# Patient Record
Sex: MALE | Race: WHITE | Employment: OTHER | URBAN - METROPOLITAN AREA
[De-identification: names, ages, dates, MRNs, and addresses within clinical notes are randomized per-mention and may not be internally consistent; named-entity substitution may affect disease eponyms.]

---

## 2020-01-05 ENCOUNTER — HOSPITAL ENCOUNTER (EMERGENCY)
Dept: GENERAL RADIOLOGY | Age: 70
Discharge: HOME | End: 2020-01-05
Attending: PHYSICIAN ASSISTANT
Payer: MEDICARE

## 2020-01-05 ENCOUNTER — APPOINTMENT (OUTPATIENT)
Dept: CT IMAGING | Age: 70
End: 2020-01-05
Attending: PHYSICIAN ASSISTANT
Payer: MEDICARE

## 2020-01-05 ENCOUNTER — HOSPITAL ENCOUNTER (EMERGENCY)
Age: 70
Discharge: HOME | End: 2020-01-05
Attending: EMERGENCY MEDICINE
Payer: MEDICARE

## 2020-01-05 ENCOUNTER — APPOINTMENT (OUTPATIENT)
Dept: GENERAL RADIOLOGY | Age: 70
End: 2020-01-05
Attending: PHYSICIAN ASSISTANT
Payer: MEDICARE

## 2020-01-05 VITALS
OXYGEN SATURATION: 98 % | HEART RATE: 68 BPM | SYSTOLIC BLOOD PRESSURE: 135 MMHG | RESPIRATION RATE: 18 BRPM | TEMPERATURE: 98 F | HEIGHT: 70 IN | BODY MASS INDEX: 37.94 KG/M2 | DIASTOLIC BLOOD PRESSURE: 73 MMHG | WEIGHT: 265 LBS

## 2020-01-05 DIAGNOSIS — S80.211A ABRASION OF RIGHT KNEE, INITIAL ENCOUNTER: ICD-10-CM

## 2020-01-05 DIAGNOSIS — R55 VASOVAGAL EPISODE: ICD-10-CM

## 2020-01-05 DIAGNOSIS — S80.01XA CONTUSION OF RIGHT KNEE AND LOWER LEG, INITIAL ENCOUNTER: Primary | ICD-10-CM

## 2020-01-05 DIAGNOSIS — S80.11XA CONTUSION OF RIGHT KNEE AND LOWER LEG, INITIAL ENCOUNTER: Primary | ICD-10-CM

## 2020-01-05 LAB
ALBUMIN SERPL-MCNC: 4.3 G/DL (ref 3.5–5)
ALBUMIN/GLOB SERPL: 1.3 {RATIO} (ref 1.1–2.2)
ALP SERPL-CCNC: 54 U/L (ref 45–117)
ALT SERPL-CCNC: 30 U/L (ref 12–78)
ANION GAP SERPL CALC-SCNC: 9 MMOL/L (ref 5–15)
APPEARANCE UR: CLEAR
AST SERPL-CCNC: 24 U/L (ref 15–37)
BACTERIA URNS QL MICRO: NEGATIVE /HPF
BASOPHILS # BLD: 0.1 K/UL (ref 0–0.1)
BASOPHILS NFR BLD: 0 % (ref 0–1)
BILIRUB SERPL-MCNC: 1 MG/DL (ref 0.2–1)
BILIRUB UR QL: NEGATIVE
BUN SERPL-MCNC: 27 MG/DL (ref 6–20)
BUN/CREAT SERPL: 25 (ref 12–20)
CALCIUM SERPL-MCNC: 9 MG/DL (ref 8.5–10.1)
CHLORIDE SERPL-SCNC: 103 MMOL/L (ref 97–108)
CO2 SERPL-SCNC: 24 MMOL/L (ref 21–32)
COLOR UR: NORMAL
CREAT SERPL-MCNC: 1.07 MG/DL (ref 0.7–1.3)
DIFFERENTIAL METHOD BLD: ABNORMAL
EOSINOPHIL # BLD: 0 K/UL (ref 0–0.4)
EOSINOPHIL NFR BLD: 0 % (ref 0–7)
EPITH CASTS URNS QL MICRO: NORMAL /LPF
ERYTHROCYTE [DISTWIDTH] IN BLOOD BY AUTOMATED COUNT: 13.9 % (ref 11.5–14.5)
GLOBULIN SER CALC-MCNC: 3.4 G/DL (ref 2–4)
GLUCOSE SERPL-MCNC: 125 MG/DL (ref 65–100)
GLUCOSE UR STRIP.AUTO-MCNC: NEGATIVE MG/DL
HCT VFR BLD AUTO: 42.8 % (ref 36.6–50.3)
HGB BLD-MCNC: 14.7 G/DL (ref 12.1–17)
HGB UR QL STRIP: NEGATIVE
HYALINE CASTS URNS QL MICRO: NORMAL /LPF (ref 0–5)
IMM GRANULOCYTES # BLD AUTO: 0.1 K/UL (ref 0–0.04)
IMM GRANULOCYTES NFR BLD AUTO: 0 % (ref 0–0.5)
KETONES UR QL STRIP.AUTO: NEGATIVE MG/DL
LEUKOCYTE ESTERASE UR QL STRIP.AUTO: NEGATIVE
LYMPHOCYTES # BLD: 1.3 K/UL (ref 0.8–3.5)
LYMPHOCYTES NFR BLD: 9 % (ref 12–49)
MCH RBC QN AUTO: 28.4 PG (ref 26–34)
MCHC RBC AUTO-ENTMCNC: 34.3 G/DL (ref 30–36.5)
MCV RBC AUTO: 82.8 FL (ref 80–99)
MONOCYTES # BLD: 0.6 K/UL (ref 0–1)
MONOCYTES NFR BLD: 4 % (ref 5–13)
NEUTS SEG # BLD: 12.9 K/UL (ref 1.8–8)
NEUTS SEG NFR BLD: 87 % (ref 32–75)
NITRITE UR QL STRIP.AUTO: NEGATIVE
NRBC # BLD: 0 K/UL (ref 0–0.01)
NRBC BLD-RTO: 0 PER 100 WBC
PH UR STRIP: 5.5 [PH] (ref 5–8)
PLATELET # BLD AUTO: 266 K/UL (ref 150–400)
PMV BLD AUTO: 9.4 FL (ref 8.9–12.9)
POTASSIUM SERPL-SCNC: 4.4 MMOL/L (ref 3.5–5.1)
PROT SERPL-MCNC: 7.7 G/DL (ref 6.4–8.2)
PROT UR STRIP-MCNC: NEGATIVE MG/DL
RBC # BLD AUTO: 5.17 M/UL (ref 4.1–5.7)
RBC #/AREA URNS HPF: NORMAL /HPF (ref 0–5)
SODIUM SERPL-SCNC: 136 MMOL/L (ref 136–145)
SP GR UR REFRACTOMETRY: 1.03 (ref 1–1.03)
TROPONIN I SERPL-MCNC: <0.05 NG/ML
UR CULT HOLD, URHOLD: NORMAL
UROBILINOGEN UR QL STRIP.AUTO: 0.2 EU/DL (ref 0.2–1)
WBC # BLD AUTO: 14.9 K/UL (ref 4.1–11.1)
WBC URNS QL MICRO: NORMAL /HPF (ref 0–4)

## 2020-01-05 PROCEDURE — 93005 ELECTROCARDIOGRAM TRACING: CPT

## 2020-01-05 PROCEDURE — 70450 CT HEAD/BRAIN W/O DYE: CPT

## 2020-01-05 PROCEDURE — 73562 X-RAY EXAM OF KNEE 3: CPT

## 2020-01-05 PROCEDURE — 99284 EMERGENCY DEPT VISIT MOD MDM: CPT

## 2020-01-05 PROCEDURE — 80053 COMPREHEN METABOLIC PANEL: CPT

## 2020-01-05 PROCEDURE — 81001 URINALYSIS AUTO W/SCOPE: CPT

## 2020-01-05 PROCEDURE — 85025 COMPLETE CBC W/AUTO DIFF WBC: CPT

## 2020-01-05 PROCEDURE — 84484 ASSAY OF TROPONIN QUANT: CPT

## 2020-01-05 PROCEDURE — 73590 X-RAY EXAM OF LOWER LEG: CPT

## 2020-01-05 PROCEDURE — 74011000250 HC RX REV CODE- 250: Performed by: PHYSICIAN ASSISTANT

## 2020-01-05 PROCEDURE — 36415 COLL VENOUS BLD VENIPUNCTURE: CPT

## 2020-01-05 RX ORDER — CEPHALEXIN 500 MG/1
500 CAPSULE ORAL 3 TIMES DAILY
Qty: 21 CAP | Refills: 0 | Status: SHIPPED | OUTPATIENT
Start: 2020-01-05 | End: 2020-01-12

## 2020-01-05 RX ORDER — BACITRACIN 500 UNIT/G
1 PACKET (EA) TOPICAL
Status: COMPLETED | OUTPATIENT
Start: 2020-01-05 | End: 2020-01-05

## 2020-01-05 RX ADMIN — BACITRACIN 1 PACKET: 500 OINTMENT TOPICAL at 17:10

## 2020-01-05 NOTE — ED PROVIDER NOTES
81 y.o. male with past medical history significant for HTN and diabetes who presents from home with chief complaint of right knee pain. Patient was at [employer] this morning and \"was coughing multiple times, lost consciousness for a second, falling to the ground hitting his head and right knee. \" Patient notes he has episodes like this before \"including coughing so much he loses consciousness for a second. \" Patient presents to [facility] ED c/o right knee pain/swelling and laceration to right side of forehead. Patient notes he took Advil with no relief. Patient endorses his Tetanus shot is UTD. Patient denies anticoagulant use. He states he has seen a cardiologist a few years ago due to syncope and had a negative stress test, this was at [facility]. He denies headache, dizziness, vomiting, neck/back pain, flank pain, CP, SOB, ABD pain, difficulty ambulating. There are no other acute medical concerns at this time. Social hx: No Tobacco use, (+) EtOH use, No illicit drug use. PCP: Kaitlynn Talley MD  He has an orthopedist.    Note written by Gibson Goldberg, Scribe, as dictated by TAHMINA De La Fuente 2:29 PM     The history is provided by the patient. No  was used.         Past Medical History:   Diagnosis Date    Arthritis     Chest pain, unspecified     Essential hypertension, benign     Hypertension     NIDDM (non-insulin dependent diabetes mellitus)     Obesity, unspecified     Other and unspecified hyperlipidemia     Sleep apnea     C PAP    Thyroid disease     Unspecified adverse effect of anesthesia     low oxygenation/fighting       Past Surgical History:   Procedure Laterality Date    ABDOMEN SURGERY PROC UNLISTED      dbl hernia repair as infant    HX ANKLE FRACTURE TX Right     Replacement    HX APPENDECTOMY  APR 2013    HX CHOLECYSTECTOMY      HX ORTHOPAEDIC      left knee     HX OTHER SURGICAL      deviated septum         Family History:   Problem Relation Age of Onset    Emphysema Mother    [place] Other Mother         enlarged heart    Heart Attack Maternal Grandmother        Social History     Socioeconomic History    Marital status:      Spouse name: Not on file    Number of children: Not on file    Years of education: Not on file    Highest education level: Not on file   Occupational History    Not on file   Social Needs    Financial resource strain: Not on file    Food insecurity:     Worry: Not on file     Inability: Not on file    Transportation needs:     Medical: Not on file     Non-medical: Not on file   Tobacco Use    Smoking status: Never Smoker    Smokeless tobacco: Never Used   Substance and Sexual Activity    Alcohol use: Yes     Comment: rarely    Drug use: No    Sexual activity: Not on file   Lifestyle    Physical activity:     Days per week: Not on file     Minutes per session: Not on file    Stress: Not on file   Relationships    Social connections:     Talks on phone: Not on file     Gets together: Not on file     Attends Jehovah's witness service: Not on file     Active member of club or organization: Not on file     Attends meetings of clubs or organizations: Not on file     Relationship status: Not on file    Intimate partner violence:     Fear of current or ex partner: Not on file     Emotionally abused: Not on file     Physically abused: Not on file     Forced sexual activity: Not on file   Other Topics Concern    Not on file   Social History Narrative    Not on file         ALLERGIES: Codeine    Review of Systems   Constitutional: Negative for activity change and fever. HENT: Negative for congestion. Respiratory: Negative for shortness of breath. Cardiovascular: Negative for chest pain. Gastrointestinal: Negative for abdominal pain. Musculoskeletal: Negative for back pain, gait problem and neck pain. Right knee swelling and pain. Skin: Positive for wound. Rash: laceration. Neurological: Positive for syncope.  Negative for headaches. All other systems reviewed and are negative. There were no vitals filed for this visit. Physical Exam  Vitals signs and nursing note reviewed. Constitutional:       General: He is not in acute distress. Appearance: He is well-developed. He is not toxic-appearing or diaphoretic. Comments: WM   HENT:      Head: Normocephalic. Comments: 2 linear areas of ecchymosis to R lateral maxilla; no man bile instability; normal bite     Right Ear: Tympanic membrane normal.      Left Ear: Tympanic membrane normal.      Mouth/Throat:      Mouth: Mucous membranes are moist.   Eyes:      General:         Right eye: No discharge. Left eye: No discharge. Conjunctiva/sclera: Conjunctivae normal.      Pupils: Pupils are equal, round, and reactive to light. Neck:      Musculoskeletal: Full passive range of motion without pain and normal range of motion. Trachea: No tracheal tenderness. Comments: No midline spinous process TTP throughout. Cardiovascular:      Rate and Rhythm: Normal rate and regular rhythm. Pulses: Normal pulses. Heart sounds: Normal heart sounds. No murmur. No friction rub. No gallop. Pulmonary:      Effort: Pulmonary effort is normal. No respiratory distress. Breath sounds: Normal breath sounds. No wheezing or rales. Chest:      Chest wall: No tenderness. Abdominal:      General: Bowel sounds are normal. There is no distension. Palpations: Abdomen is soft. Tenderness: There is no tenderness. There is no guarding or rebound. Musculoskeletal: Normal range of motion. General: No tenderness. Comments: R knee with joint effusion, large dry abrasion to prepatellar region; FROM; 2 abrasions to R anterior shin; NVI throughout. L knee- TKR scar, non-tender; FROM of all extremities. Ambulates without difficulty. Skin:     General: Skin is warm and dry.       Capillary Refill: Capillary refill takes less than 2 seconds. Findings: No abrasion, erythema or rash. Neurological:      Mental Status: He is alert and oriented to person, place, and time. Cranial Nerves: No cranial nerve deficit. Sensory: No sensory deficit. Coordination: Coordination normal.   Psychiatric:         Speech: Speech normal.         Behavior: Behavior normal.          MDM  Number of Diagnoses or Management Options  Abrasion of right knee, initial encounter:   Contusion of right knee and lower leg, initial encounter:   Vasovagal episode:   Diagnosis management comments:   Ddx: frx, contusion, abrasion       Amount and/or Complexity of Data Reviewed  Clinical lab tests: reviewed and ordered  Tests in the radiology section of CPT®: reviewed and ordered  Review and summarize past medical records: yes  Discuss the patient with other providers: yes    Patient Progress  Patient progress: stable         Procedures      I discussed patient's PMH, exam findings as well as careplan with the ER attending who agrees with care plan. Luana Ovalle PA-C    EKG interpretation:   Rhythm: sinus bradycardia; and regular . Rate (approx.): 57; Axis: normal; P wave: normal; QRS interval: normal ; ST/T wave: non-specific changes. Interpreted by Dr. Beto Palacios:  Recent Results (from the past 12 hour(s))   CBC WITH AUTOMATED DIFF    Collection Time: 01/05/20  3:35 PM   Result Value Ref Range    WBC 14.9 (H) 4.1 - 11.1 K/uL    RBC 5.17 4.10 - 5.70 M/uL    HGB 14.7 12.1 - 17.0 g/dL    HCT 42.8 36.6 - 50.3 %    MCV 82.8 80.0 - 99.0 FL    MCH 28.4 26.0 - 34.0 PG    MCHC 34.3 30.0 - 36.5 g/dL    RDW 13.9 11.5 - 14.5 %    PLATELET 939 618 - 046 K/uL    MPV 9.4 8.9 - 12.9 FL    NRBC 0.0 0  WBC    ABSOLUTE NRBC 0.00 0.00 - 0.01 K/uL    NEUTROPHILS 87 (H) 32 - 75 %    LYMPHOCYTES 9 (L) 12 - 49 %    MONOCYTES 4 (L) 5 - 13 %    EOSINOPHILS 0 0 - 7 %    BASOPHILS 0 0 - 1 %    IMMATURE GRANULOCYTES 0 0.0 - 0.5 %    ABS.  NEUTROPHILS 12.9 (H) 1.8 - 8.0 K/UL    ABS. LYMPHOCYTES 1.3 0.8 - 3.5 K/UL    ABS. MONOCYTES 0.6 0.0 - 1.0 K/UL    ABS. EOSINOPHILS 0.0 0.0 - 0.4 K/UL    ABS. BASOPHILS 0.1 0.0 - 0.1 K/UL    ABS. IMM. GRANS. 0.1 (H) 0.00 - 0.04 K/UL    DF AUTOMATED     METABOLIC PANEL, COMPREHENSIVE    Collection Time: 01/05/20  3:35 PM   Result Value Ref Range    Sodium 136 136 - 145 mmol/L    Potassium 4.4 3.5 - 5.1 mmol/L    Chloride 103 97 - 108 mmol/L    CO2 24 21 - 32 mmol/L    Anion gap 9 5 - 15 mmol/L    Glucose 125 (H) 65 - 100 mg/dL    BUN 27 (H) 6 - 20 MG/DL    Creatinine 1.07 0.70 - 1.30 MG/DL    BUN/Creatinine ratio 25 (H) 12 - 20      GFR est AA >60 >60 ml/min/1.73m2    GFR est non-AA >60 >60 ml/min/1.73m2    Calcium 9.0 8.5 - 10.1 MG/DL    Bilirubin, total 1.0 0.2 - 1.0 MG/DL    ALT (SGPT) 30 12 - 78 U/L    AST (SGOT) 24 15 - 37 U/L    Alk. phosphatase 54 45 - 117 U/L    Protein, total 7.7 6.4 - 8.2 g/dL    Albumin 4.3 3.5 - 5.0 g/dL    Globulin 3.4 2.0 - 4.0 g/dL    A-G Ratio 1.3 1.1 - 2.2     TROPONIN I    Collection Time: 01/05/20  3:35 PM   Result Value Ref Range    Troponin-I, Qt. <0.05 <0.05 ng/mL   URINE CULTURE HOLD SAMPLE    Collection Time: 01/05/20  4:51 PM   Result Value Ref Range    Urine culture hold        URINE ON HOLD IN MICROBIOLOGY DEPT FOR 3 DAYS. IF UNPRESERVED URINE IS SUBMITTED, IT CANNOT BE USED FOR ADDITIONAL TESTING AFTER 24 HRS, RECOLLECTION WILL BE REQUIRED. MEDICATIONS GIVEN:  Medications   bacitracin 500 unit/gram packet 1 Packet (1 Packet Topical Given 1/5/20 1710)         DISCHARGE NOTE:  5:14 PM  The patient's results have been reviewed with them and/or available family. Patient and/or family verbally conveyed their understanding and agreement of the patient's signs, symptoms, diagnosis, treatment and prognosis and additionally agree to follow up as recommended in the discharge instructions or to return to the Emergency Room should their condition change prior to their follow-up appointment.  The patient/family verbally agrees with the care-plan and verbally conveys that all of their questions have been answered. The discharge instructions have also been provided to the patient and/or family with some educational information regarding the patient's diagnosis as well a list of reasons why the patient would want to return to the ER prior to their follow-up appointment, should their condition change. Plan:  1. F/U with pcp, cardiology due to probably vasovagal episode  2.  Return precautions discussed and advised to return to ER if worse

## 2020-01-05 NOTE — DISCHARGE INSTRUCTIONS
Patient Education        Fainting: Care Instructions  Your Care Instructions    When you faint, or pass out, you lose consciousness for a short time. A brief drop in blood flow to the brain often causes it. When you fall or lie down, more blood flows to your brain and you regain consciousness. Emotional stress, pain, or overheating--especially if you have been standing--can make you faint. In these cases, fainting is usually not serious. But fainting can be a sign of a more serious problem. Your doctor may want you to have more tests to rule out other causes. The treatment you need depends on the reason why you fainted. The doctor has checked you carefully, but problems can develop later. If you notice any problems or new symptoms, get medical treatment right away. Follow-up care is a key part of your treatment and safety. Be sure to make and go to all appointments, and call your doctor if you are having problems. It's also a good idea to know your test results and keep a list of the medicines you take. How can you care for yourself at home? · Drink plenty of fluids to prevent dehydration. If you have kidney, heart, or liver disease and have to limit fluids, talk with your doctor before you increase your fluid intake. When should you call for help? Call 911 anytime you think you may need emergency care. For example, call if:    · You have symptoms of a heart problem. These may include:  ? Chest pain or pressure. ? Severe trouble breathing. ? A fast or irregular heartbeat. ? Lightheadedness or sudden weakness. ? Coughing up pink, foamy mucus. ? Passing out. After you call 911, the  may tell you to chew 1 adult-strength or 2 to 4 low-dose aspirin. Wait for an ambulance. Do not try to drive yourself.     · You have symptoms of a stroke. These may include:  ? Sudden numbness, tingling, weakness, or loss of movement in your face, arm, or leg, especially on only one side of your body.   ? Sudden vision changes. ? Sudden trouble speaking. ? Sudden confusion or trouble understanding simple statements. ? Sudden problems with walking or balance. ? A sudden, severe headache that is different from past headaches.     · You passed out (lost consciousness) again.    Watch closely for changes in your health, and be sure to contact your doctor if:    · You do not get better as expected. Where can you learn more? Go to http://harshil-naomi.info/. Enter A028 in the search box to learn more about \"Fainting: Care Instructions. \"  Current as of: June 26, 2019  Content Version: 12.2  © 2996-1639 [employer]. Care instructions adapted under license by [employer] (which disclaims liability or warranty for this information). If you have questions about a medical condition or this instruction, always ask your healthcare professional. [address] any warranty or liability for your use of this information. Patient Education     Contusion: Care Instructions  Your Care Instructions  Contusion is the medical term for a bruise. It is the result of a direct blow or an impact, such as a fall. Contusions are common sports injuries. Most people think of a bruise as a black-and-blue spot. This happens when small blood vessels get torn and leak blood under the skin. But bones, muscles, and organs can also get bruised. This may damage deep tissues but not cause a bruise you can see. The doctor will do a physical exam to find the location of your contusion. You may also have tests to make sure you do not have a more serious injury, such as a broken bone or nerve damage. These may include X-rays or other imaging tests like a CT scan or MRI. Deep-tissue contusions may cause pain and swelling. But if there is no serious damage, they will often get better in a few weeks with home treatment. The doctor has checked you carefully, but problems can develop later. If you notice any problems or new symptoms, get medical treatment right away. Follow-up care is a key part of your treatment and safety. Be sure to make and go to all appointments, and call your doctor if you are having problems. It's also a good idea to know your test results and keep a list of the medicines you take. How can you care for yourself at home? · Put ice or a cold pack on the sore area for 10 to 20 minutes at a time to stop swelling. Put a thin cloth between the ice pack and your skin. · Be safe with medicines. Read and follow all instructions on the label. ¨ If the doctor gave you a prescription medicine for pain, take it as prescribed. ¨ If you are not taking a prescription pain medicine, ask your doctor if you can take an over-the-counter medicine. · If you can, prop up the sore area on pillows as much as possible for the next few days. Try to keep the sore area above the level of your heart. When should you call for help? Call your doctor now or seek immediate medical care if:  · Your pain gets worse. · You have new or worse swelling. · You have tingling, weakness, or numbness in the area near the contusion. · The area near the contusion is cold or pale. Watch closely for changes in your health, and be sure to contact your doctor if:  · You do not get better as expected. Where can you learn more? Go to Tuee.be  Enter [id number] in the search box to learn more about \"Contusion: Care Instructions. \"   © 2277-1381 [employer]. Care instructions adapted under license by [employer] (which disclaims liability or warranty for this information). This care instruction is for use with your licensed healthcare professional. If you have questions about a medical condition or this instruction, always ask your healthcare professional. [address] any warranty or liability for your use of this information.   Content Version: 98.7.094523; Current as of: May 22, 2015           Patient Education        Scrapes (Abrasions): Care Instructions  Your Care Instructions  Scrapes (abrasions) are wounds where your skin has been rubbed or torn off. Most scrapes do not go deep into the skin, but some may remove several layers of skin. Scrapes usually don't bleed much, but they may ooze pinkish fluid. Scrapes on the head or face may appear worse than they are. They may bleed a lot because of the good blood supply to this area. Most scrapes heal well and may not need a bandage. They usually heal within 3 to 7 days. A large, deep scrape may take 1 to 2 weeks or longer to heal. A scab may form on some scrapes. Follow-up care is a key part of your treatment and safety. Be sure to make and go to all appointments, and call your doctor if you are having problems. It's also a good idea to know your test results and keep a list of the medicines you take. How can you care for yourself at home? · If your doctor told you how to care for your wound, follow your doctor's instructions. If you did not get instructions, follow this general advice:  ? Wash the scrape with clean water 2 times a day. Don't use hydrogen peroxide or alcohol, which can slow healing. ? You may cover the scrape with a thin layer of petroleum jelly, such as Vaseline, and a nonstick bandage. ? Apply more petroleum jelly and replace the bandage as needed. · Prop up the injured area on a pillow anytime you sit or lie down during the next 3 days. Try to keep it above the level of your heart. This will help reduce swelling. · Be safe with medicines. Take pain medicines exactly as directed. ? If the doctor gave you a prescription medicine for pain, take it as prescribed. ? If you are not taking a prescription pain medicine, ask your doctor if you can take an over-the-counter medicine. When should you call for help?   Call your doctor now or seek immediate medical care if:    · You have signs of infection, such as:  ? Increased pain, swelling, warmth, or redness around the scrape. ? Red streaks leading from the scrape. ? Pus draining from the scrape. ? A fever.     · The scrape starts to bleed, and blood soaks through the bandage. Oozing small amounts of blood is normal.    Watch closely for changes in your health, and be sure to contact your doctor if the scrape is not getting better each day. Where can you learn more? Go to http://harshil-naomi.info/. Enter A374 in the search box to learn more about \"Scrapes (Abrasions): Care Instructions. \"  Current as of: June 26, 2019  Content Version: 12.2  © 8612-5484 [employer]. Care instructions adapted under license by [employer] (which disclaims liability or warranty for this information). If you have questions about a medical condition or this instruction, always ask your healthcare professional. [place] [address] any warranty or liability for your use of this information. Patient Education        Knee Pain or Injury: Care Instructions  Your Care Instructions    Injuries are a common cause of knee problems. Sudden (acute) injuries may be caused by a direct blow to the knee. They can also be caused by abnormal twisting, bending, or falling on the knee. Pain, bruising, or swelling may be severe, and may start within minutes of the injury. Overuse is another cause of knee pain. Other causes are climbing stairs, kneeling, and other activities that use the knee. Everyday wear and tear, especially as you get older, also can cause knee pain. Rest, along with home treatment, often relieves pain and allows your knee to heal. If you have a serious knee injury, you may need tests and treatment. Follow-up care is a key part of your treatment and safety. Be sure to make and go to all appointments, and call your doctor if you are having problems.  It's also a good idea to know your test results and keep a list of the medicines you take. How can you care for yourself at home? · Be safe with medicines. Read and follow all instructions on the label. ? If the doctor gave you a prescription medicine for pain, take it as prescribed. ? If you are not taking a prescription pain medicine, ask your doctor if you can take an over-the-counter medicine. · Rest and protect your knee. Take a break from any activity that may cause pain. · Put ice or a cold pack on your knee for 10 to 20 minutes at a time. Put a thin cloth between the ice and your skin. · Prop up a sore knee on a pillow when you ice it or anytime you sit or lie down for the next 3 days. Try to keep it above the level of your heart. This will help reduce swelling. · If your knee is not swollen, you can put moist heat, a heating pad, or a warm cloth on your knee. · If your doctor recommends an elastic bandage, sleeve, or other type of support for your knee, wear it as directed. · Follow your doctor's instructions about how much weight you can put on your leg. Use a cane, crutches, or a walker as instructed. · Follow your doctor's instructions about activity during your healing process. If you can do mild exercise, slowly increase your activity. · Reach and stay at a healthy weight. Extra weight can strain the joints, especially the knees and hips, and make the pain worse. Losing even a few pounds may help. When should you call for help? Call 911 anytime you think you may need emergency care. For example, call if:    · You have symptoms of a blood clot in your lung (called a pulmonary embolism). These may include:  ? Sudden chest pain. ? Trouble breathing. ?  Coughing up blood.    Call your doctor now or seek immediate medical care if:    · You have severe or increasing pain.     · Your leg or foot turns cold or changes color.     · You cannot stand or put weight on your knee.     · Your knee looks twisted or bent out of shape.     · You cannot move your knee.     · You have signs of infection, such as:  ? Increased pain, swelling, warmth, or redness. ? Red streaks leading from the knee. ? Pus draining from a place on your knee. ? A fever.     · You have signs of a blood clot in your leg (called a deep vein thrombosis), such as:  ? Pain in your calf, back of the knee, thigh, or groin. ? Redness and swelling in your leg or groin.    Watch closely for changes in your health, and be sure to contact your doctor if:    · You have tingling, weakness, or numbness in your knee.     · You have any new symptoms, such as swelling.     · You have bruises from a knee injury that last longer than 2 weeks.     · You do not get better as expected. Where can you learn more? Go to http://harshil-naomi.info/. Enter K195 in the search box to learn more about \"Knee Pain or Injury: Care Instructions. \"  Current as of: June 26, 2019  Content Version: 12.2  © 8721-2270 [employer]. Care instructions adapted under license by [employer] (which disclaims liability or warranty for this information). If you have questions about a medical condition or this instruction, always ask your healthcare professional. [address] any warranty or liability for your use of this information.

## 2020-01-05 NOTE — ED NOTES
Kimi MARTEL at bedside to review d/c instructions and prescription(s) with patient and spouse. Opportunity for clarification given. No further questions/concerns voiced at this time. Patient is alert and remains in no acute distress.

## 2020-01-06 LAB
ATRIAL RATE: 57 BPM
CALCULATED P AXIS, ECG09: 14 DEGREES
CALCULATED R AXIS, ECG10: 44 DEGREES
CALCULATED T AXIS, ECG11: 57 DEGREES
DIAGNOSIS, 93000: NORMAL
P-R INTERVAL, ECG05: 158 MS
Q-T INTERVAL, ECG07: 438 MS
QRS DURATION, ECG06: 94 MS
QTC CALCULATION (BEZET), ECG08: 426 MS
VENTRICULAR RATE, ECG03: 57 BPM

## 2020-02-19 ENCOUNTER — OFFICE VISIT (OUTPATIENT)
Dept: CARDIOLOGY CLINIC | Age: 70
End: 2020-02-19

## 2020-02-19 VITALS
OXYGEN SATURATION: 96 % | HEART RATE: 57 BPM | SYSTOLIC BLOOD PRESSURE: 118 MMHG | DIASTOLIC BLOOD PRESSURE: 80 MMHG | BODY MASS INDEX: 39.51 KG/M2 | HEIGHT: 70 IN | WEIGHT: 276 LBS

## 2020-02-19 DIAGNOSIS — E66.9 CLASS 2 OBESITY WITH BODY MASS INDEX (BMI) OF 39.0 TO 39.9 IN ADULT, UNSPECIFIED OBESITY TYPE, UNSPECIFIED WHETHER SERIOUS COMORBIDITY PRESENT: ICD-10-CM

## 2020-02-19 DIAGNOSIS — I10 ESSENTIAL HYPERTENSION, BENIGN: Primary | ICD-10-CM

## 2020-02-19 DIAGNOSIS — R55 SYNCOPE, UNSPECIFIED SYNCOPE TYPE: ICD-10-CM

## 2020-02-19 DIAGNOSIS — G47.30 SLEEP APNEA, UNSPECIFIED TYPE: ICD-10-CM

## 2020-02-19 RX ORDER — TELMISARTAN 40 MG/1
TABLET ORAL
COMMUNITY
Start: 2019-12-30 | End: 2022-05-19

## 2020-02-19 NOTE — PROGRESS NOTES
HISTORY OF PRESENTING ILLNESS      Juan Antonio Gannon is a 78 y.o. male with diabetes, hypertension and sleep apnea who is referred from the [facility] ER for recent syncopal event after coughing. He reports similar episodes previously occurring every 1-2 years and has been seen by cardiology years prior d/t syncope. He had negative stress testing then. He has family hx of aortic stenosis and other valvular disease. He denies additional cardiac complaints or previous additional workup for this complaint. EKG during ER visit was sinus bradycardia.        ACTIVE PROBLEM LIST     Patient Active Problem List    Diagnosis Date Noted    Ankle instability 07/02/2013    Posterior tibial tendon dysfunction 07/02/2013    Contracture, Achilles tendon 07/02/2013    Chest pain, unspecified     Other and unspecified hyperlipidemia     NIDDM (non-insulin dependent diabetes mellitus)     Obesity, unspecified     Essential hypertension, benign     Sleep apnea            PAST MEDICAL HISTORY     Past Medical History:   Diagnosis Date    Arthritis     Chest pain, unspecified     Essential hypertension, benign     Hypertension     NIDDM (non-insulin dependent diabetes mellitus)     Obesity, unspecified     Other and unspecified hyperlipidemia     Sleep apnea     C PAP    Thyroid disease     Unspecified adverse effect of anesthesia     low oxygenation/fighting           PAST SURGICAL HISTORY     Past Surgical History:   Procedure Laterality Date    ABDOMEN SURGERY PROC UNLISTED      dbl hernia repair as infant    HX ANKLE FRACTURE TX Right     Replacement    HX APPENDECTOMY  APR 2013    HX CHOLECYSTECTOMY      HX ORTHOPAEDIC      left knee     HX OTHER SURGICAL      deviated septum          ALLERGIES     Allergies   Allergen Reactions    Codeine Anxiety          FAMILY HISTORY     Family History   Problem Relation Age of Onset    Emphysema Mother     Other Mother         enlarged heart    Heart Attack Maternal Grandmother     negative for cardiac disease       SOCIAL HISTORY     Social History     Socioeconomic History    Marital status:      Spouse name: Not on file    Number of children: Not on file    Years of education: Not on file    Highest education level: Not on file   Tobacco Use    Smoking status: Never Smoker    Smokeless tobacco: Never Used   Substance and Sexual Activity    Alcohol use: Yes     Comment: rarely    Drug use: No         MEDICATIONS     Current Outpatient Medications   Medication Sig    LOSARTAN-HYDROCHLOROTHIAZIDE PO Take 1 Tab by mouth daily.  INSULIN DETEMIR (LEVEMIR FLEXPEN SC) 30 Units by SubCUTAneous route nightly.  metFORMIN (GLUCOPHAGE) 500 mg tablet Take 500 mg by mouth daily (with breakfast).  cholecalciferol, vitamin D3, 2,000 unit Tab Take  by mouth daily.  multivitamin (ONE A DAY) tablet Take 1 Tab by mouth daily.  sertraline (ZOLOFT) 100 mg tablet Take 100 mg by mouth daily. 150 mg daily    levothyroxine (SYNTHROID) 200 mcg tablet Take 225 mcg by mouth Daily (before breakfast).  simvastatin (ZOCOR) 40 mg tablet Take  by mouth nightly. No current facility-administered medications for this visit. I have reviewed the nurses notes, vitals, problem list, allergy list, medical history, family, social history and medications. REVIEW OF SYMPTOMS      General: Pt denies excessive weight gain or loss. Pt is able to conduct ADL's  HEENT: Denies blurred vision, headaches, hearing loss, epistaxis and difficulty swallowing. Respiratory: Denies cough, congestion, shortness of breath, MEEHAN, wheezing or stridor.   Cardiovascular: Denies precordial pain, palpitations, edema or PND  Gastrointestinal: Denies poor appetite, indigestion, abdominal pain or blood in stool  Genitourinary: Denies hematuria, dysuria, increased urinary frequency  Musculoskeletal: Denies joint pain or swelling from muscles or joints  Neurologic: +syncope, Denies tremor, paresthesias, headache, or sensory motor disturbance  Psychiatric: Denies confusion, insomnia, depression  Integumentray: Denies rash, itching or ulcers. Hematologic: Denies easy bruising, bleeding       PHYSICAL EXAMINATION      There were no vitals filed for this visit. General: Well developed, in no acute distress. HEENT: No jaundice, oral mucosa moist, no oral ulcers  Neck: Supple, no stiffness, no lymphadenopathy, supple  Heart:  Normal S1/S2 negative S3 or S4. Regular, no murmur, gallop or rub, no jugular venous distention  Respiratory: Clear bilaterally x 4, no wheezing or rales  Abdomen:   Soft, non-tender, bowel sounds are active.   Extremities:  No edema, normal cap refill, no cyanosis. Musculoskeletal: No clubbing, no deformities  Neuro: A&Ox3, speech clear, gait stable, cooperative, no focal neurologic deficits  Skin: Skin color is normal. No rashes or lesions. Non diaphoretic, moist.  Vascular: 2+ pulses symmetric in all extremities       DIAGNOSTIC DATA      EKG: sinus bradycardia        LABORATORY DATA      Lab Results   Component Value Date/Time    WBC 14.9 (H) 01/05/2020 03:35 PM    HGB 14.7 01/05/2020 03:35 PM    HCT 42.8 01/05/2020 03:35 PM    PLATELET 202 32/23/8421 03:35 PM    MCV 82.8 01/05/2020 03:35 PM      Lab Results   Component Value Date/Time    Sodium 136 01/05/2020 03:35 PM    Potassium 4.4 01/05/2020 03:35 PM    Chloride 103 01/05/2020 03:35 PM    CO2 24 01/05/2020 03:35 PM    Anion gap 9 01/05/2020 03:35 PM    Glucose 125 (H) 01/05/2020 03:35 PM    BUN 27 (H) 01/05/2020 03:35 PM    Creatinine 1.07 01/05/2020 03:35 PM    BUN/Creatinine ratio 25 (H) 01/05/2020 03:35 PM    GFR est AA >60 01/05/2020 03:35 PM    GFR est non-AA >60 01/05/2020 03:35 PM    Calcium 9.0 01/05/2020 03:35 PM    Bilirubin, total 1.0 01/05/2020 03:35 PM    AST (SGOT) 24 01/05/2020 03:35 PM    Alk.  phosphatase 54 01/05/2020 03:35 PM    Protein, total 7.7 01/05/2020 03:35 PM    Albumin 4.3 01/05/2020 03:35 PM Globulin 3.4 01/05/2020 03:35 PM    A-G Ratio 1.3 01/05/2020 03:35 PM    ALT (SGPT) 30 01/05/2020 03:35 PM           ASSESSMENT      1. Syncope  2. Hypertension  3. Diabetes  4. Sleep apnea  5. Obesity       PLAN     Plan for echocardiogram and injection of a loop recorder. FOLLOW-UP     Post procedure      Thank you, Faisal Nix MD for allowing me to participate in the care of this extraordinarily pleasant male. Please do not hesitate to contact me for further questions/concerns. Hao San NP    Patient seen and examined by me with nurse practitioner. I personally performed all components of the history, physical, and medical decision making and agree with the assessment and plan with minor modifications as noted.        Debora Bond MD  Cardiac Electrophysiology / Cardiology    [place] [id number].  [address] [facility], [facility], [address] [place] [address] [facility]  [phone] / [phone] Fax   [phone] / [phone] Fax

## 2020-02-19 NOTE — PROGRESS NOTES
Chief Complaint   Patient presents with    Hypertension     Visit Vitals  /80 (BP 1 Location: Left arm, BP Patient Position: Sitting)   Pulse (!) 57   Ht 5' 10\" (1.778 m)   Wt 276 lb (125.2 kg)   SpO2 96%   BMI 39.60 kg/m²     Pt in office states no caridac complaints. No hospital stays. No cardiac medication refills.

## 2020-03-02 DIAGNOSIS — R55 SYNCOPE, UNSPECIFIED SYNCOPE TYPE: Primary | ICD-10-CM

## 2020-03-02 DIAGNOSIS — R55 SYNCOPE, UNSPECIFIED SYNCOPE TYPE: ICD-10-CM

## 2020-03-07 LAB
ERYTHROCYTE [DISTWIDTH] IN BLOOD BY AUTOMATED COUNT: 14.1 % (ref 11.6–15.4)
HCT VFR BLD AUTO: 41.3 % (ref 37.5–51)
HGB BLD-MCNC: 14 G/DL (ref 13–17.7)
MCH RBC QN AUTO: 29 PG (ref 26.6–33)
MCHC RBC AUTO-ENTMCNC: 33.9 G/DL (ref 31.5–35.7)
MCV RBC AUTO: 86 FL (ref 79–97)
PLATELET # BLD AUTO: 205 X10E3/UL (ref 150–450)
RBC # BLD AUTO: 4.83 X10E6/UL (ref 4.14–5.8)
WBC # BLD AUTO: 8.3 X10E3/UL (ref 3.4–10.8)

## 2020-03-12 ENCOUNTER — HOSPITAL ENCOUNTER (OUTPATIENT)
Age: 70
Setting detail: OUTPATIENT SURGERY
Discharge: HOME | End: 2020-03-12
Attending: INTERNAL MEDICINE | Admitting: INTERNAL MEDICINE
Payer: MEDICARE

## 2020-03-12 VITALS — WEIGHT: 271.19 LBS | BODY MASS INDEX: 38.82 KG/M2 | HEIGHT: 70 IN

## 2020-03-12 DIAGNOSIS — R55 SYNCOPE AND COLLAPSE: ICD-10-CM

## 2020-03-12 PROCEDURE — 33285 INSJ SUBQ CAR RHYTHM MNTR: CPT | Performed by: INTERNAL MEDICINE

## 2020-03-12 PROCEDURE — 74011250637 HC RX REV CODE- 250/637: Performed by: INTERNAL MEDICINE

## 2020-03-12 PROCEDURE — 77030041075 HC DRSG AG OPTIFRM MDII -B: Performed by: INTERNAL MEDICINE

## 2020-03-12 PROCEDURE — C1764 EVENT RECORDER, CARDIAC: HCPCS | Performed by: INTERNAL MEDICINE

## 2020-03-12 DEVICE — MON LNQ11 REVEAL LINQ USA FW2.0
Type: IMPLANTABLE DEVICE | Status: FUNCTIONAL
Brand: REVEAL LINQ™

## 2020-03-12 RX ORDER — CEPHALEXIN 250 MG/1
500 CAPSULE ORAL
Status: COMPLETED | OUTPATIENT
Start: 2020-03-12 | End: 2020-03-12

## 2020-03-12 RX ORDER — DIAZEPAM 5 MG/1
5 TABLET ORAL
Status: DISCONTINUED | OUTPATIENT
Start: 2020-03-12 | End: 2020-03-12 | Stop reason: HOSPADM

## 2020-03-12 RX ORDER — CEPHALEXIN 250 MG/1
500 CAPSULE ORAL ONCE
Status: DISCONTINUED | OUTPATIENT
Start: 2020-03-12 | End: 2020-03-12

## 2020-03-12 RX ORDER — CEPHALEXIN 500 MG/1
500 CAPSULE ORAL 3 TIMES DAILY
Qty: 15 CAP | Refills: 0 | Status: SHIPPED | OUTPATIENT
Start: 2020-03-12 | End: 2020-03-17

## 2020-03-12 RX ADMIN — DIAZEPAM 5 MG: 5 TABLET ORAL at 07:17

## 2020-03-12 RX ADMIN — CEPHALEXIN 500 MG: 250 CAPSULE ORAL at 07:25

## 2020-03-12 NOTE — Clinical Note
TRANSFER - IN REPORT:     Verbal report received from: BEDSIDE. Report consisted of patient's Situation, Background, Assessment and   Recommendations(SBAR). Opportunity for questions and clarification was provided. Assessment completed upon patient's arrival to unit and care assumed.

## 2020-03-12 NOTE — PROCEDURES
Cardiac Electrophysiology Report      PATIENT INFORMATION     Patient Name: Ryan Talbert MRN: [id number]       Study Date: 3/12/2020    YOB: 1950  Age: 78 y.o. Gender: male      Procedure:  Loop Recorder Injectiona    Referring Physician:  Fanny Romero, [address]     Duty Name   Electrophysiologist Ally Nolan MD   Monitor PARISA Witt       PATIENT HISTORY     Boston Bowman is a 78 y. o. male with diabetes, hypertension and sleep apnea who is referred from the [facility] ER for recent syncopal event after coughing. He reported similar episodes previously occurring every 1-2 years and has been seen by cardiology years prior due to syncope. He had negative stress testing then. EKG during ER visit was sinus bradycardia. He now presents for injection of a loop recorder.        PROCEDURE     The patient was brought to the Cardiac Electrophysiology laboratory preparatory area in a post-absorptive, fasting state. Informed consent was obtained. A peripheral IV was in place. Continuous electrocardiographic, blood pressure, O2 saturation and  CO2 monitoring was initiated. Pre-operative antibiotics were administered pre-operatively. Self-adhesive cardioversion patches were positioned on the chest.  Conscious sedation was effectuated according to protocol. The patient was then prepped and draped in the usual sterile fashion. A 50/50 mixture of lidocaine (1%) with epinephrine and bupivicaine (0.5%) was utilized for local anesthesia. A blunt incision was delivered along the left sternal border between the 3rd and 4th intercostal space. A loop recorder was then injected successfully using a EVOFEM loop recorder injection tool. Dermabond adhesive glue was applied to the skin. The patient remained hemodynamically stable, tolerated the procedure well and was transferred in stable condition.  There were no immediate complications encountered during the procedure. There was minimal blood loss and no specimen were removed. LEAD & GENERATOR DATA       Model # Serial #   Generator Medtronic [id number]       MEDICATION SUMMARY     Medication Route Unit Total   Keflex PO mg 500   Valium PO mg 5       RADIOLOGY SUMMARY     N/A      CONCLUSIONS     1. Successful injection of loop recorder. 2. Wound check in EP clinic in 14 days. 3. Oral antibiotics x 5 days. 4.  Follow up in EP clinic in 3 months or earlier if necessary. 5. Follow up with  TAHMINA Solano as scheduled. Thank you TAHMINA Solano for allowing me to participate in the care of this extraordinarily pleasant male.         Amador Lopez MD  Cardiac Electrophysiology / Cardiology    [address], [address] [facility], [address]  [address], [address]  [phone] / [phone] Fax [phone] / [phone] Fax

## 2020-03-12 NOTE — PROGRESS NOTES
0700    Patient arrived. ID and allergies verified verbally with patient. Pt voices understanding of procedure to be performed. Consent obtained. Pt prepped for procedure. Loop insertion     0925    [employer] performing post home monitoring education. 0935    Pt discharged via ambulation  with spouse. Personal belongings with patient upon discharge.

## 2020-03-12 NOTE — Clinical Note
TRANSFER - OUT REPORT:     Verbal report given to: BEDSIDE. Report consisted of patient's Situation, Background, Assessment and   Recommendations(SBAR). Opportunity for questions and clarification was provided. Patient transported to: [place] [address].

## 2020-03-12 NOTE — Clinical Note
TRANSFER - OUT REPORT:     Verbal report given to: BEDSIDE. Report consisted of patient's Situation, Background, Assessment and   Recommendations(SBAR). Opportunity for questions and clarification was provided. Patient transported to: [place] [address].

## 2020-03-12 NOTE — H&P
HISTORY OF PRESENTING ILLNESS      Lake Friend is a 78 y.o. male with diabetes, hypertension and sleep apnea who is referred from the [facility] ER for recent syncopal event after coughing. He reports similar episodes previously occurring every 1-2 years and has been seen by cardiology years prior d/t syncope. He had negative stress testing then. He has family hx of aortic stenosis and other valvular disease. He denies additional cardiac complaints or previous additional workup for this complaint.  EKG during ER visit was sinus bradycardia.         ACTIVE PROBLEM LIST           Patient Active Problem List     Diagnosis Date Noted    Ankle instability 07/02/2013    Posterior tibial tendon dysfunction 07/02/2013    Contracture, Achilles tendon 07/02/2013    Chest pain, unspecified      Other and unspecified hyperlipidemia      NIDDM (non-insulin dependent diabetes mellitus)      Obesity, unspecified      Essential hypertension, benign      Sleep apnea               PAST MEDICAL HISTORY           Past Medical History:   Diagnosis Date    Arthritis      Chest pain, unspecified      Essential hypertension, benign      Hypertension      NIDDM (non-insulin dependent diabetes mellitus)      Obesity, unspecified      Other and unspecified hyperlipidemia      Sleep apnea       C PAP    Thyroid disease      Unspecified adverse effect of anesthesia       low oxygenation/fighting             PAST SURGICAL HISTORY            Past Surgical History:   Procedure Laterality Date    ABDOMEN SURGERY PROC UNLISTED         dbl hernia repair as infant    HX ANKLE FRACTURE TX Right       Replacement    HX APPENDECTOMY   APR 2013    HX CHOLECYSTECTOMY        HX ORTHOPAEDIC         left knee     HX OTHER SURGICAL         deviated septum             ALLERGIES           Allergies   Allergen Reactions    Codeine Anxiety            FAMILY HISTORY            Family History   Problem Relation Age of Onset    Emphysema Mother      Other Mother           enlarged heart    Heart Attack Maternal Grandmother      negative for cardiac disease         SOCIAL HISTORY      Social History               Socioeconomic History    Marital status:        Spouse name: Not on file    Number of children: Not on file    Years of education: Not on file    Highest education level: Not on file   Tobacco Use    Smoking status: Never Smoker    Smokeless tobacco: Never Used   Substance and Sexual Activity    Alcohol use: Yes       Comment: rarely    Drug use: No               MEDICATIONS           Current Outpatient Medications   Medication Sig    LOSARTAN-HYDROCHLOROTHIAZIDE PO Take 1 Tab by mouth daily.  INSULIN DETEMIR (LEVEMIR FLEXPEN SC) 30 Units by SubCUTAneous route nightly.  metFORMIN (GLUCOPHAGE) 500 mg tablet Take 500 mg by mouth daily (with breakfast).  cholecalciferol, vitamin D3, 2,000 unit Tab Take  by mouth daily.  multivitamin (ONE A DAY) tablet Take 1 Tab by mouth daily.  sertraline (ZOLOFT) 100 mg tablet Take 100 mg by mouth daily. 150 mg daily    levothyroxine (SYNTHROID) 200 mcg tablet Take 225 mcg by mouth Daily (before breakfast).  simvastatin (ZOCOR) 40 mg tablet Take  by mouth nightly.      No current facility-administered medications for this visit.          I have reviewed the nurses notes, vitals, problem list, allergy list, medical history, family, social history and medications.         REVIEW OF SYMPTOMS      General: Pt denies excessive weight gain or loss. Pt is able to conduct ADL's  HEENT: Denies blurred vision, headaches, hearing loss, epistaxis and difficulty swallowing. Respiratory: Denies cough, congestion, shortness of breath, MEEHAN, wheezing or stridor.   Cardiovascular: Denies precordial pain, palpitations, edema or PND  Gastrointestinal: Denies poor appetite, indigestion, abdominal pain or blood in stool  Genitourinary: Denies hematuria, dysuria, increased urinary frequency  Musculoskeletal: Denies joint pain or swelling from muscles or joints  Neurologic: +syncope, Denies tremor, paresthesias, headache, or sensory motor disturbance  Psychiatric: Denies confusion, insomnia, depression  Integumentray: Denies rash, itching or ulcers. Hematologic: Denies easy bruising, bleeding         PHYSICAL EXAMINATION      There were no vitals filed for this visit. General: Well developed, in no acute distress. HEENT: No jaundice, oral mucosa moist, no oral ulcers  Neck: Supple, no stiffness, no lymphadenopathy, supple  Heart:  Normal S1/S2 negative S3 or S4. Regular, no murmur, gallop or rub, no jugular venous distention  Respiratory: Clear bilaterally x 4, no wheezing or rales  Abdomen:   Soft, non-tender, bowel sounds are active.   Extremities:  No edema, normal cap refill, no cyanosis. Musculoskeletal: No clubbing, no deformities  Neuro: A&Ox3, speech clear, gait stable, cooperative, no focal neurologic deficits  Skin: Skin color is normal. No rashes or lesions.  Non diaphoretic, moist.  Vascular: 2+ pulses symmetric in all extremities         DIAGNOSTIC DATA      EKG: sinus bradycardia          LABORATORY DATA            Lab Results   Component Value Date/Time     WBC 14.9 (H) 01/05/2020 03:35 PM     HGB 14.7 01/05/2020 03:35 PM     HCT 42.8 01/05/2020 03:35 PM     PLATELET 207 26/18/8511 03:35 PM     MCV 82.8 01/05/2020 03:35 PM            Lab Results   Component Value Date/Time     Sodium 136 01/05/2020 03:35 PM     Potassium 4.4 01/05/2020 03:35 PM     Chloride 103 01/05/2020 03:35 PM     CO2 24 01/05/2020 03:35 PM     Anion gap 9 01/05/2020 03:35 PM     Glucose 125 (H) 01/05/2020 03:35 PM     BUN 27 (H) 01/05/2020 03:35 PM     Creatinine 1.07 01/05/2020 03:35 PM     BUN/Creatinine ratio 25 (H) 01/05/2020 03:35 PM     GFR est AA >60 01/05/2020 03:35 PM     GFR est non-AA >60 01/05/2020 03:35 PM     Calcium 9.0 01/05/2020 03:35 PM     Bilirubin, total 1.0 01/05/2020 03:35 PM     AST (SGOT) 24 01/05/2020 03:35 PM     Alk. phosphatase 54 01/05/2020 03:35 PM     Protein, total 7.7 01/05/2020 03:35 PM     Albumin 4.3 01/05/2020 03:35 PM     Globulin 3.4 01/05/2020 03:35 PM     A-G Ratio 1.3 01/05/2020 03:35 PM     ALT (SGPT) 30 01/05/2020 03:35 PM             ASSESSMENT      1. Syncope  2. Hypertension  3. Diabetes  4. Sleep apnea  5.  Obesity         PLAN      Loop recorder injection     Compa Corea MD  Cardiac Electrophysiology / Cardiology     [address] [place] [address]  [address] [facility], [address] [address], [address]  [place] [address]                                         [address] [facility]  [phone] / [phone] Fax                                    [phone] / [phone] Fax

## 2020-03-12 NOTE — DISCHARGE INSTRUCTIONS
Loop Recorder  Discharge Instructions    Please make sure you have received your Temporary Loop Recorder identification card with your discharge instructions      MEDICATIONS         Take only the medications prescribed to you at discharge. ACTIVITY         Return to your normal activity, except as noted below. o Avoid tight clothes or unnecessary pressure over your incision (such as bra straps or seat belts). If it is tender or sensitive to clothing, cover the incision with a soft dressing or pad.  o Questions about driving are individualized and should be discussed with one of the EP Physicians prior to discharge. SHOWERING         Leave the bandage over your incision for 14 days after the Loop Recorder implant. You bandage will be removed in clinic in 14 days.  It is important to keep the bandaged area clean and dry. You may shower around the site until the bandage is removed in clinic. Thereafter, you may shower after the bandage is removed, washing it gently with soap and water. Do not apply any lotions, powders, or perfumes to the incision line.  Avoid submerging your incision in water (tub baths, hot tubs, or swimming) for two weeks.  Underneath the dressing. o If you have white steri-strips over your incision (underneath the gauze dressing), they will curl up at the end and fall off, usually within 10 days. Do not pull them off.  - OR -   o You may have a different type of closure for the incision. If Dermabond Adhesive was used to close your incision, you will receive a separate instruction sheet. DISCHARGE PRECAUTIONS         Record your temperature every day, at the same time, for 3 weeks after your implant. A temperature of 100.5 F, or higher, can be the first sign of infection. This should be reported to your Doctor immediately.  You can have an MRI after 6 weeks. You must be aware that any strong magnet or magnetic field can affect your Loop Recorder. In general, be careful of metal detectors, heavy machinery, and any area where arc-welding is performed. Avoid metal detectors such as the ones in security checkpoints at [facility] or [address] [place] [address]. When approaching a security checkpoint show your Loop Recorder ID Card to security personnel and ask to be hand searched.  Always tell your doctor or dentist that you have a Loop Recorder. Antibiotics may be prescribed before certain procedures.  Your temporary identification will be given to you with these instructions. Keep your device card in your wallet or on your person at all times. You should receive your device in 8 weeks. If you do not receive your permanent card, please call the office at [phone]. TAKING YOUR PULSE         Take your pulse the same time every day, preferably in the morning.  Sit down and rest for 5 minutes prior to taking your pulse.  Take your pulse for 1 full minute, use a clock or stop watch with a second hand.  To feel your pulse, use the first two fingers of one hand; place them on the thumb side of the wrist of the opposite hand. The pulse will be steady, regular and throbbing.  Call the EP Lab Doctors if your pulse is less than 40 beats per minute. SYMPTOMS THAT NEED TO BE REPORTED IMMEDIATELY         Temperature more than 100.4 F     Redness or warmth at the incision site, or pain for longer than the first 5 days after the implant.  Drainage from the incision site.  Swelling around the incision site.  Shortness of breath.  Rapid heart rate or palpitations.  Dizziness, lightheadedness, fainting.  Slow pulse below 40 beats per minute.  REMEMBER: If you feel something is an emergency or cannot be handled over the phone, call 911 or go to the closest emergency room.           FANY WILLAMS MD  Cardiac Electrophysiology / Cardiology    [address], [address] [facility], [address] [place] [address],[address]  [place], [address] [place] [address] Drive         Sandeep Bowens  [phone] / [phone] Fax       [phone] / [phone] Fax

## 2020-03-25 ENCOUNTER — OFFICE VISIT (OUTPATIENT)
Dept: CARDIOLOGY CLINIC | Age: 70
End: 2020-03-25

## 2020-03-25 ENCOUNTER — TELEPHONE (OUTPATIENT)
Dept: CARDIOLOGY CLINIC | Age: 70
End: 2020-03-25

## 2020-03-25 DIAGNOSIS — Z95.818 STATUS POST PLACEMENT OF IMPLANTABLE LOOP RECORDER: ICD-10-CM

## 2020-03-25 DIAGNOSIS — Z51.89 VISIT FOR WOUND CHECK: ICD-10-CM

## 2020-03-25 DIAGNOSIS — R55 SYNCOPE, UNSPECIFIED SYNCOPE TYPE: Primary | ICD-10-CM

## 2020-03-25 NOTE — TELEPHONE ENCOUNTER
Attempted to call patient for previously scheduled telephone follow up call. Left voicemail. ILR negative for arrhythmia thus far.     Hervey Schilder, NP

## 2020-03-25 NOTE — PROGRESS NOTES
Patient presents for wound check post-device implantation. The dressing was removed by patient and the site was inspected. The site appeared to be well-healing without ecchymosis/tenderness/erythema. Denies pain, fevers, discharge. ILR negative thus far. He denies recurrence of syncope. This visit was performed virtually via telephone with permission obtained from patient prior due to COVID 19 restrictions. Total visit time was 10 minutes. All patient and caregiver questions and concerns were addressed during the visit. Major risks, benefits, and side-effects of therapy were discussed. Plan:    Continue follow up in device clinic as planned.      Maegan Collier NP

## 2020-06-17 ENCOUNTER — VIRTUAL VISIT (OUTPATIENT)
Dept: CARDIOLOGY CLINIC | Age: 70
End: 2020-06-17

## 2020-06-17 ENCOUNTER — TELEPHONE (OUTPATIENT)
Dept: CARDIOLOGY CLINIC | Age: 70
End: 2020-06-17

## 2020-06-17 DIAGNOSIS — R55 SYNCOPE, UNSPECIFIED SYNCOPE TYPE: Primary | ICD-10-CM

## 2020-06-17 DIAGNOSIS — G47.30 SLEEP APNEA, UNSPECIFIED TYPE: ICD-10-CM

## 2020-06-17 DIAGNOSIS — I10 ESSENTIAL HYPERTENSION, BENIGN: ICD-10-CM

## 2020-06-17 RX ORDER — GLIPIZIDE 5 MG/1
10 TABLET ORAL 2 TIMES DAILY
COMMUNITY

## 2020-06-17 NOTE — PROGRESS NOTES
VIRTUAL VISIT DOCUMENTATION     Pursuant to the emergency declaration under the [facility], On license of [facility] waiver authority and the [employer] Act, this Virtual  Visit was conducted, with patient's consent, to reduce the patient's risk of exposure to COVID-19 and provide continuity of care for an established patient. Services were provided through a video synchronous discussion virtually to substitute for in-person clinic visit. HISTORY OF PRESENTING ILLNESS      Karla Mayberry is a 78 y.o. male who was seen by synchronous (real-time) audio-video technology on 6/17/2020 with diabetes, hypertension and sleep apnea who is referred from the [facility] ER for recent syncopal event after coughing. He reported similar episodes previously occurring every 1-2 years and has been seen by cardiology years prior due to syncope. He had negative stress testing then. EKG during ER visit was sinus bradycardia. He underwent injection of a loop recorder. Interrogation failed to demonstrate arrhythmias thus far. Injection site has healed well. No further episodes of syncope.          ACTIVE PROBLEM LIST     Patient Active Problem List    Diagnosis Date Noted    Ankle instability 07/02/2013    Posterior tibial tendon dysfunction 07/02/2013    Contracture, Achilles tendon 07/02/2013    Chest pain, unspecified     Other and unspecified hyperlipidemia     Type 2 diabetes mellitus ([facility] [address]     Obesity, unspecified     Essential hypertension, benign     Sleep apnea            PAST MEDICAL HISTORY     Past Medical History:   Diagnosis Date    Arthritis     Chest pain, unspecified     Essential hypertension, benign     Hypertension     NIDDM (non-insulin dependent diabetes mellitus)     Obesity, unspecified     Other and unspecified hyperlipidemia     Sleep apnea     C PAP    Thyroid disease     Unspecified adverse effect of anesthesia low oxygenation/fighting           PAST SURGICAL HISTORY     Past Surgical History:   Procedure Laterality Date    ABDOMEN SURGERY PROC UNLISTED      dbl hernia repair as infant    HX ANKLE FRACTURE TX Right     Replacement    HX APPENDECTOMY  APR 2013    HX CHOLECYSTECTOMY      HX ORTHOPAEDIC      left knee     HX OTHER SURGICAL      deviated septum    PA INSERTION SUBQ CARDIAC RHYTHM MONITOR W/PRGRMG N/A 3/12/2020    LOOP RECORDER INSERT performed by Edilberto Ceja MD at [address] LAB          ALLERGIES     Allergies   Allergen Reactions    Codeine Anxiety          FAMILY HISTORY     Family History   Problem Relation Age of Onset    Emphysema Mother     Other Mother         enlarged heart    Heart Attack Maternal Grandmother     negative for cardiac disease       SOCIAL HISTORY     Social History     Socioeconomic History    Marital status:      Spouse name: Not on file    Number of children: Not on file    Years of education: Not on file    Highest education level: Not on file   Tobacco Use    Smoking status: Never Smoker    Smokeless tobacco: Never Used   Substance and Sexual Activity    Alcohol use: Yes     Comment: rarely    Drug use: No         MEDICATIONS     Current Outpatient Medications   Medication Sig    glipiZIDE (GLUCOTROL) 5 mg tablet Take  by mouth two (2) times a day.  telmisartan (MICARDIS) 40 mg tablet     INSULIN DETEMIR (LEVEMIR FLEXPEN SC) 50 Units by SubCUTAneous route two (2) times a day.  cholecalciferol, vitamin D3, 2,000 unit Tab Take  by mouth daily.  multivitamin (ONE A DAY) tablet Take 1 Tab by mouth daily.  sertraline (ZOLOFT) 100 mg tablet Take 100 mg by mouth daily.  levothyroxine (SYNTHROID) 200 mcg tablet Take 200 mcg by mouth Daily (before breakfast).  simvastatin (ZOCOR) 40 mg tablet Take  by mouth nightly. No current facility-administered medications for this visit.         I have reviewed the nurses notes, vitals, problem list, allergy list, medical history, family, social history and medications. REVIEW OF SYMPTOMS      General: Pt denies excessive weight gain or loss. Pt is able to conduct ADL's  HEENT: Denies blurred vision, headaches, hearing loss, epistaxis and difficulty swallowing. Respiratory: Denies cough, congestion, shortness of breath, MEEHAN, wheezing or stridor. Cardiovascular: Denies precordial pain, palpitations, edema or PND  Gastrointestinal: Denies poor appetite, indigestion, abdominal pain or blood in stool  Genitourinary: Denies hematuria, dysuria, increased urinary frequency  Musculoskeletal: Denies joint pain or swelling from muscles or joints  Neurologic: Denies tremor, paresthesias, headache, or sensory motor disturbance  Psychiatric: Denies confusion, insomnia, depression  Integumentray: Denies rash, itching or ulcers. Hematologic: Denies easy bruising, bleeding       PHYSICAL EXAMINATION      Due to this being a TeleHealth evaluation, many elements of the physical examination are unable to be assessed. General: Well developed, in no acute distress, cooperative and alert  HEENT: Pupils equal/round. No marked JVD visible on video. Respiratory: No audible wheezing, no signs of respiratory distress, lips non cyanotic  Extremities:  No edema  Neuro: A&Ox3, speech clear, no facial droop, answering questions appropriately  Skin: Skin color is normal. No rashes or lesions.  Non diaphoretic on visible skin during exam       DIAGNOSTIC DATA      EKG:        LABORATORY DATA      Lab Results   Component Value Date/Time    WBC 8.3 03/06/2020 01:01 PM    HGB 14.0 03/06/2020 01:01 PM    HCT 41.3 03/06/2020 01:01 PM    PLATELET 214 10/01/0789 01:01 PM    MCV 86 03/06/2020 01:01 PM      Lab Results   Component Value Date/Time    Sodium 136 01/05/2020 03:35 PM    Potassium 4.4 01/05/2020 03:35 PM    Chloride 103 01/05/2020 03:35 PM    CO2 24 01/05/2020 03:35 PM    Anion gap 9 01/05/2020 03:35 PM    Glucose 125 (H) 01/05/2020 03:35 PM    BUN 27 (H) 01/05/2020 03:35 PM    Creatinine 1.07 01/05/2020 03:35 PM    BUN/Creatinine ratio 25 (H) 01/05/2020 03:35 PM    GFR est AA >60 01/05/2020 03:35 PM    GFR est non-AA >60 01/05/2020 03:35 PM    Calcium 9.0 01/05/2020 03:35 PM    Bilirubin, total 1.0 01/05/2020 03:35 PM    Alk. phosphatase 54 01/05/2020 03:35 PM    Protein, total 7.7 01/05/2020 03:35 PM    Albumin 4.3 01/05/2020 03:35 PM    Globulin 3.4 01/05/2020 03:35 PM    A-G Ratio 1.3 01/05/2020 03:35 PM    ALT (SGPT) 30 01/05/2020 03:35 PM           ASSESSMENT      1. Syncope  2. Hypertension  3. Diabetes  4. Sleep apnea  5. Obesity          ICD-10-CM ICD-9-CM    1. Syncope, unspecified syncope type R55 780.2    2. Sleep apnea, unspecified type G47.30 780.57    3. Essential hypertension, benign I10 401.1      Orders Placed This Encounter    glipiZIDE (GLUCOTROL) 5 mg tablet     Sig: Take  by mouth two (2) times a day. PLAN     Continue monitoring in device clinic    We discussed the expected course, resolution and complications of the diagnosis(es) in detail. Medication risks, benefits, costs, interactions, and alternatives were discussed as indicated. I advised him to contact the office if his condition worsens, changes or fails to improve as anticipated. He expressed understanding with the diagnosis(es) and plan     FOLLOW-UP     1 year      Patient was made aware and verbalized understanding that an appointment will be scheduled for them for a virtual visit and/or office visit within the above time frame. Patient understanding his/her responsibility to call and change time/date if he/she so chooses. Thank you, TAHMINA Carney for allowing me to participate in the care of this extraordinarily pleasant male. Please do not hesitate to contact me for further questions/concerns.          Adam Alba MD  Cardiac Electrophysiology / Cardiology    [address] [facility] [address], [place] [address], [address], [address], [facility]  [phone] / [phone] Fax  [phone] / [phone] Fax        Greater than 25 minutes was spent in direct video patient care, planning and chart review. This visit was conducted using [employer] telemedicine services.

## 2021-03-15 ENCOUNTER — OFFICE VISIT (OUTPATIENT)
Dept: CARDIOLOGY CLINIC | Age: 71
End: 2021-03-15
Payer: MEDICARE

## 2021-03-15 DIAGNOSIS — Z95.818 STATUS POST PLACEMENT OF IMPLANTABLE LOOP RECORDER: Primary | ICD-10-CM

## 2021-03-15 PROCEDURE — 93298 REM INTERROG DEV EVAL SCRMS: CPT | Performed by: INTERNAL MEDICINE

## 2021-03-15 NOTE — LETTER
3/25/2021 10:39 AM 
 
Mr. Naida Russell [address] [facility] [phone] Dear Patient, We have received the remote transmission for your implanted loop recorder dated 3/14/2021. You have had no episodes recorded. Your battery status is \"OK\". We will continue to monitor your device remotely as long as your home monitor remains plugged into power. Thank you for remaining connected remotely! Sincerely, Sincerely, 
 
Linn SALEHN, RN Cardiac Device Clinic Coordinator [facility] [address] 
[phone]

## 2021-03-25 NOTE — PROGRESS NOTES
Chargeable pacer remote transmission  See scanned document for details. No episodes recorded for 2/3/21 - 3/14/2021.

## 2021-04-14 ENCOUNTER — OFFICE VISIT (OUTPATIENT)
Dept: CARDIOLOGY CLINIC | Age: 71
End: 2021-04-14
Payer: MEDICARE

## 2021-04-14 DIAGNOSIS — Z95.818 STATUS POST PLACEMENT OF IMPLANTABLE LOOP RECORDER: Primary | ICD-10-CM

## 2021-04-14 PROCEDURE — 93298 REM INTERROG DEV EVAL SCRMS: CPT | Performed by: INTERNAL MEDICINE

## 2021-04-14 NOTE — LETTER
4/14/2021 1:54 PM 
 
Mr. Nabila Rodriguez [address] [facility] [phone] Dear Patient, We have received the remote transmission for your implanted loop recorder dated  4/14/2021. You have had no episodes recorded. Your battery status is \"OK\". We will continue to monitor your device remotely as long as your home monitor remains plugged into power. Thank you for remaining connected remotely! Sincerely, 
 
 
Jay SALEHN, RN Cardiac Device Clinic Coordinator [facility] of [address] [place] [address] 
[phone]

## 2021-06-15 ENCOUNTER — OFFICE VISIT (OUTPATIENT)
Dept: CARDIOLOGY CLINIC | Age: 71
End: 2021-06-15
Payer: MEDICARE

## 2021-06-15 DIAGNOSIS — Z95.818 STATUS POST PLACEMENT OF IMPLANTABLE LOOP RECORDER: Primary | ICD-10-CM

## 2021-06-15 PROCEDURE — 93291 INTERROG DEV EVAL SCRMS IP: CPT

## 2021-06-15 PROCEDURE — 93298 REM INTERROG DEV EVAL SCRMS: CPT | Performed by: INTERNAL MEDICINE

## 2021-06-15 NOTE — LETTER
6/18/2021 1:40 PM 
 
[facility]. [facility]ner Conce[facility]ion [facility] [address] [phone] Dear Mr. Wilner Concepcion, We have received the remote transmission for your implanted loop recorder dated 6/15/2021. You have had no unusual heart rate episodes recorded. Your battery status is \"OK\". We will continue to monitor your device remotely as long as your home monitor remains plugged into power. Thank you for remaining connected remotely! Sincerely, 
 
Rober PANTOJA, RN Cardiac Device Clinic Coordinator [facility] of [address]. [address] [place] [address] 
[phone] [email or web address]

## 2021-08-16 ENCOUNTER — OFFICE VISIT (OUTPATIENT)
Dept: CARDIOLOGY CLINIC | Age: 71
End: 2021-08-16
Payer: MEDICARE

## 2021-08-16 DIAGNOSIS — Z95.818 STATUS POST PLACEMENT OF IMPLANTABLE LOOP RECORDER: Primary | ICD-10-CM

## 2021-08-16 PROCEDURE — 93298 REM INTERROG DEV EVAL SCRMS: CPT | Performed by: NURSE PRACTITIONER

## 2021-08-16 NOTE — LETTER
8/16/2021 1:58 PM    Mr. Snow  [address] [address]        Dear Mr. Snow,    We have received the remote transmission for your implanted loop recorder dated 8/16/2021. You have had no unusual heart rate episodes recorded. Your battery status is \"OK\". We will continue to monitor your device remotely as long as your home monitor remains plugged into power. Thank you for remaining connected remotely! Sincerely,    Carlos PANTOJA, RN  Cardiac Device Clinic Coordinator  [facility] [address] 82.  [address] [address] [facility]  [phone]

## 2021-09-20 ENCOUNTER — OFFICE VISIT (OUTPATIENT)
Dept: CARDIOLOGY CLINIC | Age: 71
End: 2021-09-20
Payer: MEDICARE

## 2021-09-20 DIAGNOSIS — Z95.818 STATUS POST PLACEMENT OF IMPLANTABLE LOOP RECORDER: Primary | ICD-10-CM

## 2021-09-20 PROCEDURE — 93298 REM INTERROG DEV EVAL SCRMS: CPT | Performed by: NURSE PRACTITIONER

## 2021-09-20 NOTE — LETTER
9/20/2021 3:27 PM    Mr. Juan Antonio Ramirez  [address] [address]        Dear Mr. Juan Antonio Ramirez,    We have received the remote transmission for your implanted loop recorder dated 9/20/2021. You have had no unusual heart rate episodes recorded. Your battery status is \"OK\". We will continue to monitor your device remotely as long as your home monitor remains plugged into power. Thank you for remaining connected remotely! Sincerely,    Favian PANTOJA, RN  Cardiac Device Clinic Coordinator  [facility] [address] 82.  [address] [address] [facility]  [phone]

## 2021-10-25 ENCOUNTER — OFFICE VISIT (OUTPATIENT)
Dept: CARDIOLOGY CLINIC | Age: 71
End: 2021-10-25
Payer: MEDICARE

## 2021-10-25 DIAGNOSIS — Z95.818 STATUS POST PLACEMENT OF IMPLANTABLE LOOP RECORDER: Primary | ICD-10-CM

## 2021-10-25 PROCEDURE — 93298 REM INTERROG DEV EVAL SCRMS: CPT | Performed by: NURSE PRACTITIONER

## 2021-10-25 NOTE — LETTER
10/25/2021 11:29 AM    Mr. Nelson Hamilton  [address] [address]        Dear Mr. Nelson Hamilton,    We have received the remote transmission for your implanted loop recorder dated 10/25/21. You have had no unusual heart rate episodes recorded. Your battery status is \"OK\". Your next scheduled remote transmission is  11/29/2021. We will continue to monitor your device remotely as long as your home monitor remains plugged into power. Thank you for remaining connected remotely! Sincerely,    Judah SALEHN, RN  Cardiac Device Clinic Coordinator  [facility] of [address] [place] [address] 82.  [address], [address] [facility]  [phone]

## 2021-11-24 ENCOUNTER — OFFICE VISIT (OUTPATIENT)
Dept: CARDIOLOGY CLINIC | Age: 71
End: 2021-11-24

## 2021-11-24 DIAGNOSIS — Z95.818 STATUS POST PLACEMENT OF IMPLANTABLE LOOP RECORDER: Primary | ICD-10-CM

## 2021-11-29 ENCOUNTER — OFFICE VISIT (OUTPATIENT)
Dept: CARDIOLOGY CLINIC | Age: 71
End: 2021-11-29
Payer: MEDICARE

## 2021-11-29 ENCOUNTER — APPOINTMENT (OUTPATIENT)
Dept: CARDIOLOGY CLINIC | Age: 71
End: 2021-11-29

## 2021-11-29 DIAGNOSIS — Z95.818 STATUS POST PLACEMENT OF IMPLANTABLE LOOP RECORDER: Primary | ICD-10-CM

## 2021-11-29 PROCEDURE — 93298 REM INTERROG DEV EVAL SCRMS: CPT | Performed by: NURSE PRACTITIONER

## 2022-02-07 ENCOUNTER — OFFICE VISIT (OUTPATIENT)
Dept: CARDIOLOGY CLINIC | Age: 72
End: 2022-02-07
Payer: MEDICARE

## 2022-02-07 DIAGNOSIS — Z95.818 STATUS POST PLACEMENT OF IMPLANTABLE LOOP RECORDER: Primary | ICD-10-CM

## 2022-02-07 PROCEDURE — 93298 REM INTERROG DEV EVAL SCRMS: CPT | Performed by: INTERNAL MEDICINE

## 2022-02-07 NOTE — LETTER
2/9/2022 1:31 PM    Mr. Melo Orozco  [address] [address]    Dear Mr. Melo Orozco,    We have received the remote transmission for your implanted loop recorder dated 2/7/22. You have had no unusual heart rate episodes recorded. Your battery status is \"OK\". Your next scheduled remote transmission is 3/14/2022. We will continue to monitor your device remotely as long as your home monitor remains plugged into power. Thank you for remaining connected remotely! Sincerely,    Daneil Apley BSN, RN  Cardiac Device Clinic Coordinator  [facility] of [address] [place] [address] 82.  [address], [id number] [place] [address]  [phone]

## 2022-03-14 ENCOUNTER — OFFICE VISIT (OUTPATIENT)
Dept: CARDIOLOGY CLINIC | Age: 72
End: 2022-03-14
Payer: MEDICARE

## 2022-03-14 DIAGNOSIS — Z95.818 STATUS POST PLACEMENT OF IMPLANTABLE LOOP RECORDER: Primary | ICD-10-CM

## 2022-03-14 PROCEDURE — 93298 REM INTERROG DEV EVAL SCRMS: CPT | Performed by: INTERNAL MEDICINE

## 2022-04-14 ENCOUNTER — TRANSCRIBE ORDER (OUTPATIENT)
Dept: SCHEDULING | Age: 72
End: 2022-04-14

## 2022-04-14 DIAGNOSIS — R74.8 ACID PHOSPHATASE ELEVATED: ICD-10-CM

## 2022-04-14 DIAGNOSIS — R10.9 STOMACH ACHE: Primary | ICD-10-CM

## 2022-04-18 ENCOUNTER — OFFICE VISIT (OUTPATIENT)
Dept: CARDIOLOGY CLINIC | Age: 72
End: 2022-04-18
Payer: MEDICARE

## 2022-04-18 DIAGNOSIS — Z95.818 STATUS POST PLACEMENT OF IMPLANTABLE LOOP RECORDER: Primary | ICD-10-CM

## 2022-04-18 PROCEDURE — 93298 REM INTERROG DEV EVAL SCRMS: CPT | Performed by: INTERNAL MEDICINE

## 2022-04-19 ENCOUNTER — HOSPITAL ENCOUNTER (OUTPATIENT)
Dept: CT IMAGING | Age: 72
Discharge: HOME | End: 2022-04-19
Attending: PHYSICIAN ASSISTANT
Payer: MEDICARE

## 2022-04-19 DIAGNOSIS — R74.8 ACID PHOSPHATASE ELEVATED: ICD-10-CM

## 2022-04-19 DIAGNOSIS — R10.9 STOMACH ACHE: ICD-10-CM

## 2022-04-19 LAB — CREAT BLD-MCNC: 1 MG/DL (ref 0.6–1.3)

## 2022-04-19 PROCEDURE — 74011000636 HC RX REV CODE- 636: Performed by: RADIOLOGY

## 2022-04-19 PROCEDURE — 82565 ASSAY OF CREATININE: CPT

## 2022-04-19 PROCEDURE — 74177 CT ABD & PELVIS W/CONTRAST: CPT

## 2022-04-19 RX ADMIN — IOPAMIDOL 100 ML: 755 INJECTION, SOLUTION INTRAVENOUS at 14:56

## 2022-04-21 ENCOUNTER — HOSPITAL ENCOUNTER (OUTPATIENT)
Dept: CT IMAGING | Age: 72
End: 2022-04-21
Attending: PHYSICIAN ASSISTANT

## 2022-05-17 ENCOUNTER — HOSPITAL ENCOUNTER (INPATIENT)
Age: 72
LOS: 2 days | Discharge: HOME | DRG: 435 | End: 2022-05-19
Attending: EMERGENCY MEDICINE | Admitting: INTERNAL MEDICINE
Payer: MEDICARE

## 2022-05-17 ENCOUNTER — APPOINTMENT (OUTPATIENT)
Dept: CT IMAGING | Age: 72
DRG: 435 | End: 2022-05-17
Attending: EMERGENCY MEDICINE
Payer: MEDICARE

## 2022-05-17 ENCOUNTER — APPOINTMENT (OUTPATIENT)
Dept: MRI IMAGING | Age: 72
DRG: 435 | End: 2022-05-17
Attending: INTERNAL MEDICINE
Payer: MEDICARE

## 2022-05-17 ENCOUNTER — APPOINTMENT (OUTPATIENT)
Dept: ULTRASOUND IMAGING | Age: 72
DRG: 435 | End: 2022-05-17
Attending: EMERGENCY MEDICINE
Payer: MEDICARE

## 2022-05-17 DIAGNOSIS — C25.9 MALIGNANT NEOPLASM OF PANCREAS, UNSPECIFIED LOCATION OF MALIGNANCY (HCC): ICD-10-CM

## 2022-05-17 DIAGNOSIS — E11.65 TYPE 2 DIABETES MELLITUS WITH HYPERGLYCEMIA, WITH LONG-TERM CURRENT USE OF INSULIN (HCC): ICD-10-CM

## 2022-05-17 DIAGNOSIS — R16.0 LIVER MASS: ICD-10-CM

## 2022-05-17 DIAGNOSIS — C78.7 METASTASIS TO LIVER (HCC): ICD-10-CM

## 2022-05-17 DIAGNOSIS — K83.1 BILIARY OBSTRUCTION: ICD-10-CM

## 2022-05-17 DIAGNOSIS — Z79.4 TYPE 2 DIABETES MELLITUS WITH HYPERGLYCEMIA, WITH LONG-TERM CURRENT USE OF INSULIN (HCC): ICD-10-CM

## 2022-05-17 DIAGNOSIS — R17 JAUNDICE: Primary | ICD-10-CM

## 2022-05-17 DIAGNOSIS — K83.8 DILATION OF BILIARY TRACT: ICD-10-CM

## 2022-05-17 PROBLEM — R79.89 LFT ELEVATION: Status: ACTIVE | Noted: 2022-05-17

## 2022-05-17 PROBLEM — E43 SEVERE PROTEIN-CALORIE MALNUTRITION (HCC): Status: ACTIVE | Noted: 2022-05-17

## 2022-05-17 PROBLEM — D64.9 ANEMIA: Status: ACTIVE | Noted: 2022-05-17

## 2022-05-17 PROBLEM — I95.9 HYPOTENSION: Status: ACTIVE | Noted: 2022-05-17

## 2022-05-17 PROBLEM — R00.1 BRADYCARDIA: Status: ACTIVE | Noted: 2022-05-17

## 2022-05-17 PROBLEM — E80.6 HYPERBILIRUBINEMIA: Status: ACTIVE | Noted: 2022-05-17

## 2022-05-17 LAB
ALBUMIN SERPL-MCNC: 2.8 G/DL (ref 3.5–5)
ALBUMIN/GLOB SERPL: 0.8 {RATIO} (ref 1.1–2.2)
ALP SERPL-CCNC: 952 U/L (ref 45–117)
ALT SERPL-CCNC: 465 U/L (ref 12–78)
AMPHET UR QL SCN: NEGATIVE
ANION GAP SERPL CALC-SCNC: 8 MMOL/L (ref 5–15)
APPEARANCE UR: CLEAR
APTT PPP: 30.7 SEC (ref 22.1–31)
AST SERPL-CCNC: 338 U/L (ref 15–37)
BACTERIA URNS QL MICRO: NEGATIVE /HPF
BARBITURATES UR QL SCN: NEGATIVE
BASOPHILS # BLD: 0 K/UL (ref 0–0.1)
BASOPHILS NFR BLD: 0 % (ref 0–1)
BENZODIAZ UR QL: NEGATIVE
BILIRUB DIRECT SERPL-MCNC: 6 MG/DL (ref 0–0.2)
BILIRUB SERPL-MCNC: 7.2 MG/DL (ref 0.2–1)
BILIRUB UR QL CFM: POSITIVE
BUN SERPL-MCNC: 21 MG/DL (ref 6–20)
BUN/CREAT SERPL: 20 (ref 12–20)
CALCIUM SERPL-MCNC: 8.6 MG/DL (ref 8.5–10.1)
CANNABINOIDS UR QL SCN: NEGATIVE
CEA SERPL-MCNC: 5.4 NG/ML
CHLORIDE SERPL-SCNC: 101 MMOL/L (ref 97–108)
CHOLEST SERPL-MCNC: 406 MG/DL
CO2 SERPL-SCNC: 26 MMOL/L (ref 21–32)
COCAINE UR QL SCN: NEGATIVE
COLOR UR: ABNORMAL
COMMENT, HOLDF: NORMAL
COVID-19 RAPID TEST, COVR: NOT DETECTED
CREAT SERPL-MCNC: 1.07 MG/DL (ref 0.7–1.3)
DIFFERENTIAL METHOD BLD: ABNORMAL
DRUG SCRN COMMENT,DRGCM: NORMAL
EOSINOPHIL # BLD: 0.1 K/UL (ref 0–0.4)
EOSINOPHIL NFR BLD: 2 % (ref 0–7)
EPITH CASTS URNS QL MICRO: ABNORMAL /LPF
ERYTHROCYTE [DISTWIDTH] IN BLOOD BY AUTOMATED COUNT: 17.2 % (ref 11.5–14.5)
EST. AVERAGE GLUCOSE BLD GHB EST-MCNC: 189 MG/DL
ETHANOL SERPL-MCNC: <10 MG/DL
FERRITIN SERPL-MCNC: 219 NG/ML (ref 26–388)
FOLATE SERPL-MCNC: 26.7 NG/ML (ref 5–21)
GLOBULIN SER CALC-MCNC: 3.7 G/DL (ref 2–4)
GLUCOSE BLD STRIP.AUTO-MCNC: 188 MG/DL (ref 65–117)
GLUCOSE BLD STRIP.AUTO-MCNC: 225 MG/DL (ref 65–117)
GLUCOSE SERPL-MCNC: 312 MG/DL (ref 65–100)
GLUCOSE UR STRIP.AUTO-MCNC: NEGATIVE MG/DL
HAPTOGLOB SERPL-MCNC: 90 MG/DL (ref 30–200)
HAV IGM SER QL: NONREACTIVE
HBA1C MFR BLD: 8.2 % (ref 4–5.6)
HBV CORE IGM SER QL: NONREACTIVE
HBV SURFACE AG SER QL: <0.1 INDEX
HBV SURFACE AG SER QL: NEGATIVE
HCT VFR BLD AUTO: 33.6 % (ref 36.6–50.3)
HCV AB SERPL QL IA: NONREACTIVE
HDLC SERPL-MCNC: 12 MG/DL
HDLC SERPL: 33.8 {RATIO} (ref 0–5)
HGB BLD-MCNC: 11.1 G/DL (ref 12.1–17)
HGB UR QL STRIP: NEGATIVE
HYALINE CASTS URNS QL MICRO: ABNORMAL /LPF (ref 0–2)
IMM GRANULOCYTES # BLD AUTO: 0.1 K/UL (ref 0–0.04)
IMM GRANULOCYTES NFR BLD AUTO: 1 % (ref 0–0.5)
INR PPP: 1.1 (ref 0.9–1.1)
IRON SATN MFR SERPL: 32 % (ref 20–50)
IRON SERPL-MCNC: 92 UG/DL (ref 35–150)
KETONES UR QL STRIP.AUTO: NEGATIVE MG/DL
LDH SERPL L TO P-CCNC: 205 U/L (ref 85–241)
LDLC SERPL CALC-MCNC: 346.4 MG/DL (ref 0–100)
LEUKOCYTE ESTERASE UR QL STRIP.AUTO: ABNORMAL
LIPASE SERPL-CCNC: 106 U/L (ref 73–393)
LYMPHOCYTES # BLD: 0.7 K/UL (ref 0.8–3.5)
LYMPHOCYTES NFR BLD: 10 % (ref 12–49)
MCH RBC QN AUTO: 28.5 PG (ref 26–34)
MCHC RBC AUTO-ENTMCNC: 33 G/DL (ref 30–36.5)
MCV RBC AUTO: 86.4 FL (ref 80–99)
METHADONE UR QL: NEGATIVE
MONOCYTES # BLD: 0.3 K/UL (ref 0–1)
MONOCYTES NFR BLD: 5 % (ref 5–13)
NEUTS SEG # BLD: 5.7 K/UL (ref 1.8–8)
NEUTS SEG NFR BLD: 82 % (ref 32–75)
NITRITE UR QL STRIP.AUTO: NEGATIVE
NRBC # BLD: 0 K/UL (ref 0–0.01)
NRBC BLD-RTO: 0 PER 100 WBC
OPIATES UR QL: NEGATIVE
PCP UR QL: NEGATIVE
PH UR STRIP: 6 [PH] (ref 5–8)
PLATELET # BLD AUTO: 176 K/UL (ref 150–400)
PMV BLD AUTO: 11.3 FL (ref 8.9–12.9)
POTASSIUM SERPL-SCNC: 3 MMOL/L (ref 3.5–5.1)
PROT SERPL-MCNC: 6.5 G/DL (ref 6.4–8.2)
PROT UR STRIP-MCNC: NEGATIVE MG/DL
PROTHROMBIN TIME: 11.5 SEC (ref 9–11.1)
RBC # BLD AUTO: 3.89 M/UL (ref 4.1–5.7)
RBC #/AREA URNS HPF: ABNORMAL /HPF (ref 0–5)
RBC MORPH BLD: ABNORMAL
RETICS # AUTO: 0.16 M/UL (ref 0.03–0.1)
RETICS/RBC NFR AUTO: 4.2 % (ref 0.7–2.1)
SAMPLES BEING HELD,HOLD: NORMAL
SERVICE CMNT-IMP: ABNORMAL
SERVICE CMNT-IMP: ABNORMAL
SODIUM SERPL-SCNC: 135 MMOL/L (ref 136–145)
SOURCE, COVRS: NORMAL
SP1: NORMAL
SP2: NORMAL
SP3: NORMAL
THERAPEUTIC RANGE,PTTT: NORMAL SECS (ref 58–77)
TIBC SERPL-MCNC: 289 UG/DL (ref 250–450)
TRIGL SERPL-MCNC: 238 MG/DL (ref ?–150)
TSH SERPL DL<=0.05 MIU/L-ACNC: 2.18 UIU/ML (ref 0.36–3.74)
UROBILINOGEN UR QL STRIP.AUTO: 0.2 EU/DL (ref 0.2–1)
VIT B12 SERPL-MCNC: 944 PG/ML (ref 193–986)
VLDLC SERPL CALC-MCNC: 47.6 MG/DL
WBC # BLD AUTO: 6.9 K/UL (ref 4.1–11.1)
WBC URNS QL MICRO: ABNORMAL /HPF (ref 0–4)

## 2022-05-17 PROCEDURE — 82378 CARCINOEMBRYONIC ANTIGEN: CPT

## 2022-05-17 PROCEDURE — 74011250637 HC RX REV CODE- 250/637: Performed by: EMERGENCY MEDICINE

## 2022-05-17 PROCEDURE — 87635 SARS-COV-2 COVID-19 AMP PRB: CPT

## 2022-05-17 PROCEDURE — 74183 MRI ABD W/O CNTR FLWD CNTR: CPT

## 2022-05-17 PROCEDURE — 77030021566

## 2022-05-17 PROCEDURE — 82077 ASSAY SPEC XCP UR&BREATH IA: CPT

## 2022-05-17 PROCEDURE — 82607 VITAMIN B-12: CPT

## 2022-05-17 PROCEDURE — 74011250636 HC RX REV CODE- 250/636: Performed by: RADIOLOGY

## 2022-05-17 PROCEDURE — 74011250637 HC RX REV CODE- 250/637: Performed by: INTERNAL MEDICINE

## 2022-05-17 PROCEDURE — 80048 BASIC METABOLIC PNL TOTAL CA: CPT

## 2022-05-17 PROCEDURE — 74011250636 HC RX REV CODE- 250/636: Performed by: INTERNAL MEDICINE

## 2022-05-17 PROCEDURE — 85025 COMPLETE CBC W/AUTO DIFF WBC: CPT

## 2022-05-17 PROCEDURE — 82746 ASSAY OF FOLIC ACID SERUM: CPT

## 2022-05-17 PROCEDURE — 85045 AUTOMATED RETICULOCYTE COUNT: CPT

## 2022-05-17 PROCEDURE — G0378 HOSPITAL OBSERVATION PER HR: HCPCS

## 2022-05-17 PROCEDURE — 83540 ASSAY OF IRON: CPT

## 2022-05-17 PROCEDURE — 80076 HEPATIC FUNCTION PANEL: CPT

## 2022-05-17 PROCEDURE — 99223 1ST HOSP IP/OBS HIGH 75: CPT | Performed by: INTERNAL MEDICINE

## 2022-05-17 PROCEDURE — 65270000029 HC RM PRIVATE

## 2022-05-17 PROCEDURE — 87086 URINE CULTURE/COLONY COUNT: CPT

## 2022-05-17 PROCEDURE — 83036 HEMOGLOBIN GLYCOSYLATED A1C: CPT

## 2022-05-17 PROCEDURE — 83010 ASSAY OF HAPTOGLOBIN QUANT: CPT

## 2022-05-17 PROCEDURE — 36415 COLL VENOUS BLD VENIPUNCTURE: CPT

## 2022-05-17 PROCEDURE — 96374 THER/PROPH/DIAG INJ IV PUSH: CPT

## 2022-05-17 PROCEDURE — 83615 LACTATE (LD) (LDH) ENZYME: CPT

## 2022-05-17 PROCEDURE — 81001 URINALYSIS AUTO W/SCOPE: CPT

## 2022-05-17 PROCEDURE — 99285 EMERGENCY DEPT VISIT HI MDM: CPT

## 2022-05-17 PROCEDURE — 80074 ACUTE HEPATITIS PANEL: CPT

## 2022-05-17 PROCEDURE — 84443 ASSAY THYROID STIM HORMONE: CPT

## 2022-05-17 PROCEDURE — 82728 ASSAY OF FERRITIN: CPT

## 2022-05-17 PROCEDURE — 74011636637 HC RX REV CODE- 636/637: Performed by: INTERNAL MEDICINE

## 2022-05-17 PROCEDURE — 85730 THROMBOPLASTIN TIME PARTIAL: CPT

## 2022-05-17 PROCEDURE — 74170 CT ABD WO CNTRST FLWD CNTRST: CPT

## 2022-05-17 PROCEDURE — 82105 ALPHA-FETOPROTEIN SERUM: CPT

## 2022-05-17 PROCEDURE — 74011000636 HC RX REV CODE- 636: Performed by: RADIOLOGY

## 2022-05-17 PROCEDURE — A9576 INJ PROHANCE MULTIPACK: HCPCS | Performed by: RADIOLOGY

## 2022-05-17 PROCEDURE — 76705 ECHO EXAM OF ABDOMEN: CPT

## 2022-05-17 PROCEDURE — 80307 DRUG TEST PRSMV CHEM ANLYZR: CPT

## 2022-05-17 PROCEDURE — 80061 LIPID PANEL: CPT

## 2022-05-17 PROCEDURE — 82962 GLUCOSE BLOOD TEST: CPT

## 2022-05-17 PROCEDURE — 85610 PROTHROMBIN TIME: CPT

## 2022-05-17 PROCEDURE — 83690 ASSAY OF LIPASE: CPT

## 2022-05-17 PROCEDURE — 86301 IMMUNOASSAY TUMOR CA 19-9: CPT

## 2022-05-17 RX ORDER — HYDROXYZINE 25 MG/1
50 TABLET, FILM COATED ORAL
Status: COMPLETED | OUTPATIENT
Start: 2022-05-17 | End: 2022-05-17

## 2022-05-17 RX ORDER — MAGNESIUM SULFATE 100 %
4 CRYSTALS MISCELLANEOUS AS NEEDED
Status: DISCONTINUED | OUTPATIENT
Start: 2022-05-17 | End: 2022-05-19 | Stop reason: HOSPADM

## 2022-05-17 RX ORDER — POLYETHYLENE GLYCOL 3350 17 G/17G
17 POWDER, FOR SOLUTION ORAL DAILY PRN
Status: DISCONTINUED | OUTPATIENT
Start: 2022-05-17 | End: 2022-05-19 | Stop reason: HOSPADM

## 2022-05-17 RX ORDER — INSULIN LISPRO 100 [IU]/ML
INJECTION, SOLUTION INTRAVENOUS; SUBCUTANEOUS EVERY 6 HOURS
Status: DISCONTINUED | OUTPATIENT
Start: 2022-05-17 | End: 2022-05-19 | Stop reason: HOSPADM

## 2022-05-17 RX ORDER — ENOXAPARIN SODIUM 100 MG/ML
40 INJECTION SUBCUTANEOUS DAILY
Status: DISCONTINUED | OUTPATIENT
Start: 2022-05-18 | End: 2022-05-17

## 2022-05-17 RX ORDER — ACETAMINOPHEN 650 MG/1
650 SUPPOSITORY RECTAL
Status: DISCONTINUED | OUTPATIENT
Start: 2022-05-17 | End: 2022-05-19 | Stop reason: HOSPADM

## 2022-05-17 RX ORDER — HYDROXYZINE 25 MG/1
25 TABLET, FILM COATED ORAL
Status: DISCONTINUED | OUTPATIENT
Start: 2022-05-17 | End: 2022-05-19 | Stop reason: HOSPADM

## 2022-05-17 RX ORDER — ACETAMINOPHEN 325 MG/1
650 TABLET ORAL
Status: DISCONTINUED | OUTPATIENT
Start: 2022-05-17 | End: 2022-05-19 | Stop reason: HOSPADM

## 2022-05-17 RX ORDER — POTASSIUM CHLORIDE AND SODIUM CHLORIDE 900; 300 MG/100ML; MG/100ML
INJECTION, SOLUTION INTRAVENOUS CONTINUOUS
Status: DISCONTINUED | OUTPATIENT
Start: 2022-05-17 | End: 2022-05-18

## 2022-05-17 RX ORDER — POTASSIUM CHLORIDE 750 MG/1
40 TABLET, FILM COATED, EXTENDED RELEASE ORAL 2 TIMES DAILY
Status: DISCONTINUED | OUTPATIENT
Start: 2022-05-17 | End: 2022-05-18

## 2022-05-17 RX ORDER — ONDANSETRON 2 MG/ML
4 INJECTION INTRAMUSCULAR; INTRAVENOUS
Status: DISCONTINUED | OUTPATIENT
Start: 2022-05-17 | End: 2022-05-19 | Stop reason: HOSPADM

## 2022-05-17 RX ORDER — ONDANSETRON 4 MG/1
4 TABLET, ORALLY DISINTEGRATING ORAL
Status: DISCONTINUED | OUTPATIENT
Start: 2022-05-17 | End: 2022-05-19 | Stop reason: HOSPADM

## 2022-05-17 RX ORDER — ENOXAPARIN SODIUM 100 MG/ML
30 INJECTION SUBCUTANEOUS EVERY 12 HOURS
Status: DISCONTINUED | OUTPATIENT
Start: 2022-05-18 | End: 2022-05-19 | Stop reason: HOSPADM

## 2022-05-17 RX ORDER — ASCORBIC ACID 250 MG
2000 TABLET ORAL DAILY
COMMUNITY

## 2022-05-17 RX ORDER — DEXTROSE MONOHYDRATE 100 MG/ML
0-250 INJECTION, SOLUTION INTRAVENOUS AS NEEDED
Status: DISCONTINUED | OUTPATIENT
Start: 2022-05-17 | End: 2022-05-19 | Stop reason: HOSPADM

## 2022-05-17 RX ORDER — HYDROCHLOROTHIAZIDE 12.5 MG/1
12.5 TABLET ORAL DAILY
COMMUNITY
End: 2022-05-19

## 2022-05-17 RX ADMIN — HYDROXYZINE HYDROCHLORIDE 50 MG: 25 TABLET, FILM COATED ORAL at 11:18

## 2022-05-17 RX ADMIN — POTASSIUM CHLORIDE 40 MEQ: 750 TABLET, FILM COATED, EXTENDED RELEASE ORAL at 18:09

## 2022-05-17 RX ADMIN — IOPAMIDOL 100 ML: 755 INJECTION, SOLUTION INTRAVENOUS at 12:30

## 2022-05-17 RX ADMIN — POTASSIUM CHLORIDE 40 MEQ: 750 TABLET, FILM COATED, EXTENDED RELEASE ORAL at 15:29

## 2022-05-17 RX ADMIN — INSULIN LISPRO 3 UNITS: 100 INJECTION, SOLUTION INTRAVENOUS; SUBCUTANEOUS at 18:09

## 2022-05-17 RX ADMIN — GADOTERIDOL 20 ML: 279.3 INJECTION, SOLUTION INTRAVENOUS at 14:28

## 2022-05-17 RX ADMIN — INSULIN LISPRO 2 UNITS: 100 INJECTION, SOLUTION INTRAVENOUS; SUBCUTANEOUS at 15:35

## 2022-05-17 RX ADMIN — POTASSIUM CHLORIDE AND SODIUM CHLORIDE: 900; 300 INJECTION, SOLUTION INTRAVENOUS at 15:26

## 2022-05-17 NOTE — CONSULTS
[facility] [place] [facility] 88  [facility], [address] [facility] [address] [facility]  [place] Client: [phone]  F: [phone]      Reason for Visit:   Ramesh Pugh is a 81 y.o. male who is seen for evaluation of liver/pancreas mass. Hematology/Oncology Treatment History:   · CT A/P 4/19/2022: Possible mild acute pancreatitis. Prominent sigmoid diverticulosis. Enlarged prostate. Fat-containing hernias  · CT Abdomen 5/17/2022: New intra-hepatic biliary dilatation and common bile duct dilatation. Ampullary prominence and 1.5 cm x 1.5 cm soft tissue density in the pancreaticoduodenal groove. New subcentimeter hepatic hypodense lesions, worrisome for metastatic  · disease. · MRI Abdomen 5/17/2022:  Periampullary pancreatic mass with possible extension into the wall of the duodenum. Associated intrahepatic and extra hepatic biliary dilatation and mild pancreatic ductal dilatation. Small hyperenhancing lesions throughout the liver suspicious for metastatic disease. History of Present Illness:   Ramesh Pugh is a pleasant 81 y.o. male who was admitted on 5/17/22  for jaundice . A Treated for pancreatitis 2 months ago following having tan, loose BMs; stools improved  But then loose stools returned. Noticed glucose was going up so went to see endocrinologist; labs were drawn and recommended GI follow up. Seen by GI yesterday and was directed to the ED for further eval. Noticed jaundice and itching x 1 week. Weight loss of 25# < 1 month. Bowels remain loose brown /grey in color. Denies any blood. Urine dk tea colored x 1 week. Denies N/V. Appetite remains good. Denies pain. ED labs Hgb 11.1 K+ 3.0 Tbili 7.2     ED imaging  US abd moderate intrahepatic and extrahepatic biliary ductal dilation; cirrhosis;small hepatic mass.  CT ampullary prominence and soft tissue density in the pancreaticoduodenal groove; new sub-centimeter hepatic hypodense lesions; worrisome for metastatic disease;     Smoking: denies   ETOH: very rare  Colonoscopy: due in July; last one 10 yrs ago. Family Hx of cancer: Father: unsure what type    Retired : [employer]/   : no children       Past Medical History:   Diagnosis Date    Anxiety and depression     Arthritis     Cirrhosis ([facility] [address]     Dementia ([facility] [address]     DM (diabetes mellitus) ([facility] [address]     Hyperlipidemia     Hypertension    Brendan Durbin Hypothyroid     Obese     SELINA on CPAP        Past Surgical History:   Procedure Laterality Date    HX ANKLE FRACTURE TX Right     Replacement    HX APPENDECTOMY  APR 2013    HX CHOLECYSTECTOMY      HX ORTHOPAEDIC      left knee     HX OTHER SURGICAL      deviated septum    OK ABDOMEN SURGERY PROC UNLISTED      dbl hernia repair as infant    OK INSERTION SUBQ CARDIAC RHYTHM MONITOR W/PRGRMG N/A 3/12/2020    LOOP RECORDER INSERT performed by Marley Flor MD at [address] [place] [address] LAB       Social History     Socioeconomic History    Marital status:      Spouse name: Not on file    Number of children: Not on file    Years of education: Not on file    Highest education level: Not on file   Occupational History    Not on file   Tobacco Use    Smoking status: Never Smoker    Smokeless tobacco: Never Used   Substance and Sexual Activity    Alcohol use: Yes     Comment: rarely    Drug use: No    Sexual activity: Not on file   Other Topics Concern    Not on file   Social History Narrative    Not on file     Social Determinants of Health     Financial Resource Strain:     Difficulty of Paying Living Expenses: Not on file   Food Insecurity:     Worried About [address] in the Last Year: Not on file    Scott of Food in the Last Year: Not on file   Transportation Needs:     Lack of Transportation (Medical): Not on file    Lack of Transportation (Non-Medical):  Not on file   Physical Activity:     Days of Exercise per Week: Not on file    Minutes of Exercise per Session: Not on file   Stress:     Feeling of Stress : Not on file   Social Connections:     Frequency of Communication with Friends and Family: Not on file    Frequency of Social Gatherings with Friends and Family: Not on file    Attends Scientology Services: Not on file    Active Member of Clubs or Organizations: Not on file    Attends Club or Organization Meetings: Not on file    Marital Status: Not on file   Intimate Partner Violence:     Fear of Current or Ex-Partner: Not on file    Emotionally Abused: Not on file    Physically Abused: Not on file    Sexually Abused: Not on file   Housing Stability:     Unable to Pay for Housing in the Last Year: Not on file    Number of Places Lived in the Last Year: Not on file    Unstable Housing in the Last Year: Not on file       Family History   Problem Relation Age of Onset    Emphysema Mother    Irby Other Mother         enlarged heart    Heart Attack Maternal Grandmother        Current Facility-Administered Medications   Medication Dose Route Frequency    potassium chloride SR (KLOR-CON 10) tablet 40 mEq  40 mEq Oral BID    0.9% sodium chloride with KCl 40 mEq/L infusion   IntraVENous CONTINUOUS    glucose chewable tablet 16 g  4 Tablet Oral PRN    dextrose 10% infusion 0-250 mL  0-250 mL IntraVENous PRN    glucagon (GLUCAGEN) injection 1 mg  1 mg IntraMUSCular PRN    acetaminophen (TYLENOL) tablet 650 mg  650 mg Oral Q6H PRN    Or    acetaminophen (TYLENOL) suppository 650 mg  650 mg Rectal Q6H PRN    polyethylene glycol (MIRALAX) packet 17 g  17 g Oral DAILY PRN    ondansetron (ZOFRAN ODT) tablet 4 mg  4 mg Oral Q8H PRN    Or    ondansetron (ZOFRAN) injection 4 mg  4 mg IntraVENous Q6H PRN    insulin lispro (HUMALOG) injection   SubCUTAneous Q6H    [Held by provider] enoxaparin (LOVENOX) injection 30 mg  30 mg SubCUTAneous Q12H    [START ON 5/18/2022] iopamidoL (ISOVUE 300) 61 % contrast injection 100-200 mL  100-200 mL IntraCATHeter RAD ONCE    [START ON 5/18/2022] indomethacin (INDOCIN) rectal suppository 100 mg  100 mg Rectal ONCE    [START ON 5/18/2022] glucagon (GLUCAGEN) injection 1 mg  1 mg IntraVENous ONCE    hydrOXYzine HCL (ATARAX) tablet 25 mg  25 mg Oral Q6H PRN       Allergies   Allergen Reactions    Codeine Anxiety          Review of Systems: A complete review of systems was obtained, reviewed. Pertinent findings reviewed above. Physical Exam:     Visit Vitals  /66 (BP 1 Location: Left upper arm, BP Patient Position: Sitting)   Pulse 60   Temp 97.6 °F (36.4 °C)   Resp 16   Ht 5' 10\" (1.778 m)   Wt 253 lb (114.8 kg)   SpO2 95%   BMI 36.30 kg/m²     ECOG PS: 1  General: obese; no distress  Eyes: PERRLA, icteric sclerae  HENT: atraumatic, oropharynx clear  Neck: supple  Lymphatic: no cervical, supraclavicular, or inguinal adenopathy  Respiratory: CTAB, normal respiratory effort  CV: normal rate, regular rhythm, no murmurs, no peripheral edema  GI: soft, nontender, nondistended, no masses, no hepatomegaly, no splenomegaly  MS: digits without clubbing or cyanosis. Skin: no rashes, no ecchymoses, no petechia. normal temperature, turgor, and texture. Jaundice  Psych: alert, oriented, appropriate affect, normal judgment/insight    Results:     Lab Results   Component Value Date/Time    WBC 6.9 05/17/2022 10:10 AM    HGB 11.1 (L) 05/17/2022 10:10 AM    HCT 33.6 (L) 05/17/2022 10:10 AM    PLATELET 060 58/16/4186 10:10 AM    MCV 86.4 05/17/2022 10:10 AM    ABS.  NEUTROPHILS 5.7 05/17/2022 10:10 AM     Lab Results   Component Value Date/Time    Sodium 135 (L) 05/17/2022 10:10 AM    Potassium 3.0 (L) 05/17/2022 10:10 AM    Chloride 101 05/17/2022 10:10 AM    CO2 26 05/17/2022 10:10 AM    Glucose 312 (H) 05/17/2022 10:10 AM    BUN 21 (H) 05/17/2022 10:10 AM    Creatinine 1.07 05/17/2022 10:10 AM    GFR est AA >60 05/17/2022 10:10 AM    GFR est non-AA >60 05/17/2022 10:10 AM    Calcium 8.6 05/17/2022 10:10 AM    Glucose (POC) 188 (H) 05/17/2022 03:23 PM Creatinine (POC) 1.00 04/19/2022 02:47 PM     Lab Results   Component Value Date/Time    Bilirubin, total 7.2 (H) 05/17/2022 10:10 AM    ALT (SGPT) 465 (H) 05/17/2022 10:10 AM    Alk. phosphatase 952 (H) 05/17/2022 10:10 AM    Protein, total 6.5 05/17/2022 10:10 AM    Albumin 2.8 (L) 05/17/2022 10:10 AM    Globulin 3.7 05/17/2022 10:10 AM     Lab Results   Component Value Date/Time    Reticulocyte count 4.2 (H) 05/17/2022 03:04 PM     05/17/2022 03:04 PM    TSH 2.18 05/17/2022 03:04 PM    Lipase 106 05/17/2022 10:10 AM       Lab Results   Component Value Date/Time    INR 1.1 05/17/2022 10:10 AM    aPTT 30.7 05/17/2022 10:10 AM     CA 19-9:  No results for input(s): C199LT in the last 7224 hours. 5/17/22 US ABD LTD  IMPRESSION  1. Moderate intrahepatic and extrahepatic biliary ductal dilation. 2. Cirrhosis. No ascites shown. 3. Small (11 x 13 mm) hepatic mass. 4. Liver protocol CT or MRI is recommended for further evaluation. Assessment/Recommendations:     1. Pancreatic cancer with possible liver metastases  His CT and MRI are concerning for a primary pancreatic cancer with liver metastases.  is pending. I will obtain a CT Chest for additional staging. GI planning biopsy of pancreatic mass with EUS tomorrow. I reviewed his scans with IR and the liver lesions may be amenable to US guided biopsy, so we will attempt this tomorrow as well. --tumor markers pending  --EUS tomorrow  --US guided liver biopsy tomorrow    2. Hyperbilirubinemia   Admission Tbili 7.2. Imaging concerning for biliary obstruction. GI consulted, planning ERCP tomorrow. 3.  Anemia  Anemia labs pending: will follow to see if any correctable deficiency     4. Hypokalemia  receiving replacement    5. DM  management per primary team       I personally saw and evaluated the patient and performed the key components of medical decision making.   The history, physical exam, and documentation were performed by Gene Centeno Schoeneweis, NP. I reviewed and verified the above documentation and modified it as needed.     Signed By: Alicia Foster MD

## 2022-05-17 NOTE — ED NOTES
TRANSFER - OUT REPORT:    Verbal report given to [facility] RN(name) on [facility].  being transferred to 5th floor(unit) for routine progression of care       Report consisted of patients Situation, Background, Assessment and   Recommendations(SBAR). Information from the following report(s) SBAR, ED Summary, Intake/Output, MAR, Recent Results and Quality Measures was reviewed with the receiving nurse. Lines:   Peripheral IV 05/17/22 Left Antecubital (Active)        Opportunity for questions and clarification was provided.       Patient transported with:   [employer]

## 2022-05-17 NOTE — CONSULTS
[address] [place] [address]. [address] [place] [address] NP  [phone]                    GASTROENTEROLOGY CONSULTATION NOTE              NAME:  Cricket Monetjo. :   1950   MRN:   [id number]       Referring Physician:   Dr. Chace Mina Date:   2022 4:22 PM    Chief Complaint:  Jaundice       History of Present Illness:    Patient is a 81 y.o. who we have been asked to see in consultation for the above complaints. THe patient was evaluatedin our office yesterday for complaints of painless jaundice. Associated symptoms include 25 lbs unintentional weight loss. He denies use of alchol and smoking. His work up in the ER includes a CT scan showing  Periampullary pancreatic mass with possible extension into the wall of the duodenum. Associated intrahepatic and extra hepatic biliary dilatation and mild pancreatic ductal dilatation      PMH:  Past Medical History:   Diagnosis Date    Anxiety and depression     Arthritis     Cirrhosis ([facility]     Dementia ([facility]     DM (diabetes mellitus) ([facility]     Hyperlipidemia     Hypertension     Hypothyroid     Obese     SELINA on CPAP        PSH:  Past Surgical History:   Procedure Laterality Date    HX ANKLE FRACTURE TX Right     Replacement    HX APPENDECTOMY  2013    HX CHOLECYSTECTOMY      HX ORTHOPAEDIC      left knee     HX OTHER SURGICAL      deviated septum    [place] ABDOMEN SURGERY PROC UNLISTED      dbl hernia repair as infant    [place] INSERTION SUBQ CARDIAC RHYTHM MONITOR W/PRGRMG N/A 3/12/2020    LOOP RECORDER INSERT performed by Elian Aguila MD at [facility]       Allergies: Allergies   Allergen Reactions    Codeine Anxiety       Home Medications:  Prior to Admission Medications   Prescriptions Last Dose Informant Patient Reported? Taking? INSULIN DETEMIR (LEVEMIR FLEXPEN SC) 2022 at Unknown time  Yes Yes   Si Units by SubCUTAneous route two (2) times a day.    ascorbic acid, vitamin C, (Vitamin C) 250 mg tablet 5/10/2022 at Unknown time  Yes Yes   Sig: Take 2,000 mg by mouth daily. cholecalciferol, vitamin D3, 2,000 unit Tab 5/16/2022 at Unknown time  Yes Yes   Sig: Take  by mouth daily. glipiZIDE (GLUCOTROL) 5 mg tablet 5/16/2022 at Unknown time  Yes Yes   Sig: Take  by mouth two (2) times a day. hydroCHLOROthiazide (HYDRODIURIL) 12.5 mg tablet 5/10/2022 at Unknown time  Yes Yes   Sig: Take 12.5 mg by mouth daily. levothyroxine (SYNTHROID) 200 mcg tablet 5/16/2022 at Unknown time  Yes Yes   Sig: Take 200 mcg by mouth Daily (before breakfast). multivitamin (ONE A DAY) tablet 5/10/2022 at Unknown time  Yes Yes   Sig: Take 1 Tab by mouth daily. sertraline (ZOLOFT) 100 mg tablet 5/16/2022 at Unknown time  Yes Yes   Sig: Take 100 mg by mouth daily. simvastatin (ZOCOR) 40 mg tablet 5/10/2022 at Unknown time  Yes Yes   Sig: Take  by mouth nightly.    telmisartan (MICARDIS) 40 mg tablet 5/16/2022 at Unknown time  Yes Yes      Facility-Administered Medications: None       Hospital Medications:  Current Facility-Administered Medications   Medication Dose Route Frequency    potassium chloride SR (KLOR-CON 10) tablet 40 mEq  40 mEq Oral BID    0.9% sodium chloride with KCl 40 mEq/L infusion   IntraVENous CONTINUOUS    glucose chewable tablet 16 g  4 Tablet Oral PRN    dextrose 10% infusion 0-250 mL  0-250 mL IntraVENous PRN    glucagon (GLUCAGEN) injection 1 mg  1 mg IntraMUSCular PRN    acetaminophen (TYLENOL) tablet 650 mg  650 mg Oral Q6H PRN    Or    acetaminophen (TYLENOL) suppository 650 mg  650 mg Rectal Q6H PRN    polyethylene glycol (MIRALAX) packet 17 g  17 g Oral DAILY PRN    ondansetron (ZOFRAN ODT) tablet 4 mg  4 mg Oral Q8H PRN    Or    ondansetron (ZOFRAN) injection 4 mg  4 mg IntraVENous Q6H PRN    insulin lispro (HUMALOG) injection   SubCUTAneous Q6H    [START ON 5/18/2022] enoxaparin (LOVENOX) injection 30 mg  30 mg SubCUTAneous Q12H    [START ON 5/18/2022] iopamidoL (ISOVUE 300) 61 % contrast injection 100-200 mL  100-200 mL IntraCATHeter RAD ONCE    [START ON 5/18/2022] indomethacin (INDOCIN) rectal suppository 100 mg  100 mg Rectal ONCE    [START ON 5/18/2022] glucagon (GLUCAGEN) injection 1 mg  1 mg IntraVENous ONCE       Social History:  Social History     Tobacco Use    Smoking status: Never Smoker    Smokeless tobacco: Never Used   Substance Use Topics    Alcohol use: Yes     Comment: rarely       Family History:  Family History   Problem Relation Age of Onset    Emphysema Mother     Other Mother         enlarged heart    Heart Attack Maternal Grandmother        Review of Systems:  Constitutional: negative fever, negative chills, negative weight loss  Eyes:   negative visual changes  ENT:   negative sore throat, tongue or lip swelling  Respiratory:  negative cough, negative dyspnea  Cards:  negative for chest pain, palpitations, lower extremity edema  GI:   See HPI  :  negative for frequency, dysuria  Integument:  negative for rash and pruritus  Heme:  negative for easy bruising and gum/nose bleeding  Musculoskel: negative for myalgias,  back pain and muscle weakness  Neuro: negative for headaches, dizziness, vertigo  Psych:  negative for feelings of anxiety, depression     Objective:     Patient Vitals for the past 8 hrs:   BP Temp Pulse Resp SpO2 Height Weight   05/17/22 1350 -- -- -- -- -- -- 114.8 kg (253 lb)   05/17/22 0958 (!) 121/54 97.7 °F (36.5 °C) (!) 54 16 95 % 5' 10\" (1.778 m) 114.8 kg (253 lb)     No intake/output data recorded. No intake/output data recorded. EXAM:     NEURO-alert, oriented x3, affect appropriate   HEENT-Head: Normocephalic, no lesions, without obvious abnormality. LUNGS-clear to auscultation bilaterally    COR-regular rate and rhythym     ABD- soft, non-tender.  Bowel sounds normal. No masses,  no organomegaly     Skin - No rash     Data Review     Recent Labs     05/17/22  1010   WBC 6.9   HGB 11.1*   HCT 33.6*        Recent Labs 05/17/22  1010   *   K 3.0*      CO2 26   BUN 21*   CREA 1.07   *   CA 8.6     Recent Labs     05/17/22  1010   *   TP 6.5   ALB 2.8*   GLOB 3.7   LPSE 106     Recent Labs     05/17/22  1010   INR 1.1   PTP 11.5*   APTT 30.7       Patient Active Problem List   Diagnosis Code    Type 2 diabetes mellitus (HCC) E11.9    Biliary obstruction K83.1    Anxiety and depression F41.9, F32. A    Arthritis M19.90    Dementia ([facility] F03.90    Hyperlipidemia E78.5    Hypertension I10    Hypothyroid E03.9    Obese E66.9    SELINA on CPAP G47.33, Z99.89    Anemia D64.9    LFT elevation R79.89    Hyperbilirubinemia E80.6    Cirrhosis (HCC) K74.60    Liver mass R16.0    Hypotension I95.9    Bradycardia R00.1    Jaundice R17    Severe protein-calorie malnutrition (HCC) E43       Assessment and Plan:  Jaundice:  Associated with 25 lbs unintentional weight loss. Concerning for malignancy    - NPO at midnight  - Tumor serologies are pending  - EUS ERCP tomorrow  - Oncology is on board. Following      Thanks for allowing me to participate in the care of this patient.   Signed By: Roderick Cordero NP     5/17/2022  4:22 PM

## 2022-05-17 NOTE — ED TRIAGE NOTES
Patient reports he noticed approximately a week ago he was getting jaundice so he went to see his GI dr who referred him to the ED for evaluation.     Patient reports generalized itching and being jaundice but denies any other symptoms or pain-

## 2022-05-17 NOTE — PROGRESS NOTES
5/17/2022  Reason for Admission:  Biliary obstruction                   RUR Score:          N/a patient is obs status, alerted CM specialist to deliver obs letter. Plan for utilizing home health:      Patient had once in the past, nearly a decade ago following knee surgery. PCP: First and Last name:  TAHMINA Finnegan   Name of Practice:    Are you a current patient: Yes/No:  Yes   Approximate date of last visit: 2 months ago   Can you participate in a virtual visit with your PCP:                     Current Advanced Directive/Advance Care Plan: Full Code    Healthcare Decision Maker:   Click here to complete [address] including selection of the Healthcare Decision Maker Relationship (ie \"Primary\")           Wife Katey                  Transition of Care Plan:                    Patient lives with his wife in a single story home with 3 steps to enter. Prior to admission he is independent with ADLs and drives. He has a CPAP at home. He has had home health in the past but not recently--it was after knee surgery around 8 years ago. Patient's wife Ezio Luu is his emergency contact and she can be reached at [phone]. She will be taking him home on discharge. Patient sees TAHMINA Henson for primary care and last had a visit about 2 months ago. He uses [facility] for prescriptions and sometimes pays a co pay, but they are typically covered. No needs identified at this time. Will continue to follow. Transition of Care Plan   1. Continue medical management/treatment  2. Home with family assistance unless otherwise indicated  3. Family will transport at discharge  4. Follow up outpatient as indicated  5. CM will continue to follow    Care Management Interventions  PCP Verified by CM: Yes (Berenice MARTEL)  Mode of Transport at Discharge:  Other (see comment) (wife)  Transition of Care Consult (CM Consult): Discharge Planning  Discharge Durable Medical Equipment: No  Physical Therapy Consult: Yes  Occupational Therapy Consult: Yes  Speech Therapy Consult: No  Support Systems: Spouse/Significant Other  Confirm Follow Up Transport: Family  Discharge Location  Patient Expects to be Discharged to[de-identified] Home with family assistance    PER Carpio

## 2022-05-17 NOTE — PROGRESS NOTES
05/17/22    Medicare Outpatient Observation Notice (MOON)/ [place] Outpatient Observation Notice (Eduar Goyal) provided to patient/representative with verbal explanation of the notice. Time allotted for questions regarding the notice. Patient /representative provided a completed copy of the MOON/VOON notice. Copy placed on bedside chart.

## 2022-05-17 NOTE — PROGRESS NOTES
[facility] Dosing Services: 5/17/22    Consult for Enoxaparin Dosing by Dr. Paige Kussmaul  Enoxaparin Indication:  DVT prophylaxis  Previous Dose Lovenox 40 mg daily   Serum Creatinine Lab Results   Component Value Date/Time    Creatinine 1.07 05/17/2022 10:10 AM    Creatinine (POC) 1.00 04/19/2022 02:47 PM      Creatinine Clearance Estimated Creatinine Clearance: 81.5 mL/min (based on SCr of 1.07 mg/dL). Platelets Lab Results   Component Value Date/Time    PLATELET 349 21/99/0005 10:10 AM       H/H Lab Results   Component Value Date/Time    HGB 11.1 (L) 05/17/2022 10:10 AM        Adjustments:  Lovenox 30 mg every 12 hours for patient with a weight between 101 to 150 kg (114.8 kg) and scr greater than 30 ml/min (81.5 ml/min).      Continue to monitor  [facility]

## 2022-05-17 NOTE — ED PROVIDER NOTES
80 yo male presents with jaundice. 2 months ago had tan, loose BM's. Stool, x-ray, CT ordered by PCP. Told he had pancreatitis. Treated with antibiotics. BM returned to normal color. Started itching about a week ago. Went to endocrinologist.  Blood work ordered and had elevated liver enzymes. Sent to GI, saw TAHMINA Anna, sent to emergency departement. Has had 25 lb weight loss, fatigue. Stool is grayish, black x one week. .   Denies nausea, vomiting, fevers, no night sweats. Denies EtOH. FH: unsure of specifics but states there is cancer.         Jaundice          Past Medical History:   Diagnosis Date    Arthritis     Chest pain, unspecified     Essential hypertension, benign     Hypertension     NIDDM (non-insulin dependent diabetes mellitus)     Obesity, unspecified     Other and unspecified hyperlipidemia     Sleep apnea     C PAP    Thyroid disease     Unspecified adverse effect of anesthesia     low oxygenation/fighting       Past Surgical History:   Procedure Laterality Date    ABDOMEN SURGERY PROC UNLISTED      dbl hernia repair as infant    HX ANKLE FRACTURE TX Right     Replacement    HX APPENDECTOMY  APR 2013    HX CHOLECYSTECTOMY      HX ORTHOPAEDIC      left knee     HX OTHER SURGICAL      deviated septum    [place] INSERTION SUBQ CARDIAC RHYTHM MONITOR W/PRGRMG N/A 3/12/2020    LOOP RECORDER INSERT performed by Neo Cabrales MD at [facility]         Family History:   Problem Relation Age of Onset    Emphysema Mother     Other Mother         enlarged heart    Heart Attack Maternal Grandmother        Social History     Socioeconomic History    Marital status:      Spouse name: Not on file    Number of children: Not on file    Years of education: Not on file    Highest education level: Not on file   Occupational History    Not on file   Tobacco Use    Smoking status: Never Smoker    Smokeless tobacco: Never Used   Substance and Sexual Activity    Alcohol use: Yes     Comment: rarely    Drug use: No    Sexual activity: Not on file   Other Topics Concern    Not on file   Social History Narrative    Not on file     Social Determinants of Health     Financial Resource Strain:     Difficulty of Paying Living Expenses: Not on file   Food Insecurity:     Worried About Running Out of Food in the Last Year: Not on file    Scott of Food in the Last Year: Not on file   Transportation Needs:     Lack of Transportation (Medical): Not on file    Lack of Transportation (Non-Medical): Not on file   Physical Activity:     Days of Exercise per Week: Not on file    Minutes of Exercise per Session: Not on file   Stress:     Feeling of Stress : Not on file   Social Connections:     Frequency of Communication with Friends and Family: Not on file    Frequency of Social Gatherings with Friends and Family: Not on file    Attends Restoration Services: Not on file    Active Member of [address] [place] [address] or Organizations: Not on file    Attends Club or Organization Meetings: Not on file    Marital Status: Not on file   Intimate Partner Violence:     Fear of Current or Ex-Partner: Not on file    Emotionally Abused: Not on file    Physically Abused: Not on file    Sexually Abused: Not on file   Housing Stability:     Unable to Pay for Housing in the Last Year: Not on file    Number of [place] in the Last Year: Not on file    Unstable Housing in the Last Year: Not on file         ALLERGIES: Codeine    Review of Systems   Gastrointestinal: Positive for jaundice. All other systems reviewed and are negative. Vitals:    05/17/22 0958   BP: (!) 121/54   Pulse: (!) 54   Resp: 16   Temp: 97.7 °F (36.5 °C)   SpO2: 95%   Weight: 114.8 kg (253 lb)   Height: 5' 10\" (1.778 m)            Physical Exam  Vitals and nursing note reviewed. Constitutional:       General: He is not in acute distress. Appearance: He is obese. HENT:      Head: Normocephalic and atraumatic. Eyes:      General: Scleral icterus present. Conjunctiva/sclera: Conjunctivae normal.      Pupils: Pupils are equal, round, and reactive to light. Neck:      Trachea: No tracheal deviation. Cardiovascular:      Rate and Rhythm: Normal rate and regular rhythm. Pulmonary:      Effort: Pulmonary effort is normal. No respiratory distress. Breath sounds: Normal breath sounds. No stridor. Abdominal:      General: There is no distension. Palpations: Abdomen is soft. Tenderness: There is no abdominal tenderness. There is no guarding or rebound. Genitourinary:     Comments: deferred  Musculoskeletal:         General: No deformity. Cervical back: No rigidity. Skin:     General: Skin is warm and dry. Coloration: Skin is jaundiced. Neurological:      General: No focal deficit present. Mental Status: He is alert. Psychiatric:         Mood and Affect: Mood normal.         Behavior: Behavior normal.          MDM  Number of Diagnoses or Management Options  Dilation of biliary tract  Jaundice  Liver mass  Diagnosis management comments: 57-year-old male presents with painless jaundice for the past week. Here his T bili was 7.2. Conjugated bili 6.0. Ultrasound shows possible hepatic mass, dilated hepatic and biliary ducts. Suspect underlying malignancy. He was sent in by GI.  GI was consulted but did not receive a call back. Gave patient option for admission for jaundice work-up versus outpatient management given that he was sent in by his outpatient gastroenterologist.  Laura Rodrigez for inpatient work-up. Procedures        Perfect Serve Consult for Admission  1:22 PM    ED Room Number: ER15/15  Patient Name and age:  Julee Bateman. 81 y.o.  male  Working Diagnosis:   1. Jaundice    2. Dilation of biliary tract    3.  Liver mass        COVID-19 Suspicion:  no  Sepsis present:  no  Reassessment needed: N/A  Code Status:  Full Code  Readmission: no  Isolation Requirements: no  Recommended Level of Care:  med/surg  Department:[facility] ED - [phone]  Other: Has had painless jaundice. Sent in by GI. Unable to reach GI.  CT abdomen pelvis pending. Blanca Constantino.  Tamie Redding MD

## 2022-05-17 NOTE — PROGRESS NOTES
Bedside shift change report given to Yogi Badillo (oncoming nurse) by Mahesh Alexander (offgoing nurse). Report included the following information SBAR, Kardex, Intake/Output, MAR and Recent Results.

## 2022-05-17 NOTE — H&P
[facility]    Hospitalist Admission Note      NAME:  Gris Wilson. :   1950   MRN:  [id number]     PCP:  TAHMINA Briggs     Date/Time of service:  2022 1:51 PM          Subjective:     CHIEF COMPLAINT: jaundice     HISTORY OF PRESENT ILLNESS:     Mr. Christen De La Cruz is a 81 y.o.  male who presented to the Emergency Department complaining of jaundice. Worsening over weeks. New. ER finds liver mass and biliary obstruction on CT. 25 lb unintentional weight loss over last month. We will admit him for management. Past Medical History:   Diagnosis Date    Anxiety and depression     Arthritis     Cir[facility]     Dementia ([facility]     DM (diabetes mellitus) ([facility]     Hyperlipidemia     Hypertension     Hypothyroid     Obese     SELINA on CPAP         Past Surgical History:   Procedure Laterality Date    HX ANKLE FRACTURE TX Right     Replacement    HX APPENDECTOMY  2013    HX CHOLECYSTECTOMY      HX ORTHOPAEDIC      left knee     HX OTHER SURGICAL      deviated septum    [place] ABDOMEN SURGERY PROC UNLISTED      dbl hernia repair as infant    [place] INSERTION SUBQ CARDIAC RHYTHM MONITOR W/PRGRMG N/A 3/12/2020    LOOP RECORDER INSERT performed by Ilsa Pallas, MD at [facility] CATH LAB       Social History     Tobacco Use    Smoking status: Never Smoker    Smokeless tobacco: Never Used   Substance Use Topics    Alcohol use: Yes     Comment: rarely        Family History   Problem Relation Age of Onset    Emphysema Mother     Other Mother         enlarged heart    Heart Attack Maternal Grandmother       Family hx cannot be fully assessed, since the patient cannot provide information    Allergies   Allergen Reactions    Codeine Anxiety        Prior to Admission medications    Medication Sig Start Date End Date Taking? Authorizing Provider   glipiZIDE (GLUCOTROL) 5 mg tablet Take  by mouth two (2) times a day.     Provider, Historical   telmisartan (MICARDIS) 40 mg tablet  12/30/19   Provider, Historical   INSULIN DETEMIR (LEVEMIR FLEXPEN SC) 50 Units by SubCUTAneous route two (2) times a day. Other, MD Kimber   cholecalciferol, vitamin D3, 2,000 unit Tab Take  by mouth daily. Provider, Historical   multivitamin (ONE A DAY) tablet Take 1 Tab by mouth daily. Other, MD Kimber   sertraline (ZOLOFT) 100 mg tablet Take 100 mg by mouth daily. Provider, Historical   levothyroxine (SYNTHROID) 200 mcg tablet Take 200 mcg by mouth Daily (before breakfast). Provider, Historical   simvastatin (ZOCOR) 40 mg tablet Take  by mouth nightly.     Provider, Historical       Review of Systems:  (bold if positive, if negative)    Gen:  Eyes:  ENT:  CVS:  Pulm:  GI:  jaundiceGU:  MS:  Skin:  Psych:  Endo:  Hem:  Renal:  Neuro:        Objective:      VITALS:    Vital signs reviewed; most recent are:    Visit Vitals  BP (!) 121/54 (BP 1 Location: Right upper arm, BP Patient Position: Sitting)   Pulse (!) 54   Temp 97.7 °F (36.5 °C)   Resp 16   Ht 5' 10\" (1.778 m)   Wt 114.8 kg (253 lb)   SpO2 95%   BMI 36.30 kg/m²     SpO2 Readings from Last 6 Encounters:   05/17/22 95%   02/19/20 96%   01/05/20 98%   07/16/16 97%   06/04/15 94%   07/04/13 96%        No intake or output data in the 24 hours ending 05/17/22 1351     Exam:     Physical Exam:    Gen:  Well-developed, well-nourished, in no acute distress  HEENT:  Pink conjunctivae, PERRL, hearing intact to voice, moist mucous membranes  Neck:  Supple, without masses, thyroid non-tender  Resp:  No accessory muscle use, clear breath sounds without wheezes rales or rhonchi  Card:  No murmurs, normal S1, S2 without thrills, bruits or peripheral edema  Abd:  Soft, non-tender, non-distended, normoactive bowel sounds are present, no mass  Lymph:  No cervical or inguinal adenopathy  Musc:  No cyanosis or clubbing  Skin:  No rashes or ulcers, skin turgor is good  Neuro:  Cranial nerves are grossly intact, no focal motor weakness, follows commands appropriately  Psych:  Good insight, oriented to person, place and time, alert     Labs:    Recent Labs     05/17/22  1010   WBC 6.9   HGB 11.1*   HCT 33.6*        Recent Labs     05/17/22  1010   *   K 3.0*      CO2 26   *   BUN 21*   CREA 1.07   CA 8.6   ALB 2.8*   TBILI 7.2*   *     Lab Results   Component Value Date/Time    Glucose (POC) 317 (H) 07/16/2016 06:48 PM    Glucose (POC) 369 (H) 07/16/2016 05:02 PM     No results for input(s): PH, PCO2, PO2, HCO3, FIO2 in the last 72 hours. Recent Labs     05/17/22  1010   INR 1.1     All Micro Results     Procedure Component Value Units Date/Time    CULTURE, URINE [[id number]]     Order Status: Sent Specimen: Urine from Clean catch     COVID-19 RAPID TEST [[id number]]     Order Status: Sent           I have reviewed previous records       Assessment and Plan:      Biliary obstruction / LFT elevation / Hyperbilirubinemia / Jaundice / Cirrhosis / Liver mass - POA, new. Presumed cancer. MRCP. GI consult to consider stent and Bx. Oncology consult. Check tumor markers. NPO for now. Hypotension / Bradycardia / Hx Hypertension - POA, monitor with hydration. Hold telmisartan    Anemia - POA, mild, may worsen with hydration. Check serologies. Severe protein calorie malnutrition / hypoalbuminemia - 25lb (10% body weight) unintentional wt loss over last 30 days. Supplements when eating    Hypokalemia - Replete PO and in IVF and monitor. Type 2 diabetes mellitus without complications - Diabetic diet and counseling. SSI per protocol. Hold home Lantus and glipizide until eating. Check A1c. Obese / SELINA on CPAP - Advise weight loss and compliance with CPAP    Anxiety and depression / Dementia - Hx of confusion with anesthesia. Continue sertraline    Arthritis - tylenol prn. No NSAIDS    Hyperlipidemia - Check lipid panel and hold simvastatin. No hx of CAD nor CVA to demand tight control.     Hypothyroid - Check TSH and continue synthroid       Telemetry reviewed:   normal sinus rhythm    Risk of deterioration: high      Total time spent with patient: 48 Minutes I personally reviewed chart, notes, data and current medications in the medical record. I have personally examined and treated the patient at bedside during this period.                  Care Plan discussed with: Patient, Family, Care Manager, Nursing Staff, Consultant/Specialist and >50% of time spent in counseling and coordination of care    Discussed:  Care Plan and D/C Planning       ___________________________________________________    Attending Physician: Carroll Hurley MD

## 2022-05-18 ENCOUNTER — APPOINTMENT (OUTPATIENT)
Dept: GENERAL RADIOLOGY | Age: 72
DRG: 435 | End: 2022-05-18
Attending: INTERNAL MEDICINE
Payer: MEDICARE

## 2022-05-18 ENCOUNTER — ANESTHESIA (OUTPATIENT)
Dept: ENDOSCOPY | Age: 72
DRG: 435 | End: 2022-05-18
Payer: MEDICARE

## 2022-05-18 ENCOUNTER — ANESTHESIA EVENT (OUTPATIENT)
Dept: ENDOSCOPY | Age: 72
DRG: 435 | End: 2022-05-18
Payer: MEDICARE

## 2022-05-18 ENCOUNTER — APPOINTMENT (OUTPATIENT)
Dept: CT IMAGING | Age: 72
DRG: 435 | End: 2022-05-18
Attending: NURSE PRACTITIONER
Payer: MEDICARE

## 2022-05-18 LAB
AFP-TM SERPL-MCNC: 1.5 NG/ML (ref 0–8.4)
ALBUMIN SERPL-MCNC: 2.5 G/DL (ref 3.5–5)
ALBUMIN/GLOB SERPL: 0.7 {RATIO} (ref 1.1–2.2)
ALP SERPL-CCNC: 931 U/L (ref 45–117)
ALT SERPL-CCNC: 390 U/L (ref 12–78)
ANION GAP SERPL CALC-SCNC: 8 MMOL/L (ref 5–15)
AST SERPL-CCNC: 269 U/L (ref 15–37)
BACTERIA SPEC CULT: NORMAL
BILIRUB SERPL-MCNC: 8 MG/DL (ref 0.2–1)
BUN SERPL-MCNC: 16 MG/DL (ref 6–20)
BUN/CREAT SERPL: 18 (ref 12–20)
CALCIUM SERPL-MCNC: 8.4 MG/DL (ref 8.5–10.1)
CANCER AG19-9 SERPL-ACNC: 217 U/ML (ref 0–35)
CC UR VC: NORMAL
CHLORIDE SERPL-SCNC: 106 MMOL/L (ref 97–108)
CO2 SERPL-SCNC: 22 MMOL/L (ref 21–32)
CREAT SERPL-MCNC: 0.9 MG/DL (ref 0.7–1.3)
ERYTHROCYTE [DISTWIDTH] IN BLOOD BY AUTOMATED COUNT: 17.6 % (ref 11.5–14.5)
GLOBULIN SER CALC-MCNC: 3.5 G/DL (ref 2–4)
GLUCOSE BLD STRIP.AUTO-MCNC: 161 MG/DL (ref 65–117)
GLUCOSE BLD STRIP.AUTO-MCNC: 227 MG/DL (ref 65–117)
GLUCOSE BLD STRIP.AUTO-MCNC: 275 MG/DL (ref 65–117)
GLUCOSE BLD STRIP.AUTO-MCNC: 403 MG/DL (ref 65–117)
GLUCOSE SERPL-MCNC: 205 MG/DL (ref 65–100)
HCT VFR BLD AUTO: 32.3 % (ref 36.6–50.3)
HGB BLD-MCNC: 10.5 G/DL (ref 12.1–17)
MAGNESIUM SERPL-MCNC: 2.1 MG/DL (ref 1.6–2.4)
MCH RBC QN AUTO: 28.9 PG (ref 26–34)
MCHC RBC AUTO-ENTMCNC: 32.5 G/DL (ref 30–36.5)
MCV RBC AUTO: 89 FL (ref 80–99)
NRBC # BLD: 0 K/UL (ref 0–0.01)
NRBC BLD-RTO: 0 PER 100 WBC
PHOSPHATE SERPL-MCNC: 2.2 MG/DL (ref 2.6–4.7)
PLATELET # BLD AUTO: 163 K/UL (ref 150–400)
PMV BLD AUTO: 11.2 FL (ref 8.9–12.9)
POTASSIUM SERPL-SCNC: 4 MMOL/L (ref 3.5–5.1)
PROT SERPL-MCNC: 6 G/DL (ref 6.4–8.2)
RBC # BLD AUTO: 3.63 M/UL (ref 4.1–5.7)
SERVICE CMNT-IMP: ABNORMAL
SERVICE CMNT-IMP: NORMAL
SODIUM SERPL-SCNC: 136 MMOL/L (ref 136–145)
WBC # BLD AUTO: 5.8 K/UL (ref 4.1–11.1)

## 2022-05-18 PROCEDURE — 76060000033 HC ANESTHESIA 1 TO 1.5 HR: Performed by: INTERNAL MEDICINE

## 2022-05-18 PROCEDURE — 77030003474 HC NDL BIOP COOK -D: Performed by: INTERNAL MEDICINE

## 2022-05-18 PROCEDURE — 74011636637 HC RX REV CODE- 636/637: Performed by: INTERNAL MEDICINE

## 2022-05-18 PROCEDURE — 2709999900 HC NON-CHARGEABLE SUPPLY: Performed by: INTERNAL MEDICINE

## 2022-05-18 PROCEDURE — 74011000636 HC RX REV CODE- 636: Performed by: INTERNAL MEDICINE

## 2022-05-18 PROCEDURE — 74011250637 HC RX REV CODE- 250/637: Performed by: INTERNAL MEDICINE

## 2022-05-18 PROCEDURE — G0378 HOSPITAL OBSERVATION PER HR: HCPCS

## 2022-05-18 PROCEDURE — 77030041276 HC SPHNTOM BILI BSC -F: Performed by: INTERNAL MEDICINE

## 2022-05-18 PROCEDURE — 36415 COLL VENOUS BLD VENIPUNCTURE: CPT

## 2022-05-18 PROCEDURE — 77030013992 HC SNR POLYP ENDOSC BSC -B: Performed by: INTERNAL MEDICINE

## 2022-05-18 PROCEDURE — 77030021593 HC FCPS BIOP ENDOSC BSC -A: Performed by: INTERNAL MEDICINE

## 2022-05-18 PROCEDURE — 74011250636 HC RX REV CODE- 250/636: Performed by: NURSE ANESTHETIST, CERTIFIED REGISTERED

## 2022-05-18 PROCEDURE — 0F798DZ DILATION OF COMMON BILE DUCT WITH INTRALUMINAL DEVICE, VIA NATURAL OR ARTIFICIAL OPENING ENDOSCOPIC: ICD-10-PCS | Performed by: INTERNAL MEDICINE

## 2022-05-18 PROCEDURE — 71260 CT THORAX DX C+: CPT

## 2022-05-18 PROCEDURE — 84100 ASSAY OF PHOSPHORUS: CPT

## 2022-05-18 PROCEDURE — 76000 FLUOROSCOPY <1 HR PHYS/QHP: CPT

## 2022-05-18 PROCEDURE — 65270000029 HC RM PRIVATE

## 2022-05-18 PROCEDURE — 77030010104 HC SEAL PRT ENDOSC BYRN -B: Performed by: INTERNAL MEDICINE

## 2022-05-18 PROCEDURE — 74011000250 HC RX REV CODE- 250: Performed by: NURSE ANESTHETIST, CERTIFIED REGISTERED

## 2022-05-18 PROCEDURE — C1874 STENT, COATED/COV W/DEL SYS: HCPCS | Performed by: INTERNAL MEDICINE

## 2022-05-18 PROCEDURE — 77030009038 HC CATH BILI STN RTVR BSC -C: Performed by: INTERNAL MEDICINE

## 2022-05-18 PROCEDURE — 88342 IMHCHEM/IMCYTCHM 1ST ANTB: CPT

## 2022-05-18 PROCEDURE — 80053 COMPREHEN METABOLIC PANEL: CPT

## 2022-05-18 PROCEDURE — 77030011761 HC SPHNTOM BILI BSC -C: Performed by: INTERNAL MEDICINE

## 2022-05-18 PROCEDURE — 77030031656 HC FCPS ENDO GRSP DISP BSC -B: Performed by: INTERNAL MEDICINE

## 2022-05-18 PROCEDURE — 74011250636 HC RX REV CODE- 250/636: Performed by: INTERNAL MEDICINE

## 2022-05-18 PROCEDURE — 77030026438 HC STYL ET INTUB CARD -A: Performed by: ANESTHESIOLOGY

## 2022-05-18 PROCEDURE — 97161 PT EVAL LOW COMPLEX 20 MIN: CPT

## 2022-05-18 PROCEDURE — 99233 SBSQ HOSP IP/OBS HIGH 50: CPT | Performed by: INTERNAL MEDICINE

## 2022-05-18 PROCEDURE — 82962 GLUCOSE BLOOD TEST: CPT

## 2022-05-18 PROCEDURE — 77030003406 HC NDL ASPIR BIOP OCOA -C: Performed by: INTERNAL MEDICINE

## 2022-05-18 PROCEDURE — 77030010603 HC BLN DEV INFL BSC -B: Performed by: INTERNAL MEDICINE

## 2022-05-18 PROCEDURE — 83735 ASSAY OF MAGNESIUM: CPT

## 2022-05-18 PROCEDURE — 88305 TISSUE EXAM BY PATHOLOGIST: CPT

## 2022-05-18 PROCEDURE — BF131ZZ FLUOROSCOPY OF GALLBLADDER AND BILE DUCTS USING LOW OSMOLAR CONTRAST: ICD-10-PCS | Performed by: INTERNAL MEDICINE

## 2022-05-18 PROCEDURE — 88341 IMHCHEM/IMCYTCHM EA ADD ANTB: CPT

## 2022-05-18 PROCEDURE — 77030007288 HC DEV LOK BILI BSC -A: Performed by: INTERNAL MEDICINE

## 2022-05-18 PROCEDURE — 76040000008: Performed by: INTERNAL MEDICINE

## 2022-05-18 PROCEDURE — 77030016053: Performed by: INTERNAL MEDICINE

## 2022-05-18 PROCEDURE — 85027 COMPLETE CBC AUTOMATED: CPT

## 2022-05-18 PROCEDURE — C1769 GUIDE WIRE: HCPCS | Performed by: INTERNAL MEDICINE

## 2022-05-18 PROCEDURE — C1726 CATH, BAL DIL, NON-VASCULAR: HCPCS | Performed by: INTERNAL MEDICINE

## 2022-05-18 PROCEDURE — 77030008684 HC TU ET CUF COVD -B: Performed by: ANESTHESIOLOGY

## 2022-05-18 DEVICE — STENT SYSTEM RMV
Type: IMPLANTABLE DEVICE | Site: BILE DUCT | Status: FUNCTIONAL
Brand: WALLFLEX BILIARY

## 2022-05-18 RX ORDER — SUCCINYLCHOLINE CHLORIDE 20 MG/ML
INJECTION INTRAMUSCULAR; INTRAVENOUS AS NEEDED
Status: DISCONTINUED | OUTPATIENT
Start: 2022-05-18 | End: 2022-05-18 | Stop reason: HOSPADM

## 2022-05-18 RX ORDER — NEOSTIGMINE METHYLSULFATE 1 MG/ML
INJECTION, SOLUTION INTRAVENOUS AS NEEDED
Status: DISCONTINUED | OUTPATIENT
Start: 2022-05-18 | End: 2022-05-18 | Stop reason: HOSPADM

## 2022-05-18 RX ORDER — LIDOCAINE HYDROCHLORIDE 20 MG/ML
INJECTION, SOLUTION EPIDURAL; INFILTRATION; INTRACAUDAL; PERINEURAL AS NEEDED
Status: DISCONTINUED | OUTPATIENT
Start: 2022-05-18 | End: 2022-05-18 | Stop reason: HOSPADM

## 2022-05-18 RX ORDER — FENTANYL CITRATE 50 UG/ML
INJECTION, SOLUTION INTRAMUSCULAR; INTRAVENOUS AS NEEDED
Status: DISCONTINUED | OUTPATIENT
Start: 2022-05-18 | End: 2022-05-18 | Stop reason: HOSPADM

## 2022-05-18 RX ORDER — SODIUM CHLORIDE, SODIUM LACTATE, POTASSIUM CHLORIDE, CALCIUM CHLORIDE 600; 310; 30; 20 MG/100ML; MG/100ML; MG/100ML; MG/100ML
75 INJECTION, SOLUTION INTRAVENOUS CONTINUOUS
Status: DISCONTINUED | OUTPATIENT
Start: 2022-05-18 | End: 2022-05-19 | Stop reason: HOSPADM

## 2022-05-18 RX ORDER — FLUMAZENIL 0.1 MG/ML
0.2 INJECTION INTRAVENOUS
Status: DISCONTINUED | OUTPATIENT
Start: 2022-05-18 | End: 2022-05-18 | Stop reason: HOSPADM

## 2022-05-18 RX ORDER — DIPHENHYDRAMINE HYDROCHLORIDE 50 MG/ML
12.5 INJECTION, SOLUTION INTRAMUSCULAR; INTRAVENOUS
Status: DISCONTINUED | OUTPATIENT
Start: 2022-05-18 | End: 2022-05-19 | Stop reason: HOSPADM

## 2022-05-18 RX ORDER — ROCURONIUM BROMIDE 10 MG/ML
INJECTION, SOLUTION INTRAVENOUS AS NEEDED
Status: DISCONTINUED | OUTPATIENT
Start: 2022-05-18 | End: 2022-05-18 | Stop reason: HOSPADM

## 2022-05-18 RX ORDER — PHENYLEPHRINE HCL IN 0.9% NACL 0.4MG/10ML
SYRINGE (ML) INTRAVENOUS AS NEEDED
Status: DISCONTINUED | OUTPATIENT
Start: 2022-05-18 | End: 2022-05-18 | Stop reason: HOSPADM

## 2022-05-18 RX ORDER — ATROPINE SULFATE 0.1 MG/ML
0.4 INJECTION INTRAVENOUS
Status: DISCONTINUED | OUTPATIENT
Start: 2022-05-18 | End: 2022-05-18 | Stop reason: HOSPADM

## 2022-05-18 RX ORDER — GLYCOPYRROLATE 0.2 MG/ML
INJECTION INTRAMUSCULAR; INTRAVENOUS AS NEEDED
Status: DISCONTINUED | OUTPATIENT
Start: 2022-05-18 | End: 2022-05-18 | Stop reason: HOSPADM

## 2022-05-18 RX ORDER — MIDAZOLAM HYDROCHLORIDE 1 MG/ML
.25-5 INJECTION, SOLUTION INTRAMUSCULAR; INTRAVENOUS
Status: DISCONTINUED | OUTPATIENT
Start: 2022-05-18 | End: 2022-05-18 | Stop reason: HOSPADM

## 2022-05-18 RX ORDER — EPINEPHRINE 0.1 MG/ML
1 INJECTION INTRACARDIAC; INTRAVENOUS
Status: DISCONTINUED | OUTPATIENT
Start: 2022-05-18 | End: 2022-05-18 | Stop reason: HOSPADM

## 2022-05-18 RX ORDER — EPHEDRINE SULFATE/0.9% NACL/PF 50 MG/5 ML
SYRINGE (ML) INTRAVENOUS AS NEEDED
Status: DISCONTINUED | OUTPATIENT
Start: 2022-05-18 | End: 2022-05-18 | Stop reason: HOSPADM

## 2022-05-18 RX ORDER — ONDANSETRON 2 MG/ML
INJECTION INTRAMUSCULAR; INTRAVENOUS AS NEEDED
Status: DISCONTINUED | OUTPATIENT
Start: 2022-05-18 | End: 2022-05-18 | Stop reason: HOSPADM

## 2022-05-18 RX ORDER — NALOXONE HYDROCHLORIDE 0.4 MG/ML
0.4 INJECTION, SOLUTION INTRAMUSCULAR; INTRAVENOUS; SUBCUTANEOUS
Status: DISCONTINUED | OUTPATIENT
Start: 2022-05-18 | End: 2022-05-18 | Stop reason: HOSPADM

## 2022-05-18 RX ORDER — DEXTROMETHORPHAN/PSEUDOEPHED 2.5-7.5/.8
1.2 DROPS ORAL
Status: DISCONTINUED | OUTPATIENT
Start: 2022-05-18 | End: 2022-05-18 | Stop reason: HOSPADM

## 2022-05-18 RX ORDER — PROPOFOL 10 MG/ML
INJECTION, EMULSION INTRAVENOUS AS NEEDED
Status: DISCONTINUED | OUTPATIENT
Start: 2022-05-18 | End: 2022-05-18 | Stop reason: HOSPADM

## 2022-05-18 RX ORDER — SODIUM CHLORIDE 9 MG/ML
50 INJECTION, SOLUTION INTRAVENOUS CONTINUOUS
Status: DISPENSED | OUTPATIENT
Start: 2022-05-18 | End: 2022-05-18

## 2022-05-18 RX ADMIN — INSULIN LISPRO 2 UNITS: 100 INJECTION, SOLUTION INTRAVENOUS; SUBCUTANEOUS at 07:50

## 2022-05-18 RX ADMIN — HYDROXYZINE HYDROCHLORIDE 25 MG: 25 TABLET, FILM COATED ORAL at 21:16

## 2022-05-18 RX ADMIN — ROCURONIUM BROMIDE 5 MG: 10 INJECTION INTRAVENOUS at 14:01

## 2022-05-18 RX ADMIN — GLYCOPYRROLATE 0.6 MG: 0.2 INJECTION INTRAMUSCULAR; INTRAVENOUS at 14:17

## 2022-05-18 RX ADMIN — INDOMETHACIN 100 MG: 50 SUPPOSITORY RECTAL at 13:30

## 2022-05-18 RX ADMIN — PROPOFOL 50 MG: 10 INJECTION, EMULSION INTRAVENOUS at 13:30

## 2022-05-18 RX ADMIN — Medication 15 MG: at 13:42

## 2022-05-18 RX ADMIN — ONDANSETRON HYDROCHLORIDE 4 MG: 2 SOLUTION INTRAMUSCULAR; INTRAVENOUS at 14:11

## 2022-05-18 RX ADMIN — FENTANYL CITRATE 50 MCG: 50 INJECTION, SOLUTION INTRAMUSCULAR; INTRAVENOUS at 13:57

## 2022-05-18 RX ADMIN — Medication 100 MCG: at 13:42

## 2022-05-18 RX ADMIN — HYDROXYZINE HYDROCHLORIDE 25 MG: 25 TABLET, FILM COATED ORAL at 08:32

## 2022-05-18 RX ADMIN — IOPAMIDOL 100 ML: 612 INJECTION, SOLUTION INTRAVENOUS at 08:10

## 2022-05-18 RX ADMIN — PROPOFOL 50 MG: 10 INJECTION, EMULSION INTRAVENOUS at 14:20

## 2022-05-18 RX ADMIN — FENTANYL CITRATE 50 MCG: 50 INJECTION, SOLUTION INTRAMUSCULAR; INTRAVENOUS at 13:15

## 2022-05-18 RX ADMIN — Medication 3 MG: at 14:17

## 2022-05-18 RX ADMIN — IOPAMIDOL 27 ML: 612 INJECTION, SOLUTION INTRAVENOUS at 14:10

## 2022-05-18 RX ADMIN — Medication 10 MG: at 13:36

## 2022-05-18 RX ADMIN — SODIUM CHLORIDE, POTASSIUM CHLORIDE, SODIUM LACTATE AND CALCIUM CHLORIDE 75 ML/HR: 600; 310; 30; 20 INJECTION, SOLUTION INTRAVENOUS at 08:31

## 2022-05-18 RX ADMIN — Medication 10 MG: at 13:52

## 2022-05-18 RX ADMIN — INSULIN LISPRO 10 UNITS: 100 INJECTION, SOLUTION INTRAVENOUS; SUBCUTANEOUS at 17:58

## 2022-05-18 RX ADMIN — ROCURONIUM BROMIDE 20 MG: 10 INJECTION INTRAVENOUS at 13:32

## 2022-05-18 RX ADMIN — Medication 100 MCG: at 13:36

## 2022-05-18 RX ADMIN — INSULIN LISPRO 5 UNITS: 100 INJECTION, SOLUTION INTRAVENOUS; SUBCUTANEOUS at 11:54

## 2022-05-18 RX ADMIN — LIDOCAINE HYDROCHLORIDE 80 MG: 20 INJECTION, SOLUTION EPIDURAL; INFILTRATION; INTRACAUDAL; PERINEURAL at 13:17

## 2022-05-18 RX ADMIN — PROPOFOL 50 MG: 10 INJECTION, EMULSION INTRAVENOUS at 13:20

## 2022-05-18 RX ADMIN — Medication 100 MCG: at 14:04

## 2022-05-18 RX ADMIN — PROPOFOL 150 MG: 10 INJECTION, EMULSION INTRAVENOUS at 13:17

## 2022-05-18 RX ADMIN — Medication 100 MG: at 13:18

## 2022-05-18 RX ADMIN — Medication 10 MG: at 14:04

## 2022-05-18 RX ADMIN — Medication 100 MCG: at 13:31

## 2022-05-18 NOTE — PROGRESS NOTES
Comprehensive Nutrition Assessment    Type and Reason for Visit: Initial,Positive nutrition screen    Nutrition Recommendations/Plan:   1. Advance diet as tolerated - recommend regular, 4 carb choice  2. Provide Ensure Enlive BID to increase kcal/protein intake (700 kcal, 88 g Carbs, 40 g protein)     Malnutrition Assessment:  Malnutrition Status:  Severe malnutrition (05/18/22 1012)    Context:  Acute illness     Findings of the 6 clinical characteristics of malnutrition:   Energy Intake:  No significant decrease in energy intake  Weight Loss:  Greater than 5% over 1 month     Body Fat Loss:  Mild body fat loss, Triceps   Muscle Mass Loss: Moderate muscle mass loss, Clavicles (pectoralis & deltoids),Scapula (trapezius),Calf  Fluid Accumulation:  No significant fluid accumulation,     Strength:  Not performed     Nutrition Assessment:     Pt is a 81year old male admitted with Biliary obstruction [K83.1]. He  has a past medical history of Anxiety and depression, Arthritis, Cirrhosis ([facility] Dementia ([facility] DM (diabetes mellitus) ([facility] Hyperlipidemia, Hypertension, Hypothyroid, Obese, and SELINA on CPAP. BPA for 24-33# wt loss. Lack of recent weight hx - per documentation, patient has lost 18# x 2 years - not clinically significant for timeframe. Patient reports 25# wt loss in last month; admission weight of 253# is stated, RD ordered measured weight. Wife at bedside. Measured weight of 251#. Per chart review, patient was 271# in March at [address] [place] [address] Rd. Weight loss is clinically significant for timeframe (20# / 7.3% x 2 months). #. Patient denies decreased appetite during this time. Denies activity level changes. NKFA. No chewing/swallowing problems. Usually drinks 1 Premier Protein shake/day. Voiced acceptance of Ensure BID this admission. NPO at this time. Per chart review, c/f pancreatic malignancy. Corrected Ca 9.6, WNL.     Wt Readings from Last 10 Encounters:   05/18/22 114.3 kg (251 lb 14.4 oz)   03/12/20 123 kg (271 lb 3 oz)   02/26/20 125.2 kg (276 lb)   02/19/20 125.2 kg (276 lb)   01/05/20 120.2 kg (265 lb)   07/16/16 122.5 kg (270 lb)   06/04/15 127 kg (280 lb)   07/02/13 130.6 kg (288 lb)   06/28/13 120.2 kg (265 lb)   04/24/13 120.2 kg (265 lb)       Nutrition Related Findings:      Wound Type: None     Last Bowel Movement Date: 05/17/22  Stool Appearance: Hard  Edema:No data recorded    Nutr. Labs:    Lab Results   Component Value Date/Time    GFR est AA >60 05/18/2022 03:34 AM    GFR est non-AA >60 05/18/2022 03:34 AM    Creatinine (POC) 1.00 04/19/2022 02:47 PM    Creatinine 0.90 05/18/2022 03:34 AM    BUN 16 05/18/2022 03:34 AM    Sodium 136 05/18/2022 03:34 AM    Potassium 4.0 05/18/2022 03:34 AM    Chloride 106 05/18/2022 03:34 AM    CO2 22 05/18/2022 03:34 AM       Lab Results   Component Value Date/Time    Glucose 205 (H) 05/18/2022 03:34 AM    Glucose (POC) 227 (H) 05/18/2022 06:37 AM       Lab Results   Component Value Date/Time    Hemoglobin A1c 8.2 (H) 05/17/2022 03:04 PM     Magnesium   Date Value Ref Range Status   05/18/2022 2.1 1.6 - 2.4 mg/dL Final   06/04/2015 1.8 1.6 - 2.4 mg/dL Final     Lab Results   Component Value Date/Time    Calcium 8.4 (L) 05/18/2022 03:34 AM    Phosphorus 2.2 (L) 05/18/2022 03:34 AM     Nutr. Meds:  Humalog, lactated ringers, zofran PRN, miralax PRN      Current Nutrition Intake & Therapies:  Average Meal Intake: NPO  Average Supplement Intake: NPO  DIET NPO Sips of Water with Meds    Anthropometric Measures:  Height: 5' 10\" (177.8 cm)  Ideal Body Weight (IBW): 166 lbs (75 kg)  Admission Body Weight: 253 lb  Current Body Wt:  114.3 kg (251 lb 15.8 oz), 151.8 % IBW. Bed scale  Current BMI (kg/m2): 36.2  Usual Body Weight: 122.5 kg (270 lb)  % Weight Change (Calculated): -6.7  Weight Adjustment: No adjustment                 BMI Category: Obese class 2 (BMI 35.0-39. 9)    Estimated Daily Nutrient Needs:  Energy Requirements Based On: Formula  Weight Used for Energy Requirements: Current  Energy (kcal/day): 2483 (MSJ x 1.3)  Weight Used for Protein Requirements: Current  Protein (g/day): 114-137 (1.0-1.2)  Method Used for Fluid Requirements: 1 ml/kcal  Fluid (ml/day): 2483    Nutrition Diagnosis:   · Severe malnutrition related to catabolic illness,increased demand for energy/nutrients as evidenced by weight loss,poor intake prior to admission,mild loss of subcutaneous fat,mild muscle loss    Nutrition Interventions:   Food and/or Nutrient Delivery: Start oral diet,Start oral nutrition supplement  Nutrition Education/Counseling: No recommendations at this time  Coordination of Nutrition Care: Continue to monitor while inpatient,Interdisciplinary rounds       Goals:     Goals: Initiate PO diet,within 2 days       Nutrition Monitoring and Evaluation:   Behavioral-Environmental Outcomes: None identified  Food/Nutrient Intake Outcomes: Food and nutrient intake,Supplement intake,Diet advancement/tolerance  Physical Signs/Symptoms Outcomes: Biochemical data,Skin,Weight    Discharge Planning:     Too soon to determine    Marzesharone Foots, RD  Contact: Ext: [phone], or via [employer]

## 2022-05-18 NOTE — PROGRESS NOTES
Problem: Falls - Risk of  Goal: *Absence of Falls  Description: Document Paula Flores Fall Risk and appropriate interventions in the flowsheet. Outcome: Progressing Towards Goal  Note: Fall Risk Interventions:            Medication Interventions: Teach patient to arise slowly                   Problem: Diabetes Self-Management  Goal: *Disease process and treatment process  Description: Define diabetes and identify own type of diabetes; list 3 options for treating diabetes. Outcome: Progressing Towards Goal  Goal: *Monitoring blood glucose, interpreting and using results  Description: Identify recommended blood glucose targets  and personal targets.   Outcome: Progressing Towards Goal

## 2022-05-18 NOTE — PROGRESS NOTES
Endoscopy discharge instructions have been reviewed and given to patient. The patient verbalized understanding and acceptance of instructions. Dr. Leroy Rubio discussed with spouse procedure findings and next steps.

## 2022-05-18 NOTE — PROCEDURES
Casper Aldana M.D.  [phone]           2022              ERCP Operative Report    Manuel Rodriguez. :  1950  Jude Magana Medical Record Number:  [id number]      Procedure Type:   ERCPwith biliary sphincterotomy, biliary stent placement     Indications: jaundice, biliary obstruction, pancreatic mass    : Cyrena Snellen, MD    Referring Provider:    TAHMINA Woods    Exam:  Airway: clear, no airway problems anticipated  Heart: RRR, without gallops or rubs  Lungs: clear bilaterally without wheezes, crackles, or rhonchi  Abdomen: soft, nontender, nondistended, bowel sounds present  Mental Status: awake, alert and oriented to person, place and time    Sedation:  General anesthesia    Procedure Details:  After discussing in detail the procedure with all its risks and potential complications including but not limited to bleeding, perforation, increased risk of pancreatitis with potential lethal complications related to the pancreatitis, sepsis and multiorgan failure and complications related to sedation, informed consent was obtained. Ample amount of time was allowed for questions and alternatives to this procedure were provided. The patient was taken to the fluoroscopy suite and placed in the prone position. Upon sequential sedation as per above, the Olympus duodenoscope [id number]   was inserted via the mouthpiece and carefully advanced to the stomach where all fluid secretions were suctioned and then the scope advanced to the second portion of the duodenum. The quality of visualization was excellent. The duodenoscope was withdrawn into a short position. Findings:   Esophagus:normal  Stomach: normal   Duodenum/jejunum: Evidence of a large ulcerated mass seen in the second portion of the duodenum taking half the circumference in the area of the major papilla and extending distally about 3 to 4 cm     Ampulla:seen at the proximal rim of the mass.  It appeared intact and ulcerated mass appeared to be pancreatic and eroding into the duodenum  Cholangiogram: Dilated CHD and intrahepatic ducts seen, cystic duct stump seen, mid to distal CBD appeared completely obstructed. Pancreatogram: No performed. Specimen Removed:  None    Complications: None. EBL:  None. Interventions:      Pancreatic: none  Biliary: Using the Jagtome sphincterotome, the ulcerated mass area was evaluated, minor oozing occurred to touch due to friable and ulcerated tissue. After few attempts to cannulate the CBD from the ulcerated area, the major papilla was discovered after washing and easy cannulation of the CBD was then achieved. A 0.025 Fr guidewire was advanced into the intrahepatic ducts. Adequate sphincterotomy was performed using ERBE cautery. No bleeding occurred. At this point the sphincterotome was exchanged over the guidewire with a 10 mm wide, 60 mm long fully covered metal stent. The stent was advanced into the CHD and deployed into an optimal position under endoscopic and fluoroscopic guidance. Excellent bile flow could be seen coming out once stent was deployed. Biopsies were then obtained from the mass edges. At this point we decided to stop and felt no need to proceed with EUS, as the ulcerated mass appeared pancreatic invading the duodenum wall. Impression:      Biliary obstruction from ulcerated duodenal or pancreatic mass. Metal stent was deployed with excellent bile flow, and biopsies obtained. Recommendations:     Full liquid diet today  Will try and get biopsy of liver mets by radiology in am  Follow-up on final pathology results  Discuss treatment options with Dr. Oda Meckel from oncology.           Yaz Gilmore MD  5/18/2022  2:40 PM

## 2022-05-18 NOTE — ANESTHESIA PREPROCEDURE EVALUATION
Relevant Problems   RESPIRATORY SYSTEM   (+) SELINA on CPAP      NEUROLOGY   (+) Anxiety and depression   (+) Dementia (HCC)      CARDIOVASCULAR   (+) Hypertension      GASTROINTESTINAL   (+) Cirrhosis (HCC)      ENDOCRINE   (+) Arthritis   (+) Hypothyroid   (+) Obese   (+) Type 2 diabetes mellitus (HCC)      HEMATOLOGY   (+) Anemia       Anesthetic History   No history of anesthetic complications            Review of Systems / Medical History  Patient summary reviewed and pertinent labs reviewed    Pulmonary        Sleep apnea           Neuro/Psych   Within defined limits           Cardiovascular    Hypertension        Dysrhythmias         Comments: Bradycardia- naturally low HR in the low 50s   GI/Hepatic/Renal           Liver disease    Comments: Admitted with Jaundice and biliary obstruction- Liver mass; possible pancreatic cancer Endo/Other    Diabetes  Hypothyroidism  Obesity     Other Findings            Physical Exam    Airway  Mallampati: II    Neck ROM: normal range of motion   Mouth opening: Normal    Comments: beard Cardiovascular    Rhythm: regular  Rate: normal         Dental  No notable dental hx       Pulmonary  Breath sounds clear to auscultation               Abdominal  Abdominal exam normal      Comments: Jaundiced skin and scleral icterus Other Findings            Anesthetic Plan    ASA: 4  Anesthesia type: general          Induction: Intravenous  Anesthetic plan and risks discussed with: Patient

## 2022-05-18 NOTE — PROGRESS NOTES
5/18/2022  Case Management Progress Note    11:44 AM  Patient is 81year old male admitted 5/17 with biliary obstruction   Patient does not have an RUR score as he is observation status, letter given yesterday   Covid test: negative 5/17   Chart reviewed--patient discussed at [facility] rounds  Per rounds patient is having MRCP today and liver bx tomorrow. Pending all of that he may be ready for discharge tomorrow. Patient cleared physical therapy this morning and does not have any noted needs for discharge at this time--plan remains for him to return home with family assistance when medically stable to do so. Transition of Care Plan   1. Continue medical management/treatment  2. Home with family when medically ready   3. Family will transport at discharge   4. Follow up outpatient as indicated  5.  CM will continue to follow    PER Romo

## 2022-05-18 NOTE — PROGRESS NOTES
[facility] [place] at [address], [address] [facility] [address] [facility]  W: [phone]  F: [phone]      Reason for Visit:   Marbella Lopez is a 81 y.o. male who is seen for follow up of pancreatic mass, liver lesions, and biliary obstruction. Hematology/Oncology Treatment History:   · CT A/P 4/19/2022: Possible mild acute pancreatitis. Prominent sigmoid diverticulosis. Enlarged prostate. Fat-containing hernias  · CT Abdomen 5/17/2022: New intra-hepatic biliary dilatation and common bile duct dilatation. Ampullary prominence and 1.5 cm x 1.5 cm soft tissue density in the pancreaticoduodenal groove. New subcentimeter hepatic hypodense lesions, worrisome for metastatic disease. · MRI Abdomen 5/17/2022:  Periampullary pancreatic mass with possible extension into the wall of the duodenum. Associated intrahepatic and extra hepatic biliary dilatation and mild pancreatic ductal dilatation. Small hyperenhancing lesions throughout the liver suspicious for metastatic disease. Interval History:   He reports feeling ok this morning. No new issues. My visit with him was short, as he was rushing to the bathroom and transport was waiting to take him to CT. I did spend some time speaking with his wife to follow up on our conversation from yesterday. PAST HISTORY: The following sections were reviewed and updated in the EMR as appropriate: PMH, SH, FH, Medications, Allergies. Allergies   Allergen Reactions    Codeine Anxiety      Review of Systems: A complete review of systems was obtained, reviewed. Pertinent findings reviewed above.       Physical Exam:     Visit Vitals  /64 (BP 1 Location: Left upper arm)   Pulse 62   Temp 98.3 °F (36.8 °C)   Resp 20   Ht 5' 10\" (1.778 m)   Wt 253 lb (114.8 kg)   SpO2 97%   BMI 36.30 kg/m²     ECOG PS: 1  General: no distress,  icteric sclerae  Respiratory: normal respiratory effort  CV: no peripheral edema  Skin: no rashes; no ecchymoses; no petechiae; +jaundice  Psych: alert, oriented, normal mood/affect      Results:     Lab Results   Component Value Date/Time    WBC 5.8 05/18/2022 03:34 AM    HGB 10.5 (L) 05/18/2022 03:34 AM    HCT 32.3 (L) 05/18/2022 03:34 AM    PLATELET 923 58/25/4957 03:34 AM    MCV 89.0 05/18/2022 03:34 AM    ABS. NEUTROPHILS 5.7 05/17/2022 10:10 AM     Lab Results   Component Value Date/Time    Sodium 136 05/18/2022 03:34 AM    Potassium 4.0 05/18/2022 03:34 AM    Chloride 106 05/18/2022 03:34 AM    CO2 22 05/18/2022 03:34 AM    Glucose 205 (H) 05/18/2022 03:34 AM    BUN 16 05/18/2022 03:34 AM    Creatinine 0.90 05/18/2022 03:34 AM    GFR est AA >60 05/18/2022 03:34 AM    GFR est non-AA >60 05/18/2022 03:34 AM    Calcium 8.4 (L) 05/18/2022 03:34 AM    Glucose (POC) 161 (H) 05/18/2022 12:16 AM    Creatinine (POC) 1.00 04/19/2022 02:47 PM     Lab Results   Component Value Date/Time    Bilirubin, total 8.0 (H) 05/18/2022 03:34 AM    ALT (SGPT) 390 (H) 05/18/2022 03:34 AM    Alk. phosphatase 931 (H) 05/18/2022 03:34 AM    Protein, total 6.0 (L) 05/18/2022 03:34 AM    Albumin 2.5 (L) 05/18/2022 03:34 AM    Globulin 3.5 05/18/2022 03:34 AM     Lab Results   Component Value Date/Time    Reticulocyte count 4.2 (H) 05/17/2022 03:04 PM    Iron % saturation 32 05/17/2022 03:04 PM    TIBC 289 05/17/2022 03:04 PM    Ferritin 219 05/17/2022 03:04 PM    Vitamin B12 944 05/17/2022 03:04 PM    Folate 26.7 (H) 05/17/2022 03:04 PM    Haptoglobin 90 05/17/2022 03:04 PM     05/17/2022 03:04 PM    TSH 2.18 05/17/2022 03:04 PM    Lipase 106 05/17/2022 10:10 AM    Hep C virus Ab Interp. NONREACTIVE 05/17/2022 03:04 PM       Lab Results   Component Value Date/Time    INR 1.1 05/17/2022 10:10 AM    aPTT 30.7 05/17/2022 10:10 AM     CA 19-9:  No results for input(s): C199LT in the last 7224 hours. 5/17/22 US ABD LTD  IMPRESSION  1. Moderate intrahepatic and extrahepatic biliary ductal dilation. 2. Cirrhosis.  No ascites shown.  3. Small (11 x 13 mm) hepatic mass. 4. Liver protocol CT or MRI is recommended for further evaluation. Assessment/Recommendations:     1. Pancreatic cancer with possible liver metastases  His CT and MRI are concerning for a primary pancreatic cancer with liver metastases. Workup is ongoing  -- pending  --CT Chest pending  --EUS with GI today  --US guided liver biopsy with IR pending (this will need to be postponed until after her endoscopic procedure)    2. Hyperbilirubinemia   Admission Tbili 7.2, rising further to 8.0 today. Imaging concerning for biliary obstruction. Discussed with Dr. Ilene Gupta today, plan is for an attempt at ERCP. May need PTC if this is not successful. --ERCP with GI today    3. Anemia, normocytic  Mild. Labwork unrevealing. Possibly related to above process. Elevated retic suggests possible blood loss. Monitor for now. 4. Hypokalemia  Improved today    5. DM  Management per primary team.  [facility] endocrinology outpatient.         Signed By: Guillermo Councilman, MD

## 2022-05-18 NOTE — PROGRESS NOTES
Bedside shift change report given to Duke Martin (oncoming nurse) by Jami Michel (offgoing nurse). Report included the following information SBAR, Kardex, Intake/Output, MAR and Recent Results.

## 2022-05-18 NOTE — PROGRESS NOTES
Problem: Mobility Impaired (Adult and Pediatric)  Goal: *Acute Goals and Plan of Care (Insert Text)  Outcome: Progressing Towards Goal   PHYSICAL THERAPY EVALUATION/DISCHARGE  Patient: Alex Carias (71 y.o. male)  Date: 5/18/2022  Primary Diagnosis: Biliary obstruction [K83.1]  Procedure(s) (LRB):  ESOPHAGOGASTRODUODENOSCOPY (EGD) (N/A)  ENDOSCOPIC ULTRASOUND (EUS) with FNA (N/A)  ENDOSCOPIC RETROGRADE CHOLANGIOPANCREATOGRAPHY (ERCP) (N/A)     Precautions: Universal         ASSESSMENT  Based on the objective data described below, the patient presents with good general mobility. He has a history of right ankle replacement and left knee replacement. He is independent with transfers out of bed and sit <> stand and he is able to ambulate without an assistive device or assistance without loss of balance or path deviations. He is at his baseline of mobility. Functional Outcome Measure: The patient scored 28 out of 28 on the Tinetti outcome measure which is indicative of low fall risk. Other factors to consider for discharge: none     Further skilled acute physical therapy is not indicated at this time. PLAN :  Recommendation for discharge: (in order for the patient to meet his/her long term goals)  No skilled physical therapy/ follow up rehabilitation needs identified at this time. This discharge recommendation:  Has not yet been discussed the attending provider and/or case management    IF patient discharges home will need the following DME: none       SUBJECTIVE:   Patient stated I was a little unsteady after lying in the MRI yesterday.     OBJECTIVE DATA SUMMARY:   HISTORY:    Past Medical History:   Diagnosis Date    Anxiety and depression     Arthritis     Cirrhosis ([facility]     Dementia ([facility]     DM (diabetes mellitus) ([facility]     Hyperlipidemia     Hypertension     Hypothyroid     Obese     SELINA on CPAP      Past Surgical History:   Procedure Laterality Date    HX ANKLE FRACTURE TX Right Replacement    HX APPENDECTOMY  APR 2013    HX CHOLECYSTECTOMY      HX ORTHOPAEDIC      left knee     HX OTHER SURGICAL      deviated septum    NM ABDOMEN SURGERY PROC UNLISTED      dbl hernia repair as infant    NM INSERTION SUBQ CARDIAC RHYTHM MONITOR W/PRGRMG N/A 3/12/2020    LOOP RECORDER INSERT performed by Katie Velazquez MD at [address]       Prior level of function: indep without an assistive device, no recent falls  Personal factors and/or comorbidities impacting plan of care: lives with spouse in a one level home with 3 steps in, has a cane and walker available but didn't use    Home Situation  Home Environment: Private residence  # Steps to Enter: 3  One/Two Story Residence: One story  Living Alone: No  Support Systems: [employer],Spouse/Significant Other  Patient Expects to be Discharged to[de-identified] Home  Current DME Used/Available at Home: CPAP,Glucometer    EXAMINATION/PRESENTATION/DECISION MAKING:   Critical Behavior:              Hearing: Auditory  Auditory Impairment: Hard of hearing, bilateral    Range Of Motion:  AROM: Within functional limits           PROM: Within functional limits           Strength:    Strength:  Within functional limits                    Tone & Sensation:   Tone: Normal              Sensation: Intact               Coordination:  Coordination: Within functional limits  Vision:      Functional Mobility:  Bed Mobility:     Supine to Sit: Independent        Transfers:  Sit to Stand: Independent  Stand to Sit: Independent                       Balance:   Sitting: Intact  Standing: Intact  Ambulation/Gait Training:  Distance (ft): 200 Feet (ft)  Assistive Device: Gait belt  Ambulation - Level of Assistance: Independent                                           Functional Measure:  Tinetti test:    Sitting Balance: 1  Arises: 2  Attempts to Rise: 2  Immediate Standing Balance: 2  Standing Balance: 2  Nudged: 2  Eyes Closed: 1  Turn 360 Degrees - Continuous/Discontinuous: 1  Turn 360 Degrees - Steady/Unsteady: 1  Sitting Down: 2  Balance Score: 16 Balance total score  Indication of Gait: 1  R Step Length/Height: 1  L Step Length/Height: 1  R Foot Clearance: 1  L Foot Clearance: 1  Step Symmetry: 1  Step Continuity: 1  Path: 2  Trunk: 2  Walking Time: 1  Gait Score: 12 Gait total score  Total Score: 28/28 Overall total score         Tinetti Tool Score Risk of Falls  <19 = High Fall Risk  19-24 = Moderate Fall Risk  25-28 = Low Fall Risk  Tinetti ME. Performance-Oriented Assessment of Mobility Problems in Elderly Patients. [facility] 66; [id number]. (Scoring Description: PT Bulletin Feb. 10, 1993)    Older adults: Alexandro Rosado et al, 2009; n = 1000 [facility] elderly evaluated with ABC, REGINA, ADL, and IADL)  · Mean REGINA score for males aged 69-68 years = 26.21(3.40)  · Mean REGINA score for females age 69-68 years = 25.16(4.30)  · Mean REGINA score for males over 80 years = 23.29(6.02)  · Mean REGINA score for females over 80 years = 17.20(8.32)           Physical Therapy Evaluation Charge Determination   History Examination Presentation Decision-Making   LOW Complexity : Zero comorbidities / personal factors that will impact the outcome / POC LOW Complexity : 1-2 Standardized tests and measures addressing body structure, function, activity limitation and / or participation in recreation  LOW Complexity : Stable, uncomplicated  LOW Complexity : FOTO score of       Based on the above components, the patient evaluation is determined to be of the following complexity level: LOW     Pain Rating:  None stated    Activity Tolerance:   Good      After treatment patient left in no apparent distress: In bathroom    COMMUNICATION/EDUCATION:   The patients plan of care was discussed with: Occupational therapist and Registered nurse.      Fall prevention education was provided and the patient/caregiver indicated understanding., Patient/family have participated as able in goal setting and plan of care. , and Patient/family agree to work toward stated goals and plan of care.     Thank you for this referral.  Zuly Swartz, PT   Time Calculation: 14 mins

## 2022-05-18 NOTE — PERIOP NOTES
Received recovery report from Anesthesia team, see anesthesia note. ABD remains soft and non-tender post procedure. Pt has no complaints at this time and tolerated the procedure well. Endoscope was pre-cleaned at bedside immediately following procedure by Samantha Antunez. Post recovery report given to [facility].

## 2022-05-18 NOTE — PERIOP NOTES
TRANSFER - OUT REPORT:    Verbal report given to [address] on [employer].  being transferred to [phone] for routine progression of care       Report consisted of patients Situation, Background, Assessment and   Recommendations(SBAR). Information from the following report(s) SBAR was reviewed with the receiving nurse. Lines:   Peripheral IV 05/17/22 Left Antecubital (Active)   Site Assessment Clean, dry, & intact 05/18/22 1242   Phlebitis Assessment 0 05/18/22 1242   Infiltration Assessment 0 05/18/22 1242   Dressing Status Clean, dry, & intact 05/18/22 1242   Dressing Type Transparent;Tape 05/18/22 1242   Hub Color/Line Status End cap changed; Flushed;Pink 05/18/22 1242   Action Taken Open ports on tubing capped 05/18/22 1242   Alcohol Cap Used Yes 05/18/22 1242       Peripheral IV 05/17/22 Left;Posterior Hand (Active)   Site Assessment Clean, dry, & intact 05/18/22 1242   Phlebitis Assessment 0 05/18/22 1242   Infiltration Assessment 0 05/18/22 1242   Dressing Status Clean, dry, & intact 05/18/22 1242   Dressing Type Tape;Transparent 05/18/22 1242   Hub Color/Line Status Blue; Infusing;Patent 05/18/22 1242   Action Taken Open ports on tubing capped 05/18/22 1242   Alcohol Cap Used Yes 05/18/22 1242        Opportunity for questions and clarification was provided.       Patient transported with:   [employer]

## 2022-05-18 NOTE — PROGRESS NOTES
Bedside shift change report given to Abbi Nunez RN  (oncoming nurse) by David Lemus (offgoing nurse). Report included the following information SBAR, Kardex, Procedure Summary, Intake/Output, MAR and Recent Results.

## 2022-05-18 NOTE — PROGRESS NOTES
Bell Ribera.  1950  [id number]    Situation:  Verbal report received from:  Tiffany Flores RN   Procedure: Procedure(s):  ENDOSCOPIC ULTRASOUND (EUS) with FNA  ENDOSCOPIC RETROGRADE CHOLANGIOPANCREATOGRAPHY (ERCP) with fluoro  BILIARY STENT PLACEMENT  ENDOSCOPIC SPHINCTEROTOMY    Background:    Preoperative diagnosis: elevated liver enzymes  Postoperative diagnosis: Aisha-ampullary mass      :  Dr. Jose Duran   Assistant(s): Endoscopy Technician-1: Clint Myrick  Endoscopy Technician-2: Larry Lara  Endoscopy RN-1: Paulette Parekh RN    Specimens:   ID Type Source Tests Collected by Time Destination   1 : Periampullary Mass Biopsies Preservative   Ac Sol MD 5/18/2022 1408 Pathology     H. Pylori  no    Assessment:  Intra-procedure medications       Anesthesia gave intra-procedure sedation and medications, see anesthesia flow sheet yes    Intravenous fluids: NS@ KVO     Vital signs stable   yes    Abdominal assessment: round and soft   yes    Recommendation:  Discharge patient per MD order  inpatient.   Return to floor  yes  Family or Friend   spouse  Permission to share finding with family or friend yes

## 2022-05-18 NOTE — ANESTHESIA POSTPROCEDURE EVALUATION
Procedure(s):  ENDOSCOPIC RETROGRADE CHOLANGIOPANCREATOGRAPHY (ERCP) with fluoro  BILIARY STENT PLACEMENT  ENDOSCOPIC SPHINCTEROTOMY  ESOPHAGOGASTRODUODENAL (EGD) BIOPSY. general    Anesthesia Post Evaluation      Multimodal analgesia: multimodal analgesia not used between 6 hours prior to anesthesia start to PACU discharge  Patient location during evaluation: PACU  Patient participation: complete - patient participated  Level of consciousness: awake  Pain management: adequate  Airway patency: patent  Anesthetic complications: no  Cardiovascular status: acceptable, blood pressure returned to baseline and hemodynamically stable  Respiratory status: acceptable  Hydration status: acceptable  Post anesthesia nausea and vomiting:  controlled  Final Post Anesthesia Temperature Assessment:  Normothermia (36.0-37.5 degrees C)      INITIAL Post-op Vital signs:   Vitals Value Taken Time   /48 05/18/22 1539   Temp 35.9 °C (96.6 °F) 05/18/22 1445   Pulse 49 05/18/22 1540   Resp 19 05/18/22 1540   SpO2 99 % 05/18/22 1540   Vitals shown include unvalidated device data.

## 2022-05-18 NOTE — PROGRESS NOTES
Occupational therapy note:  Orders received, chart reviewed. Spoke with evaluating PT who reports patient seen and discharged from PT services as patient is independent with mobility and has been up ad beth in room and managing ADLs as needed. Patient does not require OT evaluation at this time. Will complete orders. More Simeon MS OTR/L

## 2022-05-18 NOTE — PERIOP NOTES
Neha Shaw.  1950  [id number]    Situation:    Scheduled Procedure: Procedure(s):  ESOPHAGOGASTRODUODENOSCOPY (EGD)  ENDOSCOPIC ULTRASOUND (EUS) with FNA  ENDOSCOPIC RETROGRADE CHOLANGIOPANCREATOGRAPHY (ERCP)  Verbal report received from: Araceli Cisse RN  Preoperative diagnosis: elevated liver enzymes    Background:    Procedure: Procedure(s):  ESOPHAGOGASTRODUODENOSCOPY (EGD)  ENDOSCOPIC ULTRASOUND (EUS) with FNA  ENDOSCOPIC RETROGRADE CHOLANGIOPANCREATOGRAPHY (ERCP)  Physician performing procedure; Dr. Andrea Carlos RN    NPO Status/Last PO Intake: midnight 5/17    Pregnancy Test:Not applicable If yes, result: none    Is the patient taking Blood Thinners: YES If yes, list: lovenox and last taken not taken by pt yet  Is the patient diabetic:yes       If yes, what was the last BS:  5/18/22 0637  Time taken? [address] Katiuska Smith given?  Yes, 2U humalog          Does the patient have a Pacemaker/Defibrillator in place?: no   Does the patient need antibiotics before/during/after procedure: n/a  If the patient is having a colon, How much prep was drank? n/a   What were the Colon prep results? n/a   Does the patient have SCD in place:no   Is patient on CONTACT precautions:no        If yes, what kind of CONTACT precautions: n/a    Assessment:  Are the vital signs stable prior to patient coming to ENDO?  yes  Is the patient alert/oriented and able to sign consent for the procedures:yes  How does the patient's abdomen feel prior to coming to ENDO? round and soft will assess upon patients arrival to unit   Does the patient have a patient IV in place? 20g L AC and 22g L Hand     Recommendation:  Family or Friend present yes, wife     Permission to share finding with Family or Friend yes

## 2022-05-19 ENCOUNTER — APPOINTMENT (OUTPATIENT)
Dept: ULTRASOUND IMAGING | Age: 72
DRG: 435 | End: 2022-05-19
Attending: INTERNAL MEDICINE
Payer: MEDICARE

## 2022-05-19 VITALS
WEIGHT: 251.9 LBS | HEART RATE: 60 BPM | RESPIRATION RATE: 20 BRPM | SYSTOLIC BLOOD PRESSURE: 131 MMHG | BODY MASS INDEX: 36.06 KG/M2 | DIASTOLIC BLOOD PRESSURE: 62 MMHG | TEMPERATURE: 97.3 F | HEIGHT: 70 IN | OXYGEN SATURATION: 98 %

## 2022-05-19 PROBLEM — C25.0 MALIGNANT NEOPLASM OF HEAD OF PANCREAS (HCC): Status: ACTIVE | Noted: 2022-05-19

## 2022-05-19 PROBLEM — C78.7 METASTASIS TO LIVER (HCC): Status: ACTIVE | Noted: 2022-05-19

## 2022-05-19 PROBLEM — K86.89 PANCREATIC MASS: Status: ACTIVE | Noted: 2022-05-19

## 2022-05-19 LAB
ALBUMIN SERPL-MCNC: 2.4 G/DL (ref 3.5–5)
ALBUMIN/GLOB SERPL: 0.7 {RATIO} (ref 1.1–2.2)
ALP SERPL-CCNC: 807 U/L (ref 45–117)
ALT SERPL-CCNC: 313 U/L (ref 12–78)
ANION GAP SERPL CALC-SCNC: 4 MMOL/L (ref 5–15)
AST SERPL-CCNC: 135 U/L (ref 15–37)
BASOPHILS # BLD: 0 K/UL (ref 0–0.1)
BASOPHILS NFR BLD: 0 % (ref 0–1)
BILIRUB DIRECT SERPL-MCNC: 2.1 MG/DL (ref 0–0.2)
BILIRUB SERPL-MCNC: 3.2 MG/DL (ref 0.2–1)
BUN SERPL-MCNC: 13 MG/DL (ref 6–20)
BUN/CREAT SERPL: 15 (ref 12–20)
CALCIUM SERPL-MCNC: 8.2 MG/DL (ref 8.5–10.1)
CHLORIDE SERPL-SCNC: 105 MMOL/L (ref 97–108)
CO2 SERPL-SCNC: 26 MMOL/L (ref 21–32)
CREAT SERPL-MCNC: 0.87 MG/DL (ref 0.7–1.3)
DIFFERENTIAL METHOD BLD: ABNORMAL
EOSINOPHIL # BLD: 0.2 K/UL (ref 0–0.4)
EOSINOPHIL NFR BLD: 3 % (ref 0–7)
ERYTHROCYTE [DISTWIDTH] IN BLOOD BY AUTOMATED COUNT: 17.3 % (ref 11.5–14.5)
GLOBULIN SER CALC-MCNC: 3.6 G/DL (ref 2–4)
GLUCOSE BLD STRIP.AUTO-MCNC: 264 MG/DL (ref 65–117)
GLUCOSE BLD STRIP.AUTO-MCNC: 266 MG/DL (ref 65–117)
GLUCOSE BLD STRIP.AUTO-MCNC: 324 MG/DL (ref 65–117)
GLUCOSE SERPL-MCNC: 252 MG/DL (ref 65–100)
HCT VFR BLD AUTO: 31.5 % (ref 36.6–50.3)
HGB BLD-MCNC: 10.2 G/DL (ref 12.1–17)
IMM GRANULOCYTES # BLD AUTO: 0.1 K/UL (ref 0–0.04)
IMM GRANULOCYTES NFR BLD AUTO: 1 % (ref 0–0.5)
LYMPHOCYTES # BLD: 0.9 K/UL (ref 0.8–3.5)
LYMPHOCYTES NFR BLD: 14 % (ref 12–49)
MCH RBC QN AUTO: 29 PG (ref 26–34)
MCHC RBC AUTO-ENTMCNC: 32.4 G/DL (ref 30–36.5)
MCV RBC AUTO: 89.5 FL (ref 80–99)
MONOCYTES # BLD: 0.4 K/UL (ref 0–1)
MONOCYTES NFR BLD: 6 % (ref 5–13)
NEUTS SEG # BLD: 5.1 K/UL (ref 1.8–8)
NEUTS SEG NFR BLD: 76 % (ref 32–75)
NRBC # BLD: 0 K/UL (ref 0–0.01)
NRBC BLD-RTO: 0 PER 100 WBC
PLATELET # BLD AUTO: 162 K/UL (ref 150–400)
PMV BLD AUTO: 11 FL (ref 8.9–12.9)
POTASSIUM SERPL-SCNC: 3.7 MMOL/L (ref 3.5–5.1)
PROT SERPL-MCNC: 6 G/DL (ref 6.4–8.2)
RBC # BLD AUTO: 3.52 M/UL (ref 4.1–5.7)
SERVICE CMNT-IMP: ABNORMAL
SODIUM SERPL-SCNC: 135 MMOL/L (ref 136–145)
WBC # BLD AUTO: 6.7 K/UL (ref 4.1–11.1)

## 2022-05-19 PROCEDURE — 74011000272 HC RX REV CODE- 272

## 2022-05-19 PROCEDURE — 88341 IMHCHEM/IMCYTCHM EA ADD ANTB: CPT

## 2022-05-19 PROCEDURE — 94762 N-INVAS EAR/PLS OXIMTRY CONT: CPT

## 2022-05-19 PROCEDURE — 77010033678 HC OXYGEN DAILY

## 2022-05-19 PROCEDURE — 88342 IMHCHEM/IMCYTCHM 1ST ANTB: CPT

## 2022-05-19 PROCEDURE — 36415 COLL VENOUS BLD VENIPUNCTURE: CPT

## 2022-05-19 PROCEDURE — 88307 TISSUE EXAM BY PATHOLOGIST: CPT

## 2022-05-19 PROCEDURE — 74011250636 HC RX REV CODE- 250/636: Performed by: STUDENT IN AN ORGANIZED HEALTH CARE EDUCATION/TRAINING PROGRAM

## 2022-05-19 PROCEDURE — 99153 MOD SED SAME PHYS/QHP EA: CPT

## 2022-05-19 PROCEDURE — 88360 TUMOR IMMUNOHISTOCHEM/MANUAL: CPT

## 2022-05-19 PROCEDURE — G0378 HOSPITAL OBSERVATION PER HR: HCPCS

## 2022-05-19 PROCEDURE — 74011000250 HC RX REV CODE- 250: Performed by: INTERNAL MEDICINE

## 2022-05-19 PROCEDURE — 80048 BASIC METABOLIC PNL TOTAL CA: CPT

## 2022-05-19 PROCEDURE — 74011250636 HC RX REV CODE- 250/636: Performed by: INTERNAL MEDICINE

## 2022-05-19 PROCEDURE — 74011636637 HC RX REV CODE- 636/637: Performed by: INTERNAL MEDICINE

## 2022-05-19 PROCEDURE — 99233 SBSQ HOSP IP/OBS HIGH 50: CPT | Performed by: INTERNAL MEDICINE

## 2022-05-19 PROCEDURE — 76942 ECHO GUIDE FOR BIOPSY: CPT

## 2022-05-19 PROCEDURE — 88333 PATH CONSLTJ SURG CYTO XM 1: CPT

## 2022-05-19 PROCEDURE — 65270000029 HC RM PRIVATE

## 2022-05-19 PROCEDURE — 0FB03ZX EXCISION OF LIVER, PERCUTANEOUS APPROACH, DIAGNOSTIC: ICD-10-PCS | Performed by: STUDENT IN AN ORGANIZED HEALTH CARE EDUCATION/TRAINING PROGRAM

## 2022-05-19 PROCEDURE — 99233 SBSQ HOSP IP/OBS HIGH 50: CPT | Performed by: CLINICAL NURSE SPECIALIST

## 2022-05-19 PROCEDURE — 85025 COMPLETE CBC W/AUTO DIFF WBC: CPT

## 2022-05-19 PROCEDURE — 99152 MOD SED SAME PHYS/QHP 5/>YRS: CPT

## 2022-05-19 PROCEDURE — 80076 HEPATIC FUNCTION PANEL: CPT

## 2022-05-19 PROCEDURE — 82962 GLUCOSE BLOOD TEST: CPT

## 2022-05-19 PROCEDURE — 77030027138 HC INCENT SPIROMETER -A

## 2022-05-19 RX ORDER — MIDAZOLAM HYDROCHLORIDE 1 MG/ML
.5-5 INJECTION, SOLUTION INTRAMUSCULAR; INTRAVENOUS
Status: DISCONTINUED | OUTPATIENT
Start: 2022-05-19 | End: 2022-05-19

## 2022-05-19 RX ORDER — HYDROXYZINE 25 MG/1
25 TABLET, FILM COATED ORAL
Qty: 15 TABLET | Refills: 0 | Status: SHIPPED | OUTPATIENT
Start: 2022-05-19 | End: 2022-05-29

## 2022-05-19 RX ORDER — SODIUM CHLORIDE 9 MG/ML
25 INJECTION, SOLUTION INTRAVENOUS
Status: COMPLETED | OUTPATIENT
Start: 2022-05-19 | End: 2022-05-19

## 2022-05-19 RX ORDER — FENTANYL CITRATE 50 UG/ML
25-100 INJECTION, SOLUTION INTRAMUSCULAR; INTRAVENOUS
Status: DISCONTINUED | OUTPATIENT
Start: 2022-05-19 | End: 2022-05-19

## 2022-05-19 RX ORDER — LIDOCAINE HYDROCHLORIDE 10 MG/ML
10 INJECTION, SOLUTION EPIDURAL; INFILTRATION; INTRACAUDAL; PERINEURAL
Status: COMPLETED | OUTPATIENT
Start: 2022-05-19 | End: 2022-05-19

## 2022-05-19 RX ADMIN — SODIUM CHLORIDE 25 ML/HR: 9 INJECTION, SOLUTION INTRAVENOUS at 08:30

## 2022-05-19 RX ADMIN — SODIUM CHLORIDE, POTASSIUM CHLORIDE, SODIUM LACTATE AND CALCIUM CHLORIDE 75 ML/HR: 600; 310; 30; 20 INJECTION, SOLUTION INTRAVENOUS at 01:22

## 2022-05-19 RX ADMIN — MIDAZOLAM HYDROCHLORIDE 1 MG: 1 INJECTION, SOLUTION INTRAMUSCULAR; INTRAVENOUS at 08:40

## 2022-05-19 RX ADMIN — LIDOCAINE HYDROCHLORIDE 10 ML: 10 INJECTION, SOLUTION EPIDURAL; INFILTRATION; INTRACAUDAL; PERINEURAL at 09:07

## 2022-05-19 RX ADMIN — INSULIN LISPRO 5 UNITS: 100 INJECTION, SOLUTION INTRAVENOUS; SUBCUTANEOUS at 12:23

## 2022-05-19 RX ADMIN — Medication 1 EACH: at 09:04

## 2022-05-19 RX ADMIN — INSULIN LISPRO 4 UNITS: 100 INJECTION, SOLUTION INTRAVENOUS; SUBCUTANEOUS at 01:26

## 2022-05-19 RX ADMIN — FENTANYL CITRATE 25 MCG: 50 INJECTION INTRAMUSCULAR; INTRAVENOUS at 08:43

## 2022-05-19 RX ADMIN — INSULIN LISPRO 5 UNITS: 100 INJECTION, SOLUTION INTRAVENOUS; SUBCUTANEOUS at 05:50

## 2022-05-19 NOTE — PROGRESS NOTES
INTERVENTIONAL RADIOLOGY  Preoperative History and Physical      Patient:  Aileen Peterson. :  1950  Age:  81 y.o. MRN:  [id number]  Today's Date:  2022      CC / HPI   Aileen Peterson. is a 81 y.o. male with a history of hepatic lesions who presents for biopsy. PAST MEDICAL HISTORY  Past Medical History:   Diagnosis Date    Anxiety and depression     Arthritis     [facility]     Dementia [facility]     DM (diabetes mellitus) [facility]     Hyperlipidemia     Hypertension    [place] Hypothyroid     Obese     SELINA on CPAP      PAST SURGICAL HISTORY  Past Surgical History:   Procedure Laterality Date    HX ANKLE FRACTURE TX Right     Replacement    HX APPENDECTOMY  2013    HX CHOLECYSTECTOMY      HX ORTHOPAEDIC      left knee     HX OTHER SURGICAL      deviated septum    MS ABDOMEN SURGERY PROC UNLISTED      dbl hernia repair as infant    MS INSERTION SUBQ CARDIAC RHYTHM MONITOR W/PRGRMG N/A 3/12/2020    LOOP RECORDER INSERT performed by Lakia Nur MD at [facility]     SOCIAL HISTORY  Social History     Socioeconomic History    Marital status:      Spouse name: Not on file    Number of children: Not on file    Years of education: Not on file    Highest education level: Not on file   Occupational History    Not on file   Tobacco Use    Smoking status: Never Smoker    Smokeless tobacco: Never Used   Substance and Sexual Activity    Alcohol use: Yes     Comment: rarely    Drug use: No    Sexual activity: Not on file   Other Topics Concern    Not on file   Social History Narrative    Not on file     Social Determinants of Health     Financial Resource Strain:     Difficulty of Paying Living Expenses: Not on file   Food Insecurity:     Worried About Running Out of Food in the Last Year: Not on file    Scott of Food in the Last Year: Not on file   Transportation Needs:     Lack of Transportation (Medical):  Not on file    Lack of Transportation (Non-Medical): Not on file   Physical Activity:     Days of Exercise per Week: Not on file    Minutes of Exercise per Session: Not on file   Stress:     Feeling of Stress : Not on file   Social Connections:     Frequency of Communication with Friends and Family: Not on file    Frequency of Social Gatherings with Friends and Family: Not on file    Attends Mandaeism Services: Not on file    Active Member of Clubs or Organizations: Not on file    Attends Club or Organization Meetings: Not on file    Marital Status: Not on file   Intimate Partner Violence:     Fear of Current or Ex-Partner: Not on file    Emotionally Abused: Not on file    Physically Abused: Not on file    Sexually Abused: Not on file   Housing Stability:     Unable to Pay for Housing in the Last Year: Not on file    Number of [place] in the Last Year: Not on file    Unstable Housing in the Last Year: Not on file     FAMILY HISTORY  Family History   Problem Relation Age of Onset    Emphysema Mother     Other Mother         enlarged heart    Heart Attack Maternal Grandmother      CURRENT MEDICATIONS  Current Facility-Administered Medications   Medication Dose Route Frequency Provider Last Rate Last Admin    fentaNYL citrate (PF) injection  mcg   mcg IntraVENous Rad Jignesh White MD   25 mcg at 05/19/22 0843    midazolam (VERSED) injection 0.5-5 mg  0.5-5 mg IntraVENous Rad Jignesh White MD   1 mg at 05/19/22 0840    lactated Ringers infusion  75 mL/hr IntraVENous CONTINUOUS Marie Liu MD 75 mL/hr at 05/19/22 0122 75 mL/hr at 05/19/22 0122    diphenhydrAMINE (BENADRYL) injection 12.5 mg  12.5 mg IntraVENous Q6H PRN Marie Liu MD        glucose chewable tablet 16 g  4 Tablet Oral PRN Monae Ramos MD        dextrose 10% infusion 0-250 mL  0-250 mL IntraVENous PRN Monae Ramos MD        glucagon [facility]) injection 1 mg  1 mg IntraMUSCular PRN Monae Ramos MD  acetaminophen (TYLENOL) tablet 650 mg  650 mg Oral Q6H PRN Grace Mcintosh MD        Or    acetaminophen (TYLENOL) suppository 650 mg  650 mg Rectal Q6H PRN Grace Mcintosh MD        polyethylene glycol [facility]) packet 17 g  17 g Oral DAILY PRN Grace Mcintosh MD        ondansetron (ZOFRAN ODT) tablet 4 mg  4 mg Oral Q8H PRN Grace Mcintosh MD        Or    ondansetron [facility]) injection 4 mg  4 mg IntraVENous Q6H PRN Grace Mcintosh MD        insulin lispro (HUMALOG) injection   SubCUTAneous Q6H Grace Mcintosh MD   5 Units at 05/19/22 0550    [Held by provider] enoxaparin (LOVENOX) injection 30 mg  30 mg SubCUTAneous Q12H Grace Mcintosh MD        hydrOXYzine HCL (ATARAX) tablet 25 mg  25 mg Oral Q6H PRN Grace Mcintosh MD   25 mg at 05/18/22 2116     ALLERGIES  Allergies   Allergen Reactions    Codeine Anxiety       DIAGNOSTIC STUDIES   IMAGING STUDIES  I personally reviewed the following imaging studies:    CT Results:  Results from Hospital Encounter encounter on 05/17/22    CT CHEST W CONT    Narrative  INDICATION: complete staging : new pancreatic mass with possible liver mets    COMPARISON: MRI and CT exams of 5/17/2022. TECHNIQUE:  Following the uneventful intravenous administration of 100 cc  Isovue-300, 5 mm axial images were obtained through the thorax. Coronal and  sagittal reformats were generated. CT dose reduction was achieved through use  of a standardized protocol tailored for this examination and automatic exposure  control for dose modulation. FINDINGS:    CHEST WALL: No mass or axillary lymphadenopathy. THYROID: Atrophic appearance with no nodule. MEDIASTINUM: No mass or lymphadenopathy. CAROLYN: No mass or enlarged lymphadenopathy. Calcified right hilar lymph nodes. THORACIC AORTA: No dissection or aneurysm. MAIN PULMONARY ARTERY: Normal in caliber. TRACHEA/BRONCHI: Patent. ESOPHAGUS: No wall thickening or dilatation.   HEART: The heart is normal in size without pericardial effusion. Coronary artery  calcifications are noted. PLEURA: No effusion or pneumothorax. LUNGS: Calcified granuloma right upper lobe. No nodule, mass, or airspace  disease. INCIDENTALLY IMAGED UPPER ABDOMEN: Small hepatic hypodensities at sites of  previously identified suspected hepatic metastases. Otherwise unremarkable. BONES: Chronic healed appearing sternal fracture deformity. Mild osteoarthritic  changes of the shoulders and degenerative spine changes. No acute fracture or  aggressive lesion. Impression  No CT evidence of metastatic disease to the thorax with incidental  note of coronary artery disease and sequela of granulomatous disease in the  right upper lobe and right hilum. Previously evaluated hepatic metastases. CT ABD W WO CONT    Narrative  INDICATION: Jaundice, elevated total bilirubin. Exam: CT of the abdomen is performed with 2.5 mm collimation. The study is  performed with p.o. and 100 mL of nonionic IV Isovue 370. Noncontrast, arterial  phase, portal venous phase and excretory phase images were performed. Coronal  and sagittal reformatted images were also performed. CT dose reduction was achieved through use of a standardized protocol tailored  for this examination and automatic exposure control for dose modulation. Direct comparison is made to prior CT dated April 2022. FINDINGS:    There is a 2 mm right lower lobe subpleural nodular density, unchanged. There is  minimal bibasilar linear scarring. Abdomen:    Liver/bile ducts/pancreas: There is new intra-hepatic biliary dilatation and  common bile duct dilatation, as compared to prior CT dated 4/2022. The ampulla  is prominent and there is a 1.5 cm x 1.5 cm soft tissue density within the  pancreaticoduodenal groove. While no obstructing CBD calculus is visualized,  there are several calcifications in the head of pancreas which are unchanged. The main pancreatic duct is prominent in caliber. There are multiple, subtle  subcentimeter hepatic hypodense lesions, not present on prior CT dated 4/2022. No evidence of vascular encasement. Spleen: The spleen is normal.    Adrenals: The adrenals are normal.    Gallbladder: The gallbladder is surgically absent. Kidneys: The kidneys are normal.    Bowel: No thickened or dilated loop of large or small bowel is visualized. Scattered colonic diverticulosis noted. Bones: No lytic or sclerotic osseous lesion is visualized. Miscellaneous: There is a 1.8 cm x 2 cm fat-containing periumbilical hernia. No  free intraperitoneal gas or fluid is visualized. Impression  1. New intra-hepatic biliary dilatation and common bile duct dilatation. Ampullary prominence and 1.5 cm x 1.5 cm soft tissue density in the  pancreaticoduodenal groove. 2. New subcentimeter hepatic hypodense lesions, worrisome for metastatic  disease. 3. MRI can be performed for further evaluation, as indicated. MRI Results:  Results from [place] encounter on 05/17/22    MRI ABD W WO CONT    Narrative  EXAM:  MRI ABD W WO CONT    INDICATION: Pancreatic duodenal mass    COMPARISON: CT performed 5/17/2022    TECHNIQUE: Coronal T2 and postcontrast T1; Axial T2, in/out of phase, MRCP, pre-  and dynamic postcontrast T1 MRI of the abdomen. 20 mL of ProHance. Subtractions  were performed. FINDINGS:    Evaluation is limited by motion. Liver: Multiple relatively hypoenhancing lesions scattered throughout the liver  best seen on series 703    Biliary tree: Intrahepatic and extra hepatic biliary dilatation. The ducts are  dilated to the level of the ampulla. Pancreas: There is a relatively T2 hypointense mass in the head of pancreas and  extending into the pancreatic duodenal groove. The mass demonstrates relative  hypoenhancement to the remainder the pancreas with some areas of enhancement  centrally and measures approximately 3.8 x 2.2 cm.  The pancreatic duct is mildly  dilated. Spleen: Enlarged measuring 17.6 cm    Adrenal glands: Unremarkable    Kidneys: Unremarkable    Vasculature: Unremarkable    Bowel: Unremarkable    Lymph nodes: No definite lymphadenopathy identified however there was an  enlarged lymph node adjacent to the pancreatic head on previous CT. Miscellaneous: None    Impression  1. Periampullary pancreatic mass with possible extension into the wall of the  duodenum. Associated intrahepatic and extra hepatic biliary dilatation and mild  pancreatic ductal dilatation. 2.  Small hyperenhancing lesions throughout the liver suspicious for metastatic  disease. US Results:  Results from [place] encounter on 05/17/22    US ABD LTD    Narrative  ULTRASOUND OF THE RIGHT UPPER QUADRANT    INDICATION: painless jaundice    COMPARISON: CT abdomen pelvis 4/19/2022. TECHNIQUE:  Sonography of the right upper quadrant was performed. FINDINGS:    LIVER: Cirrhotic, with surface nodularity and coarsened echotexture. Fissural  widening and surface nodularity were better seen on prior CT. No ascites is  shown. The liver is probably also mildly steatotic. Intrahepatic biliary ductal  dilation is moderate. A hypoechoic mass in the left lobe measures 11 x 13 mm. GALLBLADDER: Surgically absent  COMMON DUCT: 1.5 cm in diameter. Dilated. PANCREAS: The visualized portions of the pancreas are normal.  RIGHT KIDNEY: 11.2 cm in length. No hydronephrosis, shadowing calculus, or  contour-deforming renal mass. Impression  1. Moderate intrahepatic and extrahepatic biliary ductal dilation. 2. Cirrhosis. No ascites shown. 3. Small (11 x 13 mm) hepatic mass. 4. Liver protocol CT or MRI is recommended for further evaluation.       LABS  Lab Results   Component Value Date/Time    WBC 6.7 05/19/2022 05:40 AM    HGB 10.2 (L) 05/19/2022 05:40 AM    HCT 31.5 (L) 05/19/2022 05:40 AM    PLATELET 167 22/79/3426 05:40 AM    MCV 89.5 05/19/2022 05:40 AM     Lab Results Component Value Date/Time    Sodium 135 (L) 05/19/2022 05:40 AM    Potassium 3.7 05/19/2022 05:40 AM    Chloride 105 05/19/2022 05:40 AM    CO2 26 05/19/2022 05:40 AM    Anion gap 4 (L) 05/19/2022 05:40 AM    Glucose 252 (H) 05/19/2022 05:40 AM    BUN 13 05/19/2022 05:40 AM    Creatinine 0.87 05/19/2022 05:40 AM    BUN/Creatinine ratio 15 05/19/2022 05:40 AM    GFR est AA >60 05/19/2022 05:40 AM    GFR est non-AA >60 05/19/2022 05:40 AM    Calcium 8.2 (L) 05/19/2022 05:40 AM     Lab Results   Component Value Date/Time    INR 1.1 05/17/2022 10:10 AM    Prothrombin time 11.5 (H) 05/17/2022 10:10 AM       PHYSICAL EXAM   BP (!) 111/53 (BP 1 Location: Right upper arm, BP Patient Position: Reclining)   Pulse (!) 46   Temp 97.8 °F (36.6 °C)   Resp 20   Ht 5' 10\" (1.778 m)   Wt 114.3 kg (251 lb 14.4 oz)   SpO2 97%   BMI 36.14 kg/m²   General:  NAD  Heart:  RRR  Lungs:  NWOB  Neurological:  AAOX3      PLAN   Procedure to be performed:  US guided hepatic lesion biopsy  Plan for sedation:  moderate  Post procedure plan:  observation per protocol  Informed consent:  risks, benefits, and alternatives reviewed with the patient / family who agree to proceed      Mundo Daily NP  [facility]

## 2022-05-19 NOTE — PROGRESS NOTES
A Spiritual Care Partner Volunteer visited patient in [place] [address] on 5/19/2022  Documented by:  Chaplain Draper MDiv, MS, [facility]

## 2022-05-19 NOTE — PROGRESS NOTES
Arrives via stretcher with Angel Watt transporting to [address] [facility] [facility] radiology department for Ultrasound guided percutaneous liver biopsy with conscious sedation by Chaim Carreno NP/Adilson Amos MD. NPO status confirmed. Assessment and pre-procedural assessment completed. Allergies and medications reviewed. Consent reviewed for correctness. Procedure, sedation plan and medication education provided. Patient voices good understanding of all.    08:25 Assessment completed by RN to include vitals - see flow sheet. S1, S2 noted, cardiac monitor shows sinus drea rate 46 bpm. Ascultation reveals lungs clear bilaterally. Mallampati noted as a 2. Patient is allergic to codeine. 22g IV in left hand flushed and is patent. IV in [address] is leaking around catheter insertion site when flushed. MD called to bedside for consent. 08:30 AM Chaim Carreno NP bedside and consent in progress. 08:40 AM Sedation and procedural timeouts performed to include medication allergies. Sedation and procedure started. 09:05 AM Sedation end, Procedure end. Tolerated sedation well, Versed 1 MG and Fentanyl 25 mcg IV were used to achieve effective conscious sedation. Procedure and sedation time totals were 25 minutes. Tolerated liver biopsy well, gelfoam was used by provider prior to gauze and Tegaderm dressing to right lower abdomen biopsy site. VS and oxygenation stable throughout procedure 02 and IV fluids discontinued and patient returned to radiology holding on monitor with this nurse for one hour of recovery monitoring ordered by provider. 09:45 AM VS stable. A/O x 4. Denies any nausea. Abdomen is soft, denies cramping or pain. Given ginger ale. Tolerated well. 10:05 AM TRANSFER - OUT REPORT:    Verbal report given to Lauren MCNEIL(name) on Manuel NGA Martínez.  being transferred/returned to med /surg 509(unit) for routine progression of care       Report consisted of patients Situation, Background, Assessment and   Recommendations(SBAR). Information from the following report(s) Procedure Summary, MAR and Recent Results was reviewed with the receiving nurse. Lines:   Peripheral IV 05/17/22 Left Antecubital (Active)   Site Assessment Clean, dry, & intact 05/19/22 0706   Phlebitis Assessment 0 05/19/22 0706   Infiltration Assessment 0 05/19/22 0706   Dressing Status Clean, dry, & intact 05/19/22 0706   Dressing Type Transparent;Tape 05/18/22 1630   Hub Color/Line Status End cap changed; Flushed;Pink 05/18/22 1630   Action Taken Open ports on tubing capped 05/18/22 1630   Alcohol Cap Used Yes 05/18/22 1630       Peripheral IV 05/17/22 Left;Posterior Hand (Active)   Site Assessment Clean, dry, & intact 05/18/22 1630   Phlebitis Assessment 0 05/19/22 0706   Infiltration Assessment 0 05/19/22 0706   Dressing Status Clean, dry, & intact 05/19/22 0706   Dressing Type Tape;Transparent 05/18/22 1630   Hub Color/Line Status Blue; Infusing 05/18/22 1630   Action Taken Open ports on tubing capped 05/18/22 1630   Alcohol Cap Used Yes 05/18/22 1630        Opportunity for questions and clarification was provided.       Patient transported with:   [employer]

## 2022-05-19 NOTE — PROGRESS NOTES
[facility] [place] [facility] [place] [address] [address] [facility], [facility] [address] [facility]  W: [phone]  F: [phone]      Reason for Visit:   Yuko Ruby is a 81 y.o. male who is seen for follow up of pancreatic mass, liver lesions, and biliary obstruction. Hematology/Oncology Treatment History:   · CT A/P 4/19/2022: Possible mild acute pancreatitis. Prominent sigmoid diverticulosis. Enlarged prostate. Fat-containing hernias  · CT Abdomen 5/17/2022: New intra-hepatic biliary dilatation and common bile duct dilatation. Ampullary prominence and 1.5 cm x 1.5 cm soft tissue density in the pancreaticoduodenal groove. New subcentimeter hepatic hypodense lesions, worrisome for metastatic disease. · MRI Abdomen 5/17/2022:  Periampullary pancreatic mass with possible extension into the wall of the duodenum. Associated intrahepatic and extra hepatic biliary dilatation and mild pancreatic ductal dilatation. Small hyperenhancing lesions throughout the liver suspicious for metastatic disease. · CT Chest 5/18/2022: no metastatic disease within thorax  · ERCP by Dr. Claudia Bhatti 5/18/2022: Large ulcerated mass seen in the second portion of the duodenum taking half the circumference in the area of the major papilla and extending distally about 3 to 4 cm, appeared to be pancreatic and eroding into the duodenum. Cholangiogram with complete obstruction of CBD. Metal stent deployed. Biopsies taken, pathology pending    Interval History:   He had an ERCP yesterday. EUS was not performed as the mass could be seen eroding through the duodenum and was easily biopsied. Stent was placed in common bile duct. He is feeling fairly well this morning. Fatigued. Wife at bedside. PAST HISTORY: The following sections were reviewed and updated in the EMR as appropriate: PMH, SH, FH, Medications, Allergies.       Allergies   Allergen Reactions    Codeine Anxiety      Review of Systems: A complete review of systems was obtained, reviewed. Pertinent findings reviewed above. Physical Exam:     Visit Vitals  BP (!) 128/54 (BP 1 Location: Right upper arm, BP Patient Position: At rest)   Pulse (!) 46   Temp 97.8 °F (36.6 °C)   Resp 20   Ht 5' 10\" (1.778 m)   Wt 251 lb 14.4 oz (114.3 kg)   SpO2 98%   BMI 36.14 kg/m²     ECOG PS: 1  General: no distress,  icteric sclerae  Respiratory: normal respiratory effort  CV: no peripheral edema  Skin: no rashes; no ecchymoses; no petechiae; +jaundice  Psych: alert, oriented, normal mood/affect      Results:     Lab Results   Component Value Date/Time    WBC 6.7 05/19/2022 05:40 AM    HGB 10.2 (L) 05/19/2022 05:40 AM    HCT 31.5 (L) 05/19/2022 05:40 AM    PLATELET 035 31/50/0482 05:40 AM    MCV 89.5 05/19/2022 05:40 AM    ABS. NEUTROPHILS 5.1 05/19/2022 05:40 AM     Lab Results   Component Value Date/Time    Sodium 135 (L) 05/19/2022 05:40 AM    Potassium 3.7 05/19/2022 05:40 AM    Chloride 105 05/19/2022 05:40 AM    CO2 26 05/19/2022 05:40 AM    Glucose 252 (H) 05/19/2022 05:40 AM    BUN 13 05/19/2022 05:40 AM    Creatinine 0.87 05/19/2022 05:40 AM    GFR est AA >60 05/19/2022 05:40 AM    GFR est non-AA >60 05/19/2022 05:40 AM    Calcium 8.2 (L) 05/19/2022 05:40 AM    Glucose (POC) 264 (H) 05/19/2022 05:36 AM    Creatinine (POC) 1.00 04/19/2022 02:47 PM     Lab Results   Component Value Date/Time    Bilirubin, total 3.2 (H) 05/19/2022 05:40 AM    ALT (SGPT) 313 (H) 05/19/2022 05:40 AM    Alk.  phosphatase 807 (H) 05/19/2022 05:40 AM    Protein, total 6.0 (L) 05/19/2022 05:40 AM    Albumin 2.4 (L) 05/19/2022 05:40 AM    Globulin 3.6 05/19/2022 05:40 AM     Lab Results   Component Value Date/Time    Reticulocyte count 4.2 (H) 05/17/2022 03:04 PM    Iron % saturation 32 05/17/2022 03:04 PM    TIBC 289 05/17/2022 03:04 PM    Ferritin 219 05/17/2022 03:04 PM    Vitamin B12 944 05/17/2022 03:04 PM    Folate 26.7 (H) 05/17/2022 03:04 PM    Haptoglobin 90 05/17/2022 03:04 PM     05/17/2022 03:04 PM    TSH 2.18 05/17/2022 03:04 PM    Lipase 106 05/17/2022 10:10 AM    Hep C virus Ab Interp. NONREACTIVE 05/17/2022 03:04 PM       Lab Results   Component Value Date/Time    INR 1.1 05/17/2022 10:10 AM    aPTT 30.7 05/17/2022 10:10 AM     CA 19-9:  Recent Labs     05/17/22  1504   C199LT 217*         5/17/22 US ABD LTD  IMPRESSION  1. Moderate intrahepatic and extrahepatic biliary ductal dilation. 2. Cirrhosis. No ascites shown. 3. Small (11 x 13 mm) hepatic mass. 4. Liver protocol CT or MRI is recommended for further evaluation. Assessment/Recommendations:     1. Pancreatic cancer with possible liver metastases  On endoscopy he has an uclerated pancreatic mass invading into the duodenum. He additionally has radiographic evidence of liver metastases. Pathology from ERCP is pending. Today we will get an US guided liver biopsy. We will arrange for close outpatient follow up to review his pathology results and begin treatment planning. 2.  Biliary obstruction  Secondary to his pancreatic cancer. S/p ERCP on 5/18/2022 with stent placement. Bilirubin already coming down today which is encouraging. 3.  Anemia, normocytic  Mild. Labwork unrevealing. Likely a combination of anemia of chronic disease as well as blood loss from his mass invading the duodenum. Stable today. Monitor. 4. DM  Management per primary team.  [facility] endocrinology outpatient. [facility] from my standpoint for discharge after liver biopsy, if cleared by GI and hospitalist.  I will arrange follow up with me early next week to review pathology and recheck his labs.     Signed By: Vashti Nunez MD

## 2022-05-19 NOTE — DISCHARGE INSTRUCTIONS
HOSPITALIST DISCHARGE INSTRUCTIONS  NAME: Myra Thomas :  1950   MRN:  [id number]     Date/Time:  2022 1:47 PM    ADMIT DATE: 2022     DISCHARGE DATE: 2022     ADMITTING DIAGNOSIS:  Pancreatic mass     DISCHARGE DIAGNOSIS:  As above     MEDICATIONS:     · It is important that you take the medication exactly as they are prescribed. · Keep your medication in the bottles provided by the pharmacist and keep a list of the medication names, dosages, and times to be taken in your wallet. · Do not take other medications without consulting your doctor. Pain Management: per above medications    What to do at Home    Recommended diet:  Low fat, Low cholesterol    Recommended activity: Activity as tolerated    If you experience any of the following symptoms then please call your primary care physician or return to the emergency room if you cannot get hold of your doctor:  Fever, chills, nausea, vomiting, diarrhea, change in mentation, falling, bleeding, shortness of breath, chest pain     Follow Up:  Dr. Deanna Rodríguez, PA  you are to call and set up an appointment to see them in 2 weeks. Follow-up Information     Follow up With Specialties Details Why Contact Info    Lion Patel MD Hematology and Oncology, Internal Medicine Physician, Hematology Physician, Oncology Go on 2022 appt at 10:30 am  [address]  [phone]      Deanna Rodríguez [place] Physician Assistant   [address]  [phone]            Information obtained by :  I understand that if any problems occur once I am at home I am to contact my physician. I understand and acknowledge receipt of the instructions indicated above.                                                                                                                                            Physician's or R.N.'s Signature Date/Time                                                                                                                                              Patient or Representative Signature                                                          Date/Time

## 2022-05-19 NOTE — PROGRESS NOTES
Message left on department VM from Jacki Sánchez NP notfiying of US liver biopsy order and request for it to be performed prior to endoscopy exam. Chart reviewed. Patient is already NPO. Will speak to endoscopy team in AM to discuss scheduling.

## 2022-05-19 NOTE — PROGRESS NOTES
[address] NP  [phone]           GI PROGRESS NOTE        NAME: Irais Holguin. :  1950   MRN:  [id number]       Subjective:   Feels less itchy and is less jaundiced than yesterday. Feels soreness in the upper middle abdomen      Objective:   NAD      VITALS:   Last 24hrs VS reviewed since prior progress note. Most recent are:  Visit Vitals  /67 (BP 1 Location: Right upper arm, BP Patient Position: At rest)   Pulse (!) 45   Temp 97.3 °F (36.3 °C)   Resp 20   Ht 5' 10\" (1.778 m)   Wt 114.3 kg (251 lb 14.4 oz)   SpO2 97%   BMI 36.14 kg/m²       Intake/Output Summary (Last 24 hours) at 2022 1509  Last data filed at 2022 1225  Gross per 24 hour   Intake 1431.67 ml   Output 650 ml   Net 781.67 ml       PHYSICAL EXAM:  General: Alert, in no acute distress    HEENT: icteric sclerae. Lungs:            CTA Bilaterally. Heart:  Regular  rhythm,    Abdomen: Soft, Non distended, Non tender.  (+)Bowel sounds, no HSM  Extremities: No c/c/e  Neurologic:  CN 2-12 gi, Alert and oriented X 3. No acute neurological distress   Psych:   Good insight. Not anxious nor agitated. Lab Data Reviewed:   Recent Labs     22  0540 22  0334   WBC 6.7 5.8   HGB 10.2* 10.5*   HCT 31.5* 32.3*    163     Recent Labs     22  0540 22  0334   * 136   K 3.7 4.0    106   CO2 26 22   BUN 13 16   CREA 0.87 0.90   * 205*   PHOS  --  2.2*   CA 8.2* 8.4*     Recent Labs     22  0540 22  0334 22  1010 22  1010   * 931*   < > 952*   TP 6.0* 6.0*   < > 6.5   ALB 2.4* 2.5*   < > 2.8*   GLOB 3.6 3.5   < > 3.7   LPSE  --   --   --  106    < > = values in this interval not displayed.        ________________________________________________________________________  Patient Active Problem List   Diagnosis Code    Type 2 diabetes mellitus [facility] E11.9    Biliary obstruction K83.1    Anxiety and depression F41.9, F32.A    Arthritis M19.90    Dementia ([address] F03.90    Hyperlipidemia E78.5    Hypertension I10    Hypothyroid E03.9    Obese E66.9    SELINA on CPAP G47.33, Z99.89    Anemia D64.9    LFT elevation R79.89    Hyperbilirubinemia E80.6    Cirrhosis (HCC) K74.60    Liver mass R16.0    Hypotension I95.9    Bradycardia R00.1    Jaundice R17    Severe protein-calorie malnutrition (HCC) E43    Malignant neoplasm of head of pancreas (HCC) C25.0    Metastasis to liver (HCC) C78.7    Pancreatic mass K86.89         Assessment and Plan:  Jaundice:  Associated with 25 lbs unintentional weight loss. Concerning for malignancy. Status post ERCP yesterday showing. Biliary obstruction from ulcerated duodenal or pancreatic mass. Metal stent was deployed with excellent bile flow, and biopsies obtained. - Soft textured diet  - Pt has follow up with Dr Abida Barnett as outpt. Ok to discharge from GI standpoint. Please call with any questions or concerns.         Signed By: Lonny Armenta NP     5/19/2022  3:09 PM

## 2022-05-19 NOTE — PROGRESS NOTES
5/19/2022  Case Management Progress Note    12:34 PM  Patient is 81year old male admitted 5/17 with biliary obstruction   Patient's RUR is 17% yellow/moderate risk for readmission  Covid test: negative 5/17   Chart reviewed--patient discussed at IDR rounds  Patient had liver biopsy this morning and per rounds may be ready for discharge this afternoon or tomorrow pending heme/onc plan. He does not have any noted needs for discharge at this time and plan remains for him to return home with family assistance at discharge. Will continue to follow and assist as needed. Transition of Care Plan   1. Continue medical management/treatment  2. Home with family assistance when treatment plan finalized  3. Family will transport at discharge  4.  Cm will continue to follow and assist    PER Galdamez

## 2022-05-19 NOTE — DIABETES MGMT
[facility]    CLINICAL NURSE SPECIALIST  DISCHARGE RECOMMENDATIONS     Initial Presentation   Dorothy Calvert is a 81 y.o. male admitted 5/17/22 from the ER with jaundice after experiencing tan, loose BMs and unintentional 25 lb weight loss. Denied nausea, vomiting, fever & night sweats. Saw PCP, endocrinologist and GI PTA. Concern for pancreatitis that was treated with ABx. Liver enzymes elevated => sent to ER  ER exam:   Eyes:      General: Scleral icterus present. Ultrasound: Possible hepatic mass, dilated hepatic and biliary ducts. Suspect underlying malignancy    HX:   Past Medical History:   Diagnosis Date    Anxiety and depression     Arthritis     [facility]     Dementia ([facility]     DM (diabetes mellitus) ([facility]     Hyperlipidemia     Hypertension     Hypothyroid     Obese     SELINA on CPAP     Pancreatitis    INITIAL DX:   Biliary obstruction [K83.1]     Current Treatment     TX: Clot prevention. Insulin  5/18/22 ERCPwith biliary sphincterotomy, biliary stent placement     Consulted by Provider for advanced diabetes nursing assessment and care for:   [] Transitioning off [place]   [x] Inpatient management strategy  [] Home management assessment  [] Survival skill education    Hospital Course   Clinical progress has been uncomplicated. 5/17/22 CT Abdomen: New intra-hepatic biliary dilatation and common bile duct dilatation. Ampullary prominence and 1.5 cm x 1.5 cm soft tissue density in the pancreaticoduodenal groove. New subcentimeter hepatic hypodense lesions, worrisome for metastatic disease. MRI Abdomen:  Periampullary pancreatic mass with possible extension into the wall of the duodenum. Associated intrahepatic and extra hepatic biliary dilatation and mild pancreatic ductal dilatation. Small hyperenhancing lesions throughout the liver suspicious for metastatic disease.   GI consult: Plan ERCP   5/18/22 ERCP findings: Duodenum/jejunum: Evidence of a large ulcerated mass seen in the second portion of the duodenum taking half the circumference in the area of the major papilla and extending distally about 3 to 4 cm   Ampulla: Seen at the proximal rim of the mass. It appeared intact and ulcerated mass appeared to be pancreatic and eroding into the duodenum. Cholangiogram: Dilated CHD and intrahepatic ducts seen, cystic duct stump seen, mid to distal CBD appeared completely obstructed. Diabetes History   Known Type 2 diabetes for many years, originally treated with oral agents  Now on high-dose insulin therapy  Positive family history of diabetes on father's side  Admission /AG 8. A1c 8.2%    Diabetes-related Medical History  Other associated conditions     Sleep apnea  Cirrhosis    Diabetes Medication History  Key Antihyperglycemic Medications             glipiZIDE (GLUCOTROL) 5 mg tablet (Taking) Take  by mouth two (2) times a day. INSULIN DETEMIR (LEVEMIR FLEXPEN SC) (Taking) 50 Units by SubCUTAneous route two (2) times a day. Diabetes self-management practices:   Eating pattern   [x] Not eating a carbohydrate-controlled mealplan  [x] Breakfast Protein drink with toast  [x] Lunch  [place] with chips  [x] Dinner  Well-balanced meal  [x] Snacks Salty  [x] Beverages Tea & lemonade (sugar-free)  Physical activity pattern   [x] Not employing a physical activity program to control BG  Monitoring pattern - Tests in morning; readings in the 100s   [x] Not testing BGs sufficiently to inform self-management adjustments  Taking medications pattern  [x] Consistent administration  [x] Affordable  Overall evaluation:    [x] Not achieving A1c target with drug therapy & self-care practices    Subjective   I'm supposed to go home this afternoon.      Objective   Physical exam  General Obese male in no acute distress. Conversant and cooperative.  Wife at bedside  Neuro  Alert, oriented   Vital Signs   Visit Vitals  BP (!) 123/58 (BP 1 Location: Right upper arm, BP Patient Position: At rest)   Pulse (!) 49   Temp 98.1 °F (36.7 °C)   Resp 18   Ht 5' 10\" (1.778 m)   Wt 114.3 kg (251 lb 14.4 oz)   SpO2 98%   BMI 36.14 kg/m²     Laboratory  Recent Labs     05/19/22  0540 05/18/22  0334 05/17/22  1504 05/17/22  1010   * 205*  --  312*   AGAP 4* 8  --  8   TRIGL  --   --  238*  --    WBC 6.7 5.8  --  6.9   CREA 0.87 0.90  --  1.07   GFRNA >60 >60  --  >60   * 269*  --  338*   * 390*  --  465*     Factors impacting BG management  Factor Dose Comments   Nutrition:  CL meals     Other:   Kidney function  Liver function (Cirrhosis)   Normal  Elevated enzymes     Cancer???     Blood glucose pattern      Significant diabetes-related events over the past 24-72 hours  BGs in the 200-400 range  Just corrective insulin in use    Assessment and Plan   Nursing Diagnosis Risk for unstable blood glucose pattern   Nursing Intervention Domain 5254 Decision-making Support   Nursing Interventions Examined current inpatient diabetes/blood glucose control   Explored factors facilitating and impeding inpatient management     Evaluation   This obese  male has been undergoing diagnostic testing to establish diagnosis. High probability of hepatic cancer. In terms of his BG management, he has been receiving corrective insulin that has not achieved control. At home, patient has been using approximately 140 units of basal insulin daily along with oral agents.      In light of probable hepatic cancer, would recommend that he move to a basal/mealtime approach to diabetes management going forward; discharge recommendations:     Levemir insulin 40 units twice daily (0.8 units/kg/D)   Novolog insulin 20-25 units with each consumed meal    Billing Code(s)   [x] 47353 IP subsequent hospital care - 35 minutes    Before making these care recommendations, I personally reviewed the hospitalization record, including notes, laboratory & diagnostic data and current medications, and examined the patient at the bedside (circumstances permitting) before making care recommendations. More than fifty (50) percent of the time was spent in patient counseling and/or care coordination.   Total minutes: Sal Mckoy, CNS  Diabetes Clinical Nurse Specialist  [facility]  Access via [address] [place] [address]

## 2022-05-19 NOTE — PROGRESS NOTES
Spoke with Priscilla in Endoscopy who is going to reach out to Dr. Reggie Amin regarding request for US liver biopsy today and will return call.    09:45 AM Call was returned to [facility], [facility]. and liver biopsy is to be done tomorrow 5/20/22.

## 2022-05-19 NOTE — PROCEDURES
Interventional Radiology Brief Postoperative Note    Procedure Date:  5/19/2022    Procedure:  US guided hepatic lesion biopsy    :  Lalit Erickson NP    Attending:  Krystyna Nichole MD    Preoperative Diagnosis:  Hepatic lesions    Postoperative Diagnosis:  Hepatic lesions    Complications:  None    Estimated Blood Loss:  < 5 ml    Procedure Findings:  Five 18-gauge cores obtained    Specimens:  Yes - cores sent for pathology    Condition of Patient:  The patient tolerated the procedure without difficulty and remained in stable condition throughout.

## 2022-05-19 NOTE — PROGRESS NOTES
Bedside and Verbal shift change report given to TARUN Aguilar (oncoming nurse) by Jeb Hodges (offgoing nurse). Report included the following information SBAR, Intake/Output, MAR and Recent Results.

## 2022-05-19 NOTE — PROGRESS NOTES
[facility] [place] [facility] [place] [address] [address] [facility] [address] [facility]  W: [phone]  F: [phone]      Reason for Visit:   Ayla Murphy is a 81 y.o. male who is seen for follow up of pancreatic cancer. Hematology/Oncology Treatment History:   · CT A/P 4/19/2022: Possible mild acute pancreatitis. Prominent sigmoid diverticulosis. Enlarged prostate. Fat-containing hernias  · CT Abdomen 5/17/2022: New intra-hepatic biliary dilatation and common bile duct dilatation. Ampullary prominence and 1.5 cm x 1.5 cm soft tissue density in the pancreaticoduodenal groove. New subcentimeter hepatic hypodense lesions, worrisome for metastatic disease. · MRI Abdomen 5/17/2022:  Periampullary pancreatic mass with possible extension into the wall of the duodenum. Associated intrahepatic and extra hepatic biliary dilatation and mild pancreatic ductal dilatation. Small hyperenhancing lesions throughout the liver suspicious for metastatic disease. · CT Chest 5/18/2022: no metastatic disease within thorax  · ERCP by Dr. Rincon Frames 5/18/2022: Large ulcerated mass seen in the second portion of the duodenum taking half the circumference in the area of the major papilla and extending distally about 3 to 4 cm, appeared to be pancreatic and eroding into the duodenum. Cholangiogram with complete obstruction of CBD. Metal stent deployed. 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 male who presents today for follow up of pancreatic cancer. I saw him in the hospital last week, see inpatient notes for details. He reports improvement in his jaundice. However, stools still remain abnormal in consistency and appearance. He denies pain. Eating better.   However, significant fatigue, sleeping quite a but during the day. He is accompanied by his wife today. Review of systems was obtained and pertinent findings reviewed above. Past medical history, social history, family history, medications, and allergies are located in the electronic medical record. Physical Exam:     Visit Vitals  BP (!) 111/45 (BP 1 Location: Left upper arm, BP Patient Position: Sitting, BP Cuff Size: Large adult) Comment: . Pulse 65   Temp 97 °F (36.1 °C) (Temporal)   Resp 20   Ht 5' 10\" (1.778 m)   Wt 253 lb (114.8 kg)   SpO2 95%   BMI 36.30 kg/m²     ECOG PS: 1  General: no distress  Eyes: anicteric sclerae  HENT: oropharynx clear  Neck: supple  Lymphatic: no cervical, supraclavicular, or inguinal adenopathy  Respiratory: normal respiratory effort  CV: no peripheral edema  GI: soft, nontender, nondistended, no masses  Skin: no rashes, no ecchymoses, no petechiae    Results:     Lab Results   Component Value Date/Time    WBC 6.7 05/19/2022 05:40 AM    HGB 10.2 (L) 05/19/2022 05:40 AM    HCT 31.5 (L) 05/19/2022 05:40 AM    PLATELET 360 60/02/6964 05:40 AM    MCV 89.5 05/19/2022 05:40 AM    ABS. NEUTROPHILS 5.1 05/19/2022 05:40 AM     Lab Results   Component Value Date/Time    Sodium 135 (L) 05/19/2022 05:40 AM    Potassium 3.7 05/19/2022 05:40 AM    Chloride 105 05/19/2022 05:40 AM    CO2 26 05/19/2022 05:40 AM    Glucose 252 (H) 05/19/2022 05:40 AM    BUN 13 05/19/2022 05:40 AM    Creatinine 0.87 05/19/2022 05:40 AM    GFR est AA >60 05/19/2022 05:40 AM    GFR est non-AA >60 05/19/2022 05:40 AM    Calcium 8.2 (L) 05/19/2022 05:40 AM    Glucose (POC) 266 (H) 05/19/2022 11:38 AM    Creatinine (POC) 1.00 04/19/2022 02:47 PM     Lab Results   Component Value Date/Time    Bilirubin, total 3.2 (H) 05/19/2022 05:40 AM    ALT (SGPT) 313 (H) 05/19/2022 05:40 AM    Alk.  phosphatase 807 (H) 05/19/2022 05:40 AM    Protein, total 6.0 (L) 05/19/2022 05:40 AM    Albumin 2.4 (L) 05/19/2022 05:40 AM    Globulin 3.6 05/19/2022 05:40 AM       Test results above have been reviewed. Assessment:   1) Metastatic Small Cell Carcinoma of the Pancreas  He has disease within his pancrease, invading into the duodenum. He additionally has metastatic disease within his liver, confirmed with biopsy. His cancer is not curable and management is with palliative intent. My recommendation is for chemotherapy with Carboplatin and Etoposide given every 3 weeks for 4 cycles. We also discussed the alternative option of supportive care alone, but he would like to proceed with treatment. We discussed the risks and benefits of treatment with Carboplatin and Etoposide chemotherapy. Potential side effects include but are not limited to: nausea, vomiting, diarrhea, constipation, taste changes, allergic reactions, myelosuppression, infection, fatigue, alopecia, neuropathy, mucositis, renal failure, infertility, and rarely, death. He would like to come back later this week for formal chemotherapy teaching and consent. He will benefit from port placement for therapy. He should also have an MRI brain for staging.    2)  Biliary obstruction  Secondary to his pancreatic cancer. S/p ERCP on 5/18/2022 with stent placement. Repeat bilirubin level today.    3)  Anemia, normocytic  Mild. Labwork unrevealing. Likely a combination of anemia of chronic disease as well as blood loss from his mass invading the duodenum. Repeat CBC today.      4) DM  Follows endocrinology outpatient.       Plan:     · Labs today: CBC, CMP,   · Port placement with IR  · MRI Brain  Proceed ASAP with C#1 of Carboplatin (AUC 5) on day1 and Etoposide (100mg/m2) Days 1-3 given every 3 weeks for 4-6 cycles  Labs prior to each treatment: CBC, BMP on day 1  Antiemetic prophylaxis: Palonosetron prior to each treatment on day 1 and Dexamethasone prior to therapy on day 1-3  PRN antiemetics: Ondansetron, Prochlorperazine and Lorazepam at home  EMLA cream to port  · Return to see me with the start of therapy    Signed By: Phuc Munoz MD

## 2022-05-19 NOTE — PROGRESS NOTES
Discharge orders in    Discharge education, instructions, documents/AVS given to patient and verbalized understanding. IV cannula aseptically removed. Tip intact. No active bleeding noted. No pain. No distress. Respirations even and unlabored. No complaints. RUQ dressing c/d/i. No active bleeding noted.

## 2022-05-23 ENCOUNTER — OFFICE VISIT (OUTPATIENT)
Dept: CARDIOLOGY CLINIC | Age: 72
End: 2022-05-23
Payer: MEDICARE

## 2022-05-23 DIAGNOSIS — Z95.818 STATUS POST PLACEMENT OF IMPLANTABLE LOOP RECORDER: Primary | ICD-10-CM

## 2022-05-23 PROCEDURE — 93298 REM INTERROG DEV EVAL SCRMS: CPT | Performed by: INTERNAL MEDICINE

## 2022-05-24 ENCOUNTER — OFFICE VISIT (OUTPATIENT)
Dept: ONCOLOGY | Age: 72
End: 2022-05-24
Payer: MEDICARE

## 2022-05-24 VITALS
HEART RATE: 65 BPM | OXYGEN SATURATION: 95 % | WEIGHT: 253 LBS | RESPIRATION RATE: 20 BRPM | TEMPERATURE: 97 F | SYSTOLIC BLOOD PRESSURE: 111 MMHG | HEIGHT: 70 IN | DIASTOLIC BLOOD PRESSURE: 45 MMHG | BODY MASS INDEX: 36.22 KG/M2

## 2022-05-24 DIAGNOSIS — C78.7 METASTASIS TO LIVER (HCC): ICD-10-CM

## 2022-05-24 DIAGNOSIS — C25.0 MALIGNANT NEOPLASM OF HEAD OF PANCREAS (HCC): ICD-10-CM

## 2022-05-24 DIAGNOSIS — C25.0 MALIGNANT NEOPLASM OF HEAD OF PANCREAS (HCC): Primary | ICD-10-CM

## 2022-05-24 DIAGNOSIS — C80.1 SMALL CELL CARCINOMA (HCC): ICD-10-CM

## 2022-05-24 PROBLEM — Z51.11 ENCOUNTER FOR ANTINEOPLASTIC CHEMOTHERAPY: Status: ACTIVE | Noted: 2022-05-24

## 2022-05-24 PROCEDURE — G8417 CALC BMI ABV UP PARAM F/U: HCPCS | Performed by: INTERNAL MEDICINE

## 2022-05-24 PROCEDURE — 99215 OFFICE O/P EST HI 40 MIN: CPT | Performed by: INTERNAL MEDICINE

## 2022-05-24 PROCEDURE — 3017F COLORECTAL CA SCREEN DOC REV: CPT | Performed by: INTERNAL MEDICINE

## 2022-05-24 PROCEDURE — G8427 DOCREV CUR MEDS BY ELIG CLIN: HCPCS | Performed by: INTERNAL MEDICINE

## 2022-05-24 PROCEDURE — 1111F DSCHRG MED/CURRENT MED MERGE: CPT | Performed by: INTERNAL MEDICINE

## 2022-05-24 PROCEDURE — G8754 DIAS BP LESS 90: HCPCS | Performed by: INTERNAL MEDICINE

## 2022-05-24 PROCEDURE — G8752 SYS BP LESS 140: HCPCS | Performed by: INTERNAL MEDICINE

## 2022-05-24 PROCEDURE — 1101F PT FALLS ASSESS-DOCD LE1/YR: CPT | Performed by: INTERNAL MEDICINE

## 2022-05-24 PROCEDURE — G8536 NO DOC ELDER MAL SCRN: HCPCS | Performed by: INTERNAL MEDICINE

## 2022-05-24 PROCEDURE — G9717 DOC PT DX DEP/BP F/U NT REQ: HCPCS | Performed by: INTERNAL MEDICINE

## 2022-05-24 RX ORDER — ONDANSETRON 2 MG/ML
8 INJECTION INTRAMUSCULAR; INTRAVENOUS AS NEEDED
Status: CANCELLED | OUTPATIENT
Start: 2022-06-01

## 2022-05-24 RX ORDER — ACETAMINOPHEN 325 MG/1
650 TABLET ORAL AS NEEDED
Status: CANCELLED
Start: 2022-06-02

## 2022-05-24 RX ORDER — PALONOSETRON 0.05 MG/ML
0.25 INJECTION, SOLUTION INTRAVENOUS ONCE
Status: CANCELLED | OUTPATIENT
Start: 2022-06-01 | End: 2022-06-01

## 2022-05-24 RX ORDER — ACETAMINOPHEN 325 MG/1
650 TABLET ORAL AS NEEDED
Status: CANCELLED
Start: 2022-06-01

## 2022-05-24 RX ORDER — SODIUM CHLORIDE 9 MG/ML
25 INJECTION, SOLUTION INTRAVENOUS CONTINUOUS
Status: CANCELLED | OUTPATIENT
Start: 2022-06-01

## 2022-05-24 RX ORDER — EPINEPHRINE 1 MG/ML
0.3 INJECTION, SOLUTION, CONCENTRATE INTRAVENOUS AS NEEDED
Status: CANCELLED | OUTPATIENT
Start: 2022-06-03

## 2022-05-24 RX ORDER — SODIUM CHLORIDE 9 MG/ML
10 INJECTION INTRAMUSCULAR; INTRAVENOUS; SUBCUTANEOUS AS NEEDED
Status: CANCELLED | OUTPATIENT
Start: 2022-06-02

## 2022-05-24 RX ORDER — DEXAMETHASONE SODIUM PHOSPHATE 4 MG/ML
8 INJECTION, SOLUTION INTRA-ARTICULAR; INTRALESIONAL; INTRAMUSCULAR; INTRAVENOUS; SOFT TISSUE ONCE
Status: CANCELLED | OUTPATIENT
Start: 2022-06-03 | End: 2022-06-03

## 2022-05-24 RX ORDER — SODIUM CHLORIDE 9 MG/ML
10 INJECTION INTRAMUSCULAR; INTRAVENOUS; SUBCUTANEOUS AS NEEDED
Status: CANCELLED | OUTPATIENT
Start: 2022-06-03

## 2022-05-24 RX ORDER — ONDANSETRON 2 MG/ML
8 INJECTION INTRAMUSCULAR; INTRAVENOUS AS NEEDED
Status: CANCELLED | OUTPATIENT
Start: 2022-06-02

## 2022-05-24 RX ORDER — ONDANSETRON 2 MG/ML
8 INJECTION INTRAMUSCULAR; INTRAVENOUS AS NEEDED
Status: CANCELLED | OUTPATIENT
Start: 2022-06-03

## 2022-05-24 RX ORDER — DIPHENHYDRAMINE HYDROCHLORIDE 50 MG/ML
25 INJECTION, SOLUTION INTRAMUSCULAR; INTRAVENOUS AS NEEDED
Status: CANCELLED
Start: 2022-06-02

## 2022-05-24 RX ORDER — EPINEPHRINE 1 MG/ML
0.3 INJECTION, SOLUTION, CONCENTRATE INTRAVENOUS AS NEEDED
Status: CANCELLED | OUTPATIENT
Start: 2022-06-01

## 2022-05-24 RX ORDER — DIPHENHYDRAMINE HYDROCHLORIDE 50 MG/ML
50 INJECTION, SOLUTION INTRAMUSCULAR; INTRAVENOUS AS NEEDED
Status: CANCELLED
Start: 2022-06-02

## 2022-05-24 RX ORDER — EPINEPHRINE 1 MG/ML
0.3 INJECTION, SOLUTION, CONCENTRATE INTRAVENOUS AS NEEDED
Status: CANCELLED | OUTPATIENT
Start: 2022-06-02

## 2022-05-24 RX ORDER — HEPARIN 100 UNIT/ML
300-500 SYRINGE INTRAVENOUS AS NEEDED
Status: CANCELLED
Start: 2022-06-03

## 2022-05-24 RX ORDER — DIPHENHYDRAMINE HYDROCHLORIDE 50 MG/ML
25 INJECTION, SOLUTION INTRAMUSCULAR; INTRAVENOUS AS NEEDED
Status: CANCELLED
Start: 2022-06-03

## 2022-05-24 RX ORDER — HEPARIN 100 UNIT/ML
300-500 SYRINGE INTRAVENOUS AS NEEDED
Status: CANCELLED
Start: 2022-06-01

## 2022-05-24 RX ORDER — ALBUTEROL SULFATE 0.83 MG/ML
2.5 SOLUTION RESPIRATORY (INHALATION) AS NEEDED
Status: CANCELLED
Start: 2022-06-03

## 2022-05-24 RX ORDER — SODIUM CHLORIDE 0.9 % (FLUSH) 0.9 %
10 SYRINGE (ML) INJECTION AS NEEDED
Status: CANCELLED | OUTPATIENT
Start: 2022-06-01

## 2022-05-24 RX ORDER — SODIUM CHLORIDE 9 MG/ML
25 INJECTION, SOLUTION INTRAVENOUS CONTINUOUS
Status: CANCELLED | OUTPATIENT
Start: 2022-06-03

## 2022-05-24 RX ORDER — DIPHENHYDRAMINE HYDROCHLORIDE 50 MG/ML
50 INJECTION, SOLUTION INTRAMUSCULAR; INTRAVENOUS AS NEEDED
Status: CANCELLED
Start: 2022-06-03

## 2022-05-24 RX ORDER — ALBUTEROL SULFATE 0.83 MG/ML
2.5 SOLUTION RESPIRATORY (INHALATION) AS NEEDED
Status: CANCELLED
Start: 2022-06-01

## 2022-05-24 RX ORDER — HYDROCORTISONE SODIUM SUCCINATE 100 MG/2ML
100 INJECTION, POWDER, FOR SOLUTION INTRAMUSCULAR; INTRAVENOUS AS NEEDED
Status: CANCELLED | OUTPATIENT
Start: 2022-06-01

## 2022-05-24 RX ORDER — ONDANSETRON 2 MG/ML
8 INJECTION INTRAMUSCULAR; INTRAVENOUS
Status: CANCELLED | OUTPATIENT
Start: 2022-06-03

## 2022-05-24 RX ORDER — SODIUM CHLORIDE 9 MG/ML
10 INJECTION INTRAMUSCULAR; INTRAVENOUS; SUBCUTANEOUS AS NEEDED
Status: CANCELLED | OUTPATIENT
Start: 2022-06-01

## 2022-05-24 RX ORDER — DEXAMETHASONE SODIUM PHOSPHATE 4 MG/ML
8 INJECTION, SOLUTION INTRA-ARTICULAR; INTRALESIONAL; INTRAMUSCULAR; INTRAVENOUS; SOFT TISSUE ONCE
Status: CANCELLED | OUTPATIENT
Start: 2022-06-02 | End: 2022-06-02

## 2022-05-24 RX ORDER — ALBUTEROL SULFATE 0.83 MG/ML
2.5 SOLUTION RESPIRATORY (INHALATION) AS NEEDED
Status: CANCELLED
Start: 2022-06-02

## 2022-05-24 RX ORDER — SODIUM CHLORIDE 0.9 % (FLUSH) 0.9 %
10 SYRINGE (ML) INJECTION AS NEEDED
Status: CANCELLED | OUTPATIENT
Start: 2022-06-02

## 2022-05-24 RX ORDER — ONDANSETRON 2 MG/ML
8 INJECTION INTRAMUSCULAR; INTRAVENOUS
Status: CANCELLED | OUTPATIENT
Start: 2022-06-02

## 2022-05-24 RX ORDER — HEPARIN 100 UNIT/ML
300-500 SYRINGE INTRAVENOUS AS NEEDED
Status: CANCELLED
Start: 2022-06-02

## 2022-05-24 RX ORDER — DIPHENHYDRAMINE HYDROCHLORIDE 50 MG/ML
25 INJECTION, SOLUTION INTRAMUSCULAR; INTRAVENOUS AS NEEDED
Status: CANCELLED
Start: 2022-06-01

## 2022-05-24 RX ORDER — ACETAMINOPHEN 325 MG/1
650 TABLET ORAL AS NEEDED
Status: CANCELLED
Start: 2022-06-03

## 2022-05-24 RX ORDER — SODIUM CHLORIDE 0.9 % (FLUSH) 0.9 %
10 SYRINGE (ML) INJECTION AS NEEDED
Status: CANCELLED | OUTPATIENT
Start: 2022-06-03

## 2022-05-24 RX ORDER — SODIUM CHLORIDE 9 MG/ML
25 INJECTION, SOLUTION INTRAVENOUS CONTINUOUS
Status: CANCELLED | OUTPATIENT
Start: 2022-06-02

## 2022-05-24 RX ORDER — DIPHENHYDRAMINE HYDROCHLORIDE 50 MG/ML
50 INJECTION, SOLUTION INTRAMUSCULAR; INTRAVENOUS AS NEEDED
Status: CANCELLED
Start: 2022-06-01

## 2022-05-24 RX ORDER — HYDROCORTISONE SODIUM SUCCINATE 100 MG/2ML
100 INJECTION, POWDER, FOR SOLUTION INTRAMUSCULAR; INTRAVENOUS AS NEEDED
Status: CANCELLED | OUTPATIENT
Start: 2022-06-02

## 2022-05-24 RX ORDER — HYDROCORTISONE SODIUM SUCCINATE 100 MG/2ML
100 INJECTION, POWDER, FOR SOLUTION INTRAMUSCULAR; INTRAVENOUS AS NEEDED
Status: CANCELLED | OUTPATIENT
Start: 2022-06-03

## 2022-05-25 LAB
ALBUMIN SERPL-MCNC: 3.1 G/DL (ref 3.5–5)
ALBUMIN/GLOB SERPL: 0.9 {RATIO} (ref 1.1–2.2)
ALP SERPL-CCNC: 388 U/L (ref 45–117)
ALT SERPL-CCNC: 152 U/L (ref 12–78)
ANION GAP SERPL CALC-SCNC: 6 MMOL/L (ref 5–15)
AST SERPL-CCNC: 62 U/L (ref 15–37)
BASOPHILS # BLD: 0 K/UL (ref 0–0.1)
BASOPHILS NFR BLD: 0 % (ref 0–1)
BILIRUB SERPL-MCNC: 1.7 MG/DL (ref 0.2–1)
BUN SERPL-MCNC: 17 MG/DL (ref 6–20)
BUN/CREAT SERPL: 17 (ref 12–20)
CALCIUM SERPL-MCNC: 8.9 MG/DL (ref 8.5–10.1)
CHLORIDE SERPL-SCNC: 102 MMOL/L (ref 97–108)
CO2 SERPL-SCNC: 27 MMOL/L (ref 21–32)
CREAT SERPL-MCNC: 0.98 MG/DL (ref 0.7–1.3)
DIFFERENTIAL METHOD BLD: ABNORMAL
EOSINOPHIL # BLD: 0.3 K/UL (ref 0–0.4)
EOSINOPHIL NFR BLD: 2 % (ref 0–7)
ERYTHROCYTE [DISTWIDTH] IN BLOOD BY AUTOMATED COUNT: 15.9 % (ref 11.5–14.5)
GLOBULIN SER CALC-MCNC: 3.4 G/DL (ref 2–4)
GLUCOSE SERPL-MCNC: 258 MG/DL (ref 65–100)
HCT VFR BLD AUTO: 39.7 % (ref 36.6–50.3)
HGB BLD-MCNC: 11.9 G/DL (ref 12.1–17)
IMM GRANULOCYTES # BLD AUTO: 0.1 K/UL (ref 0–0.04)
IMM GRANULOCYTES NFR BLD AUTO: 1 % (ref 0–0.5)
LYMPHOCYTES # BLD: 1.3 K/UL (ref 0.8–3.5)
LYMPHOCYTES NFR BLD: 10 % (ref 12–49)
MCH RBC QN AUTO: 28.7 PG (ref 26–34)
MCHC RBC AUTO-ENTMCNC: 30 G/DL (ref 30–36.5)
MCV RBC AUTO: 95.7 FL (ref 80–99)
MONOCYTES # BLD: 0.8 K/UL (ref 0–1)
MONOCYTES NFR BLD: 6 % (ref 5–13)
NEUTS SEG # BLD: 10.2 K/UL (ref 1.8–8)
NEUTS SEG NFR BLD: 81 % (ref 32–75)
NRBC # BLD: 0 K/UL (ref 0–0.01)
NRBC BLD-RTO: 0 PER 100 WBC
PLATELET # BLD AUTO: 262 K/UL (ref 150–400)
PMV BLD AUTO: 11.1 FL (ref 8.9–12.9)
POTASSIUM SERPL-SCNC: 3.3 MMOL/L (ref 3.5–5.1)
PROT SERPL-MCNC: 6.5 G/DL (ref 6.4–8.2)
RBC # BLD AUTO: 4.15 M/UL (ref 4.1–5.7)
SODIUM SERPL-SCNC: 135 MMOL/L (ref 136–145)
WBC # BLD AUTO: 12.6 K/UL (ref 4.1–11.1)

## 2022-05-25 NOTE — PROGRESS NOTES
[facility] [place] at [place] [address] [address] [facility] [address] [facility]  W: [phone]  F: [phone]      Reason for Visit:   Janet Peter. is a 81 y.o. male who is seen for follow up of small cell carcinoma of the pancreas. Hematology/Oncology Treatment History:   · CT A/P 4/19/2022: Possible mild acute pancreatitis. Prominent sigmoid diverticulosis. Enlarged prostate. Fat-containing hernias  · CT Abdomen 5/17/2022: New intra-hepatic biliary dilatation and common bile duct dilatation. Ampullary prominence and 1.5 cm x 1.5 cm soft tissue density in the pancreaticoduodenal groove. New subcentimeter hepatic hypodense lesions, worrisome for metastatic disease. · MRI Abdomen 5/17/2022:  Periampullary pancreatic mass with possible extension into the wall of the duodenum. Associated intrahepatic and extra hepatic biliary dilatation and mild pancreatic ductal dilatation. Small hyperenhancing lesions throughout the liver suspicious for metastatic disease. · CT Chest 5/18/2022: no metastatic disease within thorax  · ERCP by Dr. Hardeep Erickson 5/18/2022: Large ulcerated mass seen in the second portion of the duodenum taking half the circumference in the area of the major papilla and extending distally about 3 to 4 cm, appeared to be pancreatic and eroding into the duodenum. Cholangiogram with complete obstruction of CBD. Metal stent deployed. 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 He is tired, but otherwise doing fairly well. No change since our visit earlier this week. He is accompanied by his wife today. Review of systems was obtained and pertinent findings reviewed above.  Past medical history, social history, family history, medications, and allergies are located in the electronic medical record. Physical Exam:     Visit Vitals  /61 (BP 1 Location: Left upper arm, BP Patient Position: Sitting, BP Cuff Size: Large adult)   Pulse 67   Ht 5' 10\" (1.778 m)   Wt 253 lb (114.8 kg)   SpO2 97%   BMI 36.30 kg/m²     ECOG PS: 1  General: no distress  Respiratory: normal respiratory effort  CV: no peripheral edema  Skin: no rashes; no ecchymoses; no petechiae      Results:     Lab Results   Component Value Date/Time    WBC 12.6 (H) 05/24/2022 12:15 PM    HGB 11.9 (L) 05/24/2022 12:15 PM    HCT 39.7 05/24/2022 12:15 PM    PLATELET 162 07/76/4919 12:15 PM    MCV 95.7 05/24/2022 12:15 PM    ABS. NEUTROPHILS 10.2 (H) 05/24/2022 12:15 PM     Lab Results   Component Value Date/Time    Sodium 135 (L) 05/24/2022 12:15 PM    Potassium 3.3 (L) 05/24/2022 12:15 PM    Chloride 102 05/24/2022 12:15 PM    CO2 27 05/24/2022 12:15 PM    Glucose 258 (H) 05/24/2022 12:15 PM    BUN 17 05/24/2022 12:15 PM    Creatinine 0.98 05/24/2022 12:15 PM    GFR est AA >60 05/24/2022 12:15 PM    GFR est non-AA >60 05/24/2022 12:15 PM    Calcium 8.9 05/24/2022 12:15 PM    Glucose (POC) 266 (H) 05/19/2022 11:38 AM    Creatinine (POC) 1.00 04/19/2022 02:47 PM     Lab Results   Component Value Date/Time    Bilirubin, total 1.7 (H) 05/24/2022 12:15 PM    ALT (SGPT) 152 (H) 05/24/2022 12:15 PM    Alk. phosphatase 388 (H) 05/24/2022 12:15 PM    Protein, total 6.5 05/24/2022 12:15 PM    Albumin 3.1 (L) 05/24/2022 12:15 PM    Globulin 3.4 05/24/2022 12:15 PM         Assessment:   1) Metastatic Small Cell Carcinoma of the Pancreas  He has disease within his pancrease, invading into the duodenum. He additionally has metastatic disease within his liver, confirmed with biopsy. His cancer is not curable and management is with palliative intent. My recommendation is for chemotherapy with Carboplatin and Etoposide given every 3 weeks for 4 cycles.   We also discussed the alternative option of supportive care alone, but he would like to proceed with treatment. We discussed the risks and benefits of treatment with Carboplatin and Etoposide chemotherapy. Potential side effects include but are not limited to: nausea, vomiting, diarrhea, constipation, taste changes, allergic reactions, myelosuppression, infection, fatigue, alopecia, neuropathy, mucositis, renal failure, infertility, and rarely, death. The patient has consented to treatment. Port placement is scheduled for 5/31 and MRI brain is scheduled for 6/3.     2)  Biliary obstruction  Secondary to his pancreatic cancer. S/p ERCP on 5/18/2022 with stent placement. Bilirubin improving on recheck earlier this week, should be safe to proceed with treatment.    3)  Anemia, normocytic  Mild. Labwork unrevealing. Likely a combination of anemia of chronic disease as well as blood loss from his mass invading the duodenum. HGB improving on repeat earlier this week.      4) DM  Follows endocrinology outpatient.       Plan:     · Port placement with IR 5/31  · MRI Brain 6/3  Proceed next week with C#1 of Carboplatin (AUC 5) on day1 and Etoposide (100mg/m2) Days 1-3 given every 3 weeks for 4-6 cycles  Labs prior to each treatment: CBC, BMP on day 1  Antiemetic prophylaxis: Palonosetron prior to each treatment on day 1 and Dexamethasone prior to therapy on day 1-3  PRN antiemetics: Ondansetron, Prochlorperazine and Lorazepam at home  EMLA cream to port  · Return to see me with the start of therapy    Signed By: Frieda Birch MD

## 2022-05-26 ENCOUNTER — OFFICE VISIT (OUTPATIENT)
Dept: ONCOLOGY | Age: 72
End: 2022-05-26
Payer: MEDICARE

## 2022-05-26 VITALS
OXYGEN SATURATION: 97 % | DIASTOLIC BLOOD PRESSURE: 61 MMHG | HEART RATE: 67 BPM | HEIGHT: 70 IN | WEIGHT: 253 LBS | BODY MASS INDEX: 36.22 KG/M2 | SYSTOLIC BLOOD PRESSURE: 130 MMHG

## 2022-05-26 DIAGNOSIS — C78.7 METASTASIS TO LIVER (HCC): ICD-10-CM

## 2022-05-26 DIAGNOSIS — C80.1 SMALL CELL CARCINOMA (HCC): ICD-10-CM

## 2022-05-26 DIAGNOSIS — C25.0 MALIGNANT NEOPLASM OF HEAD OF PANCREAS (HCC): Primary | ICD-10-CM

## 2022-05-26 LAB — CANCER AG19-9 SERPL-ACNC: 41 U/ML (ref 0–35)

## 2022-05-26 PROCEDURE — 1123F ACP DISCUSS/DSCN MKR DOCD: CPT | Performed by: INTERNAL MEDICINE

## 2022-05-26 PROCEDURE — 1101F PT FALLS ASSESS-DOCD LE1/YR: CPT | Performed by: INTERNAL MEDICINE

## 2022-05-26 PROCEDURE — 99215 OFFICE O/P EST HI 40 MIN: CPT | Performed by: INTERNAL MEDICINE

## 2022-05-26 PROCEDURE — G8536 NO DOC ELDER MAL SCRN: HCPCS | Performed by: INTERNAL MEDICINE

## 2022-05-26 PROCEDURE — G9717 DOC PT DX DEP/BP F/U NT REQ: HCPCS | Performed by: INTERNAL MEDICINE

## 2022-05-26 PROCEDURE — G8417 CALC BMI ABV UP PARAM F/U: HCPCS | Performed by: INTERNAL MEDICINE

## 2022-05-26 PROCEDURE — G8752 SYS BP LESS 140: HCPCS | Performed by: INTERNAL MEDICINE

## 2022-05-26 PROCEDURE — 1111F DSCHRG MED/CURRENT MED MERGE: CPT | Performed by: INTERNAL MEDICINE

## 2022-05-26 PROCEDURE — 3017F COLORECTAL CA SCREEN DOC REV: CPT | Performed by: INTERNAL MEDICINE

## 2022-05-26 PROCEDURE — G8427 DOCREV CUR MEDS BY ELIG CLIN: HCPCS | Performed by: INTERNAL MEDICINE

## 2022-05-26 PROCEDURE — G8754 DIAS BP LESS 90: HCPCS | Performed by: INTERNAL MEDICINE

## 2022-05-26 RX ORDER — LIDOCAINE AND PRILOCAINE 25; 25 MG/G; MG/G
CREAM TOPICAL AS NEEDED
Qty: 30 G | Refills: 0 | Status: SHIPPED | OUTPATIENT
Start: 2022-05-26

## 2022-05-26 RX ORDER — PROCHLORPERAZINE MALEATE 10 MG
10 TABLET ORAL
Qty: 50 TABLET | Refills: 5 | Status: SHIPPED | OUTPATIENT
Start: 2022-05-26 | End: 2022-10-19 | Stop reason: ALTCHOICE

## 2022-05-26 RX ORDER — ONDANSETRON HYDROCHLORIDE 8 MG/1
8 TABLET, FILM COATED ORAL
Qty: 45 TABLET | Refills: 5 | Status: SHIPPED | OUTPATIENT
Start: 2022-05-26 | End: 2022-10-19 | Stop reason: ALTCHOICE

## 2022-05-26 NOTE — PROGRESS NOTES
[address] [facility] [place]  [phone]      Chemotherapy education folder given to patient. Consent form reviewed and signed by patient and provider.

## 2022-05-26 NOTE — PROGRESS NOTES
Identified pt with two pt identifiers. Reviewed record in preparation for visit and have obtained necessary documentation. All patient medications has been reviewed. Chief Complaint   Patient presents with    Follow-up     Additional information about chief complaint:    Visit Vitals  /61 (BP 1 Location: Left upper arm, BP Patient Position: Sitting, BP Cuff Size: Large adult)   Pulse 67   Ht 5' 10\" (1.778 m)   Wt 253 lb (114.8 kg)   SpO2 97%   BMI 36.30 kg/m²       Health Maintenance Due   Topic    Foot Exam Q1     MICROALBUMIN Q1     Eye Exam Retinal or Dilated     COVID-19 Vaccine (1)    Depression Monitoring     DTaP/Tdap/Td series (1 - Tdap)    Shingrix Vaccine Age 50> (1 of 2)    Medicare Yearly Exam        1. Have you been to the ER, urgent care clinic since your last visit? Hospitalized since your last visit? No    2. Have you seen or consulted any other health care providers outside of the [address] [place] [address] since your last visit? Include any pap smears or colon screening.  No

## 2022-05-27 ENCOUNTER — TELEPHONE (OUTPATIENT)
Dept: ONCOLOGY | Age: 72
End: 2022-05-27

## 2022-05-27 NOTE — TELEPHONE ENCOUNTER
[address] [facility]   Oncology Social Work  Encounter     Patient Name:  Mary Winston    Medical History: small cell carcinoma of the pancreas    Advance Directives: on file    Narrative: Called patient to introduce social work role and supports and financial resources. Left voicemail message requesting a return call.      Thank you,  Sergei Clemons LCSW

## 2022-05-31 ENCOUNTER — HOSPITAL ENCOUNTER (OUTPATIENT)
Dept: INTERVENTIONAL RADIOLOGY/VASCULAR | Age: 72
Discharge: HOME | End: 2022-05-31
Attending: INTERNAL MEDICINE | Admitting: INTERNAL MEDICINE
Payer: MEDICARE

## 2022-05-31 VITALS
WEIGHT: 238.7 LBS | HEART RATE: 54 BPM | TEMPERATURE: 97.2 F | DIASTOLIC BLOOD PRESSURE: 60 MMHG | RESPIRATION RATE: 23 BRPM | OXYGEN SATURATION: 96 % | SYSTOLIC BLOOD PRESSURE: 123 MMHG | HEIGHT: 70 IN | BODY MASS INDEX: 34.17 KG/M2

## 2022-05-31 DIAGNOSIS — C78.7 METASTASIS TO LIVER (HCC): ICD-10-CM

## 2022-05-31 DIAGNOSIS — C80.1 SMALL CELL CARCINOMA (HCC): ICD-10-CM

## 2022-05-31 DIAGNOSIS — C25.0 MALIGNANT NEOPLASM OF HEAD OF PANCREAS (HCC): ICD-10-CM

## 2022-05-31 LAB
GLUCOSE BLD STRIP.AUTO-MCNC: 82 MG/DL (ref 65–117)
GLUCOSE BLD STRIP.AUTO-MCNC: 92 MG/DL (ref 65–117)
SERVICE CMNT-IMP: NORMAL
SERVICE CMNT-IMP: NORMAL

## 2022-05-31 PROCEDURE — 36561 INSERT TUNNELED CV CATH: CPT

## 2022-05-31 PROCEDURE — 82962 GLUCOSE BLOOD TEST: CPT

## 2022-05-31 PROCEDURE — C1894 INTRO/SHEATH, NON-LASER: HCPCS

## 2022-05-31 PROCEDURE — 77030031139 HC SUT VCRL2 J&J -A

## 2022-05-31 PROCEDURE — 2709999900 HC NON-CHARGEABLE SUPPLY

## 2022-05-31 PROCEDURE — 99152 MOD SED SAME PHYS/QHP 5/>YRS: CPT

## 2022-05-31 PROCEDURE — C1788 PORT, INDWELLING, IMP: HCPCS

## 2022-05-31 PROCEDURE — 77030010507 HC ADH SKN DERMBND J&J -B

## 2022-05-31 PROCEDURE — 74011250636 HC RX REV CODE- 250/636: Performed by: STUDENT IN AN ORGANIZED HEALTH CARE EDUCATION/TRAINING PROGRAM

## 2022-05-31 PROCEDURE — 74011000250 HC RX REV CODE- 250: Performed by: STUDENT IN AN ORGANIZED HEALTH CARE EDUCATION/TRAINING PROGRAM

## 2022-05-31 PROCEDURE — C1769 GUIDE WIRE: HCPCS

## 2022-05-31 RX ORDER — LIDOCAINE HYDROCHLORIDE AND EPINEPHRINE 10; 10 MG/ML; UG/ML
1.5 INJECTION, SOLUTION INFILTRATION; PERINEURAL ONCE
Status: COMPLETED | OUTPATIENT
Start: 2022-05-31 | End: 2022-05-31

## 2022-05-31 RX ORDER — HEPARIN SODIUM (PORCINE) LOCK FLUSH IV SOLN 100 UNIT/ML 100 UNIT/ML
500 SOLUTION INTRAVENOUS AS NEEDED
Status: DISCONTINUED | OUTPATIENT
Start: 2022-05-31 | End: 2022-05-31 | Stop reason: HOSPADM

## 2022-05-31 RX ORDER — LIDOCAINE HYDROCHLORIDE 10 MG/ML
1-20 INJECTION INFILTRATION; PERINEURAL
Status: DISCONTINUED | OUTPATIENT
Start: 2022-05-31 | End: 2022-05-31 | Stop reason: HOSPADM

## 2022-05-31 RX ORDER — FENTANYL CITRATE 50 UG/ML
25-200 INJECTION, SOLUTION INTRAMUSCULAR; INTRAVENOUS
Status: DISCONTINUED | OUTPATIENT
Start: 2022-05-31 | End: 2022-05-31 | Stop reason: HOSPADM

## 2022-05-31 RX ORDER — CEFAZOLIN SODIUM 1 G/3ML
2 INJECTION, POWDER, FOR SOLUTION INTRAMUSCULAR; INTRAVENOUS ONCE
Status: COMPLETED | OUTPATIENT
Start: 2022-05-31 | End: 2022-05-31

## 2022-05-31 RX ORDER — MIDAZOLAM HYDROCHLORIDE 1 MG/ML
1-10 INJECTION, SOLUTION INTRAMUSCULAR; INTRAVENOUS
Status: DISCONTINUED | OUTPATIENT
Start: 2022-05-31 | End: 2022-05-31 | Stop reason: HOSPADM

## 2022-05-31 RX ORDER — TELMISARTAN 40 MG/1
40 TABLET ORAL DAILY
COMMUNITY

## 2022-05-31 RX ADMIN — MIDAZOLAM 1 MG: 1 INJECTION INTRAMUSCULAR; INTRAVENOUS at 10:48

## 2022-05-31 RX ADMIN — MIDAZOLAM 1 MG: 1 INJECTION INTRAMUSCULAR; INTRAVENOUS at 10:52

## 2022-05-31 RX ADMIN — FENTANYL CITRATE 50 MCG: 50 INJECTION, SOLUTION INTRAMUSCULAR; INTRAVENOUS at 10:27

## 2022-05-31 RX ADMIN — CEFAZOLIN 2 G: 1 INJECTION, POWDER, FOR SOLUTION INTRAMUSCULAR; INTRAVENOUS at 10:27

## 2022-05-31 RX ADMIN — LIDOCAINE HYDROCHLORIDE,EPINEPHRINE BITARTRATE 10 MG: 10; .01 INJECTION, SOLUTION INFILTRATION; PERINEURAL at 10:53

## 2022-05-31 RX ADMIN — FENTANYL CITRATE 50 MCG: 50 INJECTION, SOLUTION INTRAMUSCULAR; INTRAVENOUS at 10:51

## 2022-05-31 RX ADMIN — LIDOCAINE HYDROCHLORIDE 10 ML: 10 INJECTION, SOLUTION INFILTRATION; PERINEURAL at 10:52

## 2022-05-31 RX ADMIN — HEPARIN SODIUM (PORCINE) LOCK FLUSH IV SOLN 100 UNIT/ML 200 UNITS: 100 SOLUTION at 10:59

## 2022-05-31 NOTE — PROGRESS NOTES
[facility] [place] at [address], [address] [facility]  W: [phone]  F: [phone]      Reason for Visit:   Neha Barrett is a 81 y.o. male who is seen for follow up of small cell carcinoma of the pancreas. Hematology/Oncology Treatment History:   · CT A/P 4/19/2022: Possible mild acute pancreatitis. Prominent sigmoid diverticulosis. Enlarged prostate. Fat-containing hernias  · CT Abdomen 5/17/2022: New intra-hepatic biliary dilatation and common bile duct dilatation. Ampullary prominence and 1.5 cm x 1.5 cm soft tissue density in the pancreaticoduodenal groove. New subcentimeter hepatic hypodense lesions, worrisome for metastatic disease. · MRI Abdomen 5/17/2022:  Periampullary pancreatic mass with possible extension into the wall of the duodenum. Associated intrahepatic and extra hepatic biliary dilatation and mild pancreatic ductal dilatation. Small hyperenhancing lesions throughout the liver suspicious for metastatic disease. · CT Chest 5/18/2022: no metastatic disease within thorax  · ERCP by Dr. Loida Harper 5/18/2022: Large ulcerated mass seen in the second portion of the duodenum taking half the circumference in the area of the major papilla and extending distally about 3 to 4 cm, appeared to be pancreatic and eroding into the duodenum. Cholangiogram with complete obstruction of CBD. Metal stent deployed. 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 Stools do float, yellow/tan stools. They do not appear oily/greasy. No significant abdominal cramping. Denies fever, chills, SOB at rest or chest pain. No bleeding noted. Denies pain today. Reports baseline fatigue. Tired throughout the day, resting well at night. He is accompanied by his wife today. Review of systems was obtained and pertinent findings reviewed above. Past medical history, social history, family history, medications, and allergies are located in the electronic medical record. Physical Exam:     Visit Vitals  /60   Pulse (!) 52   Temp 97.4 °F (36.3 °C)   Resp 18   Ht 5' 10\" (1.778 m)   Wt 251 lb (113.9 kg)   SpO2 96%   BMI 36.01 kg/m²     ECOG PS: 1  General: no distress  Respiratory: normal respiratory effort  CV: no peripheral edema  Skin: no rashes; no ecchymoses; no petechiae      Results:     Lab Results   Component Value Date/Time    WBC 7.9 06/01/2022 10:53 AM    HGB 10.1 (L) 06/01/2022 10:53 AM    HCT 30.5 (L) 06/01/2022 10:53 AM    PLATELET 413 55/54/1452 10:53 AM    MCV 85.7 06/01/2022 10:53 AM    ABS. NEUTROPHILS 6.2 06/01/2022 10:53 AM     Lab Results   Component Value Date/Time    Sodium 141 06/01/2022 10:53 AM    Potassium 2.9 (L) 06/01/2022 10:53 AM    Chloride 107 06/01/2022 10:53 AM    CO2 25 06/01/2022 10:53 AM    Glucose 207 (H) 06/01/2022 10:53 AM    BUN 12 06/01/2022 10:53 AM    Creatinine 0.89 06/01/2022 10:53 AM    GFR est AA >60 06/01/2022 10:53 AM    GFR est non-AA >60 06/01/2022 10:53 AM    Calcium 8.1 (L) 06/01/2022 10:53 AM    Glucose (POC) 92 05/31/2022 11:29 AM    Creatinine (POC) 1.00 04/19/2022 02:47 PM     Lab Results   Component Value Date/Time    Bilirubin, total 0.9 06/01/2022 10:53 AM    ALT (SGPT) 37 06/01/2022 10:53 AM    Alk. phosphatase 165 (H) 06/01/2022 10:53 AM    Protein, total 6.4 06/01/2022 10:53 AM    Albumin 2.6 (L) 06/01/2022 10:53 AM    Globulin 3.8 06/01/2022 10:53 AM         Assessment:   1) Metastatic Small Cell Carcinoma of the Pancreas  He has disease within his pancrease, invading into the duodenum. He additionally has metastatic disease within his liver, confirmed with biopsy.   His cancer is not curable and management is with palliative intent. My recommendation is for chemotherapy with Carboplatin and Etoposide given every 3 weeks for 4 cycles. We discussed the risks and benefits of treatment with Carboplatin and Etoposide chemotherapy. Potential side effects include but are not limited to: nausea, vomiting, diarrhea, constipation, taste changes, allergic reactions, myelosuppression, infection, fatigue, alopecia, neuropathy, mucositis, renal failure, infertility, and rarely, death. The patient has consented to treatment. Proceed with C1 of therapy today as ordered. The patient will be seen with each cycle of therapy, and labs will be monitored to assess for toxicity from therapy.    2)  Biliary obstruction  Secondary to his pancreatic cancer. S/p ERCP on 5/18/2022 with stent placement.    3)  Anemia, normocytic  Mild. Labwork unrevealing. Likely a combination of anemia of chronic disease as well as blood loss from his mass invading the duodenum. Repeat CBC today.    4) DM  Follows endocrinology outpatient. 5) Fatigue  At baseline likely due to disease. Will repeat thyroid studies as he has hypothyroidism. 6) Diarrhea  Present since diagnosis. Reports tan stools that float. Will do trial of Creon for pancreatic insufficiency. Discussed with RD who will educate patient on dosing/administration.        Plan:     Proceed today with C1 of Carboplatin (AUC 5) on day1 and Etoposide (100mg/m2) Days 1-3 given every 3 weeks for 4-6 cycles  Labs prior to each treatment: CBC, BMP on day 1  Antiemetic prophylaxis: Palonosetron prior to each treatment on day 1 and Dexamethasone prior to therapy on day 1-3  PRN antiemetics: Ondansetron, Prochlorperazine at home  EMLA cream to port  MRI brain as scheduled on 6/3/2022  Trial of pancreatic enzymes  · Return to see me with each cycle of therapy    I personally saw and evaluated the patient and performed the key components of medical decision making.   The history, physical exam, and documentation were performed by Diane Fam NP. I reviewed and verified the above documentation and modified it as needed. His bilirubin level has come down further. He is having some symptoms concerning for pancreatic insufficiency, so we will try some pancreatic enzymes. Proceed today with his first cycle of chemotherapy.       Signed By: Frieda Birch MD

## 2022-05-31 NOTE — ROUTINE PROCESS
9:38 AM  Patient arrived. ID and allergies verified verbally with patient. Pt voices understanding of procedure to be performed. Consent obtained. Pt prepped for procedure.

## 2022-05-31 NOTE — ROUTINE PROCESS
11:10 AM  TRANSFER - IN REPORT:    Verbal report received from trisha leigh.(name) on [facility] [place].  being received from angio lab(unit) for routine progression of care      Report consisted of patients Situation, Background, Assessment and   Recommendations(SBAR). Information from the following report(s) Procedure Summary was reviewed with the receiving nurse. Opportunity for questions and clarification was provided. Assessment completed upon patients arrival to unit and care assumed. 1215  Patient ambulates in hallway or on unit. Discharge instructions reviewed with patient and family. Voiced understanding. Patient given copy of discharge instructions to take home. [phone]  Pt discharged via wheelchair with wife. Personal belongings with patient upon discharge.

## 2022-05-31 NOTE — DISCHARGE INSTRUCTIONS
Patient Education        Implanted Port: What to Expect at [facility] had a procedure to implant a port. A port is a device placed, in most cases, under the skin of your chest below your collarbone. It is made of plastic, stainless steel, or titanium. It's about the size of a quarter, but thicker. It looks like a small bump under your skin. A thin, flexible tube called a catheter runs under the skin from the port into a large vein. With the port, you will be able to get medicines (such as chemotherapy) with more comfort. You also can get blood, nutrients, or other fluids. Blood can be taken through the port for tests. You will probably have some discomfort and bruising at the port site. This will go away in a few days. The port can be used right away. You may have the port for weeks, months, or longer. Your port will need to be flushed out regularly to keep it open. A nurse or other health professional will do this for you. This care sheet gives you a general idea about how long it will take for you to recover. But each person recovers at a different pace. Follow the steps below to feel better as quickly as possible. How can you care for yourself at home? Activity    · Avoid arm and upper body movements that may pull on the catheter for the first few days. These movements include heavy weight lifting and vigorous use of your arms.     · You will probably need to take 1 day off from work and will be able to return to normal activities shortly after. This depends on the type of work you do, why you have the catheter, and how you feel.     · You probably will be able to take baths and swim. But you may need to avoid some activities. Talk to your doctor about any limits on your activity.     · Ask your doctor when you can drive again. Be careful when you pull your seat belt across your chest so it doesn't pull out the catheter. It's okay if the seat belt lays over the catheter.    Medicines    · Your doctor will tell you if and when you can restart your medicines. He or she will also give you instructions about taking any new medicines.     · If you take aspirin or some other blood thinner, ask your doctor if and when to start taking it again. Make sure that you understand exactly what your doctor wants you to do.     · Be safe with medicines. Read and follow all instructions on the label. ? If the doctor gave you a prescription medicine for pain, take it as prescribed. ? If you are not taking a prescription pain medicine, ask your doctor if you can take an over-the-counter medicine. Incision care    · If you have a bandage, your doctor will tell you when you can remove it. After you remove the bandage, you may shower. Wash the area with soap and water and pat it dry. Don't use hydrogen peroxide or alcohol, which can slow healing. You may cover the area with a gauze bandage if it weeps or rubs against clothing. Change the bandage every day.     · If you have strips of tape on the cut (incision) the doctor made, leave the tape on for a week or until it falls off. Other instructions    · Always carry the medical alert card that your doctor gives you. It contains information about your port. It will tell health care workers that you have a port in case you need emergency care.     · When you get dressed, be careful not to rub the port. Do not wear a bra or suspenders that irritate your skin near the port. Follow-up care is a key part of your treatment and safety. Be sure to make and go to all appointments, and call your doctor if you are having problems. It's also a good idea to know your test results and keep a list of the medicines you take. When should you call for help? Call your doctor now or seek immediate medical care if:    · You have signs of infection, such as:  ? Increased pain, swelling, warmth, or redness. ? Red streaks leading from the area. ? Pus draining from the area. ?  A fever.     · You have pain or swelling in your neck or arm.     · You have trouble breathing. Watch closely for changes in your health, and be sure to contact your doctor if:    · You have any problems with your line or port. Where can you learn more? Go to http://www.gray.com/  Enter M256 in the search box to learn more about \"Implanted Port: What to Expect at Home. \"  Current as of: July 1, 2021               Content Version: 13.2  © 2006-2022 Healthwise, [employer]. Care instructions adapted under license by [employer] (which disclaims liability or warranty for this information). If you have questions about a medical condition or this instruction, always ask your healthcare professional. [address] any warranty or liability for your use of this information.

## 2022-05-31 NOTE — H&P
Radiology History and Physical    Patient: Ramesh Pugh 81 y.o. male       Chief Complaint: No chief complaint on file. History of Present Illness: Port placement for chemotherapy    History:    Past Medical History:   Diagnosis Date    Anxiety and depression     Arthritis     Cir[facility]osis [facility]     Dementia ([facility]     DM (diabetes mellitus) ([facility]     Hyperlipidemia     Hypertension    [facility] Hypothyroid     Obese     SELINA on CPAP      Family History   Problem Relation Age of Onset    Emphysema Mother     Other Mother         enlarged heart    Heart Attack Maternal Grandmother      Social History     Socioeconomic History    Marital status:      Spouse name: Not on file    Number of children: Not on file    Years of education: Not on file    Highest education level: Not on file   Occupational History    Not on file   Tobacco Use    Smoking status: Never Smoker    Smokeless tobacco: Never Used   Substance and Sexual Activity    Alcohol use: Yes     Comment: rarely    Drug use: No    Sexual activity: Not on file   Other Topics Concern    Not on file   Social History Narrative    Not on file     Social Determinants of Health     Financial Resource Strain:     Difficulty of Paying Living Expenses: Not on file   Food Insecurity:     Worried About Running Out of Food in the Last Year: Not on file    Scott of Food in the Last Year: Not on file   Transportation Needs:     Lack of Transportation (Medical): Not on file    Lack of Transportation (Non-Medical):  Not on file   Physical Activity:     Days of Exercise per Week: Not on file    Minutes of Exercise per Session: Not on file   Stress:     Feeling of Stress : Not on file   Social Connections:     Frequency of Communication with Friends and Family: Not on file    Frequency of Social Gatherings with Friends and Family: Not on file    Attends [employer] Services: Not on file    Active Member of Clubs or Organizations: Not on file  Attends Club or Organization Meetings: Not on file    Marital Status: Not on file   Intimate Partner Violence:     Fear of Current or Ex-Partner: Not on file    Emotionally Abused: Not on file    Physically Abused: Not on file    Sexually Abused: Not on file   Housing Stability:     Unable to Pay for Housing in the Last Year: Not on file    Number of [place] in the Last Year: Not on file    Unstable Housing in the Last Year: Not on file       Allergies: Allergies   Allergen Reactions    Codeine Anxiety       Current Medications:  No current facility-administered medications for this encounter. Physical Exam:  There were no vitals taken for this visit. GENERAL: alert, cooperative, no distress, appears stated age  LUNG: clear to auscultation bilaterally  HEART: regular rate and rhythm  ABD: Non tender, non distended. Alerts:    Hospital Problems  Date Reviewed: 5/26/2022    None          Laboratory:    No results for input(s): HGB, HCT, WBC, PLT, INR, BUN, CREA, K, CRCLT, HGBEXT, HCTEXT, PLTEXT, INREXT in the last 72 hours. No lab exists for component: PTT, PT      Plan of Care/Planned Procedure:  Risks, benefits, and alternatives reviewed with patient and he agrees to proceed with the procedure. Deemed appropriate for moderate sedation with versed and fentanyl.       Matthew Durham MD

## 2022-06-01 ENCOUNTER — HOSPITAL ENCOUNTER (OUTPATIENT)
Dept: INFUSION THERAPY | Age: 72
Discharge: HOME | End: 2022-06-01
Payer: MEDICARE

## 2022-06-01 ENCOUNTER — PATIENT MESSAGE (OUTPATIENT)
Dept: ONCOLOGY | Age: 72
End: 2022-06-01

## 2022-06-01 ENCOUNTER — OFFICE VISIT (OUTPATIENT)
Dept: ONCOLOGY | Age: 72
End: 2022-06-01
Payer: MEDICARE

## 2022-06-01 ENCOUNTER — TELEPHONE (OUTPATIENT)
Dept: ONCOLOGY | Age: 72
End: 2022-06-01

## 2022-06-01 ENCOUNTER — DOCUMENTATION ONLY (OUTPATIENT)
Dept: ONCOLOGY | Age: 72
End: 2022-06-01

## 2022-06-01 VITALS
TEMPERATURE: 97.4 F | HEIGHT: 70 IN | HEART RATE: 52 BPM | OXYGEN SATURATION: 96 % | RESPIRATION RATE: 18 BRPM | WEIGHT: 251 LBS | DIASTOLIC BLOOD PRESSURE: 60 MMHG | SYSTOLIC BLOOD PRESSURE: 128 MMHG | BODY MASS INDEX: 35.93 KG/M2

## 2022-06-01 VITALS
RESPIRATION RATE: 18 BRPM | TEMPERATURE: 97.4 F | HEIGHT: 70 IN | DIASTOLIC BLOOD PRESSURE: 65 MMHG | OXYGEN SATURATION: 96 % | SYSTOLIC BLOOD PRESSURE: 122 MMHG | HEART RATE: 59 BPM | WEIGHT: 251.4 LBS | BODY MASS INDEX: 35.99 KG/M2

## 2022-06-01 DIAGNOSIS — C78.7 METASTASIS TO LIVER (HCC): ICD-10-CM

## 2022-06-01 DIAGNOSIS — C25.0 MALIGNANT NEOPLASM OF HEAD OF PANCREAS (HCC): Primary | ICD-10-CM

## 2022-06-01 DIAGNOSIS — C80.1 SMALL CELL CARCINOMA (HCC): Primary | ICD-10-CM

## 2022-06-01 DIAGNOSIS — Z51.11 ENCOUNTER FOR ANTINEOPLASTIC CHEMOTHERAPY: ICD-10-CM

## 2022-06-01 LAB
ALBUMIN SERPL-MCNC: 2.6 G/DL (ref 3.5–5)
ALBUMIN/GLOB SERPL: 0.7 {RATIO} (ref 1.1–2.2)
ALP SERPL-CCNC: 165 U/L (ref 45–117)
ALT SERPL-CCNC: 37 U/L (ref 12–78)
ANION GAP SERPL CALC-SCNC: 9 MMOL/L (ref 5–15)
AST SERPL-CCNC: 27 U/L (ref 15–37)
BASOPHILS # BLD: 0 K/UL (ref 0–0.1)
BASOPHILS NFR BLD: 0 % (ref 0–1)
BILIRUB SERPL-MCNC: 0.9 MG/DL (ref 0.2–1)
BUN SERPL-MCNC: 12 MG/DL (ref 6–20)
BUN/CREAT SERPL: 13 (ref 12–20)
CALCIUM SERPL-MCNC: 8.1 MG/DL (ref 8.5–10.1)
CHLORIDE SERPL-SCNC: 107 MMOL/L (ref 97–108)
CO2 SERPL-SCNC: 25 MMOL/L (ref 21–32)
CREAT SERPL-MCNC: 0.89 MG/DL (ref 0.7–1.3)
DIFFERENTIAL METHOD BLD: ABNORMAL
EOSINOPHIL # BLD: 0.1 K/UL (ref 0–0.4)
EOSINOPHIL NFR BLD: 2 % (ref 0–7)
ERYTHROCYTE [DISTWIDTH] IN BLOOD BY AUTOMATED COUNT: 15.5 % (ref 11.5–14.5)
GLOBULIN SER CALC-MCNC: 3.8 G/DL (ref 2–4)
GLUCOSE SERPL-MCNC: 207 MG/DL (ref 65–100)
HBV SURFACE AB SER QL: NONREACTIVE
HBV SURFACE AB SER-ACNC: <3.1 MIU/ML
HBV SURFACE AG SER QL: <0.1 INDEX
HBV SURFACE AG SER QL: NEGATIVE
HCT VFR BLD AUTO: 30.5 % (ref 36.6–50.3)
HGB BLD-MCNC: 10.1 G/DL (ref 12.1–17)
IMM GRANULOCYTES # BLD AUTO: 0.1 K/UL (ref 0–0.04)
IMM GRANULOCYTES NFR BLD AUTO: 2 % (ref 0–0.5)
LYMPHOCYTES # BLD: 1.1 K/UL (ref 0.8–3.5)
LYMPHOCYTES NFR BLD: 14 % (ref 12–49)
MCH RBC QN AUTO: 28.4 PG (ref 26–34)
MCHC RBC AUTO-ENTMCNC: 33.1 G/DL (ref 30–36.5)
MCV RBC AUTO: 85.7 FL (ref 80–99)
MONOCYTES # BLD: 0.4 K/UL (ref 0–1)
MONOCYTES NFR BLD: 5 % (ref 5–13)
NEUTS SEG # BLD: 6.2 K/UL (ref 1.8–8)
NEUTS SEG NFR BLD: 77 % (ref 32–75)
NRBC # BLD: 0.02 K/UL (ref 0–0.01)
NRBC BLD-RTO: 0.3 PER 100 WBC
PLATELET # BLD AUTO: 216 K/UL (ref 150–400)
PMV BLD AUTO: 10.3 FL (ref 8.9–12.9)
POTASSIUM SERPL-SCNC: 2.9 MMOL/L (ref 3.5–5.1)
PROT SERPL-MCNC: 6.4 G/DL (ref 6.4–8.2)
RBC # BLD AUTO: 3.56 M/UL (ref 4.1–5.7)
SODIUM SERPL-SCNC: 141 MMOL/L (ref 136–145)
WBC # BLD AUTO: 7.9 K/UL (ref 4.1–11.1)

## 2022-06-01 PROCEDURE — 74011250636 HC RX REV CODE- 250/636: Performed by: INTERNAL MEDICINE

## 2022-06-01 PROCEDURE — 36415 COLL VENOUS BLD VENIPUNCTURE: CPT

## 2022-06-01 PROCEDURE — 1123F ACP DISCUSS/DSCN MKR DOCD: CPT | Performed by: INTERNAL MEDICINE

## 2022-06-01 PROCEDURE — 96413 CHEMO IV INFUSION 1 HR: CPT

## 2022-06-01 PROCEDURE — 1101F PT FALLS ASSESS-DOCD LE1/YR: CPT | Performed by: INTERNAL MEDICINE

## 2022-06-01 PROCEDURE — 3017F COLORECTAL CA SCREEN DOC REV: CPT | Performed by: INTERNAL MEDICINE

## 2022-06-01 PROCEDURE — 77030012965 HC NDL HUBR BBMI -A

## 2022-06-01 PROCEDURE — 74011250637 HC RX REV CODE- 250/637: Performed by: NURSE PRACTITIONER

## 2022-06-01 PROCEDURE — 86706 HEP B SURFACE ANTIBODY: CPT

## 2022-06-01 PROCEDURE — 99215 OFFICE O/P EST HI 40 MIN: CPT | Performed by: INTERNAL MEDICINE

## 2022-06-01 PROCEDURE — 86704 HEP B CORE ANTIBODY TOTAL: CPT

## 2022-06-01 PROCEDURE — 96367 TX/PROPH/DG ADDL SEQ IV INF: CPT

## 2022-06-01 PROCEDURE — G8417 CALC BMI ABV UP PARAM F/U: HCPCS | Performed by: INTERNAL MEDICINE

## 2022-06-01 PROCEDURE — 85025 COMPLETE CBC W/AUTO DIFF WBC: CPT

## 2022-06-01 PROCEDURE — 96417 CHEMO IV INFUS EACH ADDL SEQ: CPT

## 2022-06-01 PROCEDURE — G8752 SYS BP LESS 140: HCPCS | Performed by: INTERNAL MEDICINE

## 2022-06-01 PROCEDURE — G8536 NO DOC ELDER MAL SCRN: HCPCS | Performed by: INTERNAL MEDICINE

## 2022-06-01 PROCEDURE — G8427 DOCREV CUR MEDS BY ELIG CLIN: HCPCS | Performed by: INTERNAL MEDICINE

## 2022-06-01 PROCEDURE — 87340 HEPATITIS B SURFACE AG IA: CPT

## 2022-06-01 PROCEDURE — 1111F DSCHRG MED/CURRENT MED MERGE: CPT | Performed by: INTERNAL MEDICINE

## 2022-06-01 PROCEDURE — 80053 COMPREHEN METABOLIC PANEL: CPT

## 2022-06-01 PROCEDURE — G9717 DOC PT DX DEP/BP F/U NT REQ: HCPCS | Performed by: INTERNAL MEDICINE

## 2022-06-01 PROCEDURE — 96375 TX/PRO/DX INJ NEW DRUG ADDON: CPT

## 2022-06-01 PROCEDURE — G8754 DIAS BP LESS 90: HCPCS | Performed by: INTERNAL MEDICINE

## 2022-06-01 PROCEDURE — 74011000258 HC RX REV CODE- 258: Performed by: INTERNAL MEDICINE

## 2022-06-01 RX ORDER — DEXAMETHASONE SODIUM PHOSPHATE 4 MG/ML
8 INJECTION, SOLUTION INTRA-ARTICULAR; INTRALESIONAL; INTRAMUSCULAR; INTRAVENOUS; SOFT TISSUE ONCE
Status: CANCELLED | OUTPATIENT
Start: 2022-06-23 | End: 2022-06-23

## 2022-06-01 RX ORDER — PANCRELIPASE 36000; 180000; 114000 [USP'U]/1; [USP'U]/1; [USP'U]/1
8 CAPSULE, DELAYED RELEASE PELLETS ORAL
Qty: 112 CAPSULE | Refills: 0 | Status: SHIPPED | OUTPATIENT
Start: 2022-06-01

## 2022-06-01 RX ORDER — PALONOSETRON 0.05 MG/ML
0.25 INJECTION, SOLUTION INTRAVENOUS ONCE
Status: CANCELLED | OUTPATIENT
Start: 2022-06-22 | End: 2022-06-22

## 2022-06-01 RX ORDER — HEPARIN 100 UNIT/ML
300-500 SYRINGE INTRAVENOUS AS NEEDED
Status: CANCELLED
Start: 2022-06-24

## 2022-06-01 RX ORDER — HYDROCORTISONE SODIUM SUCCINATE 100 MG/2ML
100 INJECTION, POWDER, FOR SOLUTION INTRAMUSCULAR; INTRAVENOUS AS NEEDED
Status: CANCELLED | OUTPATIENT
Start: 2022-06-24

## 2022-06-01 RX ORDER — DIPHENHYDRAMINE HYDROCHLORIDE 50 MG/ML
50 INJECTION, SOLUTION INTRAMUSCULAR; INTRAVENOUS AS NEEDED
Status: CANCELLED
Start: 2022-06-22

## 2022-06-01 RX ORDER — SODIUM CHLORIDE 9 MG/ML
25 INJECTION, SOLUTION INTRAVENOUS CONTINUOUS
Status: CANCELLED | OUTPATIENT
Start: 2022-06-23

## 2022-06-01 RX ORDER — ONDANSETRON 2 MG/ML
8 INJECTION INTRAMUSCULAR; INTRAVENOUS AS NEEDED
Status: CANCELLED | OUTPATIENT
Start: 2022-06-23

## 2022-06-01 RX ORDER — DIPHENHYDRAMINE HYDROCHLORIDE 50 MG/ML
25 INJECTION, SOLUTION INTRAMUSCULAR; INTRAVENOUS AS NEEDED
Status: CANCELLED
Start: 2022-06-23

## 2022-06-01 RX ORDER — HEPARIN 100 UNIT/ML
300-500 SYRINGE INTRAVENOUS AS NEEDED
Status: CANCELLED
Start: 2022-06-22

## 2022-06-01 RX ORDER — HEPARIN 100 UNIT/ML
300-500 SYRINGE INTRAVENOUS AS NEEDED
Status: CANCELLED
Start: 2022-06-23

## 2022-06-01 RX ORDER — SODIUM CHLORIDE 9 MG/ML
10 INJECTION INTRAMUSCULAR; INTRAVENOUS; SUBCUTANEOUS AS NEEDED
Status: CANCELLED | OUTPATIENT
Start: 2022-06-23

## 2022-06-01 RX ORDER — ALBUTEROL SULFATE 0.83 MG/ML
2.5 SOLUTION RESPIRATORY (INHALATION) AS NEEDED
Status: CANCELLED
Start: 2022-06-22

## 2022-06-01 RX ORDER — SODIUM CHLORIDE 0.9 % (FLUSH) 0.9 %
10 SYRINGE (ML) INJECTION AS NEEDED
Status: CANCELLED | OUTPATIENT
Start: 2022-06-22

## 2022-06-01 RX ORDER — DIPHENHYDRAMINE HYDROCHLORIDE 50 MG/ML
25 INJECTION, SOLUTION INTRAMUSCULAR; INTRAVENOUS AS NEEDED
Status: CANCELLED
Start: 2022-06-22

## 2022-06-01 RX ORDER — ACETAMINOPHEN 325 MG/1
650 TABLET ORAL AS NEEDED
Status: CANCELLED
Start: 2022-06-23

## 2022-06-01 RX ORDER — SODIUM CHLORIDE 9 MG/ML
10 INJECTION INTRAMUSCULAR; INTRAVENOUS; SUBCUTANEOUS AS NEEDED
Status: CANCELLED | OUTPATIENT
Start: 2022-06-24

## 2022-06-01 RX ORDER — SODIUM CHLORIDE 9 MG/ML
25 INJECTION, SOLUTION INTRAVENOUS CONTINUOUS
Status: DISCONTINUED | OUTPATIENT
Start: 2022-06-01 | End: 2022-06-02 | Stop reason: HOSPADM

## 2022-06-01 RX ORDER — DIPHENHYDRAMINE HYDROCHLORIDE 50 MG/ML
50 INJECTION, SOLUTION INTRAMUSCULAR; INTRAVENOUS AS NEEDED
Status: CANCELLED
Start: 2022-06-24

## 2022-06-01 RX ORDER — ONDANSETRON 2 MG/ML
8 INJECTION INTRAMUSCULAR; INTRAVENOUS
Status: CANCELLED | OUTPATIENT
Start: 2022-06-23

## 2022-06-01 RX ORDER — ALBUTEROL SULFATE 0.83 MG/ML
2.5 SOLUTION RESPIRATORY (INHALATION) AS NEEDED
Status: CANCELLED
Start: 2022-06-23

## 2022-06-01 RX ORDER — PALONOSETRON 0.05 MG/ML
0.25 INJECTION, SOLUTION INTRAVENOUS ONCE
Status: COMPLETED | OUTPATIENT
Start: 2022-06-01 | End: 2022-06-01

## 2022-06-01 RX ORDER — ACETAMINOPHEN 325 MG/1
650 TABLET ORAL AS NEEDED
Status: CANCELLED
Start: 2022-06-24

## 2022-06-01 RX ORDER — DEXAMETHASONE SODIUM PHOSPHATE 4 MG/ML
8 INJECTION, SOLUTION INTRA-ARTICULAR; INTRALESIONAL; INTRAMUSCULAR; INTRAVENOUS; SOFT TISSUE ONCE
Status: CANCELLED | OUTPATIENT
Start: 2022-06-24 | End: 2022-06-24

## 2022-06-01 RX ORDER — ONDANSETRON 2 MG/ML
8 INJECTION INTRAMUSCULAR; INTRAVENOUS
Status: CANCELLED | OUTPATIENT
Start: 2022-06-24

## 2022-06-01 RX ORDER — ALBUTEROL SULFATE 0.83 MG/ML
2.5 SOLUTION RESPIRATORY (INHALATION) AS NEEDED
Status: CANCELLED
Start: 2022-06-24

## 2022-06-01 RX ORDER — SODIUM CHLORIDE 9 MG/ML
10 INJECTION INTRAMUSCULAR; INTRAVENOUS; SUBCUTANEOUS AS NEEDED
Status: CANCELLED | OUTPATIENT
Start: 2022-06-22

## 2022-06-01 RX ORDER — EPINEPHRINE 1 MG/ML
0.3 INJECTION, SOLUTION, CONCENTRATE INTRAVENOUS AS NEEDED
Status: CANCELLED | OUTPATIENT
Start: 2022-06-23

## 2022-06-01 RX ORDER — SODIUM CHLORIDE 9 MG/ML
25 INJECTION, SOLUTION INTRAVENOUS CONTINUOUS
Status: CANCELLED | OUTPATIENT
Start: 2022-06-24

## 2022-06-01 RX ORDER — EPINEPHRINE 1 MG/ML
0.3 INJECTION, SOLUTION, CONCENTRATE INTRAVENOUS AS NEEDED
Status: CANCELLED | OUTPATIENT
Start: 2022-06-24

## 2022-06-01 RX ORDER — DIPHENHYDRAMINE HYDROCHLORIDE 50 MG/ML
25 INJECTION, SOLUTION INTRAMUSCULAR; INTRAVENOUS AS NEEDED
Status: CANCELLED
Start: 2022-06-24

## 2022-06-01 RX ORDER — ACETAMINOPHEN 325 MG/1
650 TABLET ORAL AS NEEDED
Status: CANCELLED
Start: 2022-06-22

## 2022-06-01 RX ORDER — SODIUM CHLORIDE 0.9 % (FLUSH) 0.9 %
10 SYRINGE (ML) INJECTION AS NEEDED
Status: CANCELLED | OUTPATIENT
Start: 2022-06-23

## 2022-06-01 RX ORDER — SODIUM CHLORIDE 0.9 % (FLUSH) 0.9 %
10 SYRINGE (ML) INJECTION AS NEEDED
Status: CANCELLED | OUTPATIENT
Start: 2022-06-24

## 2022-06-01 RX ORDER — EPINEPHRINE 1 MG/ML
0.3 INJECTION, SOLUTION, CONCENTRATE INTRAVENOUS AS NEEDED
Status: CANCELLED | OUTPATIENT
Start: 2022-06-22

## 2022-06-01 RX ORDER — HYDROCORTISONE SODIUM SUCCINATE 100 MG/2ML
100 INJECTION, POWDER, FOR SOLUTION INTRAMUSCULAR; INTRAVENOUS AS NEEDED
Status: CANCELLED | OUTPATIENT
Start: 2022-06-23

## 2022-06-01 RX ORDER — HYDROCORTISONE SODIUM SUCCINATE 100 MG/2ML
100 INJECTION, POWDER, FOR SOLUTION INTRAMUSCULAR; INTRAVENOUS AS NEEDED
Status: CANCELLED | OUTPATIENT
Start: 2022-06-22

## 2022-06-01 RX ORDER — SODIUM CHLORIDE 9 MG/ML
25 INJECTION, SOLUTION INTRAVENOUS CONTINUOUS
Status: CANCELLED | OUTPATIENT
Start: 2022-06-22

## 2022-06-01 RX ORDER — ONDANSETRON 2 MG/ML
8 INJECTION INTRAMUSCULAR; INTRAVENOUS AS NEEDED
Status: CANCELLED | OUTPATIENT
Start: 2022-06-22

## 2022-06-01 RX ORDER — ONDANSETRON 2 MG/ML
8 INJECTION INTRAMUSCULAR; INTRAVENOUS AS NEEDED
Status: CANCELLED | OUTPATIENT
Start: 2022-06-24

## 2022-06-01 RX ORDER — POTASSIUM CHLORIDE 750 MG/1
60 TABLET, FILM COATED, EXTENDED RELEASE ORAL
Status: COMPLETED | OUTPATIENT
Start: 2022-06-01 | End: 2022-06-01

## 2022-06-01 RX ORDER — DIPHENHYDRAMINE HYDROCHLORIDE 50 MG/ML
50 INJECTION, SOLUTION INTRAMUSCULAR; INTRAVENOUS AS NEEDED
Status: CANCELLED
Start: 2022-06-23

## 2022-06-01 RX ADMIN — PALONOSETRON 0.25 MG: 0.25 INJECTION, SOLUTION INTRAVENOUS at 12:46

## 2022-06-01 RX ADMIN — SODIUM CHLORIDE 25 ML/HR: 9 INJECTION, SOLUTION INTRAVENOUS at 12:42

## 2022-06-01 RX ADMIN — FOSAPREPITANT 150 MG: 150 INJECTION, POWDER, LYOPHILIZED, FOR SOLUTION INTRAVENOUS at 12:46

## 2022-06-01 RX ADMIN — POTASSIUM CHLORIDE 60 MEQ: 750 TABLET, FILM COATED, EXTENDED RELEASE ORAL at 14:41

## 2022-06-01 RX ADMIN — CARBOPLATIN 748 MG: 10 INJECTION, SOLUTION INTRAVENOUS at 14:01

## 2022-06-01 RX ADMIN — ETOPOSIDE 238 MG: 20 INJECTION INTRAVENOUS at 14:46

## 2022-06-01 RX ADMIN — DEXAMETHASONE SODIUM PHOSPHATE 12 MG: 4 INJECTION, SOLUTION INTRA-ARTICULAR; INTRALESIONAL; INTRAMUSCULAR; INTRAVENOUS; SOFT TISSUE at 12:46

## 2022-06-01 NOTE — PROGRESS NOTES
Pharmacy Note- Chemotherapy Education    Julee Bateman. is a  81 y.o.male  diagnosed with pancreatis cancer here today for Cycle 1, Day 1 chemotherapy. Mr. Ramesh Villegas is being treated with carboplatin/etoposide. Provided treatment calendar and brief side effect management counseling. Mr. Ramesh Villegas verbalized understanding of the information presented and all of the patient's questions were answered.     Jcae Evans, PharmD, [phone] in place: No   Recommendation Provided To: Patient/Caregiver: 1 via In person   Intervention Detail: New Rx: 1, reason: Improve Adherence   Intervention Accepted By: Patient/Caregiver: 1   Time Spent (min): 15

## 2022-06-01 NOTE — PROGRESS NOTES
[facility] VISIT NOTE  Date: 2022    Name: Cricket Montejo. MRN: [id number]         : 1950    1040  Mr. Amrita Victor Arrived ambulatory and in no distress for C1D1 of Carboplatin and Etoposide Regimen. Assessment was completed, no acute issues at this time, no new complaints voiced. Right chest wall port accessed without difficulty, labs drawn & sent for processing. 1. Do you have any symptoms of COVID-19? SOB, coughing, fever, or generally not feeling well NO    2. Have you been exposed to COVID-19 recently? NO    3. Have you had any recent contact with family/friend that has a pending COVID test? NO       Chemotherapy Flowsheet 2022   Cycle C1D1   Date 2022   Drug / Regimen Carbo/Etoposide   Pre Meds given   Notes given           Mr. Bowman's vitals were reviewed. Patient Vitals for the past 12 hrs:   Temp Pulse Resp BP SpO2   22 1557 -- (!) 59 -- 122/65 --   22 1040 97.4 °F (36.3 °C) (!) 52 18 128/60 96 %         Lab results were obtained and reviewed. Recent Results (from the past 12 hour(s))   CBC WITH AUTOMATED DIFF    Collection Time: 22 10:53 AM   Result Value Ref Range    WBC 7.9 4.1 - 11.1 K/uL    RBC 3.56 (L) 4.10 - 5.70 M/uL    HGB 10.1 (L) 12.1 - 17.0 g/dL    HCT 30.5 (L) 36.6 - 50.3 %    MCV 85.7 80.0 - 99.0 FL    MCH 28.4 26.0 - 34.0 PG    MCHC 33.1 30.0 - 36.5 g/dL    RDW 15.5 (H) 11.5 - 14.5 %    PLATELET 018 781 - 792 K/uL    MPV 10.3 8.9 - 12.9 FL    NRBC 0.3 (H) 0  WBC    ABSOLUTE NRBC 0.02 (H) 0.00 - 0.01 K/uL    NEUTROPHILS 77 (H) 32 - 75 %    LYMPHOCYTES 14 12 - 49 %    MONOCYTES 5 5 - 13 %    EOSINOPHILS 2 0 - 7 %    BASOPHILS 0 0 - 1 %    IMMATURE GRANULOCYTES 2 (H) 0.0 - 0.5 %    ABS. NEUTROPHILS 6.2 1.8 - 8.0 K/UL    ABS. LYMPHOCYTES 1.1 0.8 - 3.5 K/UL    ABS. MONOCYTES 0.4 0.0 - 1.0 K/UL    ABS. EOSINOPHILS 0.1 0.0 - 0.4 K/UL    ABS. BASOPHILS 0.0 0.0 - 0.1 K/UL    ABS. IMM.  GRANS. 0.1 (H) 0.00 - 0.04 K/UL    DF AUTOMATED METABOLIC PANEL, COMPREHENSIVE    Collection Time: 06/01/22 10:53 AM   Result Value Ref Range    Sodium 141 136 - 145 mmol/L    Potassium 2.9 (L) 3.5 - 5.1 mmol/L    Chloride 107 97 - 108 mmol/L    CO2 25 21 - 32 mmol/L    Anion gap 9 5 - 15 mmol/L    Glucose 207 (H) 65 - 100 mg/dL    BUN 12 6 - 20 MG/DL    Creatinine 0.89 0.70 - 1.30 MG/DL    BUN/Creatinine ratio 13 12 - 20      GFR est AA >60 >60 ml/min/1.73m2    GFR est non-AA >60 >60 ml/min/1.73m2    Calcium 8.1 (L) 8.5 - 10.1 MG/DL    Bilirubin, total 0.9 0.2 - 1.0 MG/DL    ALT (SGPT) 37 12 - 78 U/L    AST (SGOT) 27 15 - 37 U/L    Alk. phosphatase 165 (H) 45 - 117 U/L    Protein, total 6.4 6.4 - 8.2 g/dL    Albumin 2.6 (L) 3.5 - 5.0 g/dL    Globulin 3.8 2.0 - 4.0 g/dL    A-G Ratio 0.7 (L) 1.1 - 2.2     HEP B SURFACE AB    Collection Time: 06/01/22 10:53 AM   Result Value Ref Range    Hepatitis B surface Ab <3.10 mIU/mL    Hep B surface Ab Interp. NONREACTIVE NR     HEP B SURFACE AG    Collection Time: 06/01/22 10:53 AM   Result Value Ref Range    Hepatitis B surface Ag <0.10 Index    Hep B surface Ag Interp.  Negative NEG         Medications received:  Medications Administered     0.9% sodium chloride infusion     Admin Date  06/01/2022 Action  New Bag Dose  25 mL/hr Rate  25 mL/hr Route  IntraVENous Administered By  Anthony Roberts RN          CARBOplatin (PARAPLATIN) 748 mg in 0.9% sodium chloride 250 mL, overfill volume 25 mL chemo infusion     Admin Date  06/01/2022 Action  New Bag Dose  748 mg Rate  699.6 mL/hr Route  IntraVENous Administered By  Pa Whitley RN          dexamethasone (DECADRON) 12 mg in 0.9% sodium chloride 50 mL IVPB     Admin Date  06/01/2022 Action  New Bag Dose  12 mg Route  IntraVENous Administered By  Anthony Slay, RN          etoposide (VEPESID) 238 mg in 0.9% sodium chloride 1,000 mL, overfill volume 100 mL chemo infusion     Admin Date  06/01/2022 Action  New Bag Dose  238 mg Rate  1,111.9 mL/hr Route  IntraVENous Administered By  Alena Linton RN          fosaprepitant (EMEND) 150 mg in 0.9% sodium chloride 150 mL IVPB     Admin Date  06/01/2022 Action  New Bag Dose  150 mg Rate  450 mL/hr Route  IntraVENous Administered By  Boris Kee RN          palonosetron HCl (ALOXI) injection 0.25 mg     Admin Date  06/01/2022 Action  Given Dose  0.25 mg Route  IntraVENous Administered By  Boris Kee RN          potassium chloride SR (KLOR-CON 10) tablet 60 mEq     Admin Date  06/01/2022 Action  Given Dose  60 mEq Route  Oral Administered By  Alena Linton RN                 Mr. Sidney Boo tolerated treatment well and was discharged from [place] [address] in stable condition at 1600. Port flushed and heparinized per protocol. Patient left accessed per request.  He is to return on  June 2, 2022 at 1130 for his next appointment.     Darrell Momin RN  June 1, 2022    Future Appointments:  Future Appointments   Date Time Provider Cindy Duncani   6/2/2022 11:30 AM SS INF2 CH3 <4H [facility]   6/3/2022  9:00 AM [facility]   6/3/2022  2:15 PM [facility] MRI 1 SFMRMRI [facility]   6/22/2022  9:00 AM SS INF1 CH1 >4H [facility]   6/22/2022  9:15 AM Lacey NELSON NP [facility] BS AMB   6/23/2022  9:00 AM SS INF3 [facility]   6/24/2022  9:00 AM SS INF1 CH3 <4H [facility]   6/27/2022 10:30 AM [place] [address], [facility] CAVSF BS AMB   7/13/2022  9:00 AM SS INF3 [facility]H [facility]   7/14/2022  9:00 AM SS INF1 [facility]H [facility]   7/15/2022  9:00 AM SS INF3 CH3 <4H [facility]   8/3/2022  9:00 AM SS INF[facility]H [facility]   8/4/2022  9:00 AM SS INF3 [facility]H [facility]   8/5/2022  9:00 AM SS INF3 CH3 <4H [facility] Needs

## 2022-06-01 NOTE — PROGRESS NOTES
Flacoandrey Chelsie. is a 81 y.o. male follow up for SCC of pancreas. 1. Have you been to the ER, urgent care clinic since your last visit? Hospitalized since your last visit?no    2. Have you seen or consulted any other health care providers outside of the [address] [place] [address] since your last visit? Include any pap smears or colon screening.  No    Vitals 6/1/2022   Blood Pressure 128/60   Pulse 52   Temp 97.4   Resp 18   Height 5' 10\"   Weight 251 lb 6.4 oz   SpO2 96   BSA 2.37 m2

## 2022-06-02 ENCOUNTER — HOSPITAL ENCOUNTER (OUTPATIENT)
Dept: INFUSION THERAPY | Age: 72
Discharge: HOME | End: 2022-06-02
Payer: MEDICARE

## 2022-06-02 ENCOUNTER — TELEPHONE (OUTPATIENT)
Dept: ONCOLOGY | Age: 72
End: 2022-06-02

## 2022-06-02 ENCOUNTER — APPOINTMENT (OUTPATIENT)
Dept: INFUSION THERAPY | Age: 72
End: 2022-06-02
Payer: MEDICARE

## 2022-06-02 VITALS
WEIGHT: 251 LBS | HEIGHT: 70 IN | HEART RATE: 55 BPM | SYSTOLIC BLOOD PRESSURE: 129 MMHG | DIASTOLIC BLOOD PRESSURE: 60 MMHG | TEMPERATURE: 97.5 F | RESPIRATION RATE: 18 BRPM | BODY MASS INDEX: 35.93 KG/M2 | OXYGEN SATURATION: 99 %

## 2022-06-02 DIAGNOSIS — C80.1 SMALL CELL CARCINOMA (HCC): Primary | ICD-10-CM

## 2022-06-02 DIAGNOSIS — Z51.11 ENCOUNTER FOR ANTINEOPLASTIC CHEMOTHERAPY: ICD-10-CM

## 2022-06-02 LAB
ANION GAP SERPL CALC-SCNC: 13 MMOL/L (ref 5–15)
BUN SERPL-MCNC: 20 MG/DL (ref 6–20)
BUN/CREAT SERPL: 19 (ref 12–20)
CALCIUM SERPL-MCNC: 7.7 MG/DL (ref 8.5–10.1)
CHLORIDE SERPL-SCNC: 105 MMOL/L (ref 97–108)
CO2 SERPL-SCNC: 22 MMOL/L (ref 21–32)
CREAT SERPL-MCNC: 1.05 MG/DL (ref 0.7–1.3)
GLUCOSE SERPL-MCNC: 404 MG/DL (ref 65–100)
HBV CORE AB SERPL QL IA: NEGATIVE
POTASSIUM SERPL-SCNC: 3.5 MMOL/L (ref 3.5–5.1)
SODIUM SERPL-SCNC: 140 MMOL/L (ref 136–145)

## 2022-06-02 PROCEDURE — 80048 BASIC METABOLIC PNL TOTAL CA: CPT

## 2022-06-02 PROCEDURE — 96413 CHEMO IV INFUSION 1 HR: CPT

## 2022-06-02 PROCEDURE — 36415 COLL VENOUS BLD VENIPUNCTURE: CPT

## 2022-06-02 PROCEDURE — 74011000250 HC RX REV CODE- 250: Performed by: INTERNAL MEDICINE

## 2022-06-02 PROCEDURE — 96375 TX/PRO/DX INJ NEW DRUG ADDON: CPT

## 2022-06-02 PROCEDURE — 77030012965 HC NDL HUBR BBMI -A

## 2022-06-02 PROCEDURE — 74011250636 HC RX REV CODE- 250/636: Performed by: INTERNAL MEDICINE

## 2022-06-02 RX ORDER — ACETAMINOPHEN 325 MG/1
650 TABLET ORAL AS NEEDED
Status: ACTIVE | OUTPATIENT
Start: 2022-06-02 | End: 2022-06-02

## 2022-06-02 RX ORDER — DIPHENHYDRAMINE HYDROCHLORIDE 50 MG/ML
25 INJECTION, SOLUTION INTRAMUSCULAR; INTRAVENOUS AS NEEDED
Status: ACTIVE | OUTPATIENT
Start: 2022-06-02 | End: 2022-06-02

## 2022-06-02 RX ORDER — SODIUM CHLORIDE 9 MG/ML
25 INJECTION, SOLUTION INTRAVENOUS CONTINUOUS
Status: DISPENSED | OUTPATIENT
Start: 2022-06-02 | End: 2022-06-02

## 2022-06-02 RX ORDER — DEXAMETHASONE SODIUM PHOSPHATE 4 MG/ML
8 INJECTION, SOLUTION INTRA-ARTICULAR; INTRALESIONAL; INTRAMUSCULAR; INTRAVENOUS; SOFT TISSUE ONCE
Status: COMPLETED | OUTPATIENT
Start: 2022-06-02 | End: 2022-06-02

## 2022-06-02 RX ORDER — SODIUM CHLORIDE 0.9 % (FLUSH) 0.9 %
10 SYRINGE (ML) INJECTION AS NEEDED
Status: DISPENSED | OUTPATIENT
Start: 2022-06-02 | End: 2022-06-02

## 2022-06-02 RX ORDER — ONDANSETRON 2 MG/ML
8 INJECTION INTRAMUSCULAR; INTRAVENOUS
Status: DISCONTINUED | OUTPATIENT
Start: 2022-06-02 | End: 2022-06-03 | Stop reason: HOSPADM

## 2022-06-02 RX ORDER — HEPARIN 100 UNIT/ML
300-500 SYRINGE INTRAVENOUS AS NEEDED
Status: ACTIVE | OUTPATIENT
Start: 2022-06-02 | End: 2022-06-02

## 2022-06-02 RX ORDER — ONDANSETRON 2 MG/ML
8 INJECTION INTRAMUSCULAR; INTRAVENOUS AS NEEDED
Status: ACTIVE | OUTPATIENT
Start: 2022-06-02 | End: 2022-06-02

## 2022-06-02 RX ORDER — SODIUM CHLORIDE 9 MG/ML
10 INJECTION INTRAMUSCULAR; INTRAVENOUS; SUBCUTANEOUS AS NEEDED
Status: ACTIVE | OUTPATIENT
Start: 2022-06-02 | End: 2022-06-02

## 2022-06-02 RX ADMIN — SODIUM CHLORIDE 25 ML/HR: 9 INJECTION, SOLUTION INTRAVENOUS at 09:40

## 2022-06-02 RX ADMIN — SODIUM CHLORIDE, PRESERVATIVE FREE 10 ML: 5 INJECTION INTRAVENOUS at 12:51

## 2022-06-02 RX ADMIN — Medication 500 UNITS: at 11:35

## 2022-06-02 RX ADMIN — ETOPOSIDE 238 MG: 20 INJECTION INTRAVENOUS at 10:27

## 2022-06-02 RX ADMIN — DEXAMETHASONE SODIUM PHOSPHATE 8 MG: 4 INJECTION, SOLUTION INTRA-ARTICULAR; INTRALESIONAL; INTRAMUSCULAR; INTRAVENOUS; SOFT TISSUE at 09:40

## 2022-06-02 NOTE — TELEPHONE ENCOUNTER
6/2/22- Called pt to discuss assistance options for Creon. Patient stated he was at an appointment and would call back to this user. Patient called back stating he was able to  5 pills of the Creon but per Nupur they had to order more and his copay would be over $300 for 1 fill. Informed pt My [employer] Juan gil has a program if pt qualifies pt would get the medication for free. Inquired pts income and household size and per pts information pt would qualify for this program. Patient requested the application to be emailed to him.  Emailed application to pt per request and emailed provider form to Juan Price NP.

## 2022-06-02 NOTE — TELEPHONE ENCOUNTER
Creon voucher called in for patient to receive 56 capsule supply at no cost.  Eligibility is for all patients.

## 2022-06-02 NOTE — TELEPHONE ENCOUNTER
[facility] at [address], [address] [place] [phone]  W: [phone]  F: [phone]    Medical Nutrition Therapy  Nutrition Referral:    Referral received. Met with patient,  explained that RD is available to address nutrition throughout the spectrum of care. Patient with pancreatic cancer. He reports he is eating well. No issues with appetite. Having loose stools, urgency. Light in color and often float. He reports they appear greasy and oily. Symptoms consistent with exocrine pancreatic insufficiency. Discussed use of creon to aid in symptoms. Reviewed how to take the medication. Chemotherapy Flowsheet 6/1/2022   Cycle C1D1   Date 6/1/2022   Drug / Regimen Carbo/Etoposide   Pre Meds given   Notes given     Wt Readings from Last 5 Encounters:   06/01/22 251 lb (113.9 kg)   06/01/22 251 lb 6.4 oz (114 kg)   05/31/22 238 lb 11.2 oz (108.3 kg)   05/26/22 253 lb (114.8 kg)   05/24/22 253 lb (114.8 kg)       Plan:  - Creon 36,000 units take 2 caps with first bite of full meals and 1 with first bite of snacks. - Continue with diet as tolerated.         Signed By: Ignacio Luna, [email or web address]

## 2022-06-02 NOTE — PROGRESS NOTES
[facility] Progress Note    Date: 2022    Name: Delores Barajas MRN: [id number]         : 1950    Mr. Kwaku Zambrano Arrived ambulatory and in no distress for C1D2 of Etoposide Regimen. Assessment was completed, no acute issues at this time, no new complaints voiced. Right chest wall port accessed without difficulty, labs drawn & sent for processing. Chemotherapy Flowsheet 2022   Cycle C1D2   Date 2022   Drug / Regimen Etoposide   Pre Meds given   Notes given         Mr. Ramon Wolfe vitals were reviewed. Visit Vitals  /60   Pulse (!) 55   Temp 97.5 °F (36.4 °C)   Resp 18   Ht 5' 10\" (1.778 m)   Wt 113.9 kg (251 lb)   SpO2 99%   BMI 36.01 kg/m²       Lab results were obtained and reviewed.   Recent Results (from the past 12 hour(s))   METABOLIC PANEL, BASIC    Collection Time: 22  9:30 AM   Result Value Ref Range    Sodium 140 136 - 145 mmol/L    Potassium 3.5 3.5 - 5.1 mmol/L    Chloride 105 97 - 108 mmol/L    CO2 22 21 - 32 mmol/L    Anion gap 13 5 - 15 mmol/L    Glucose 404 (H) 65 - 100 mg/dL    BUN 20 6 - 20 MG/DL    Creatinine 1.05 0.70 - 1.30 MG/DL    BUN/Creatinine ratio 19 12 - 20      GFR est AA >60 >60 ml/min/1.73m2    GFR est non-AA >60 >60 ml/min/1.73m2    Calcium 7.7 (L) 8.5 - 10.1 MG/DL       Medications:  Medications Administered     0.9% sodium chloride infusion     Admin Date  2022 Action  New Bag Dose  25 mL/hr Rate  25 mL/hr Route  IntraVENous Administered By  Toño Dwyer RN          0.9% sodium chloride injection 10 mL     Admin Date  2022 Action  Given Dose  10 mL Route  IntraVENous Administered By  Toño Dwyer RN          dexamethasone (DECADRON) 4 mg/mL injection 8 mg     Admin Date  2022 Action  Given Dose  8 mg Route  IntraVENous Administered By  Toño Dwyer, RN          etoposide (VEPESID) 238 mg in 0.9% sodium chloride 1,000 mL, overfill volume 100 mL chemo infusion     Admin Date  2022 Action  New Bag Dose  238 mg Rate  1,111.9 mL/hr Route  IntraVENous Administered By  Jenae Vance RN          heparin (porcine) pf 300-500 Units     Admin Date  06/02/2022 Action  Given Dose  500 Units Route  InterCATHeter Administered By  Jenae Vance RN                  Mr. Ladonna Gregg tolerated treatment well and was discharged from [place] [address] in stable condition at 1135. Port left accessed, flushed & heparinized per protocol. He is to return on Monik 3 for his next appointment.     Elnora Aase, RN  June 2, 2022

## 2022-06-03 ENCOUNTER — HOSPITAL ENCOUNTER (OUTPATIENT)
Dept: MRI IMAGING | Age: 72
Discharge: HOME | End: 2022-06-03
Attending: INTERNAL MEDICINE
Payer: MEDICARE

## 2022-06-03 ENCOUNTER — HOSPITAL ENCOUNTER (OUTPATIENT)
Dept: INFUSION THERAPY | Age: 72
Discharge: HOME | End: 2022-06-03
Payer: MEDICARE

## 2022-06-03 VITALS
TEMPERATURE: 97.8 F | DIASTOLIC BLOOD PRESSURE: 62 MMHG | BODY MASS INDEX: 37.81 KG/M2 | HEART RATE: 44 BPM | OXYGEN SATURATION: 99 % | RESPIRATION RATE: 18 BRPM | SYSTOLIC BLOOD PRESSURE: 137 MMHG | HEIGHT: 70 IN | WEIGHT: 264.1 LBS

## 2022-06-03 VITALS — WEIGHT: 261 LBS | BODY MASS INDEX: 37.45 KG/M2

## 2022-06-03 DIAGNOSIS — C25.0 MALIGNANT NEOPLASM OF HEAD OF PANCREAS (HCC): ICD-10-CM

## 2022-06-03 DIAGNOSIS — C80.1 SMALL CELL CARCINOMA (HCC): Primary | ICD-10-CM

## 2022-06-03 DIAGNOSIS — C80.1 SMALL CELL CARCINOMA (HCC): ICD-10-CM

## 2022-06-03 DIAGNOSIS — C78.7 METASTASIS TO LIVER (HCC): ICD-10-CM

## 2022-06-03 DIAGNOSIS — Z51.11 ENCOUNTER FOR ANTINEOPLASTIC CHEMOTHERAPY: ICD-10-CM

## 2022-06-03 LAB
ANION GAP SERPL CALC-SCNC: 8 MMOL/L (ref 5–15)
BUN SERPL-MCNC: 22 MG/DL (ref 6–20)
BUN/CREAT SERPL: 23 (ref 12–20)
CALCIUM SERPL-MCNC: 8.1 MG/DL (ref 8.5–10.1)
CHLORIDE SERPL-SCNC: 108 MMOL/L (ref 97–108)
CO2 SERPL-SCNC: 23 MMOL/L (ref 21–32)
CREAT SERPL-MCNC: 0.94 MG/DL (ref 0.7–1.3)
GLUCOSE BLD STRIP.AUTO-MCNC: 277 MG/DL (ref 65–117)
GLUCOSE SERPL-MCNC: 285 MG/DL (ref 65–100)
POTASSIUM SERPL-SCNC: 3.5 MMOL/L (ref 3.5–5.1)
SERVICE CMNT-IMP: ABNORMAL
SODIUM SERPL-SCNC: 139 MMOL/L (ref 136–145)

## 2022-06-03 PROCEDURE — 74011000250 HC RX REV CODE- 250: Performed by: INTERNAL MEDICINE

## 2022-06-03 PROCEDURE — 82962 GLUCOSE BLOOD TEST: CPT

## 2022-06-03 PROCEDURE — 80048 BASIC METABOLIC PNL TOTAL CA: CPT

## 2022-06-03 PROCEDURE — A9576 INJ PROHANCE MULTIPACK: HCPCS | Performed by: INTERNAL MEDICINE

## 2022-06-03 PROCEDURE — 36415 COLL VENOUS BLD VENIPUNCTURE: CPT

## 2022-06-03 PROCEDURE — 74011250636 HC RX REV CODE- 250/636: Performed by: INTERNAL MEDICINE

## 2022-06-03 PROCEDURE — 96375 TX/PRO/DX INJ NEW DRUG ADDON: CPT

## 2022-06-03 PROCEDURE — 96413 CHEMO IV INFUSION 1 HR: CPT

## 2022-06-03 PROCEDURE — 70553 MRI BRAIN STEM W/O & W/DYE: CPT

## 2022-06-03 RX ORDER — HEPARIN 100 UNIT/ML
300-500 SYRINGE INTRAVENOUS AS NEEDED
Status: ACTIVE | OUTPATIENT
Start: 2022-06-03 | End: 2022-06-03

## 2022-06-03 RX ORDER — SODIUM CHLORIDE 9 MG/ML
25 INJECTION, SOLUTION INTRAVENOUS CONTINUOUS
Status: DISPENSED | OUTPATIENT
Start: 2022-06-03 | End: 2022-06-03

## 2022-06-03 RX ORDER — DEXAMETHASONE SODIUM PHOSPHATE 4 MG/ML
8 INJECTION, SOLUTION INTRA-ARTICULAR; INTRALESIONAL; INTRAMUSCULAR; INTRAVENOUS; SOFT TISSUE ONCE
Status: COMPLETED | OUTPATIENT
Start: 2022-06-03 | End: 2022-06-03

## 2022-06-03 RX ORDER — SODIUM CHLORIDE 0.9 % (FLUSH) 0.9 %
10 SYRINGE (ML) INJECTION AS NEEDED
Status: DISPENSED | OUTPATIENT
Start: 2022-06-03 | End: 2022-06-03

## 2022-06-03 RX ORDER — SODIUM CHLORIDE 9 MG/ML
10 INJECTION INTRAMUSCULAR; INTRAVENOUS; SUBCUTANEOUS AS NEEDED
Status: ACTIVE | OUTPATIENT
Start: 2022-06-03 | End: 2022-06-03

## 2022-06-03 RX ADMIN — GADOTERIDOL 20 ML: 279.3 INJECTION, SOLUTION INTRAVENOUS at 14:15

## 2022-06-03 RX ADMIN — SODIUM CHLORIDE 25 ML/HR: 9 INJECTION, SOLUTION INTRAVENOUS at 09:25

## 2022-06-03 RX ADMIN — Medication 500 UNITS: at 12:14

## 2022-06-03 RX ADMIN — DEXAMETHASONE SODIUM PHOSPHATE 8 MG: 4 INJECTION INTRA-ARTICULAR; INTRALESIONAL; INTRAMUSCULAR; INTRAVENOUS; SOFT TISSUE at 09:28

## 2022-06-03 RX ADMIN — SODIUM CHLORIDE, PRESERVATIVE FREE 10 ML: 5 INJECTION INTRAVENOUS at 12:13

## 2022-06-03 RX ADMIN — ETOPOSIDE 238 MG: 20 INJECTION INTRAVENOUS at 10:31

## 2022-06-03 NOTE — DISCHARGE SUMMARY
Physician Discharge Summary     Patient ID:  Luis Alberto Bhat  [id number]  81 y.o.  1950    Admit date: 5/17/2022    Discharge date and time: 5/19/2022    Admission Diagnoses: Biliary obstruction [K83.1]  Pancreatic mass [K86.89]    Discharge Diagnoses/Hospital Course   Ms. Jered Sheldon is a 80 yo male with PMH of DM, HTN, XOL, hypothyroidism admitted for biliary obstruction. He underwent MRCP that showed a pancreatic mass and liver lesions and subsequently ERCP with stent deployment in the biliary tree. CT chest did not indicate lung mets. Pt underwent liver biopsy. He was discharged with LFT's downtrending and with close heme/onc follow-up to determine course of action after pathology results      Hypotension / Bradycardia / Hx Hypertension - POA. BP remained normal off BP meds. Suspect 2/2 weight loss may not require resumption. BP meds not resumed at discharge      [address]. Mild.  ?ACD in the setting of malignancy      Severe protein calorie malnutrition / hypoalbuminemia - 25lb (10% body weight) unintentional wt loss over last 30 days      Type 2 diabetes mellitus without complications - may need reductions in Lantus due to recent weight loss     PCP: TAHMINA Ghosh     Consults: GI and Hematology/Oncology    Discharge Exam:  Visit Vitals  /62 (BP 1 Location: Right upper arm, BP Patient Position: At rest;Semi fowlers)   Pulse 60   Temp 97.3 °F (36.3 °C)   Resp 20   Ht 5' 10\" (1.778 m)   Wt 114.3 kg (251 lb 14.4 oz)   SpO2 98%   BMI 36.14 kg/m²       Gen:  Well-developed, well-nourished, in no acute distress  HEENT:  Pink conjunctivae, PERRL, hearing intact to voice, moist mucous membranes  Neck:  Supple, without masses, thyroid non-tender  Resp:  No accessory muscle use, clear breath sounds without wheezes rales or rhonchi  Card:  No murmurs, normal S1, S2 without thrills, bruits or peripheral edema  Abd:  Soft, non-tender, non-distended, normoactive bowel sounds are present  Musc:  No cyanosis or clubbing  Skin:  No rashes or ulcers, skin turgor is good  Neuro:  Cranial nerves 3-12 are grossly intact,  strength is 5/5 bilaterally and dorsi / plantarflexion is 5/5 bilaterally, follows commands appropriately  Psych:  Good insight, oriented to person, place and time, alert     Disposition: home    Patient Instructions:   Discharge Medication List as of 5/19/2022  2:53 PM      START taking these medications    Details   hydrOXYzine HCL (ATARAX) 25 mg tablet Take 1 Tablet by mouth every six (6) hours as needed for Itching for up to 10 days. , Normal, Disp-15 Tablet, R-0         CONTINUE these medications which have NOT CHANGED    Details   ascorbic acid, vitamin C, (Vitamin C) 250 mg tablet Take 2,000 mg by mouth daily. , Historical Med      glipiZIDE (GLUCOTROL) 5 mg tablet Take  by mouth two (2) times a day., Historical Med      INSULIN DETEMIR (LEVEMIR FLEXPEN SC) 50 Units by SubCUTAneous route two (2) times a day., Historical Med      cholecalciferol, vitamin D3, 2,000 unit Tab Take  by mouth daily. , Historical Med      multivitamin (ONE A DAY) tablet Take 1 Tab by mouth daily. , Historical Med      sertraline (ZOLOFT) 100 mg tablet Take 100 mg by mouth daily. , Historical Med      levothyroxine (SYNTHROID) 200 mcg tablet Take 200 mcg by mouth Daily (before breakfast). , Historical Med      simvastatin (ZOCOR) 40 mg tablet Take  by mouth nightly., Historical Med         STOP taking these medications       hydroCHLOROthiazide (HYDRODIURIL) 12.5 mg tablet Comments:   Reason for Stopping:         telmisartan (MICARDIS) 40 mg tablet Comments:   Reason for Stopping:               Activity: Activity as tolerated  Diet: Resume previous diet  Wound Care: None needed    Follow-up with TAHMINA Crawford   Follow-up Information     Follow up With Specialties Details Why Contact Info    Silvia Patel MD Hematology and Oncology, Internal Medicine Physician, Hematology Physician, Oncology Go on 5/24/2022 appt at 10:30 am  [address] [place] [address] St.  [facility]  [facility]  [phone]      Jeancarlos Agarwal, [address] Physician Assistant On 5/23/2022 at 2:00pm with JEAN Stanton [address]  [address]  [phone]            Approximate time spent in patient care on day of discharge: 35 minutes     Signed:  Nasim Ramon MD  6/3/2022  1:06 PM

## 2022-06-03 NOTE — PROGRESS NOTES
[facility] VISIT NOTE  Date: Monik 3, 2022    Name: Julee Bateman. MRN: [id number]         : 1950    Mr. Ramesh Villegas Arrived ambulatory and in no distress for C1D3 of Etoposide Regimen. Assessment was completed, no acute issues at this time, pt complained of constipation and feeling bloated, and weight gain. Pt denied any SOB, swelling. MD notified. Chest wall port flushed with positive blood return, drawn blood and sent to processing. Dressing CDI. 1. Do you have any symptoms of COVID-19? SOB, coughing, fever, or generally not feeling well NO    2. Have you been exposed to COVID-19 recently? NO    3. Have you had any recent contact with family/friend that has a pending COVID test? NO       Chemotherapy Flowsheet 6/3/2022   Cycle C1D3   Date 6/3/2022   Drug / Regimen Etoposide   Pre Meds given   Notes given           Mr. Alessia Cole vitals were reviewed. Patient Vitals for the past 12 hrs:   Temp Pulse Resp BP SpO2   22 1143 97.8 °F (36.6 °C) (!) 44 18 137/62 --   22 0904 97.5 °F (36.4 °C) -- 18 139/63 99 %         Lab results were obtained and reviewed.   Recent Results (from the past 12 hour(s))   METABOLIC PANEL, BASIC    Collection Time: 22  9:19 AM   Result Value Ref Range    Sodium 139 136 - 145 mmol/L    Potassium 3.5 3.5 - 5.1 mmol/L    Chloride 108 97 - 108 mmol/L    CO2 23 21 - 32 mmol/L    Anion gap 8 5 - 15 mmol/L    Glucose 285 (H) 65 - 100 mg/dL    BUN 22 (H) 6 - 20 MG/DL    Creatinine 0.94 0.70 - 1.30 MG/DL    BUN/Creatinine ratio 23 (H) 12 - 20      GFR est AA >60 >60 ml/min/1.73m2    GFR est non-AA >60 >60 ml/min/1.73m2    Calcium 8.1 (L) 8.5 - 10.1 MG/DL   GLUCOSE, POC    Collection Time: 22 11:47 AM   Result Value Ref Range    Glucose (POC) 277 (H) 65 - 117 mg/dL    Performed by Cordell John        Medications received:  Medications Administered     0.9% sodium chloride infusion     Admin Date  2022 Action  New Bag Dose  25 mL/hr Rate  25 mL/hr Route  IntraVENous Administered By  Alessandro Blas RN          0.9% sodium chloride injection 10 mL     Admin Date  06/03/2022 Action  Given Dose  10 mL Route  IntraVENous Administered By  Alessandro Blas RN          dexamethasone (DECADRON) 4 mg/mL injection 8 mg     Admin Date  06/03/2022 Action  Given Dose  8 mg Route  IntraVENous Administered By  Alessandro Blas RN          etoposide (VEPESID) 238 mg in 0.9% sodium chloride 1,000 mL, overfill volume 100 mL chemo infusion     Admin Date  06/03/2022 Action  New Bag Dose  238 mg Rate  1,111.9 mL/hr Route  IntraVENous Administered By  Alessandro Blas RN          heparin (porcine) pf 300-500 Units     Admin Date  06/03/2022 Action  Given Dose  500 Units Route  InterCATHeter Administered By  Alessandro Blas RN                 Mr. Bulmaro Rhodes complained of seeing Halo/ bright light vision at the end of the treatment. Pt stated that he got that symptoms when his are BS high. BS checked. MD notified and educated to monitor BS at home per MD. Pt was discharged from [place] [address] in stable condition at 1220. Port de-accessed, flushed & heparinized per protocol. He is to return on  June 22, 2022 at 0900 for his next appointment.     Akira Martinez RN  Monik 3, 2022    Future Appointments:  Future Appointments   Date Time Provider Cindy Daughertyisti   6/22/2022  9:00 AM SS INF1 CH1 >4H [facility]   6/22/2022  9:15 AM Concepcion NELSON NP [facility] BS AMB   6/23/2022  9:00 AM SS INF3 CH3 <4H [facility]   6/24/2022  9:00 AM SS INF1 CH3 <4H [facility]   6/27/2022 10:30 AM [address] [facility] BS AMB   7/13/2022  9:00 AM SS INF3 CH1 >4H [facility]   7/14/2022  9:00 AM SS INF1 CH3 <4H [facility]   7/15/2022  9:00 AM SS INF3 CH3 <4H [facility]   8/3/2022  9:00 AM SS INF1 CH1 >4H [facility]   8/4/2022  9:00 AM SS INF3 CH3 <4H [facility]   8/5/2022  9:00 AM SS INF3 CH3 <4H [facility] [address] [place] [address]

## 2022-06-03 NOTE — PROGRESS NOTES
[facility]  [address]  [phone]      Medical Progress Note      NAME: Buck Lance :  1950  MRM:  [id number]    Date/Time: 2022         Subjective:     Chief Complaint:  \"I'm okay\"     Pt seen and examined. Awaiting ERCP so NPO. No complaints other than being hungry and itchy. Aware of Atarax PRN     ROS:  (bold if positive, if negative)           Objective:       Vitals:      Last 24hrs VS reviewed since prior progress note. Most recent are:  Tmax 98.7  HR 74  /59  RR 27   94%     Exam:     Physical Exam:    Gen:  Well-developed, well-nourished, in no acute distress  HEENT:  Pink conjunctivae, PERRL, hearing intact to voice, moist mucous membranes  Neck:  Supple, without masses, thyroid non-tender  Resp:  No accessory muscle use, clear breath sounds without wheezes rales or rhonchi  Card:  No murmurs, normal S1, S2 without thrills, bruits or peripheral edema  Abd:  Soft, non-tender, non-distended, normoactive bowel sounds are present  Musc:  No cyanosis or clubbing  Skin:  No rashes or ulcers, skin turgor is good  Neuro:  Cranial nerves 3-12 are grossly intact,  strength is 5/5 bilaterally and dorsi / plantarflexion is 5/5 bilaterally, follows commands appropriately  Psych:  Good insight, oriented to person, place and time, alert      Medications Reviewed: (see below)    Lab Data Reviewed: (see below)    ______________________________________________________________________    Medications:     No current facility-administered medications for this encounter. Current Outpatient Medications   Medication Sig    ascorbic acid, vitamin C, (Vitamin C) 250 mg tablet Take 2,000 mg by mouth daily.  glipiZIDE (GLUCOTROL) 5 mg tablet Take 10 mg by mouth two (2) times a day.  INSULIN DETEMIR (LEVEMIR FLEXPEN SC) 50 Units by SubCUTAneous route two (2) times a day.     cholecalciferol, vitamin D3, 2,000 unit Tab Take  by mouth daily.  multivitamin (ONE A DAY) tablet Take 1 Tab by mouth daily.  sertraline (ZOLOFT) 100 mg tablet Take 150 mg by mouth daily.  levothyroxine (SYNTHROID) 200 mcg tablet Take 200 mcg by mouth Daily (before breakfast).  lipase-protease-amylase (Creon) 36,000-114,000- 180,000 unit cpDR capsule Take 8 Capsules by mouth daily as needed (Up to 8 per day based on meals/snacks). Take 2 caps with first bite of full meals and 1 with first bite of snacks.  telmisartan (MICARDIS) 40 mg tablet Take 40 mg by mouth daily.  prochlorperazine (Compazine) 10 mg tablet Take 1 Tablet by mouth every six (6) hours as needed for Nausea.  ondansetron hcl (ZOFRAN) 8 mg tablet Take 1 Tablet by mouth every eight (8) hours as needed for Nausea.  lidocaine-prilocaine (EMLA) topical cream Apply  to affected area as needed for Pain (Apply 30-60 min. prior to having your port accessed). Facility-Administered Medications Ordered in Other Encounters   Medication Dose Route Frequency    0.9% sodium chloride infusion  25 mL/hr IntraVENous CONTINUOUS    sodium chloride (NS) flush 10 mL  10 mL IntraVENous PRN    0.9% sodium chloride injection 10 mL  10 mL IntraVENous PRN    heparin (porcine) pf 300-500 Units  300-500 Units InterCATHeter PRN            Lab Review:     Recent Labs     06/01/22  1053   WBC 7.9   HGB 10.1*   HCT 30.5*        Recent Labs     06/03/22  0919 06/02/22  0930 06/01/22  1053    140 141   K 3.5 3.5 2.9*    105 107   CO2 23 22 25   * 404* 207*   BUN 22* 20 12   CREA 0.94 1.05 0.89   CA 8.1* 7.7* 8.1*   ALB  --   --  2.6*   ALT  --   --  37     No components found for: [place]         Assessment / Plan:   Biliary obstruction / LFT elevation / Hyperbilirubinemia / Jaundice / Cirrhosis / Liver mass - POA, new.  Presumed cancer  -CT with liver lesions; plans for liver biopsy   -ERCP today for diagnostics and possibly biliary stent placement      Hypotension / Bradycardia / Hx Hypertension - POA  -holding ARB   -IVF's      Anemia - POA. Mild.  ?ACD in the setting of malignancy   -monitor after instrumentation      Severe protein calorie malnutrition / hypoalbuminemia - 25lb (10% body weight) unintentional wt loss over last 30 days  -encourage PO intake      Type 2 diabetes mellitus without complications   -ISS   -BG checks AC TID and qHS   -hold lantus for now as NPO      Obese / SELINA on CPAP  -CPAP      Anxiety and depression / Dementia  -on SSRI      Arthritis  -tylenol PRN      Hyperlipidemia   -on statin     Hypothyroid  -on levothyroxine     Total time spent with patient: 28 Minutes                  Care Plan discussed with: Patient    Discussed:  Care Plan    Prophylaxis:  SCD's    Disposition:  Home w/Family           ___________________________________________________    Attending Physician: Nia Harper MD

## 2022-06-06 NOTE — TELEPHONE ENCOUNTER
6/6/22- Called pt to follow up on the [employer] form. Patient stated the form was received and he will complete it and get it back to this user to submit this week. No further questions or concerns. Returned pts call from [employer] received with questions. Answered all of patients questions and patient stated he will send his patient enrollment form back to this user today or tomorrow.

## 2022-06-07 ENCOUNTER — TELEPHONE (OUTPATIENT)
Dept: ONCOLOGY | Age: 72
End: 2022-06-07

## 2022-06-07 NOTE — TELEPHONE ENCOUNTER
[address] at [place]  [phone]    06/07/22- Patient called to report he received his first chemo last week- overall feeling fatigued but noticed heartburn pain and \" it feels like a air bubble or I need to burp, once I move it helps resolve it\". He denied any chest pain, SOB, N/V, fevers, or chills. He continues to have about 2 episodes of diarrhea daily but creon is helping and noticed new mild right foot swelling- denies any redness, pain, hot to touch areas or other symptoms. Advised him to elevate feet as needed, continue to monitor. Can try OTC measures for heartburn/indigestion. He will inform our office if symptoms persist or worsen. No further questions or concerns.

## 2022-06-07 NOTE — TELEPHONE ENCOUNTER
Patient called and stated that he had his last chemo on Friday for the first cycle and he feels like there is a bubble in his chest. Patient stated that it feels like he needs to burp and he is having heart burn.  Please advise     CB# [phone]

## 2022-06-07 NOTE — TELEPHONE ENCOUNTER
6/7/22- Received completed application from patient. Faxed completed application and fax confirmation received.

## 2022-06-09 NOTE — TELEPHONE ENCOUNTER
Called Julia Christianson to check status of patients application for [employer]. Per representative patients application is in the benefits investigation step and will take another 3 to 5 business days.

## 2022-06-14 ENCOUNTER — TELEPHONE (OUTPATIENT)
Dept: ONCOLOGY | Age: 72
End: 2022-06-14

## 2022-06-14 NOTE — TELEPHONE ENCOUNTER
[address] [facility]   Oncology Social Work  Encounter     Patient Name:  Mary Winston    Medical History: Pancreatic cancer with possible liver metastases    Advance Directives: on file    Narrative: Called patient to introduce social work role and supports. Left voicemail message requesting a return call.      Thank you,  Sergei Clemons LCSW

## 2022-06-15 ENCOUNTER — DOCUMENTATION ONLY (OUTPATIENT)
Dept: ONCOLOGY | Age: 72
End: 2022-06-15

## 2022-06-15 NOTE — TELEPHONE ENCOUNTER
6/15/22- Called [employer] to check status of patients assistance application for Creon. Per representative patient is approved effective 6/14/22-12/31/22 with the cost of care $0 copay. Per representative the shipment will be received at patients address 7-10 business days from 6/14/22. This user requested an approval letter to be faxed to the providers office and representative stated they will refax the approval.    Called pt to inform of the above information. Patient verbalized understanding and was very appreciative of the assistance. No further financial assistance required at this time.

## 2022-06-15 NOTE — PROGRESS NOTES
Oncology Social Work  Psychosocial Assessment    Reason for Assessment:      [] Social Work Referral [x] Initial Assessment [x] New Diagnosis [] Other    [facility] [address]22   Patient's [address] is: Legal Next katy Epps [address] Directive Yes, not on file       Sources of Information:    [x]Patient  [x]Family  []Staff  [x]Medical Record    Mental Status:    [x]Alert  []Lethargic  []Unresponsive   [] Unable to assess   Oriented to:  [x]Person  [x]Place  [x]Time  [x]Situation      Relationship Status:  []Single  [x]  []Significant Other/Life Partner  []  []  []    Living Circumstances:  []Lives Alone  [x]Family/Significant Other in Household  []Roommates  []Children in the Home  []Paid Caregivers  []Assisted Living Facility/Group [address]  []Homeless  []Incarcerated  []Environmental/Care Concerns  []Other:    Employment Status:  []Employed Full-time []Employed Part-time []Homemaker  [x] Retired [] Short-Term Disability [] Long-Term Disability  [] Unemployed   [] [place]   [] SSDI  [] Self-Employment    Barriers to Learning:    []Language  []Developmental  []Cognitive  []Altered Mental Status  []Visual/Hearing Impairment  []Unable to Read/Write  []Motivational  [] Challenges Understanding Medical Jargon [x]No Barriers Identified      Financial/Legal Concerns:    []Uninsured  []Limited Income/Resources  []Non-Citizen  []Food Insecurity []No Concerns Identified   []Other:    Hinduism/Spiritual/Existential:  Does patient have any spiritual or Yazdanism beliefs? [x] Yes [] No  Is the patient involved in a spiritual, denisa or Yazdanism community?  [x] Yes [] No  Patient expressing spiritual/existential angst? [] Yes [x] No  Notes: Meño Burrell Christy [address]:    Identified Support Person/Group: Katey (wife)  [x]Strong  []Fair  []Limited    Coping with Illness:   [x]  Coping Well  [] Challenges Coping with Serious Illness [] Situational Depression [] Situational Anxiety [] Anticipatory Grief  [] Recent Loss [] Caregiver [place]            Narrative: Spoke with patient and introduced oncology social work role and supports. He lives with his wife, Bakari Wyatt", in their private residence. They have 2 children and 4 grandchildren who live here and [place]. 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 He has Medicaid [employer]. He has adequate transportation to medical appointments. Patient is actively coping with diagnosis and treatment. He tells me he is \"emotionally doing well\". He has lot of people praying for him and feels well supported by family, friends and Muslim community with practical and emotional needs. 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 Patient's primacy concern is related to cost of care. Camilla RODRIGUEZ has assisted patient to [facility] for assistance with Creon. Referred patient to Jaleesa Pantoja for copay assistance with IV drugs. Mailed patient FAP application. Encouraged patient to contact me as needed for ongoing psychosocial support. Plan:   1.  Financial assistance referrals: Jaleesa Pantoja and FAP application provided     Referral/Handouts:   Financial/Medication assistance referral     Thank you,  Ray Degroot LCSW

## 2022-06-20 ENCOUNTER — TELEPHONE (OUTPATIENT)
Dept: ONCOLOGY | Age: 72
End: 2022-06-20

## 2022-06-20 NOTE — TELEPHONE ENCOUNTER
6/20/2022- Called [employer] as patient has already been re-enrolled in to the free drug program for Creon through 12/31/2022 and per rep, an order for Creon was placed on 6/14/2022 and it takes anywhere from 7-11 business days to process orders. I asked if Renetta Johnson could expedite the order however the rep stated their \"upper management\" is not allowing expedited refill requests anymore but the rep will try anyway. Called patient to explain this and he stated he is completely out and the pharmacy told him he could get a qty of 52 for $150 but this is not affordable for patient. He stated Salma Goldsmith got him some Creon when he first started on the med but I advised that this was likely a free trial and unfortunately is a once in a lifetime use. He wanted to ask Salma Goldsmith if there was anything else she could for him but I told him this was unlikely and would need to wait for the delivery to come in.

## 2022-06-20 NOTE — TELEPHONE ENCOUNTER
Patient states that he is out of his medication. He would like to know if he has a copay now.       # [phone]

## 2022-06-20 NOTE — TELEPHONE ENCOUNTER
Called patient and confirmed Francisca's statement that the creon voucher can only be used once in a lifetime. He reports he didn't have creon yesterday and had diarrhea last night. Suggested that he use imodium and/or gas-x. Suggested that if possible, get half the prescription and take 1 capsules verses 2 at meal times. He will try to  half of the medication tomorrow.       Oleg Charlton RD

## 2022-06-21 NOTE — PROGRESS NOTES
Cancer [place] at [place] [address] [facility] [address] [facility]  W: [phone]  F: [phone]      Reason for Visit:   Aileen Rodriguez is a 81 y.o. male who is seen for follow up of small cell carcinoma of the pancreas. Hematology/Oncology Treatment History:   · CT A/P 4/19/2022: Possible mild acute pancreatitis. Prominent sigmoid diverticulosis. Enlarged prostate. Fat-containing hernias  · CT Abdomen 5/17/2022: New intra-hepatic biliary dilatation and common bile duct dilatation. Ampullary prominence and 1.5 cm x 1.5 cm soft tissue density in the pancreaticoduodenal groove. New subcentimeter hepatic hypodense lesions, worrisome for metastatic disease. · MRI Abdomen 5/17/2022:  Periampullary pancreatic mass with possible extension into the wall of the duodenum. Associated intrahepatic and extra hepatic biliary dilatation and mild pancreatic ductal dilatation. Small hyperenhancing lesions throughout the liver suspicious for metastatic disease. · CT Chest 5/18/2022: no metastatic disease within thorax  · ERCP by Dr. Marcelyn Cranker 5/18/2022: Large ulcerated mass seen in the second portion of the duodenum taking half the circumference in the area of the major papilla and extending distally about 3 to 4 cm, appeared to be pancreatic and eroding into the duodenum. Cholangiogram with complete obstruction of CBD. Metal stent deployed. Biopsies taken, high grade neuroendocrine carcinoma, small cell type  · US guided liver biopsy 5/19/2022: small cell carcinoma  · Stage IV Small Cell Carcinoma of the Pancreas  · Initiated therapy with Carboplatin/Etoposide on 6/1/2022    History of Present Illness: Had Creon trial and diarrhea completely resolved. Stools were much more controlled. He ran out of medication and diarrhea returned after a few days off medication. Mild diarrhea. Mild fatigue. Mild headache. He is accompanied by his wife today.     Review of systems was obtained and pertinent findings reviewed above. Past medical history, social history, family history, medications, and allergies are located in the electronic medical record. Physical Exam:     Visit Vitals  /66   Pulse 60   Temp 97.8 °F (36.6 °C)   Resp 18   Ht 5' 10\" (1.778 m)   Wt 242 lb (109.8 kg)   SpO2 95%   BMI 34.72 kg/m²     ECOG PS: 1  General: no distress  Respiratory: normal respiratory effort  CV: no peripheral edema  Skin: no rashes; no ecchymoses; no petechiae      Results:     Lab Results   Component Value Date/Time    WBC 5.8 06/22/2022 09:19 AM    HGB 10.5 (L) 06/22/2022 09:19 AM    HCT 32.7 (L) 06/22/2022 09:19 AM    PLATELET 998 35/30/2238 09:19 AM    MCV 85.2 06/22/2022 09:19 AM    ABS. NEUTROPHILS 3.9 06/22/2022 09:19 AM     Lab Results   Component Value Date/Time    Sodium 142 06/22/2022 09:19 AM    Potassium 3.6 06/22/2022 09:19 AM    Chloride 109 (H) 06/22/2022 09:19 AM    CO2 24 06/22/2022 09:19 AM    Glucose 136 (H) 06/22/2022 09:19 AM    BUN 12 06/22/2022 09:19 AM    Creatinine 0.91 06/22/2022 09:19 AM    GFR est AA >60 06/22/2022 09:19 AM    GFR est non-AA >60 06/22/2022 09:19 AM    Calcium 8.8 06/22/2022 09:19 AM    Glucose (POC) 277 (H) 06/03/2022 11:47 AM    Creatinine (POC) 1.00 04/19/2022 02:47 PM     Lab Results   Component Value Date/Time    Bilirubin, total 0.6 06/22/2022 09:19 AM    ALT (SGPT) 21 06/22/2022 09:19 AM    Alk. phosphatase 87 06/22/2022 09:19 AM    Protein, total 6.7 06/22/2022 09:19 AM    Albumin 3.3 (L) 06/22/2022 09:19 AM    Globulin 3.4 06/22/2022 09:19 AM       Assessment:   1) Metastatic Small Cell Carcinoma of the Pancreas  He has disease within his pancrease, invading into the duodenum. He additionally has metastatic disease within his liver, confirmed with biopsy. His cancer is not curable and management is with palliative intent. My recommendation is for chemotherapy with Carboplatin and Etoposide given every 3 weeks for 4 cycles.   He tolerated C1 without difficulty. Will proceed with C2 today as ordered. The patient will be seen with each cycle of therapy, and labs will be monitored to assess for toxicity from therapy.    2)  Biliary obstruction  Secondary to his pancreatic cancer. S/p ERCP on 5/18/2022 with stent placement.    3)  Anemia, normocytic  Mild. Labwork unrevealing. Likely a combination of anemia of chronic disease as well as blood loss from his mass invading the duodenum.    4) DM  Follows endocrinology outpatient. 5) Fatigue  At baseline likely due to disease. Monitor     6) Diarrhea  Present since diagnosis. Trial of Creon since last visit with improvement in symptoms. Has application pending for free drug.      Plan:     Proceed today with C2 of Carboplatin (AUC 5) on day1 and Etoposide (100mg/m2) Days 1-3 given every 3 weeks for 4-6 cycles  Labs prior to each treatment: CBC, BMP on day 1  Antiemetic prophylaxis: Palonosetron prior to each treatment on day 1 and Dexamethasone prior to therapy on day 1-3  PRN antiemetics: Ondansetron, Prochlorperazine at home  EMLA cream to port  Continue Creon  Repeat CT before next visit  · Return to see me with each cycle of therapy    I personally saw and evaluated the patient and performed the key components of medical decision making. The history, physical exam, and documentation were performed by Francy Allen NP. I reviewed and verified the above documentation and modified it as needed. He is tolerating systemic therapy with manageable toxicity, so we will proceed with treatment. Repeat CT before next visit.     Signed By: Ria Feng MD

## 2022-06-22 ENCOUNTER — HOSPITAL ENCOUNTER (OUTPATIENT)
Dept: INFUSION THERAPY | Age: 72
Discharge: HOME | End: 2022-06-22
Payer: MEDICARE

## 2022-06-22 ENCOUNTER — OFFICE VISIT (OUTPATIENT)
Dept: ONCOLOGY | Age: 72
End: 2022-06-22
Payer: MEDICARE

## 2022-06-22 VITALS
TEMPERATURE: 97.9 F | DIASTOLIC BLOOD PRESSURE: 61 MMHG | HEART RATE: 61 BPM | RESPIRATION RATE: 18 BRPM | OXYGEN SATURATION: 95 % | SYSTOLIC BLOOD PRESSURE: 131 MMHG | WEIGHT: 242.9 LBS | BODY MASS INDEX: 34.77 KG/M2 | HEIGHT: 70 IN

## 2022-06-22 VITALS
BODY MASS INDEX: 34.65 KG/M2 | TEMPERATURE: 97.8 F | WEIGHT: 242 LBS | DIASTOLIC BLOOD PRESSURE: 66 MMHG | RESPIRATION RATE: 18 BRPM | HEIGHT: 70 IN | HEART RATE: 60 BPM | OXYGEN SATURATION: 95 % | SYSTOLIC BLOOD PRESSURE: 128 MMHG

## 2022-06-22 DIAGNOSIS — C80.1 SMALL CELL CARCINOMA (HCC): Primary | ICD-10-CM

## 2022-06-22 DIAGNOSIS — C25.0 MALIGNANT NEOPLASM OF HEAD OF PANCREAS (HCC): Primary | ICD-10-CM

## 2022-06-22 DIAGNOSIS — C78.7 METASTASIS TO LIVER (HCC): ICD-10-CM

## 2022-06-22 DIAGNOSIS — Z51.11 ENCOUNTER FOR ANTINEOPLASTIC CHEMOTHERAPY: ICD-10-CM

## 2022-06-22 LAB
ALBUMIN SERPL-MCNC: 3.3 G/DL (ref 3.5–5)
ALBUMIN/GLOB SERPL: 1 {RATIO} (ref 1.1–2.2)
ALP SERPL-CCNC: 87 U/L (ref 45–117)
ALT SERPL-CCNC: 21 U/L (ref 12–78)
ANION GAP SERPL CALC-SCNC: 9 MMOL/L (ref 5–15)
AST SERPL-CCNC: 21 U/L (ref 15–37)
BASOPHILS # BLD: 0 K/UL (ref 0–0.1)
BASOPHILS NFR BLD: 0 % (ref 0–1)
BILIRUB SERPL-MCNC: 0.6 MG/DL (ref 0.2–1)
BUN SERPL-MCNC: 12 MG/DL (ref 6–20)
BUN/CREAT SERPL: 13 (ref 12–20)
CALCIUM SERPL-MCNC: 8.8 MG/DL (ref 8.5–10.1)
CHLORIDE SERPL-SCNC: 109 MMOL/L (ref 97–108)
CO2 SERPL-SCNC: 24 MMOL/L (ref 21–32)
CREAT SERPL-MCNC: 0.91 MG/DL (ref 0.7–1.3)
DIFFERENTIAL METHOD BLD: ABNORMAL
EOSINOPHIL # BLD: 0 K/UL (ref 0–0.4)
EOSINOPHIL NFR BLD: 0 % (ref 0–7)
ERYTHROCYTE [DISTWIDTH] IN BLOOD BY AUTOMATED COUNT: 16.6 % (ref 11.5–14.5)
GLOBULIN SER CALC-MCNC: 3.4 G/DL (ref 2–4)
GLUCOSE SERPL-MCNC: 136 MG/DL (ref 65–100)
HCT VFR BLD AUTO: 32.7 % (ref 36.6–50.3)
HGB BLD-MCNC: 10.5 G/DL (ref 12.1–17)
IMM GRANULOCYTES # BLD AUTO: 0 K/UL
IMM GRANULOCYTES NFR BLD AUTO: 0 %
LYMPHOCYTES # BLD: 1.4 K/UL (ref 0.8–3.5)
LYMPHOCYTES NFR BLD: 24 % (ref 12–49)
MCH RBC QN AUTO: 27.3 PG (ref 26–34)
MCHC RBC AUTO-ENTMCNC: 32.1 G/DL (ref 30–36.5)
MCV RBC AUTO: 85.2 FL (ref 80–99)
METAMYELOCYTES NFR BLD MANUAL: 1 %
MONOCYTES # BLD: 0.4 K/UL (ref 0–1)
MONOCYTES NFR BLD: 7 % (ref 5–13)
MYELOCYTES NFR BLD MANUAL: 1 %
NEUTS BAND NFR BLD MANUAL: 3 % (ref 0–6)
NEUTS SEG # BLD: 3.9 K/UL (ref 1.8–8)
NEUTS SEG NFR BLD: 64 % (ref 32–75)
NRBC # BLD: 0.02 K/UL (ref 0–0.01)
NRBC BLD-RTO: 0.3 PER 100 WBC
PLATELET # BLD AUTO: 318 K/UL (ref 150–400)
PMV BLD AUTO: 9.7 FL (ref 8.9–12.9)
POTASSIUM SERPL-SCNC: 3.6 MMOL/L (ref 3.5–5.1)
PROT SERPL-MCNC: 6.7 G/DL (ref 6.4–8.2)
RBC # BLD AUTO: 3.84 M/UL (ref 4.1–5.7)
RBC MORPH BLD: ABNORMAL
SODIUM SERPL-SCNC: 142 MMOL/L (ref 136–145)
WBC # BLD AUTO: 5.8 K/UL (ref 4.1–11.1)

## 2022-06-22 PROCEDURE — G8536 NO DOC ELDER MAL SCRN: HCPCS | Performed by: INTERNAL MEDICINE

## 2022-06-22 PROCEDURE — 85025 COMPLETE CBC W/AUTO DIFF WBC: CPT

## 2022-06-22 PROCEDURE — 96375 TX/PRO/DX INJ NEW DRUG ADDON: CPT

## 2022-06-22 PROCEDURE — 80053 COMPREHEN METABOLIC PANEL: CPT

## 2022-06-22 PROCEDURE — G9717 DOC PT DX DEP/BP F/U NT REQ: HCPCS | Performed by: INTERNAL MEDICINE

## 2022-06-22 PROCEDURE — 1101F PT FALLS ASSESS-DOCD LE1/YR: CPT | Performed by: INTERNAL MEDICINE

## 2022-06-22 PROCEDURE — 99215 OFFICE O/P EST HI 40 MIN: CPT | Performed by: INTERNAL MEDICINE

## 2022-06-22 PROCEDURE — 96413 CHEMO IV INFUSION 1 HR: CPT

## 2022-06-22 PROCEDURE — 74011000250 HC RX REV CODE- 250: Performed by: NURSE PRACTITIONER

## 2022-06-22 PROCEDURE — G8754 DIAS BP LESS 90: HCPCS | Performed by: INTERNAL MEDICINE

## 2022-06-22 PROCEDURE — G8427 DOCREV CUR MEDS BY ELIG CLIN: HCPCS | Performed by: INTERNAL MEDICINE

## 2022-06-22 PROCEDURE — 74011250636 HC RX REV CODE- 250/636: Performed by: NURSE PRACTITIONER

## 2022-06-22 PROCEDURE — 74011000258 HC RX REV CODE- 258: Performed by: NURSE PRACTITIONER

## 2022-06-22 PROCEDURE — 36415 COLL VENOUS BLD VENIPUNCTURE: CPT

## 2022-06-22 PROCEDURE — 3017F COLORECTAL CA SCREEN DOC REV: CPT | Performed by: INTERNAL MEDICINE

## 2022-06-22 PROCEDURE — 96417 CHEMO IV INFUS EACH ADDL SEQ: CPT

## 2022-06-22 PROCEDURE — G8752 SYS BP LESS 140: HCPCS | Performed by: INTERNAL MEDICINE

## 2022-06-22 PROCEDURE — 77030012965 HC NDL HUBR BBMI -A

## 2022-06-22 PROCEDURE — 1123F ACP DISCUSS/DSCN MKR DOCD: CPT | Performed by: INTERNAL MEDICINE

## 2022-06-22 PROCEDURE — G8417 CALC BMI ABV UP PARAM F/U: HCPCS | Performed by: INTERNAL MEDICINE

## 2022-06-22 PROCEDURE — 96367 TX/PROPH/DG ADDL SEQ IV INF: CPT

## 2022-06-22 RX ORDER — EPINEPHRINE 1 MG/ML
0.3 INJECTION, SOLUTION, CONCENTRATE INTRAVENOUS AS NEEDED
Status: CANCELLED | OUTPATIENT
Start: 2022-07-13

## 2022-06-22 RX ORDER — SODIUM CHLORIDE 9 MG/ML
10 INJECTION INTRAMUSCULAR; INTRAVENOUS; SUBCUTANEOUS AS NEEDED
Status: CANCELLED | OUTPATIENT
Start: 2022-07-13

## 2022-06-22 RX ORDER — SODIUM CHLORIDE 0.9 % (FLUSH) 0.9 %
10 SYRINGE (ML) INJECTION AS NEEDED
Status: CANCELLED | OUTPATIENT
Start: 2022-07-14

## 2022-06-22 RX ORDER — DIPHENHYDRAMINE HYDROCHLORIDE 50 MG/ML
25 INJECTION, SOLUTION INTRAMUSCULAR; INTRAVENOUS AS NEEDED
Status: CANCELLED
Start: 2022-07-13

## 2022-06-22 RX ORDER — PALONOSETRON 0.05 MG/ML
0.25 INJECTION, SOLUTION INTRAVENOUS ONCE
Status: CANCELLED | OUTPATIENT
Start: 2022-07-13 | End: 2022-07-13

## 2022-06-22 RX ORDER — SODIUM CHLORIDE 9 MG/ML
25 INJECTION, SOLUTION INTRAVENOUS CONTINUOUS
Status: CANCELLED | OUTPATIENT
Start: 2022-07-13

## 2022-06-22 RX ORDER — SODIUM CHLORIDE 9 MG/ML
10 INJECTION INTRAMUSCULAR; INTRAVENOUS; SUBCUTANEOUS AS NEEDED
Status: CANCELLED | OUTPATIENT
Start: 2022-07-14

## 2022-06-22 RX ORDER — SODIUM CHLORIDE 9 MG/ML
25 INJECTION, SOLUTION INTRAVENOUS CONTINUOUS
Status: DISPENSED | OUTPATIENT
Start: 2022-06-22 | End: 2022-06-22

## 2022-06-22 RX ORDER — DEXAMETHASONE SODIUM PHOSPHATE 4 MG/ML
8 INJECTION, SOLUTION INTRA-ARTICULAR; INTRALESIONAL; INTRAMUSCULAR; INTRAVENOUS; SOFT TISSUE ONCE
Status: CANCELLED | OUTPATIENT
Start: 2022-07-15 | End: 2022-07-15

## 2022-06-22 RX ORDER — SODIUM CHLORIDE 9 MG/ML
10 INJECTION INTRAMUSCULAR; INTRAVENOUS; SUBCUTANEOUS AS NEEDED
Status: CANCELLED | OUTPATIENT
Start: 2022-07-15

## 2022-06-22 RX ORDER — ONDANSETRON 2 MG/ML
8 INJECTION INTRAMUSCULAR; INTRAVENOUS AS NEEDED
Status: CANCELLED | OUTPATIENT
Start: 2022-07-15

## 2022-06-22 RX ORDER — DIPHENHYDRAMINE HYDROCHLORIDE 50 MG/ML
25 INJECTION, SOLUTION INTRAMUSCULAR; INTRAVENOUS AS NEEDED
Status: CANCELLED
Start: 2022-07-15

## 2022-06-22 RX ORDER — SODIUM CHLORIDE 9 MG/ML
25 INJECTION, SOLUTION INTRAVENOUS CONTINUOUS
Status: CANCELLED | OUTPATIENT
Start: 2022-07-15

## 2022-06-22 RX ORDER — ACETAMINOPHEN 325 MG/1
650 TABLET ORAL AS NEEDED
Status: CANCELLED
Start: 2022-07-13

## 2022-06-22 RX ORDER — ALBUTEROL SULFATE 0.83 MG/ML
2.5 SOLUTION RESPIRATORY (INHALATION) AS NEEDED
Status: CANCELLED
Start: 2022-07-13

## 2022-06-22 RX ORDER — SODIUM CHLORIDE 9 MG/ML
25 INJECTION, SOLUTION INTRAVENOUS CONTINUOUS
Status: CANCELLED | OUTPATIENT
Start: 2022-07-14

## 2022-06-22 RX ORDER — HYDROCORTISONE SODIUM SUCCINATE 100 MG/2ML
100 INJECTION, POWDER, FOR SOLUTION INTRAMUSCULAR; INTRAVENOUS AS NEEDED
Status: CANCELLED | OUTPATIENT
Start: 2022-07-13

## 2022-06-22 RX ORDER — ACETAMINOPHEN 325 MG/1
650 TABLET ORAL AS NEEDED
Status: CANCELLED
Start: 2022-07-14

## 2022-06-22 RX ORDER — DIPHENHYDRAMINE HYDROCHLORIDE 50 MG/ML
25 INJECTION, SOLUTION INTRAMUSCULAR; INTRAVENOUS AS NEEDED
Status: CANCELLED
Start: 2022-07-14

## 2022-06-22 RX ORDER — DIPHENHYDRAMINE HYDROCHLORIDE 50 MG/ML
50 INJECTION, SOLUTION INTRAMUSCULAR; INTRAVENOUS AS NEEDED
Status: CANCELLED
Start: 2022-07-15

## 2022-06-22 RX ORDER — SODIUM CHLORIDE 9 MG/ML
10 INJECTION INTRAMUSCULAR; INTRAVENOUS; SUBCUTANEOUS AS NEEDED
Status: ACTIVE | OUTPATIENT
Start: 2022-06-22 | End: 2022-06-22

## 2022-06-22 RX ORDER — DIPHENHYDRAMINE HYDROCHLORIDE 50 MG/ML
50 INJECTION, SOLUTION INTRAMUSCULAR; INTRAVENOUS AS NEEDED
Status: CANCELLED
Start: 2022-07-13

## 2022-06-22 RX ORDER — ONDANSETRON 2 MG/ML
8 INJECTION INTRAMUSCULAR; INTRAVENOUS
Status: CANCELLED | OUTPATIENT
Start: 2022-07-14

## 2022-06-22 RX ORDER — ALBUTEROL SULFATE 0.83 MG/ML
2.5 SOLUTION RESPIRATORY (INHALATION) AS NEEDED
Status: CANCELLED
Start: 2022-07-15

## 2022-06-22 RX ORDER — HEPARIN 100 UNIT/ML
300-500 SYRINGE INTRAVENOUS AS NEEDED
Status: ACTIVE | OUTPATIENT
Start: 2022-06-22 | End: 2022-06-22

## 2022-06-22 RX ORDER — SODIUM CHLORIDE 0.9 % (FLUSH) 0.9 %
10 SYRINGE (ML) INJECTION AS NEEDED
Status: DISPENSED | OUTPATIENT
Start: 2022-06-22 | End: 2022-06-22

## 2022-06-22 RX ORDER — HYDROCORTISONE SODIUM SUCCINATE 100 MG/2ML
100 INJECTION, POWDER, FOR SOLUTION INTRAMUSCULAR; INTRAVENOUS AS NEEDED
Status: CANCELLED | OUTPATIENT
Start: 2022-07-15

## 2022-06-22 RX ORDER — ONDANSETRON 2 MG/ML
8 INJECTION INTRAMUSCULAR; INTRAVENOUS AS NEEDED
Status: CANCELLED | OUTPATIENT
Start: 2022-07-14

## 2022-06-22 RX ORDER — ALBUTEROL SULFATE 0.83 MG/ML
2.5 SOLUTION RESPIRATORY (INHALATION) AS NEEDED
Status: CANCELLED
Start: 2022-07-14

## 2022-06-22 RX ORDER — ONDANSETRON 2 MG/ML
8 INJECTION INTRAMUSCULAR; INTRAVENOUS AS NEEDED
Status: CANCELLED | OUTPATIENT
Start: 2022-07-13

## 2022-06-22 RX ORDER — ACETAMINOPHEN 325 MG/1
650 TABLET ORAL AS NEEDED
Status: CANCELLED
Start: 2022-07-15

## 2022-06-22 RX ORDER — DEXAMETHASONE SODIUM PHOSPHATE 4 MG/ML
8 INJECTION, SOLUTION INTRA-ARTICULAR; INTRALESIONAL; INTRAMUSCULAR; INTRAVENOUS; SOFT TISSUE ONCE
Status: CANCELLED | OUTPATIENT
Start: 2022-07-14 | End: 2022-07-14

## 2022-06-22 RX ORDER — SODIUM CHLORIDE 0.9 % (FLUSH) 0.9 %
10 SYRINGE (ML) INJECTION AS NEEDED
Status: CANCELLED | OUTPATIENT
Start: 2022-07-13

## 2022-06-22 RX ORDER — EPINEPHRINE 1 MG/ML
0.3 INJECTION, SOLUTION, CONCENTRATE INTRAVENOUS AS NEEDED
Status: CANCELLED | OUTPATIENT
Start: 2022-07-15

## 2022-06-22 RX ORDER — ONDANSETRON 2 MG/ML
8 INJECTION INTRAMUSCULAR; INTRAVENOUS
Status: CANCELLED | OUTPATIENT
Start: 2022-07-15

## 2022-06-22 RX ORDER — HEPARIN 100 UNIT/ML
300-500 SYRINGE INTRAVENOUS AS NEEDED
Status: CANCELLED
Start: 2022-07-13

## 2022-06-22 RX ORDER — PALONOSETRON 0.05 MG/ML
0.25 INJECTION, SOLUTION INTRAVENOUS ONCE
Status: COMPLETED | OUTPATIENT
Start: 2022-06-22 | End: 2022-06-22

## 2022-06-22 RX ORDER — EPINEPHRINE 1 MG/ML
0.3 INJECTION, SOLUTION, CONCENTRATE INTRAVENOUS AS NEEDED
Status: CANCELLED | OUTPATIENT
Start: 2022-07-14

## 2022-06-22 RX ORDER — SODIUM CHLORIDE 0.9 % (FLUSH) 0.9 %
10 SYRINGE (ML) INJECTION AS NEEDED
Status: CANCELLED | OUTPATIENT
Start: 2022-07-15

## 2022-06-22 RX ORDER — HYDROCORTISONE SODIUM SUCCINATE 100 MG/2ML
100 INJECTION, POWDER, FOR SOLUTION INTRAMUSCULAR; INTRAVENOUS AS NEEDED
Status: CANCELLED | OUTPATIENT
Start: 2022-07-14

## 2022-06-22 RX ORDER — HEPARIN 100 UNIT/ML
300-500 SYRINGE INTRAVENOUS AS NEEDED
Status: CANCELLED
Start: 2022-07-15

## 2022-06-22 RX ORDER — HEPARIN 100 UNIT/ML
300-500 SYRINGE INTRAVENOUS AS NEEDED
Status: CANCELLED
Start: 2022-07-14

## 2022-06-22 RX ORDER — DIPHENHYDRAMINE HYDROCHLORIDE 50 MG/ML
50 INJECTION, SOLUTION INTRAMUSCULAR; INTRAVENOUS AS NEEDED
Status: CANCELLED
Start: 2022-07-14

## 2022-06-22 RX ADMIN — FOSAPREPITANT 150 MG: 150 INJECTION, POWDER, LYOPHILIZED, FOR SOLUTION INTRAVENOUS at 11:33

## 2022-06-22 RX ADMIN — SODIUM CHLORIDE, PRESERVATIVE FREE 10 ML: 5 INJECTION INTRAVENOUS at 15:08

## 2022-06-22 RX ADMIN — DEXAMETHASONE SODIUM PHOSPHATE 12 MG: 4 INJECTION, SOLUTION INTRA-ARTICULAR; INTRALESIONAL; INTRAMUSCULAR; INTRAVENOUS; SOFT TISSUE at 11:35

## 2022-06-22 RX ADMIN — CARBOPLATIN 714 MG: 10 INJECTION, SOLUTION INTRAVENOUS at 12:54

## 2022-06-22 RX ADMIN — SODIUM CHLORIDE 25 ML/HR: 9 INJECTION, SOLUTION INTRAVENOUS at 11:26

## 2022-06-22 RX ADMIN — Medication 500 UNITS: at 15:08

## 2022-06-22 RX ADMIN — PALONOSETRON 0.25 MG: 0.25 INJECTION, SOLUTION INTRAVENOUS at 11:29

## 2022-06-22 RX ADMIN — ETOPOSIDE 238 MG: 20 INJECTION INTRAVENOUS at 13:43

## 2022-06-22 NOTE — PROGRESS NOTES
Yuko Jamalkavita. is a 81 y.o. male follow up for SCC of pancreas. 1. Have you been to the ER, urgent care clinic since your last visit? Hospitalized since your last visit?no    2. Have you seen or consulted any other health care providers outside of the [address] [place] [address] since your last visit? Include any pap smears or colon screening.  No    Vitals 6/22/2022   Blood Pressure 128/66   Pulse 60   Temp 97.8   Resp 18   Height 5' 10\"   Weight 242 lb 14.4 oz   SpO2 95   BSA 2.33 m2   BMI 34.85 kg/m2

## 2022-06-22 NOTE — PROGRESS NOTES
[facility] VISIT NOTE  Date: 2022    Name: Dorothy Calvert MRN: [id number]         : 1950    Mr. Alea Palmer Arrived ambulatory and in no distress for C2D1 of Carbo/ Etoposide Regimen. Assessment was completed by Gifty Smith RN, no acute issues at this time, no new complaints voiced. Chest wall port accessed by assessment niyeimy using 1 inch needle without difficulty, labs drawn & sent for processing. 1. Do you have any symptoms of COVID-19? SOB, coughing, fever, or generally not feeling well NO    2. Have you been exposed to COVID-19 recently? NO    3. Have you had any recent contact with family/friend that has a pending COVID test? NO       Chemotherapy Flowsheet 2022   Cycle C2D1   Date 2022   Drug / Regimen Carbo/Etoposide   Pre Meds given   Notes given           Mr. Hans Parrish vitals were reviewed. Patient Vitals for the past 12 hrs:   Temp Pulse Resp BP SpO2   22 1459 97.9 °F (36.6 °C) 61 18 131/61 95 %   22 0914 97.8 °F (36.6 °C) 60 18 128/66 95 %         Lab results were obtained and reviewed. Recent Results (from the past 12 hour(s))   CBC WITH AUTOMATED DIFF    Collection Time: 22  9:19 AM   Result Value Ref Range    WBC 5.8 4.1 - 11.1 K/uL    RBC 3.84 (L) 4.10 - 5.70 M/uL    HGB 10.5 (L) 12.1 - 17.0 g/dL    HCT 32.7 (L) 36.6 - 50.3 %    MCV 85.2 80.0 - 99.0 FL    MCH 27.3 26.0 - 34.0 PG    MCHC 32.1 30.0 - 36.5 g/dL    RDW 16.6 (H) 11.5 - 14.5 %    PLATELET 436 798 - 660 K/uL    MPV 9.7 8.9 - 12.9 FL    NRBC 0.3 (H) 0  WBC    ABSOLUTE NRBC 0.02 (H) 0.00 - 0.01 K/uL    NEUTROPHILS 64 32 - 75 %    BAND NEUTROPHILS 3 0 - 6 %    LYMPHOCYTES 24 12 - 49 %    MONOCYTES 7 5 - 13 %    EOSINOPHILS 0 0 - 7 %    BASOPHILS 0 0 - 1 %    METAMYELOCYTES 1 (H) 0 %    MYELOCYTES 1 (H) 0 %    IMMATURE GRANULOCYTES 0 %    ABS. NEUTROPHILS 3.9 1.8 - 8.0 K/UL    ABS. LYMPHOCYTES 1.4 0.8 - 3.5 K/UL    ABS. MONOCYTES 0.4 0.0 - 1.0 K/UL    ABS.  EOSINOPHILS 0.0 0.0 - 0.4 K/UL ABS. BASOPHILS 0.0 0.0 - 0.1 K/UL    ABS. IMM. GRANS. 0.0 K/UL    DF MANUAL      RBC COMMENTS ANISOCYTOSIS  1+       METABOLIC PANEL, COMPREHENSIVE    Collection Time: 06/22/22  9:19 AM   Result Value Ref Range    Sodium 142 136 - 145 mmol/L    Potassium 3.6 3.5 - 5.1 mmol/L    Chloride 109 (H) 97 - 108 mmol/L    CO2 24 21 - 32 mmol/L    Anion gap 9 5 - 15 mmol/L    Glucose 136 (H) 65 - 100 mg/dL    BUN 12 6 - 20 MG/DL    Creatinine 0.91 0.70 - 1.30 MG/DL    BUN/Creatinine ratio 13 12 - 20      GFR est AA >60 >60 ml/min/1.73m2    GFR est non-AA >60 >60 ml/min/1.73m2    Calcium 8.8 8.5 - 10.1 MG/DL    Bilirubin, total 0.6 0.2 - 1.0 MG/DL    ALT (SGPT) 21 12 - 78 U/L    AST (SGOT) 21 15 - 37 U/L    Alk.  phosphatase 87 45 - 117 U/L    Protein, total 6.7 6.4 - 8.2 g/dL    Albumin 3.3 (L) 3.5 - 5.0 g/dL    Globulin 3.4 2.0 - 4.0 g/dL    A-G Ratio 1.0 (L) 1.1 - 2.2         Medications received:  Medications Administered     0.9% sodium chloride infusion     Admin Date  06/22/2022 Action  New Bag Dose  25 mL/hr Rate  25 mL/hr Route  IntraVENous Administered By  Tunde Cerda RN          0.9% sodium chloride injection 10 mL     Admin Date  06/22/2022 Action  Given Dose  10 mL Route  IntraVENous Administered By  Tunde Cerda RN          CARBOplatin (PARAPLATIN) 714 mg in 0.9% sodium chloride 250 mL, overfill volume 25 mL chemo infusion     Admin Date  06/22/2022 Action  New Bag Dose  714 mg Rate  692.8 mL/hr Route  IntraVENous Administered By  Tunde Cerda RN          dexamethasone (DECADRON) 12 mg in 0.9% sodium chloride 50 mL IVPB     Admin Date  06/22/2022 Action  New Bag Dose  12 mg Route  IntraVENous Administered By  Tunde Cerda RN          etoposide (VEPESID) 238 mg in 0.9% sodium chloride 1,000 mL, overfill volume 100 mL chemo infusion     Admin Date  06/22/2022 Action  New Bag Dose  238 mg Rate  1,111.9 mL/hr Route  IntraVENous Administered By  Tunde Cerda RN          fosaprepitant (EMEND) 150 mg in 0.9% sodium chloride 150 mL IVPB     Admin Date  06/22/2022 Action  New Bag Dose  150 mg Rate  450 mL/hr Route  IntraVENous Administered By  Tunde Cerda RN          heparin (porcine) pf 300-500 Units     Admin Date  06/22/2022 Action  Given Dose  500 Units Route  InterCATHeter Administered By  Tunde Cerda RN          palonosetron HCl (ALOXI) injection 0.25 mg     Admin Date  06/22/2022 Action  Given Dose  0.25 mg Route  IntraVENous Administered By  Tunde Cerda RN                 Two nurses verified prior to administering:    Drug name  Drug dose  Infusion volume or drug volume when prepared in a syringe  Rate of administration  Route of administration  Expiration dates and/or times  Appearance and physical integrity of the drugs  Rate set on infusion pump, when used  Sequencing of drug administration    AVS refused. Mr. Galindo Parra tolerated treatment well and was discharged from [place] [address] in stable condition at 1510. Port flushed, heparinized, capped and leave chest port access for tomorrow treatment per patient request. He is to return on  June 23, 2022 at 0900 for his next appointment.     Naun Dmuont RN  June 22, 2022    Future Appointments:  Future Appointments   Date Time Provider Cindy Gee   6/23/2022  9:00 AM SS INF3 CH3 <4H [facility]   6/24/2022  9:00 AM SS INF1 CH3 <4H [facility]   6/27/2022 10:30 AM [place] [address] [facility] BS AMB   7/13/2022  9:00 AM SS INF3 CH1 >4H [facility]   7/13/2022  9:15 AM Shahriar Patel MD [facility] BS AMB   7/14/2022  9:00 AM SS INF1 CH3 <4H [facility]   7/15/2022  9:00 AM SS INF3 CH3 <4H [facility]   8/3/2022  9:00 AM SS INF1 CH1 >4H [facility]   8/4/2022  9:00 AM SS INF3 CH3 <4H [facility]   8/5/2022  9:00 AM SS INF3 CH3 <4H [facility] Tom[facility] Lebron

## 2022-06-23 ENCOUNTER — HOSPITAL ENCOUNTER (OUTPATIENT)
Dept: INFUSION THERAPY | Age: 72
Discharge: HOME | End: 2022-06-23
Payer: MEDICARE

## 2022-06-23 VITALS
OXYGEN SATURATION: 96 % | DIASTOLIC BLOOD PRESSURE: 69 MMHG | SYSTOLIC BLOOD PRESSURE: 147 MMHG | WEIGHT: 248 LBS | HEIGHT: 70 IN | RESPIRATION RATE: 16 BRPM | TEMPERATURE: 98.1 F | HEART RATE: 62 BPM | BODY MASS INDEX: 35.5 KG/M2

## 2022-06-23 DIAGNOSIS — C80.1 SMALL CELL CARCINOMA (HCC): Primary | ICD-10-CM

## 2022-06-23 DIAGNOSIS — Z51.11 ENCOUNTER FOR ANTINEOPLASTIC CHEMOTHERAPY: ICD-10-CM

## 2022-06-23 PROCEDURE — 96413 CHEMO IV INFUSION 1 HR: CPT

## 2022-06-23 PROCEDURE — 74011250636 HC RX REV CODE- 250/636: Performed by: NURSE PRACTITIONER

## 2022-06-23 PROCEDURE — 96375 TX/PRO/DX INJ NEW DRUG ADDON: CPT

## 2022-06-23 RX ORDER — SODIUM CHLORIDE 9 MG/ML
10 INJECTION INTRAMUSCULAR; INTRAVENOUS; SUBCUTANEOUS AS NEEDED
Status: ACTIVE | OUTPATIENT
Start: 2022-06-23 | End: 2022-06-23

## 2022-06-23 RX ORDER — SODIUM CHLORIDE 9 MG/ML
25 INJECTION, SOLUTION INTRAVENOUS CONTINUOUS
Status: DISPENSED | OUTPATIENT
Start: 2022-06-23 | End: 2022-06-23

## 2022-06-23 RX ORDER — HEPARIN 100 UNIT/ML
300-500 SYRINGE INTRAVENOUS AS NEEDED
Status: ACTIVE | OUTPATIENT
Start: 2022-06-23 | End: 2022-06-23

## 2022-06-23 RX ORDER — DEXAMETHASONE SODIUM PHOSPHATE 4 MG/ML
8 INJECTION, SOLUTION INTRA-ARTICULAR; INTRALESIONAL; INTRAMUSCULAR; INTRAVENOUS; SOFT TISSUE ONCE
Status: COMPLETED | OUTPATIENT
Start: 2022-06-23 | End: 2022-06-23

## 2022-06-23 RX ORDER — SODIUM CHLORIDE 0.9 % (FLUSH) 0.9 %
10 SYRINGE (ML) INJECTION AS NEEDED
Status: DISPENSED | OUTPATIENT
Start: 2022-06-23 | End: 2022-06-23

## 2022-06-23 RX ADMIN — DEXAMETHASONE SODIUM PHOSPHATE 8 MG: 4 INJECTION, SOLUTION INTRA-ARTICULAR; INTRALESIONAL; INTRAMUSCULAR; INTRAVENOUS; SOFT TISSUE at 09:35

## 2022-06-23 RX ADMIN — ETOPOSIDE 238 MG: 20 INJECTION INTRAVENOUS at 10:28

## 2022-06-23 RX ADMIN — SODIUM CHLORIDE 25 ML/HR: 9 INJECTION, SOLUTION INTRAVENOUS at 09:45

## 2022-06-23 NOTE — PROGRESS NOTES
A Spiritual Care Partner Volunteer visited patient in [address] [place] [address] on 6/23/2022  Documented by:  Chaplain Arthur MDiv, MS, [facility]

## 2022-06-23 NOTE — PROGRESS NOTES
\[facility] [place] [facility]\"" Progress Note    Date: 2022    Name: Ramesh Pugh MRN: [id number]         : 1950    Mr. Demetrius Rosa Arrived ambulatory and in no distress for C2D2 of Regimen. Assessment was completed, no acute issues at this time, no new complaints voiced. R chest port chest wall port accessed without difficulty, yesterday and secured with tape and biopatch, flushes easily positive blood return. Chemotherapy Flowsheet 2022   Cycle C2D2   Date 2022   Drug / Regimen Etoposide   Pre Meds given   Notes given           Mr. Edwar Bartholomew vitals were reviewed. Visit Vitals  /63   Pulse 67   Temp 98.1 °F (36.7 °C)   Resp 16   Ht 5' 10\" (1.778 m)   Wt 112.5 kg (248 lb)   SpO2 96%   BMI 35.58 kg/m²       Medications:    Medications Administered     0.9% sodium chloride infusion     Admin Date  2022 Action  New Bag Dose  25 mL/hr Rate  25 mL/hr Route  IntraVENous Administered By  Kenan Ramirez          dexamethasone (DECADRON) 4 mg/mL injection 8 mg     Admin Date  2022 Action  Given Dose  8 mg Route  IntraVENous Administered By  Kenan Ramirez          etoposide (VEPESID) 238 mg in 0.9% sodium chloride 1,000 mL, overfill volume 100 mL chemo infusion     Admin Date  2022 Action  New Bag Dose  238 mg Rate  1,111.9 mL/hr Route  IntraVENous Administered By  Kenan Ramirez                  Two nurses verified prior to administering:  Drug name  Drug dose  Infusion volume or drug volume when prepared in a syringe  Rate of administration  Route of administration  Expiration dates and/or times  Appearance and physical integrity of the drugs  Rate set on infusion pump, when used  Sequencing of drug administration          Mr. Bowman tolerated treatment well and was discharged from [place] [address] in stable condition. Port secured for use tomorrow. He is to return on  for his next appointment.     Niko Mendoza  2022

## 2022-06-24 ENCOUNTER — HOSPITAL ENCOUNTER (OUTPATIENT)
Dept: INFUSION THERAPY | Age: 72
Discharge: HOME | End: 2022-06-24
Payer: MEDICARE

## 2022-06-24 VITALS
RESPIRATION RATE: 16 BRPM | WEIGHT: 250.4 LBS | HEIGHT: 70 IN | BODY MASS INDEX: 35.85 KG/M2 | DIASTOLIC BLOOD PRESSURE: 58 MMHG | HEART RATE: 54 BPM | SYSTOLIC BLOOD PRESSURE: 130 MMHG | TEMPERATURE: 97.8 F | OXYGEN SATURATION: 97 %

## 2022-06-24 DIAGNOSIS — Z51.11 ENCOUNTER FOR ANTINEOPLASTIC CHEMOTHERAPY: ICD-10-CM

## 2022-06-24 DIAGNOSIS — C80.1 SMALL CELL CARCINOMA (HCC): Primary | ICD-10-CM

## 2022-06-24 PROCEDURE — 74011250636 HC RX REV CODE- 250/636: Performed by: NURSE PRACTITIONER

## 2022-06-24 PROCEDURE — 96413 CHEMO IV INFUSION 1 HR: CPT

## 2022-06-24 PROCEDURE — 74011000250 HC RX REV CODE- 250: Performed by: NURSE PRACTITIONER

## 2022-06-24 PROCEDURE — 96375 TX/PRO/DX INJ NEW DRUG ADDON: CPT

## 2022-06-24 RX ORDER — SODIUM CHLORIDE 9 MG/ML
10 INJECTION INTRAMUSCULAR; INTRAVENOUS; SUBCUTANEOUS AS NEEDED
Status: ACTIVE | OUTPATIENT
Start: 2022-06-24 | End: 2022-06-24

## 2022-06-24 RX ORDER — SODIUM CHLORIDE 9 MG/ML
25 INJECTION, SOLUTION INTRAVENOUS CONTINUOUS
Status: DISPENSED | OUTPATIENT
Start: 2022-06-24 | End: 2022-06-24

## 2022-06-24 RX ORDER — BACLOFEN 10 MG/1
10 TABLET ORAL
Qty: 20 TABLET | Refills: 0 | Status: SHIPPED | OUTPATIENT
Start: 2022-06-24 | End: 2022-09-22

## 2022-06-24 RX ORDER — HEPARIN 100 UNIT/ML
300-500 SYRINGE INTRAVENOUS AS NEEDED
Status: ACTIVE | OUTPATIENT
Start: 2022-06-24 | End: 2022-06-24

## 2022-06-24 RX ORDER — DEXAMETHASONE SODIUM PHOSPHATE 4 MG/ML
8 INJECTION, SOLUTION INTRA-ARTICULAR; INTRALESIONAL; INTRAMUSCULAR; INTRAVENOUS; SOFT TISSUE ONCE
Status: COMPLETED | OUTPATIENT
Start: 2022-06-24 | End: 2022-06-24

## 2022-06-24 RX ORDER — SODIUM CHLORIDE 0.9 % (FLUSH) 0.9 %
10 SYRINGE (ML) INJECTION AS NEEDED
Status: DISPENSED | OUTPATIENT
Start: 2022-06-24 | End: 2022-06-24

## 2022-06-24 RX ORDER — ONDANSETRON 2 MG/ML
8 INJECTION INTRAMUSCULAR; INTRAVENOUS
Status: COMPLETED | OUTPATIENT
Start: 2022-06-24 | End: 2022-06-24

## 2022-06-24 RX ADMIN — SODIUM CHLORIDE 25 ML/HR: 9 INJECTION, SOLUTION INTRAVENOUS at 09:16

## 2022-06-24 RX ADMIN — Medication 500 UNITS: at 11:16

## 2022-06-24 RX ADMIN — ETOPOSIDE 238 MG: 20 INJECTION INTRAVENOUS at 10:05

## 2022-06-24 RX ADMIN — ONDANSETRON 8 MG: 2 INJECTION INTRAMUSCULAR; INTRAVENOUS at 09:25

## 2022-06-24 RX ADMIN — DEXAMETHASONE SODIUM PHOSPHATE 8 MG: 4 INJECTION, SOLUTION INTRA-ARTICULAR; INTRALESIONAL; INTRAMUSCULAR; INTRAVENOUS; SOFT TISSUE at 09:19

## 2022-06-24 RX ADMIN — SODIUM CHLORIDE, PRESERVATIVE FREE 10 ML: 5 INJECTION INTRAVENOUS at 09:16

## 2022-06-24 RX ADMIN — SODIUM CHLORIDE, PRESERVATIVE FREE 10 ML: 5 INJECTION INTRAVENOUS at 11:16

## 2022-06-24 NOTE — PROGRESS NOTES
[facility] VISIT NOTE  Date: 2022    Name: Jose Angel Booth. MRN: [id number]         : 1950    Mr. John Moore Arrived ambulatory and in no distress for C2D3 of Etoposide Regimen. Assessment was completed, no acute issues at this time, pt complained of hiccupping and MD aware. Chest wall port flushed with +bld return and dressing CDI. 1. Do you have any symptoms of COVID-19? SOB, coughing, fever, or generally not feeling well NO    2. Have you been exposed to COVID-19 recently? NO    3. Have you had any recent contact with family/friend that has a pending COVID test? NO       Chemotherapy Flowsheet 2022   Cycle C2D3   Date 2022   Drug / Regimen Etoposide   Pre Meds given   Notes given           Mr. Shereen Shelley vitals were reviewed. Patient Vitals for the past 12 hrs:   Temp Pulse Resp BP SpO2   22 1116 97.8 °F (36.6 °C) (!) 54 16 (!) 130/58 97 %   22 0909 98.1 °F (36.7 °C) (!) 51 16 (!) 139/59 95 %         Lab results were obtained and reviewed.   N/A       Medications received:  Medications Administered     0.9% sodium chloride infusion     Admin Date  2022 Action  New Bag Dose  25 mL/hr Rate  25 mL/hr Route  IntraVENous Administered By  Loli Price RN          0.9% sodium chloride injection 10 mL     Admin Date  2022 Action  Given Dose  10 mL Route  IntraVENous Administered By  Loli Price RN           Admin Date  2022 Action  Given Dose  10 mL Route  IntraVENous Administered By  Loli Price RN          dexamethasone (DECADRON) 4 mg/mL injection 8 mg     Admin Date  2022 Action  Given Dose  8 mg Route  IntraVENous Administered By  Loli Price RN          etoposide (VEPESID) 238 mg in 0.9% sodium chloride 1,000 mL, overfill volume 100 mL chemo infusion     Admin Date  2022 Action  New Bag Dose  238 mg Rate  1,111.9 mL/hr Route  IntraVENous Administered By  Loli Price RN          heparin (porcine) pf 300-500 Units     Admin Date  2022 Action  Given Dose  500 Units Route  InterCATHeter Administered By  Warden Leo RN          ondansetron [facility]) injection 8 mg     Admin Date  06/24/2022 Action  Given Dose  8 mg Route  IntraVENous Administered By  Warden Leo RN                 Two nurses verified prior to administering:    Drug name  Drug dose  Infusion volume or drug volume when prepared in a syringe  Rate of administration  Route of administration  Expiration dates and/or times  Appearance and physical integrity of the drugs  Rate set on infusion pump, when used  Sequencing of drug administration    AVS refused. Mr. Avtar Scruggs tolerated treatment well and was discharged from [place] [address] in stable condition at 1120. Port de-accessed, flushed & heparinized per protocol. He is to return on  July 13, 2022 at 0900 for his next appointment.     Diamond Snellen, RN  June 24, 2022    Future Appointments:  Future Appointments   Date Time Provider Cindy Marlen   6/27/2022 10:30 AM [address] [facility] BS AMB   7/13/2022  9:00 AM SS INF3 CH1 >4H [facility]   7/13/2022  9:15 AM Lashay Patel MD [facility] BS AMB   7/14/2022  9:00 AM SS INF1 CH3 <4H [facility]   7/15/2022  9:00 AM SS INF3 CH3 <4H [facility]   8/3/2022  9:00 AM SS INF1 CH1 >4H [facility]   8/4/2022  9:00 AM SS INF3 CH3 <4H [facility]   8/5/2022  9:00 AM SS INF3 CH3 <4H [facility] Michelle Lockhart

## 2022-06-27 ENCOUNTER — OFFICE VISIT (OUTPATIENT)
Dept: CARDIOLOGY CLINIC | Age: 72
End: 2022-06-27
Payer: MEDICARE

## 2022-06-27 DIAGNOSIS — Z95.818 STATUS POST PLACEMENT OF IMPLANTABLE LOOP RECORDER: Primary | ICD-10-CM

## 2022-06-27 PROCEDURE — 93298 REM INTERROG DEV EVAL SCRMS: CPT | Performed by: INTERNAL MEDICINE

## 2022-07-01 RX ORDER — PANCRELIPASE 36000; 180000; 114000 [USP'U]/1; [USP'U]/1; [USP'U]/1
8 CAPSULE, DELAYED RELEASE PELLETS ORAL
Qty: 112 CAPSULE | Refills: 0 | Status: CANCELLED | OUTPATIENT
Start: 2022-07-01

## 2022-07-06 ENCOUNTER — HOSPITAL ENCOUNTER (OUTPATIENT)
Dept: CT IMAGING | Age: 72
Discharge: HOME | End: 2022-07-06
Attending: NURSE PRACTITIONER
Payer: MEDICARE

## 2022-07-06 DIAGNOSIS — C25.0 MALIGNANT NEOPLASM OF HEAD OF PANCREAS (HCC): ICD-10-CM

## 2022-07-06 DIAGNOSIS — C78.7 METASTASIS TO LIVER (HCC): ICD-10-CM

## 2022-07-06 PROCEDURE — 74011000636 HC RX REV CODE- 636: Performed by: NURSE PRACTITIONER

## 2022-07-06 PROCEDURE — 74177 CT ABD & PELVIS W/CONTRAST: CPT

## 2022-07-06 RX ADMIN — IOPAMIDOL 100 ML: 755 INJECTION, SOLUTION INTRAVENOUS at 15:23

## 2022-07-13 ENCOUNTER — OFFICE VISIT (OUTPATIENT)
Dept: ONCOLOGY | Age: 72
End: 2022-07-13
Payer: MEDICARE

## 2022-07-13 ENCOUNTER — HOSPITAL ENCOUNTER (OUTPATIENT)
Dept: INFUSION THERAPY | Age: 72
Discharge: HOME | End: 2022-07-13
Payer: MEDICARE

## 2022-07-13 VITALS
HEART RATE: 58 BPM | HEIGHT: 70 IN | RESPIRATION RATE: 18 BRPM | TEMPERATURE: 97.8 F | DIASTOLIC BLOOD PRESSURE: 71 MMHG | WEIGHT: 244.3 LBS | SYSTOLIC BLOOD PRESSURE: 130 MMHG | BODY MASS INDEX: 34.97 KG/M2 | OXYGEN SATURATION: 96 %

## 2022-07-13 VITALS
HEART RATE: 58 BPM | RESPIRATION RATE: 18 BRPM | HEIGHT: 70 IN | OXYGEN SATURATION: 96 % | BODY MASS INDEX: 34.93 KG/M2 | TEMPERATURE: 97.8 F | DIASTOLIC BLOOD PRESSURE: 63 MMHG | SYSTOLIC BLOOD PRESSURE: 140 MMHG | WEIGHT: 244 LBS

## 2022-07-13 DIAGNOSIS — C78.7 METASTASIS TO LIVER (HCC): ICD-10-CM

## 2022-07-13 DIAGNOSIS — C25.0 MALIGNANT NEOPLASM OF HEAD OF PANCREAS (HCC): Primary | ICD-10-CM

## 2022-07-13 DIAGNOSIS — C80.1 SMALL CELL CARCINOMA (HCC): Primary | ICD-10-CM

## 2022-07-13 DIAGNOSIS — Z51.11 ENCOUNTER FOR ANTINEOPLASTIC CHEMOTHERAPY: ICD-10-CM

## 2022-07-13 LAB
ALBUMIN SERPL-MCNC: 3.2 G/DL (ref 3.5–5)
ALBUMIN/GLOB SERPL: 0.9 {RATIO} (ref 1.1–2.2)
ALP SERPL-CCNC: 79 U/L (ref 45–117)
ALT SERPL-CCNC: 23 U/L (ref 12–78)
ANION GAP SERPL CALC-SCNC: 9 MMOL/L (ref 5–15)
AST SERPL-CCNC: 20 U/L (ref 15–37)
BASOPHILS # BLD: 0.1 K/UL (ref 0–0.1)
BASOPHILS NFR BLD: 2 % (ref 0–1)
BILIRUB SERPL-MCNC: 0.7 MG/DL (ref 0.2–1)
BUN SERPL-MCNC: 12 MG/DL (ref 6–20)
BUN/CREAT SERPL: 14 (ref 12–20)
CALCIUM SERPL-MCNC: 8.4 MG/DL (ref 8.5–10.1)
CHLORIDE SERPL-SCNC: 107 MMOL/L (ref 97–108)
CO2 SERPL-SCNC: 25 MMOL/L (ref 21–32)
CREAT SERPL-MCNC: 0.84 MG/DL (ref 0.7–1.3)
DIFFERENTIAL METHOD BLD: ABNORMAL
EOSINOPHIL # BLD: 0.1 K/UL (ref 0–0.4)
EOSINOPHIL NFR BLD: 2 % (ref 0–7)
ERYTHROCYTE [DISTWIDTH] IN BLOOD BY AUTOMATED COUNT: 16.7 % (ref 11.5–14.5)
GLOBULIN SER CALC-MCNC: 3.5 G/DL (ref 2–4)
GLUCOSE SERPL-MCNC: 138 MG/DL (ref 65–100)
HCT VFR BLD AUTO: 30.1 % (ref 36.6–50.3)
HGB BLD-MCNC: 9.5 G/DL (ref 12.1–17)
IMM GRANULOCYTES # BLD AUTO: 0 K/UL
IMM GRANULOCYTES NFR BLD AUTO: 0 %
LYMPHOCYTES # BLD: 0.9 K/UL (ref 0.8–3.5)
LYMPHOCYTES NFR BLD: 24 % (ref 12–49)
MCH RBC QN AUTO: 26.6 PG (ref 26–34)
MCHC RBC AUTO-ENTMCNC: 31.6 G/DL (ref 30–36.5)
MCV RBC AUTO: 84.3 FL (ref 80–99)
MONOCYTES # BLD: 0.3 K/UL (ref 0–1)
MONOCYTES NFR BLD: 7 % (ref 5–13)
MYELOCYTES NFR BLD MANUAL: 1 %
NEUTS BAND NFR BLD MANUAL: 4 % (ref 0–6)
NEUTS SEG # BLD: 2.5 K/UL (ref 1.8–8)
NEUTS SEG NFR BLD: 60 % (ref 32–75)
NRBC # BLD: 0.03 K/UL (ref 0–0.01)
NRBC BLD-RTO: 0.8 PER 100 WBC
PLATELET # BLD AUTO: 230 K/UL (ref 150–400)
PMV BLD AUTO: 9.4 FL (ref 8.9–12.9)
POTASSIUM SERPL-SCNC: 3.4 MMOL/L (ref 3.5–5.1)
PROT SERPL-MCNC: 6.7 G/DL (ref 6.4–8.2)
RBC # BLD AUTO: 3.57 M/UL (ref 4.1–5.7)
RBC MORPH BLD: ABNORMAL
SODIUM SERPL-SCNC: 141 MMOL/L (ref 136–145)
WBC # BLD AUTO: 3.9 K/UL (ref 4.1–11.1)
WBC MORPH BLD: ABNORMAL

## 2022-07-13 PROCEDURE — 1101F PT FALLS ASSESS-DOCD LE1/YR: CPT | Performed by: INTERNAL MEDICINE

## 2022-07-13 PROCEDURE — 96367 TX/PROPH/DG ADDL SEQ IV INF: CPT

## 2022-07-13 PROCEDURE — G8754 DIAS BP LESS 90: HCPCS | Performed by: INTERNAL MEDICINE

## 2022-07-13 PROCEDURE — 96413 CHEMO IV INFUSION 1 HR: CPT

## 2022-07-13 PROCEDURE — 36415 COLL VENOUS BLD VENIPUNCTURE: CPT

## 2022-07-13 PROCEDURE — 80053 COMPREHEN METABOLIC PANEL: CPT

## 2022-07-13 PROCEDURE — 96417 CHEMO IV INFUS EACH ADDL SEQ: CPT

## 2022-07-13 PROCEDURE — G8417 CALC BMI ABV UP PARAM F/U: HCPCS | Performed by: INTERNAL MEDICINE

## 2022-07-13 PROCEDURE — G8427 DOCREV CUR MEDS BY ELIG CLIN: HCPCS | Performed by: INTERNAL MEDICINE

## 2022-07-13 PROCEDURE — G8752 SYS BP LESS 140: HCPCS | Performed by: INTERNAL MEDICINE

## 2022-07-13 PROCEDURE — 96375 TX/PRO/DX INJ NEW DRUG ADDON: CPT

## 2022-07-13 PROCEDURE — 74011000258 HC RX REV CODE- 258: Performed by: NURSE PRACTITIONER

## 2022-07-13 PROCEDURE — 74011000250 HC RX REV CODE- 250: Performed by: NURSE PRACTITIONER

## 2022-07-13 PROCEDURE — 74011250636 HC RX REV CODE- 250/636: Performed by: NURSE PRACTITIONER

## 2022-07-13 PROCEDURE — 85025 COMPLETE CBC W/AUTO DIFF WBC: CPT

## 2022-07-13 PROCEDURE — 1123F ACP DISCUSS/DSCN MKR DOCD: CPT | Performed by: INTERNAL MEDICINE

## 2022-07-13 PROCEDURE — 99215 OFFICE O/P EST HI 40 MIN: CPT | Performed by: INTERNAL MEDICINE

## 2022-07-13 PROCEDURE — 3017F COLORECTAL CA SCREEN DOC REV: CPT | Performed by: INTERNAL MEDICINE

## 2022-07-13 PROCEDURE — G9717 DOC PT DX DEP/BP F/U NT REQ: HCPCS | Performed by: INTERNAL MEDICINE

## 2022-07-13 PROCEDURE — G8536 NO DOC ELDER MAL SCRN: HCPCS | Performed by: INTERNAL MEDICINE

## 2022-07-13 PROCEDURE — 77030012965 HC NDL HUBR BBMI -A

## 2022-07-13 RX ORDER — SODIUM CHLORIDE 9 MG/ML
25 INJECTION, SOLUTION INTRAVENOUS CONTINUOUS
Status: DISPENSED | OUTPATIENT
Start: 2022-07-13 | End: 2022-07-13

## 2022-07-13 RX ORDER — PALONOSETRON 0.05 MG/ML
0.25 INJECTION, SOLUTION INTRAVENOUS ONCE
Status: COMPLETED | OUTPATIENT
Start: 2022-07-13 | End: 2022-07-13

## 2022-07-13 RX ORDER — SODIUM CHLORIDE 9 MG/ML
10 INJECTION INTRAMUSCULAR; INTRAVENOUS; SUBCUTANEOUS AS NEEDED
Status: DISPENSED | OUTPATIENT
Start: 2022-07-13 | End: 2022-07-13

## 2022-07-13 RX ORDER — SODIUM CHLORIDE 0.9 % (FLUSH) 0.9 %
10 SYRINGE (ML) INJECTION AS NEEDED
Status: DISPENSED | OUTPATIENT
Start: 2022-07-13 | End: 2022-07-13

## 2022-07-13 RX ORDER — HEPARIN 100 UNIT/ML
300-500 SYRINGE INTRAVENOUS AS NEEDED
Status: ACTIVE | OUTPATIENT
Start: 2022-07-13 | End: 2022-07-13

## 2022-07-13 RX ADMIN — CARBOPLATIN 750 MG: 10 INJECTION, SOLUTION INTRAVENOUS at 11:40

## 2022-07-13 RX ADMIN — SODIUM CHLORIDE 25 ML/HR: 9 INJECTION, SOLUTION INTRAVENOUS at 10:37

## 2022-07-13 RX ADMIN — PALONOSETRON 0.25 MG: 0.05 INJECTION, SOLUTION INTRAVENOUS at 10:46

## 2022-07-13 RX ADMIN — ETOPOSIDE 238 MG: 20 INJECTION INTRAVENOUS at 12:19

## 2022-07-13 RX ADMIN — HEPARIN 500 UNITS: 100 SYRINGE at 13:39

## 2022-07-13 RX ADMIN — SODIUM CHLORIDE, PRESERVATIVE FREE 10 ML: 5 INJECTION INTRAVENOUS at 13:39

## 2022-07-13 RX ADMIN — DEXAMETHASONE SODIUM PHOSPHATE 12 MG: 4 INJECTION, SOLUTION INTRA-ARTICULAR; INTRALESIONAL; INTRAMUSCULAR; INTRAVENOUS; SOFT TISSUE at 11:13

## 2022-07-13 RX ADMIN — FOSAPREPITANT 150 MG: 150 INJECTION, POWDER, LYOPHILIZED, FOR SOLUTION INTRAVENOUS at 10:50

## 2022-07-13 RX ADMIN — SODIUM CHLORIDE, PRESERVATIVE FREE 10 ML: 5 INJECTION INTRAVENOUS at 10:55

## 2022-07-13 NOTE — PROGRESS NOTES
Andersonfeliciano Gaitan. is a 81 y.o. male follow up for SCC of pancreas. 1. Have you been to the ER, urgent care clinic since your last visit? Hospitalized since your last visit?no     2. Have you seen or consulted any other health care providers outside of the [address] [place] [address] since your last visit? Include any pap smears or colon screening.  No    Vitals 7/13/2022   Blood Pressure 140/63   Pulse 58   Temp 97.8   Resp 18   Height 5' 10\"   Weight 244 lb 4.8 oz   SpO2 96   BSA 2.34 m2   BMI 35.05 kg/m2   BP comment

## 2022-07-13 NOTE — PROGRESS NOTES
[facility] at [address], [address]  W: [phone]  F: [phone]      Reason for Visit:   Vazquez Lombardo is a 81 y.o. male who is seen for follow up of small cell carcinoma of the pancreas. Hematology/Oncology Treatment History:   · CT A/P 4/19/2022: Possible mild acute pancreatitis. Prominent sigmoid diverticulosis. Enlarged prostate. Fat-containing hernias  · CT Abdomen 5/17/2022: New intra-hepatic biliary dilatation and common bile duct dilatation. Ampullary prominence and 1.5 cm x 1.5 cm soft tissue density in the pancreaticoduodenal groove. New subcentimeter hepatic hypodense lesions, worrisome for metastatic disease. · MRI Abdomen 5/17/2022:  Periampullary pancreatic mass with possible extension into the wall of the duodenum. Associated intrahepatic and extra hepatic biliary dilatation and mild pancreatic ductal dilatation. Small hyperenhancing lesions throughout the liver suspicious for metastatic disease. · CT Chest 5/18/2022: no metastatic disease within thorax  · ERCP by Dr. Moise Callaway 5/18/2022: Large ulcerated mass seen in the second portion of the duodenum taking half the circumference in the area of the major papilla and extending distally about 3 to 4 cm, appeared to be pancreatic and eroding into the duodenum. Cholangiogram with complete obstruction of CBD. Metal stent deployed. 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 Some fatigue during therapy, improved in the past week. Has lost most of his hair. No nausea. Moving bowels ok as long as he takes creon. Weight has stabilized. He is accompanied by his wife today.     Review of systems was obtained and pertinent findings reviewed above. Past medical history, social history, family history, medications, and allergies are located in the electronic medical record. Physical Exam:     Visit Vitals  BP (!) 140/63   Pulse (!) 58   Temp 97.8 °F (36.6 °C)   Resp 18   Ht 5' 10\" (1.778 m)   Wt 244 lb (110.7 kg)   SpO2 96%   BMI 35.01 kg/m²     ECOG PS: 1  General: no distress  Eyes: anicteric sclerae  HENT: oropharynx clear  Neck: supple  Lymphatic: no cervical, supraclavicular, or inguinal adenopathy  Respiratory: normal respiratory effort  CV: no peripheral edema  GI: soft, nontender, nondistended, no masses  Skin: no rashes; no ecchymoses; no petechiae        Results:     Lab Results   Component Value Date/Time    WBC 3.9 (L) 07/13/2022 09:24 AM    HGB 9.5 (L) 07/13/2022 09:24 AM    HCT 30.1 (L) 07/13/2022 09:24 AM    PLATELET 887 45/41/5596 09:24 AM    MCV 84.3 07/13/2022 09:24 AM    ABS. NEUTROPHILS 2.5 07/13/2022 09:24 AM     Lab Results   Component Value Date/Time    Sodium 141 07/13/2022 09:24 AM    Potassium 3.4 (L) 07/13/2022 09:24 AM    Chloride 107 07/13/2022 09:24 AM    CO2 25 07/13/2022 09:24 AM    Glucose 138 (H) 07/13/2022 09:24 AM    BUN 12 07/13/2022 09:24 AM    Creatinine 0.84 07/13/2022 09:24 AM    GFR est AA >60 07/13/2022 09:24 AM    GFR est non-AA >60 07/13/2022 09:24 AM    Calcium 8.4 (L) 07/13/2022 09:24 AM    Glucose (POC) 277 (H) 06/03/2022 11:47 AM    Creatinine (POC) 1.00 04/19/2022 02:47 PM     Lab Results   Component Value Date/Time    Bilirubin, total 0.7 07/13/2022 09:24 AM    ALT (SGPT) 23 07/13/2022 09:24 AM    Alk.  phosphatase 79 07/13/2022 09:24 AM    Protein, total 6.7 07/13/2022 09:24 AM    Albumin 3.2 (L) 07/13/2022 09:24 AM    Globulin 3.5 07/13/2022 09:24 AM     Lab Results   Component Value Date/Time    Reticulocyte count 4.2 (H) 05/17/2022 03:04 PM    Iron % saturation 32 05/17/2022 03:04 PM    TIBC 289 05/17/2022 03:04 PM    Ferritin 219 05/17/2022 03:04 PM    Vitamin B12 944 05/17/2022 03:04 PM Folate 26.7 (H) 05/17/2022 03:04 PM    Haptoglobin 90 05/17/2022 03:04 PM     05/17/2022 03:04 PM    TSH 2.18 05/17/2022 03:04 PM    Lipase 106 05/17/2022 10:10 AM    Hep C virus Ab Interp. NONREACTIVE 05/17/2022 03:04 PM       CT C/A/P 7/6/2022:  1. Status post placement of a common bile duct stent with resolution of  intrahepatic biliary dilatation. 2.  The periocular mass noted previously is not as well-visualized and is likely  decrease in size with some residual mass noted in the pancreatic head. 3.  Small liver lesions are no longer identified however there are 2 larger  lesions in the right hepatic lobe which were not previously noted suspicious for  metastatic disease. Assessment:   1) Metastatic Small Cell Carcinoma of the Pancreas  He has disease within his pancrease, invading into the duodenum. He additionally has metastatic disease within his liver, confirmed with biopsy. His cancer is not curable and management is with palliative intent. He is currently on palliative chemotherapy with Carboplatin and Etoposide given every 3 weeks with plans for 4-6 cycles. He is tolerating therapy well. His repeat CT after 2 cycles shows a nice response to his known disease, but two new liver lesions. Mixed response. As further treatment options are limited, I favor continuing with current therapy and repeating a CT after 2 more cycles. The patient will be seen with each cycle of therapy, and labs will be monitored to assess for toxicity from therapy.    2)  Biliary obstruction  Secondary to his pancreatic cancer. S/p ERCP on 5/18/2022 with stent placement.    3)  Anemia, normocytic  Progressive, secondary to chemotherapy, anemia of chronic disease, and blood loss from his mass invading the duodenum.    4) DM  Follows endocrinology outpatient. Glucose worsens with steroids, taking higher insulin dose on those days.     5) Pancreatic insufficiency  Diarrhea improved with initiation of creon. 6) Emotional Well Being  No psychosocial concerns identified today. Patient has adequate support.        Plan:     Proceed today with C3 of Carboplatin (AUC 5) on day1 and Etoposide (100mg/m2) Days 1-3 given every 3 weeks for 4-6 cycles  Labs prior to each treatment: CBC, BMP on day 1  Antiemetic prophylaxis: Palonosetron prior to each treatment on day 1 and Dexamethasone prior to therapy on day 1-3  PRN antiemetics: Ondansetron, Prochlorperazine at home  EMLA cream to port  Continue Creon  Repeat CT after C#4  · Return to see me with each cycle of therapy    I personally performed the service.       Signed By: Bharath Givens MD

## 2022-07-13 NOTE — PROGRESS NOTES
[facility] Progress Note    Date: 2022    Name: Marcellus Mortimer. MRN: [id number]         : 1950    Mr. Mira Pedro Arrived ambulatory and in no distress for cycle 3 day 1 of Carboplatin/Etoposide regimen. Follow Up: Proceed with treatment    Assessment was completed and documented in flowsheets. No acute concerns at this time. Port accessed without difficulty, labs drawn and processed. Chemotherapy Flowsheet 2022   Cycle C3D1   Date 2022   Drug / Regimen Carbo + Etoposide   Pre Meds given   Notes given         Mr. Rashida Nascimento vitals were reviewed. Patient Vitals for the past 12 hrs:   Temp Pulse Resp BP SpO2   22 1339 -- (!) 58 -- 130/71 --   22 0915 97.8 °F (36.6 °C) (!) 58 18 (!) 140/63 96 %       Lines:   Venous Access Device 22 Upper chest (subclavicular area, right (Active)   Central Line Being Utilized Yes 22 1000   Criteria for Appropriate Use Irritant/vesicant medication 22 1000   Site Assessment Clean, dry, & intact 22 1000   Date of Last Dressing Change 22 1000   Dressing Status Clean, dry, & intact 22 1000   Dressing Type Disk with Chlorhexadine gluconate (CHG); Transparent 22 1000   Action Taken Blood drawn 22 1000   Date Accessed (Medial Site) 22 1000   Access Time (Medial Site) 0915 22 1000   Access Needle Size (Site #1) 20 G 22 1000   Access Needle Length (Medial Site) 1 inch 22 1000   Positive Blood Return (Medial Site) Yes 22 1000   Action Taken (Medial Site) Blood drawn;Flushed 22 1000   Positive Blood Return (Lateral Site) Yes 22 1135   Action Taken (Lateral Site) Flushed 22 1135   Alcohol Cap Used Yes 22 1000        Lab results were obtained and reviewed. Labs within parameter for treatment.    Recent Results (from the past 12 hour(s))   CBC WITH AUTOMATED DIFF    Collection Time: 22  9:24 AM   Result Value Ref Range    WBC 3.9 (L) 4.1 - 11.1 K/uL    RBC 3.57 (L) 4.10 - 5.70 M/uL    HGB 9.5 (L) 12.1 - 17.0 g/dL    HCT 30.1 (L) 36.6 - 50.3 %    MCV 84.3 80.0 - 99.0 FL    MCH 26.6 26.0 - 34.0 PG    MCHC 31.6 30.0 - 36.5 g/dL    RDW 16.7 (H) 11.5 - 14.5 %    PLATELET 087 944 - 826 K/uL    MPV 9.4 8.9 - 12.9 FL    NRBC 0.8 (H) 0  WBC    ABSOLUTE NRBC 0.03 (H) 0.00 - 0.01 K/uL    NEUTROPHILS 60 32 - 75 %    BAND NEUTROPHILS 4 0 - 6 %    LYMPHOCYTES 24 12 - 49 %    MONOCYTES 7 5 - 13 %    EOSINOPHILS 2 0 - 7 %    BASOPHILS 2 (H) 0 - 1 %    MYELOCYTES 1 (H) 0 %    IMMATURE GRANULOCYTES 0 %    ABS. NEUTROPHILS 2.5 1.8 - 8.0 K/UL    ABS. LYMPHOCYTES 0.9 0.8 - 3.5 K/UL    ABS. MONOCYTES 0.3 0.0 - 1.0 K/UL    ABS. EOSINOPHILS 0.1 0.0 - 0.4 K/UL    ABS. BASOPHILS 0.1 0.0 - 0.1 K/UL    ABS. IMM. GRANS. 0.0 K/UL    DF MANUAL      RBC COMMENTS ANISOCYTOSIS  1+        WBC COMMENTS ATYPICAL LYMPHOCYTES PRESENT     METABOLIC PANEL, COMPREHENSIVE    Collection Time: 07/13/22  9:24 AM   Result Value Ref Range    Sodium 141 136 - 145 mmol/L    Potassium 3.4 (L) 3.5 - 5.1 mmol/L    Chloride 107 97 - 108 mmol/L    CO2 25 21 - 32 mmol/L    Anion gap 9 5 - 15 mmol/L    Glucose 138 (H) 65 - 100 mg/dL    BUN 12 6 - 20 MG/DL    Creatinine 0.84 0.70 - 1.30 MG/DL    BUN/Creatinine ratio 14 12 - 20      GFR est AA >60 >60 ml/min/1.73m2    GFR est non-AA >60 >60 ml/min/1.73m2    Calcium 8.4 (L) 8.5 - 10.1 MG/DL    Bilirubin, total 0.7 0.2 - 1.0 MG/DL    ALT (SGPT) 23 12 - 78 U/L    AST (SGOT) 20 15 - 37 U/L    Alk. phosphatase 79 45 - 117 U/L    Protein, total 6.7 6.4 - 8.2 g/dL    Albumin 3.2 (L) 3.5 - 5.0 g/dL    Globulin 3.5 2.0 - 4.0 g/dL    A-G Ratio 0.9 (L) 1.1 - 2.2         Pre-medications  were administered as ordered and chemotherapy was initiated.   Medications Administered     0.9% sodium chloride infusion     Admin Date  07/13/2022 Action  New Bag Dose  25 mL/hr Rate  25 mL/hr Route  IntraVENous Administered By  Jacinto Pratt RN          0.9% sodium chloride injection 10 mL     Admin Date  07/13/2022 Action  Given Dose  10 mL Route  IntraVENous Administered By  Raenette Rinne, RN           Admin Date  07/13/2022 Action  Given Dose  10 mL Route  IntraVENous Administered By  Raenette Rinne, RN          CARBOplatin (PARAPLATIN) 750 mg in 0.9% sodium chloride 250 mL, overfill volume 25 mL chemo infusion     Admin Date  07/13/2022 Action  New Bag Dose  750 mg Rate  700 mL/hr Route  IntraVENous Administered By  Raenette Rinne, RN          dexamethasone (DECADRON) 12 mg in 0.9% sodium chloride 50 mL IVPB     Admin Date  07/13/2022 Action  New Bag Dose  12 mg Route  IntraVENous Administered By  Raenette Rinne, RN          etoposide (VEPESID) 238 mg in 0.9% sodium chloride 1,000 mL, overfill volume 100 mL chemo infusion     Admin Date  07/13/2022 Action  New Bag Dose  238 mg Rate  1,111.9 mL/hr Route  IntraVENous Administered By  Raenette Rinne, RN          fosaprepitant (EMEND) 150 mg in 0.9% sodium chloride 150 mL IVPB     Admin Date  07/13/2022 Action  New Bag Dose  150 mg Rate  450 mL/hr Route  IntraVENous Administered By  Raenette Rinne, RN          heparin (porcine) pf 300-500 Units     Admin Date  07/13/2022 Action  Given Dose  500 Units Route  InterCATHeter Administered By  Raenette Rinne, RN          palonosetron HCl (ALOXI) injection 0.25 mg     Admin Date  07/13/2022 Action  Given Dose  0.25 mg Route  IntraVENous Administered By  Raenette Rinne, RN                Medication education and side effect management reinforced with patient. They verbalized understanding. Mr. Papo Colindres tolerated treatment well, port flushed and de accessed, patient was discharged from [place] [address] in stable condition. Patient is aware of upcoming appointments.       Future Appointments   Date Time Provider Cindy Gee   7/14/2022  9:00 AM SS INF1 CH3 <4H [facility]   7/15/2022  9:00 AM SS INF3 CH3 <4H [facility]   8/1/2022  8:45 AM [place] [address], [facility] CAVSF BS AMB 8/3/2022  9:00 AM SS [facility]   8/3/2022  9:15 AM Luís Patel MD ONCSF BS [facility]   8/4/2022  9:00 AM SS INF3 [facility]   8/5/2022  9:00 AM SS INF3 CH3 <4H [facility] Lesley Borjas RN  July 13, 2022

## 2022-07-14 ENCOUNTER — HOSPITAL ENCOUNTER (OUTPATIENT)
Dept: INFUSION THERAPY | Age: 72
Discharge: HOME | End: 2022-07-14
Payer: MEDICARE

## 2022-07-14 VITALS
HEART RATE: 50 BPM | SYSTOLIC BLOOD PRESSURE: 144 MMHG | TEMPERATURE: 98 F | WEIGHT: 251.3 LBS | HEIGHT: 70 IN | DIASTOLIC BLOOD PRESSURE: 60 MMHG | RESPIRATION RATE: 18 BRPM | OXYGEN SATURATION: 97 % | BODY MASS INDEX: 35.98 KG/M2

## 2022-07-14 DIAGNOSIS — C80.1 SMALL CELL CARCINOMA (HCC): Primary | ICD-10-CM

## 2022-07-14 DIAGNOSIS — Z51.11 ENCOUNTER FOR ANTINEOPLASTIC CHEMOTHERAPY: ICD-10-CM

## 2022-07-14 PROCEDURE — 74011250636 HC RX REV CODE- 250/636: Performed by: NURSE PRACTITIONER

## 2022-07-14 PROCEDURE — 96413 CHEMO IV INFUSION 1 HR: CPT

## 2022-07-14 PROCEDURE — 96375 TX/PRO/DX INJ NEW DRUG ADDON: CPT

## 2022-07-14 PROCEDURE — 74011000250 HC RX REV CODE- 250: Performed by: NURSE PRACTITIONER

## 2022-07-14 RX ORDER — SODIUM CHLORIDE 9 MG/ML
10 INJECTION INTRAMUSCULAR; INTRAVENOUS; SUBCUTANEOUS AS NEEDED
Status: ACTIVE | OUTPATIENT
Start: 2022-07-14 | End: 2022-07-14

## 2022-07-14 RX ORDER — SODIUM CHLORIDE 9 MG/ML
25 INJECTION, SOLUTION INTRAVENOUS CONTINUOUS
Status: DISPENSED | OUTPATIENT
Start: 2022-07-14 | End: 2022-07-14

## 2022-07-14 RX ORDER — HEPARIN 100 UNIT/ML
300-500 SYRINGE INTRAVENOUS AS NEEDED
Status: ACTIVE | OUTPATIENT
Start: 2022-07-14 | End: 2022-07-14

## 2022-07-14 RX ORDER — SODIUM CHLORIDE 0.9 % (FLUSH) 0.9 %
10 SYRINGE (ML) INJECTION AS NEEDED
Status: DISPENSED | OUTPATIENT
Start: 2022-07-14 | End: 2022-07-14

## 2022-07-14 RX ORDER — DEXAMETHASONE SODIUM PHOSPHATE 4 MG/ML
8 INJECTION, SOLUTION INTRA-ARTICULAR; INTRALESIONAL; INTRAMUSCULAR; INTRAVENOUS; SOFT TISSUE ONCE
Status: COMPLETED | OUTPATIENT
Start: 2022-07-14 | End: 2022-07-14

## 2022-07-14 RX ADMIN — HEPARIN 500 UNITS: 100 SYRINGE at 12:07

## 2022-07-14 RX ADMIN — DEXAMETHASONE SODIUM PHOSPHATE 8 MG: 4 INJECTION INTRA-ARTICULAR; INTRALESIONAL; INTRAMUSCULAR; INTRAVENOUS; SOFT TISSUE at 09:36

## 2022-07-14 RX ADMIN — SODIUM CHLORIDE, PRESERVATIVE FREE 10 ML: 5 INJECTION INTRAVENOUS at 12:07

## 2022-07-14 RX ADMIN — SODIUM CHLORIDE 25 ML/HR: 9 INJECTION INTRAMUSCULAR; INTRAVENOUS; SUBCUTANEOUS at 09:27

## 2022-07-14 RX ADMIN — SODIUM CHLORIDE 10 ML: 9 INJECTION INTRAMUSCULAR; INTRAVENOUS; SUBCUTANEOUS at 09:28

## 2022-07-14 RX ADMIN — ETOPOSIDE 238 MG: 20 INJECTION INTRAVENOUS at 10:56

## 2022-07-14 NOTE — PROGRESS NOTES
\[facility]\"" Progress Note    Date: 2022    Name: Tara Cruz. MRN: [id number]         : 1950    Mr. Rasheed Lerma Arrived ambulatory and in no distress for C3D2 of Etoposide. Assessment was completed, no acute issues at this time, no new complaints voiced. 1 chest wall port accessed without difficulty. Chemotherapy Flowsheet 2022   Cycle C3D2   Date 2022   Drug / Regimen Etoposide   Pre Meds given   Notes -       Mr. Bowman's vitals were reviewed.   Visit Vitals  /63 (BP 1 Location: Right arm, BP Patient Position: Sitting)   Pulse (!) 56   Temp 98 °F (36.7 °C)   Resp 18   Ht 5' 10\" (1.778 m)   Wt 114 kg (251 lb 4.8 oz)   SpO2 95%   BMI 36.06 kg/m²     Medications:  Medications Administered     0.9% sodium chloride infusion     Admin Date  2022 Action  New Bag Dose  25 mL/hr Rate  25 mL/hr Route  IntraVENous Administered By  Nick Harvey RN          0.9% sodium chloride injection 10 mL     Admin Date  2022 Action  Given Dose  10 mL Route  IntraVENous Administered By  Nick Harvey RN          dexamethasone (DECADRON) 4 mg/mL injection 8 mg     Admin Date  2022 Action  Given Dose  8 mg Route  IntraVENous Administered By  Nick Harvey RN          etoposide (VEPESID) 238 mg in 0.9% sodium chloride 1,000 mL, overfill volume 100 mL chemo infusion     Admin Date  2022 Action  New Bag Dose  238 mg Rate  1,111.9 mL/hr Route  IntraVENous Administered By  Nick Harvey RN          heparin (porcine) pf 300-500 Units     Admin Date  2022 Action  Given Dose  500 Units Route  InterCATHeter Administered By  Nick Harvey RN          sodium chloride (NS) flush 10 mL     Admin Date  2022 Action  Given Dose  10 mL Route  IntraVENous Administered By  Nick Harvey RN                Two nurses verified prior to administering:  Drug name  Drug dose  Infusion volume or drug volume when prepared in a syringe  Rate of administration  Route of administration  Expiration dates and/or times  Appearance and physical integrity of the drugs  Rate set on infusion pump, when used  Sequencing of drug administration      Mr. Bowman tolerated treatment well and was discharged from [place] [address] in stable condition at 1215. Port remained accessed for tomorrows treatment. Port flushed & heparinized per protocol. He is to return on 7/15/22 at 9:00 am for his next appointment.     Chastity Beltran RN  July 14, 2022

## 2022-07-15 ENCOUNTER — HOSPITAL ENCOUNTER (OUTPATIENT)
Dept: INFUSION THERAPY | Age: 72
Discharge: HOME | End: 2022-07-15
Payer: MEDICARE

## 2022-07-15 VITALS
DIASTOLIC BLOOD PRESSURE: 65 MMHG | HEART RATE: 49 BPM | SYSTOLIC BLOOD PRESSURE: 151 MMHG | OXYGEN SATURATION: 95 % | RESPIRATION RATE: 16 BRPM | TEMPERATURE: 98 F

## 2022-07-15 DIAGNOSIS — C80.1 SMALL CELL CARCINOMA (HCC): Primary | ICD-10-CM

## 2022-07-15 DIAGNOSIS — Z51.11 ENCOUNTER FOR ANTINEOPLASTIC CHEMOTHERAPY: ICD-10-CM

## 2022-07-15 PROCEDURE — 74011250636 HC RX REV CODE- 250/636: Performed by: NURSE PRACTITIONER

## 2022-07-15 PROCEDURE — 96413 CHEMO IV INFUSION 1 HR: CPT

## 2022-07-15 PROCEDURE — 96375 TX/PRO/DX INJ NEW DRUG ADDON: CPT

## 2022-07-15 RX ORDER — DEXAMETHASONE SODIUM PHOSPHATE 4 MG/ML
8 INJECTION, SOLUTION INTRA-ARTICULAR; INTRALESIONAL; INTRAMUSCULAR; INTRAVENOUS; SOFT TISSUE ONCE
Status: COMPLETED | OUTPATIENT
Start: 2022-07-15 | End: 2022-07-15

## 2022-07-15 RX ORDER — SODIUM CHLORIDE 9 MG/ML
25 INJECTION, SOLUTION INTRAVENOUS CONTINUOUS
Status: DISPENSED | OUTPATIENT
Start: 2022-07-15 | End: 2022-07-15

## 2022-07-15 RX ORDER — SODIUM CHLORIDE 0.9 % (FLUSH) 0.9 %
10 SYRINGE (ML) INJECTION AS NEEDED
Status: DISPENSED | OUTPATIENT
Start: 2022-07-15 | End: 2022-07-15

## 2022-07-15 RX ORDER — SODIUM CHLORIDE 9 MG/ML
10 INJECTION INTRAMUSCULAR; INTRAVENOUS; SUBCUTANEOUS AS NEEDED
Status: DISPENSED | OUTPATIENT
Start: 2022-07-15 | End: 2022-07-15

## 2022-07-15 RX ORDER — HEPARIN 100 UNIT/ML
300-500 SYRINGE INTRAVENOUS AS NEEDED
Status: DISPENSED | OUTPATIENT
Start: 2022-07-15 | End: 2022-07-15

## 2022-07-15 RX ADMIN — DEXAMETHASONE SODIUM PHOSPHATE 8 MG: 4 INJECTION INTRA-ARTICULAR; INTRALESIONAL; INTRAMUSCULAR; INTRAVENOUS; SOFT TISSUE at 09:40

## 2022-07-15 RX ADMIN — ETOPOSIDE 238 MG: 20 INJECTION INTRAVENOUS at 10:15

## 2022-07-15 RX ADMIN — SODIUM CHLORIDE 25 ML/HR: 9 INJECTION, SOLUTION INTRAVENOUS at 10:00

## 2022-07-15 NOTE — PROGRESS NOTES
[facility] Progress Note    Date: July 15, 2022    Name: Jamila Olivas. MRN: [id number]         : 1950    Mr. Papo Colindres Arrived ambulatory and in no distress for C3D3 of Etoposide Regimen. Assessment was completed, no acute issues at this time, no new complaints voiced. chest wall port already accessed. No labs ordered with treatment. Chemotherapy Flowsheet 7/15/2022   Cycle C3D3   Date 7/15/2022   Drug / Regimen Etoposide   Pre Meds given   Notes given       Mr. Lizett Torres vitals were reviewed. Visit Vitals  BP (!) 151/65   Pulse (!) 49   Temp 98 °F (36.7 °C)   Resp 16   SpO2 95%       Medications:  Medications Administered     0.9% sodium chloride infusion     Admin Date  07/15/2022 Action  New Bag Dose  25 mL/hr Rate  25 mL/hr Route  IntraVENous Administered By  Zoë Mcneil RN          dexamethasone (DECADRON) 4 mg/mL injection 8 mg     Admin Date  07/15/2022 Action  Given Dose  8 mg Route  IntraVENous Administered By  Zoë Mcneil RN          etoposide (VEPESID) 238 mg in 0.9% sodium chloride 1,000 mL, overfill volume 100 mL chemo infusion     Admin Date  07/15/2022 Action  New Bag Dose  238 mg Rate  1,111.9 mL/hr Route  IntraVENous Administered By  Isidro Carvajal RN                      Two nurses verified prior to administering:  Drug name  Drug dose  Infusion volume or drug volume when prepared in a syringe  Rate of administration  Route of administration  Expiration dates and/or times  Appearance and physical integrity of the drugs  Rate set on infusion pump, when used  Sequencing of drug administration          Mr. Bowman tolerated treatment well and was discharged from [place] [address] in stable condition. Port de-accessed, flushed & heparinized per protocol. He is to return on August 3 at 0900 for his next appointment.     Jacqui Carranza RN  July 15, 2022

## 2022-07-26 RX ORDER — SODIUM CHLORIDE 9 MG/ML
25 INJECTION, SOLUTION INTRAVENOUS CONTINUOUS
Status: CANCELLED | OUTPATIENT
Start: 2022-08-05

## 2022-07-26 RX ORDER — ALBUTEROL SULFATE 0.83 MG/ML
2.5 SOLUTION RESPIRATORY (INHALATION) AS NEEDED
Status: CANCELLED
Start: 2022-08-03

## 2022-07-26 RX ORDER — ONDANSETRON 2 MG/ML
8 INJECTION INTRAMUSCULAR; INTRAVENOUS AS NEEDED
Status: CANCELLED | OUTPATIENT
Start: 2022-08-05

## 2022-07-26 RX ORDER — DEXAMETHASONE SODIUM PHOSPHATE 4 MG/ML
8 INJECTION, SOLUTION INTRA-ARTICULAR; INTRALESIONAL; INTRAMUSCULAR; INTRAVENOUS; SOFT TISSUE ONCE
Status: CANCELLED | OUTPATIENT
Start: 2022-08-04 | End: 2022-08-04

## 2022-07-26 RX ORDER — HYDROCORTISONE SODIUM SUCCINATE 100 MG/2ML
100 INJECTION, POWDER, FOR SOLUTION INTRAMUSCULAR; INTRAVENOUS AS NEEDED
Status: CANCELLED | OUTPATIENT
Start: 2022-08-05

## 2022-07-26 RX ORDER — HEPARIN 100 UNIT/ML
300-500 SYRINGE INTRAVENOUS AS NEEDED
Status: CANCELLED
Start: 2022-08-04

## 2022-07-26 RX ORDER — DIPHENHYDRAMINE HYDROCHLORIDE 50 MG/ML
50 INJECTION, SOLUTION INTRAMUSCULAR; INTRAVENOUS AS NEEDED
Status: CANCELLED
Start: 2022-08-03

## 2022-07-26 RX ORDER — ONDANSETRON 2 MG/ML
8 INJECTION INTRAMUSCULAR; INTRAVENOUS AS NEEDED
Status: CANCELLED | OUTPATIENT
Start: 2022-08-04

## 2022-07-26 RX ORDER — DIPHENHYDRAMINE HYDROCHLORIDE 50 MG/ML
25 INJECTION, SOLUTION INTRAMUSCULAR; INTRAVENOUS AS NEEDED
Status: CANCELLED
Start: 2022-08-03

## 2022-07-26 RX ORDER — EPINEPHRINE 1 MG/ML
0.3 INJECTION, SOLUTION, CONCENTRATE INTRAVENOUS AS NEEDED
Status: CANCELLED | OUTPATIENT
Start: 2022-08-03

## 2022-07-26 RX ORDER — DIPHENHYDRAMINE HYDROCHLORIDE 50 MG/ML
25 INJECTION, SOLUTION INTRAMUSCULAR; INTRAVENOUS AS NEEDED
Status: CANCELLED
Start: 2022-08-05

## 2022-07-26 RX ORDER — SODIUM CHLORIDE 9 MG/ML
10 INJECTION INTRAMUSCULAR; INTRAVENOUS; SUBCUTANEOUS AS NEEDED
Status: CANCELLED | OUTPATIENT
Start: 2022-08-04

## 2022-07-26 RX ORDER — EPINEPHRINE 1 MG/ML
0.3 INJECTION, SOLUTION, CONCENTRATE INTRAVENOUS AS NEEDED
Status: CANCELLED | OUTPATIENT
Start: 2022-08-05

## 2022-07-26 RX ORDER — ACETAMINOPHEN 325 MG/1
650 TABLET ORAL AS NEEDED
Status: CANCELLED
Start: 2022-08-03

## 2022-07-26 RX ORDER — DIPHENHYDRAMINE HYDROCHLORIDE 50 MG/ML
50 INJECTION, SOLUTION INTRAMUSCULAR; INTRAVENOUS AS NEEDED
Status: CANCELLED
Start: 2022-08-04

## 2022-07-26 RX ORDER — HEPARIN 100 UNIT/ML
300-500 SYRINGE INTRAVENOUS AS NEEDED
Status: CANCELLED
Start: 2022-08-05

## 2022-07-26 RX ORDER — SODIUM CHLORIDE 0.9 % (FLUSH) 0.9 %
10 SYRINGE (ML) INJECTION AS NEEDED
Status: CANCELLED | OUTPATIENT
Start: 2022-08-04

## 2022-07-26 RX ORDER — SODIUM CHLORIDE 0.9 % (FLUSH) 0.9 %
10 SYRINGE (ML) INJECTION AS NEEDED
Status: CANCELLED | OUTPATIENT
Start: 2022-08-05

## 2022-07-26 RX ORDER — SODIUM CHLORIDE 9 MG/ML
10 INJECTION INTRAMUSCULAR; INTRAVENOUS; SUBCUTANEOUS AS NEEDED
Status: CANCELLED | OUTPATIENT
Start: 2022-08-05

## 2022-07-26 RX ORDER — HYDROCORTISONE SODIUM SUCCINATE 100 MG/2ML
100 INJECTION, POWDER, FOR SOLUTION INTRAMUSCULAR; INTRAVENOUS AS NEEDED
Status: CANCELLED | OUTPATIENT
Start: 2022-08-04

## 2022-07-26 RX ORDER — ALBUTEROL SULFATE 0.83 MG/ML
2.5 SOLUTION RESPIRATORY (INHALATION) AS NEEDED
Status: CANCELLED
Start: 2022-08-05

## 2022-07-26 RX ORDER — DIPHENHYDRAMINE HYDROCHLORIDE 50 MG/ML
25 INJECTION, SOLUTION INTRAMUSCULAR; INTRAVENOUS AS NEEDED
Status: CANCELLED
Start: 2022-08-04

## 2022-07-26 RX ORDER — SODIUM CHLORIDE 9 MG/ML
25 INJECTION, SOLUTION INTRAVENOUS CONTINUOUS
Status: CANCELLED | OUTPATIENT
Start: 2022-08-04

## 2022-07-26 RX ORDER — DEXAMETHASONE SODIUM PHOSPHATE 4 MG/ML
8 INJECTION, SOLUTION INTRA-ARTICULAR; INTRALESIONAL; INTRAMUSCULAR; INTRAVENOUS; SOFT TISSUE ONCE
Status: CANCELLED | OUTPATIENT
Start: 2022-08-05 | End: 2022-08-05

## 2022-07-26 RX ORDER — ACETAMINOPHEN 325 MG/1
650 TABLET ORAL AS NEEDED
Status: CANCELLED
Start: 2022-08-05

## 2022-07-26 RX ORDER — ALBUTEROL SULFATE 0.83 MG/ML
2.5 SOLUTION RESPIRATORY (INHALATION) AS NEEDED
Status: CANCELLED
Start: 2022-08-04

## 2022-07-26 RX ORDER — ONDANSETRON 2 MG/ML
8 INJECTION INTRAMUSCULAR; INTRAVENOUS
Status: CANCELLED | OUTPATIENT
Start: 2022-08-05

## 2022-07-26 RX ORDER — DIPHENHYDRAMINE HYDROCHLORIDE 50 MG/ML
50 INJECTION, SOLUTION INTRAMUSCULAR; INTRAVENOUS AS NEEDED
Status: CANCELLED
Start: 2022-08-05

## 2022-07-26 RX ORDER — ONDANSETRON 2 MG/ML
8 INJECTION INTRAMUSCULAR; INTRAVENOUS AS NEEDED
Status: CANCELLED | OUTPATIENT
Start: 2022-08-03

## 2022-07-26 RX ORDER — EPINEPHRINE 1 MG/ML
0.3 INJECTION, SOLUTION, CONCENTRATE INTRAVENOUS AS NEEDED
Status: CANCELLED | OUTPATIENT
Start: 2022-08-04

## 2022-07-26 RX ORDER — ACETAMINOPHEN 325 MG/1
650 TABLET ORAL AS NEEDED
Status: CANCELLED
Start: 2022-08-04

## 2022-07-26 RX ORDER — ONDANSETRON 2 MG/ML
8 INJECTION INTRAMUSCULAR; INTRAVENOUS
Status: CANCELLED | OUTPATIENT
Start: 2022-08-04

## 2022-07-26 RX ORDER — HYDROCORTISONE SODIUM SUCCINATE 100 MG/2ML
100 INJECTION, POWDER, FOR SOLUTION INTRAMUSCULAR; INTRAVENOUS AS NEEDED
Status: CANCELLED | OUTPATIENT
Start: 2022-08-03

## 2022-08-01 ENCOUNTER — OFFICE VISIT (OUTPATIENT)
Dept: CARDIOLOGY CLINIC | Age: 72
End: 2022-08-01
Payer: MEDICARE

## 2022-08-01 DIAGNOSIS — Z95.818 STATUS POST PLACEMENT OF IMPLANTABLE LOOP RECORDER: Primary | ICD-10-CM

## 2022-08-01 PROCEDURE — 93298 REM INTERROG DEV EVAL SCRMS: CPT | Performed by: INTERNAL MEDICINE

## 2022-08-01 NOTE — PROGRESS NOTES
[facility] at [address] [facility]  W: [phone]  F: [phone]      Reason for Visit:   Amarjit Martínez is a 81 y.o. male who is seen for follow up of small cell carcinoma of the pancreas. Hematology/Oncology Treatment History:   CT A/P 4/19/2022: Possible mild acute pancreatitis. Prominent sigmoid diverticulosis. Enlarged prostate. Fat-containing hernias  CT Abdomen 5/17/2022: New intra-hepatic biliary dilatation and common bile duct dilatation. Ampullary prominence and 1.5 cm x 1.5 cm soft tissue density in the pancreaticoduodenal groove. New subcentimeter hepatic hypodense lesions, worrisome for metastatic disease. MRI Abdomen 5/17/2022:  Periampullary pancreatic mass with possible extension into the wall of the duodenum. Associated intrahepatic and extra hepatic biliary dilatation and mild pancreatic ductal dilatation. Small hyperenhancing lesions throughout the liver suspicious for metastatic disease. CT Chest 5/18/2022: no metastatic disease within thorax  ERCP by Dr. Brando Alcantara 5/18/2022: Large ulcerated mass seen in the second portion of the duodenum taking half the circumference in the area of the major papilla and extending distally about 3 to 4 cm, appeared to be pancreatic and eroding into the duodenum. Cholangiogram with complete obstruction of CBD. Metal stent deployed. 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 Reports grade 1 diarrhea, more tan color now. Taking Creon with each meal and snacks. No nausea or vomiting. Appetite has been stable. Drinking fluids without issue. No swelling of lower extremities. Denies pain today. He is accompanied by his wife today.     Review of systems was obtained and pertinent findings reviewed above. Past medical history, social history, family history, medications, and allergies are located in the electronic medical record. Physical Exam:     Visit Vitals  BP (!) 113/59   Pulse (!) 57   Temp 97.8 °F (36.6 °C)   Resp 18   Ht 5' 10\" (1.778 m)   Wt 245 lb (111.1 kg)   SpO2 95%   BMI 35.15 kg/m²       ECOG PS: 1  General: no distress  Eyes: anicteric sclerae  HENT: oropharynx clear  Neck: supple  Lymphatic: no cervical, supraclavicular, or inguinal adenopathy  Respiratory: normal respiratory effort  CV: no peripheral edema  GI: soft, nontender, nondistended, no masses  Skin: no rashes; no ecchymoses; no petechiae    Results:     Lab Results   Component Value Date/Time    WBC 3.7 (L) 08/03/2022 09:23 AM    HGB 9.7 (L) 08/03/2022 09:23 AM    HCT 30.6 (L) 08/03/2022 09:23 AM    PLATELET 999 64/75/8064 09:23 AM    MCV 81.4 08/03/2022 09:23 AM    ABS. NEUTROPHILS 2.1 08/03/2022 09:23 AM     Lab Results   Component Value Date/Time    Sodium 140 08/03/2022 09:23 AM    Potassium 3.1 (L) 08/03/2022 09:23 AM    Chloride 108 08/03/2022 09:23 AM    CO2 27 08/03/2022 09:23 AM    Glucose 201 (H) 08/03/2022 09:23 AM    BUN 16 08/03/2022 09:23 AM    Creatinine 0.82 08/03/2022 09:23 AM    GFR est AA >60 08/03/2022 09:23 AM    GFR est non-AA >60 08/03/2022 09:23 AM    Calcium 8.9 08/03/2022 09:23 AM    Glucose (POC) 277 (H) 06/03/2022 11:47 AM    Creatinine (POC) 1.00 04/19/2022 02:47 PM     Lab Results   Component Value Date/Time    Bilirubin, total 0.6 08/03/2022 09:23 AM    ALT (SGPT) 23 08/03/2022 09:23 AM    Alk.  phosphatase 75 08/03/2022 09:23 AM    Protein, total 6.8 08/03/2022 09:23 AM    Albumin 3.4 (L) 08/03/2022 09:23 AM    Globulin 3.4 08/03/2022 09:23 AM     Lab Results   Component Value Date/Time    Reticulocyte count 4.2 (H) 05/17/2022 03:04 PM    Iron % saturation 32 05/17/2022 03:04 PM    TIBC 289 05/17/2022 03:04 PM    Ferritin 219 05/17/2022 03:04 PM Vitamin B12 944 05/17/2022 03:04 PM    Folate 26.7 (H) 05/17/2022 03:04 PM    Haptoglobin 90 05/17/2022 03:04 PM     05/17/2022 03:04 PM    TSH 2.18 05/17/2022 03:04 PM    Lipase 106 05/17/2022 10:10 AM    Hep C virus Ab Interp. NONREACTIVE 05/17/2022 03:04 PM       CT C/A/P 7/6/2022:  1. Status post placement of a common bile duct stent with resolution of intrahepatic biliary dilatation. 2.  The periocular mass noted previously is not as well-visualized and is likely decrease in size with some residual mass noted in the pancreatic head. 3.  Small liver lesions are no longer identified however there are 2 larger lesions in the right hepatic lobe which were not previously noted suspicious for metastatic disease. Assessment:   1) Metastatic Small Cell Carcinoma of the Pancreas  He has disease within his pancrease, invading into the duodenum. He additionally has metastatic disease within his liver, confirmed with biopsy. His cancer is not curable and management is with palliative intent. He is currently on palliative chemotherapy with Carboplatin and Etoposide given every 3 weeks with plans for 4-6 cycles. He is tolerating therapy well overall. Will proceed with next cycle today and assess additional imaging after this cycle due to mixed response on prior imaging. The patient will be seen with each cycle of therapy, and labs will be monitored to assess for toxicity from therapy. 2)  Biliary obstruction  Secondary to his pancreatic cancer. S/p ERCP on 5/18/2022 with stent placement. 3)  Anemia, normocytic  Progressive, secondary to chemotherapy, anemia of chronic disease, and blood loss from his mass invading the duodenum. 4) DM  Follows endocrinology outpatient. Glucose worsens with steroids, taking higher insulin dose on those days. 5) Pancreatic insufficiency  Diarrhea improved with initiation of creon. 6) Emotional Well Being  No psychosocial concerns identified today. Patient has adequate support. Plan:     Proceed today with C4 of Carboplatin (AUC 5) on day1 and Etoposide (100mg/m2) Days 1-3 given every 3 weeks for 4-6 cycles  Labs prior to each treatment: CBC, BMP on day 1  Antiemetic prophylaxis: Palonosetron prior to each treatment on day 1 and Dexamethasone prior to therapy on day 1-3  PRN antiemetics: Ondansetron, Prochlorperazine at home  EMLA cream to port  Continue Creon  Repeat CT after this cycle, ordered  Return to see me with each cycle of therapy    I personally saw and evaluated the patient and performed the key components of medical decision making. The history, physical exam, and documentation were performed by Jadiel Lewis NP. I reviewed and verified the above documentation and modified it as needed. He is tolerating systemic therapy with manageable toxicity, so we will proceed with treatment. Repeat CT before next visit.     Signed By: Paul Block MD

## 2022-08-03 ENCOUNTER — OFFICE VISIT (OUTPATIENT)
Dept: ONCOLOGY | Age: 72
End: 2022-08-03

## 2022-08-03 ENCOUNTER — HOSPITAL ENCOUNTER (OUTPATIENT)
Dept: INFUSION THERAPY | Age: 72
Discharge: HOME | End: 2022-08-03
Payer: MEDICARE

## 2022-08-03 VITALS
SYSTOLIC BLOOD PRESSURE: 113 MMHG | TEMPERATURE: 97.8 F | RESPIRATION RATE: 18 BRPM | DIASTOLIC BLOOD PRESSURE: 59 MMHG | OXYGEN SATURATION: 95 % | HEART RATE: 57 BPM | BODY MASS INDEX: 35.07 KG/M2 | WEIGHT: 245 LBS | HEIGHT: 70 IN

## 2022-08-03 VITALS
HEIGHT: 70 IN | OXYGEN SATURATION: 95 % | SYSTOLIC BLOOD PRESSURE: 145 MMHG | RESPIRATION RATE: 18 BRPM | WEIGHT: 245 LBS | DIASTOLIC BLOOD PRESSURE: 65 MMHG | TEMPERATURE: 96.8 F | BODY MASS INDEX: 35.07 KG/M2 | HEART RATE: 62 BPM

## 2022-08-03 DIAGNOSIS — Z51.11 ENCOUNTER FOR ANTINEOPLASTIC CHEMOTHERAPY: ICD-10-CM

## 2022-08-03 DIAGNOSIS — C80.1 SMALL CELL CARCINOMA (HCC): Primary | ICD-10-CM

## 2022-08-03 DIAGNOSIS — C25.0 MALIGNANT NEOPLASM OF HEAD OF PANCREAS (HCC): Primary | ICD-10-CM

## 2022-08-03 DIAGNOSIS — C78.7 METASTASIS TO LIVER (HCC): ICD-10-CM

## 2022-08-03 LAB
ALBUMIN SERPL-MCNC: 3.4 G/DL (ref 3.5–5)
ALBUMIN/GLOB SERPL: 1 {RATIO} (ref 1.1–2.2)
ALP SERPL-CCNC: 75 U/L (ref 45–117)
ALT SERPL-CCNC: 23 U/L (ref 12–78)
ANION GAP SERPL CALC-SCNC: 5 MMOL/L (ref 5–15)
AST SERPL-CCNC: 21 U/L (ref 15–37)
BASOPHILS # BLD: 0 K/UL (ref 0–0.1)
BASOPHILS NFR BLD: 1 % (ref 0–1)
BILIRUB SERPL-MCNC: 0.6 MG/DL (ref 0.2–1)
BUN SERPL-MCNC: 16 MG/DL (ref 6–20)
BUN/CREAT SERPL: 20 (ref 12–20)
CALCIUM SERPL-MCNC: 8.9 MG/DL (ref 8.5–10.1)
CHLORIDE SERPL-SCNC: 108 MMOL/L (ref 97–108)
CO2 SERPL-SCNC: 27 MMOL/L (ref 21–32)
CREAT SERPL-MCNC: 0.82 MG/DL (ref 0.7–1.3)
DIFFERENTIAL METHOD BLD: ABNORMAL
EOSINOPHIL # BLD: 0.1 K/UL (ref 0–0.4)
EOSINOPHIL NFR BLD: 2 % (ref 0–7)
ERYTHROCYTE [DISTWIDTH] IN BLOOD BY AUTOMATED COUNT: 17.6 % (ref 11.5–14.5)
GLOBULIN SER CALC-MCNC: 3.4 G/DL (ref 2–4)
GLUCOSE SERPL-MCNC: 201 MG/DL (ref 65–100)
HCT VFR BLD AUTO: 30.6 % (ref 36.6–50.3)
HGB BLD-MCNC: 9.7 G/DL (ref 12.1–17)
IMM GRANULOCYTES # BLD AUTO: 0.1 K/UL (ref 0–0.04)
IMM GRANULOCYTES NFR BLD AUTO: 2 % (ref 0–0.5)
LYMPHOCYTES # BLD: 1 K/UL (ref 0.8–3.5)
LYMPHOCYTES NFR BLD: 26 % (ref 12–49)
MCH RBC QN AUTO: 25.8 PG (ref 26–34)
MCHC RBC AUTO-ENTMCNC: 31.7 G/DL (ref 30–36.5)
MCV RBC AUTO: 81.4 FL (ref 80–99)
MONOCYTES # BLD: 0.4 K/UL (ref 0–1)
MONOCYTES NFR BLD: 10 % (ref 5–13)
NEUTS SEG # BLD: 2.1 K/UL (ref 1.8–8)
NEUTS SEG NFR BLD: 59 % (ref 32–75)
NRBC # BLD: 0.02 K/UL (ref 0–0.01)
NRBC BLD-RTO: 0.5 PER 100 WBC
PLATELET # BLD AUTO: 186 K/UL (ref 150–400)
PMV BLD AUTO: 10 FL (ref 8.9–12.9)
POTASSIUM SERPL-SCNC: 3.1 MMOL/L (ref 3.5–5.1)
PROT SERPL-MCNC: 6.8 G/DL (ref 6.4–8.2)
RBC # BLD AUTO: 3.76 M/UL (ref 4.1–5.7)
RBC MORPH BLD: ABNORMAL
SODIUM SERPL-SCNC: 140 MMOL/L (ref 136–145)
WBC # BLD AUTO: 3.7 K/UL (ref 4.1–11.1)

## 2022-08-03 PROCEDURE — 74011250637 HC RX REV CODE- 250/637: Performed by: NURSE PRACTITIONER

## 2022-08-03 PROCEDURE — 74011250636 HC RX REV CODE- 250/636: Performed by: INTERNAL MEDICINE

## 2022-08-03 PROCEDURE — 96375 TX/PRO/DX INJ NEW DRUG ADDON: CPT

## 2022-08-03 PROCEDURE — 96413 CHEMO IV INFUSION 1 HR: CPT

## 2022-08-03 PROCEDURE — 74011000250 HC RX REV CODE- 250: Performed by: INTERNAL MEDICINE

## 2022-08-03 PROCEDURE — 3017F COLORECTAL CA SCREEN DOC REV: CPT | Performed by: INTERNAL MEDICINE

## 2022-08-03 PROCEDURE — 85025 COMPLETE CBC W/AUTO DIFF WBC: CPT

## 2022-08-03 PROCEDURE — 1123F ACP DISCUSS/DSCN MKR DOCD: CPT | Performed by: INTERNAL MEDICINE

## 2022-08-03 PROCEDURE — 96367 TX/PROPH/DG ADDL SEQ IV INF: CPT

## 2022-08-03 PROCEDURE — 1101F PT FALLS ASSESS-DOCD LE1/YR: CPT | Performed by: INTERNAL MEDICINE

## 2022-08-03 PROCEDURE — G8536 NO DOC ELDER MAL SCRN: HCPCS | Performed by: INTERNAL MEDICINE

## 2022-08-03 PROCEDURE — 80053 COMPREHEN METABOLIC PANEL: CPT

## 2022-08-03 PROCEDURE — G8417 CALC BMI ABV UP PARAM F/U: HCPCS | Performed by: INTERNAL MEDICINE

## 2022-08-03 PROCEDURE — 36415 COLL VENOUS BLD VENIPUNCTURE: CPT

## 2022-08-03 PROCEDURE — 96417 CHEMO IV INFUS EACH ADDL SEQ: CPT

## 2022-08-03 PROCEDURE — G8754 DIAS BP LESS 90: HCPCS | Performed by: INTERNAL MEDICINE

## 2022-08-03 PROCEDURE — 99215 OFFICE O/P EST HI 40 MIN: CPT | Performed by: INTERNAL MEDICINE

## 2022-08-03 PROCEDURE — G8427 DOCREV CUR MEDS BY ELIG CLIN: HCPCS | Performed by: INTERNAL MEDICINE

## 2022-08-03 PROCEDURE — G9717 DOC PT DX DEP/BP F/U NT REQ: HCPCS | Performed by: INTERNAL MEDICINE

## 2022-08-03 PROCEDURE — G8752 SYS BP LESS 140: HCPCS | Performed by: INTERNAL MEDICINE

## 2022-08-03 PROCEDURE — 74011000258 HC RX REV CODE- 258: Performed by: INTERNAL MEDICINE

## 2022-08-03 PROCEDURE — 77030016057 HC NDL HUBR APOL -B

## 2022-08-03 RX ORDER — SODIUM CHLORIDE 9 MG/ML
10 INJECTION INTRAMUSCULAR; INTRAVENOUS; SUBCUTANEOUS AS NEEDED
Status: ACTIVE | OUTPATIENT
Start: 2022-08-03 | End: 2022-08-03

## 2022-08-03 RX ORDER — SODIUM CHLORIDE 9 MG/ML
25 INJECTION, SOLUTION INTRAVENOUS CONTINUOUS
Status: DISPENSED | OUTPATIENT
Start: 2022-08-03 | End: 2022-08-03

## 2022-08-03 RX ORDER — HEPARIN 100 UNIT/ML
300-500 SYRINGE INTRAVENOUS AS NEEDED
Status: ACTIVE | OUTPATIENT
Start: 2022-08-03 | End: 2022-08-03

## 2022-08-03 RX ORDER — PALONOSETRON 0.05 MG/ML
0.25 INJECTION, SOLUTION INTRAVENOUS ONCE
Status: COMPLETED | OUTPATIENT
Start: 2022-08-03 | End: 2022-08-03

## 2022-08-03 RX ORDER — SODIUM CHLORIDE 0.9 % (FLUSH) 0.9 %
10 SYRINGE (ML) INJECTION AS NEEDED
Status: DISPENSED | OUTPATIENT
Start: 2022-08-03 | End: 2022-08-03

## 2022-08-03 RX ORDER — POTASSIUM CHLORIDE 750 MG/1
40 TABLET, FILM COATED, EXTENDED RELEASE ORAL
Status: COMPLETED | OUTPATIENT
Start: 2022-08-03 | End: 2022-08-03

## 2022-08-03 RX ADMIN — DEXAMETHASONE SODIUM PHOSPHATE 12 MG: 4 INJECTION, SOLUTION INTRA-ARTICULAR; INTRALESIONAL; INTRAMUSCULAR; INTRAVENOUS; SOFT TISSUE at 11:25

## 2022-08-03 RX ADMIN — SODIUM CHLORIDE 25 ML/HR: 9 INJECTION, SOLUTION INTRAVENOUS at 11:16

## 2022-08-03 RX ADMIN — ETOPOSIDE 238 MG: 20 INJECTION INTRAVENOUS at 13:08

## 2022-08-03 RX ADMIN — FOSAPREPITANT 150 MG: 150 INJECTION, POWDER, LYOPHILIZED, FOR SOLUTION INTRAVENOUS at 11:24

## 2022-08-03 RX ADMIN — SODIUM CHLORIDE, PRESERVATIVE FREE 10 ML: 5 INJECTION INTRAVENOUS at 14:18

## 2022-08-03 RX ADMIN — POTASSIUM CHLORIDE 40 MEQ: 750 TABLET, EXTENDED RELEASE ORAL at 13:03

## 2022-08-03 RX ADMIN — HEPARIN 500 UNITS: 100 SYRINGE at 14:18

## 2022-08-03 RX ADMIN — PALONOSETRON 0.25 MG: 0.05 INJECTION, SOLUTION INTRAVENOUS at 11:17

## 2022-08-03 RX ADMIN — CARBOPLATIN 750 MG: 10 INJECTION, SOLUTION INTRAVENOUS at 12:27

## 2022-08-03 NOTE — PROGRESS NOTES
[facility] Progress Note    Date: August 3, 2022    Name: Aleisha Aguilar. MRN: [id number]         : 1950    Mr. Debra Tolbert Arrived ambulatory and in no distress for C4D1 of  Carboplatin + Etoposide Regimen. Assessment was completed by  Freddy Arnold RN. Right chest wall port accessed without difficulty, labs drawn & sent for processing. Chemotherapy Flowsheet 8/3/2022   Cycle C4D1   Date 8/3/2022   Drug / Regimen Carbo+Etoposide   Pre Meds given   Notes -       Patient proceed to MD appointment. Patient returned with no change in treatment. Mr. Romayne Bolk vitals were reviewed. Visit Vitals  BP (!) 145/65 (BP 1 Location: Right arm, BP Patient Position: Sitting)   Pulse 62   Temp 96.8 °F (36 °C)   Resp 18   Ht 5' 10\" (1.778 m)   Wt 111.1 kg (245 lb)   SpO2 95%   BMI 35.15 kg/m²       Lab results were obtained and reviewed. Recent Results (from the past 12 hour(s))   CBC WITH AUTOMATED DIFF    Collection Time: 22  9:23 AM   Result Value Ref Range    WBC 3.7 (L) 4.1 - 11.1 K/uL    RBC 3.76 (L) 4.10 - 5.70 M/uL    HGB 9.7 (L) 12.1 - 17.0 g/dL    HCT 30.6 (L) 36.6 - 50.3 %    MCV 81.4 80.0 - 99.0 FL    MCH 25.8 (L) 26.0 - 34.0 PG    MCHC 31.7 30.0 - 36.5 g/dL    RDW 17.6 (H) 11.5 - 14.5 %    PLATELET 845 215 - 707 K/uL    MPV 10.0 8.9 - 12.9 FL    NRBC 0.5 (H) 0  WBC    ABSOLUTE NRBC 0.02 (H) 0.00 - 0.01 K/uL    NEUTROPHILS 59 32 - 75 %    LYMPHOCYTES 26 12 - 49 %    MONOCYTES 10 5 - 13 %    EOSINOPHILS 2 0 - 7 %    BASOPHILS 1 0 - 1 %    IMMATURE GRANULOCYTES 2 (H) 0.0 - 0.5 %    ABS. NEUTROPHILS 2.1 1.8 - 8.0 K/UL    ABS. LYMPHOCYTES 1.0 0.8 - 3.5 K/UL    ABS. MONOCYTES 0.4 0.0 - 1.0 K/UL    ABS. EOSINOPHILS 0.1 0.0 - 0.4 K/UL    ABS. BASOPHILS 0.0 0.0 - 0.1 K/UL    ABS. IMM.  GRANS. 0.1 (H) 0.00 - 0.04 K/UL    DF SMEAR SCANNED      RBC COMMENTS ANISOCYTOSIS  1+       METABOLIC PANEL, COMPREHENSIVE    Collection Time: 22  9:23 AM   Result Value Ref Range    Sodium 140 136 - 145 mmol/L    Potassium 3.1 (L) 3.5 - 5.1 mmol/L    Chloride 108 97 - 108 mmol/L    CO2 27 21 - 32 mmol/L    Anion gap 5 5 - 15 mmol/L    Glucose 201 (H) 65 - 100 mg/dL    BUN 16 6 - 20 MG/DL    Creatinine 0.82 0.70 - 1.30 MG/DL    BUN/Creatinine ratio 20 12 - 20      GFR est AA >60 >60 ml/min/1.73m2    GFR est non-AA >60 >60 ml/min/1.73m2    Calcium 8.9 8.5 - 10.1 MG/DL    Bilirubin, total 0.6 0.2 - 1.0 MG/DL    ALT (SGPT) 23 12 - 78 U/L    AST (SGOT) 21 15 - 37 U/L    Alk.  phosphatase 75 45 - 117 U/L    Protein, total 6.8 6.4 - 8.2 g/dL    Albumin 3.4 (L) 3.5 - 5.0 g/dL    Globulin 3.4 2.0 - 4.0 g/dL    A-G Ratio 1.0 (L) 1.1 - 2.2         Medications:  Medications Administered       0.9% sodium chloride infusion       Admin Date  08/03/2022 Action  New Bag Dose  25 mL/hr Rate  25 mL/hr Route  IntraVENous Administered By  July Klein RN              CARBOplatin (PARAPLATIN) 750 mg in 0.9% sodium chloride 250 mL, overfill volume 25 mL chemo infusion       Admin Date  08/03/2022 Action  New Bag Dose  750 mg Rate  700 mL/hr Route  IntraVENous Administered By  July Klein RN              dexamethasone (DECADRON) 12 mg in 0.9% sodium chloride 50 mL IVPB       Admin Date  08/03/2022 Action  New Bag Dose  12 mg Route  IntraVENous Administered By  July Klein RN              etoposide (VEPESID) 238 mg in 0.9% sodium chloride 1,000 mL, overfill volume 100 mL chemo infusion       Admin Date  08/03/2022 Action  New Bag Dose  238 mg Rate  1,111.9 mL/hr Route  IntraVENous Administered By  July Klein RN              fosaprepitant (EMEND) 150 mg in 0.9% sodium chloride 150 mL IVPB       Admin Date  08/03/2022 Action  New Bag Dose  150 mg Rate  450 mL/hr Route  IntraVENous Administered By  July Klein RN              heparin (porcine) pf 300-500 Units       Admin Date  08/03/2022 Action  Given Dose  500 Units Route  InterCATHeter Administered By  July Klien RN              palonosetron HCl (ALOXI) injection 0.25 mg       Admin Date  08/03/2022 Action  Given Dose  0.25 mg Route  IntraVENous Administered By  Radha Khalil RN              potassium chloride SR (KLOR-CON 10) tablet 40 mEq       Admin Date  08/03/2022 Action  Given Dose  40 mEq Route  Oral Administered By  Radha Khalil RN              sodium chloride (NS) flush 10 mL       Admin Date  08/03/2022 Action  Given Dose  10 mL Route  IntraVENous Administered By  Radha Khalil RN                     Mr. Anny Hardwick tolerated treatment well and was discharged from [place] [address] in stable condition at 1425. Port flushed, heparinized, and capped per patient request. He would like to keep ramos needle in for tomorrow's treatment. He is to return on 8/4/2022.

## 2022-08-03 NOTE — PROGRESS NOTES
Jessica Zuniga. is a 81 y.o. male follow up for SCC of pancreas.     Vitals 8/3/2022   Blood Pressure 113/59   Pulse 57   Temp 97.8   Resp 17   Height 5' 10\"   Weight 245 lb   SpO2 95   BSA 2.34 m2   BMI 35.15 kg/m2   BP comment

## 2022-08-04 ENCOUNTER — HOSPITAL ENCOUNTER (OUTPATIENT)
Dept: INFUSION THERAPY | Age: 72
Discharge: HOME | End: 2022-08-04
Payer: MEDICARE

## 2022-08-04 VITALS
TEMPERATURE: 97.6 F | DIASTOLIC BLOOD PRESSURE: 62 MMHG | HEART RATE: 59 BPM | SYSTOLIC BLOOD PRESSURE: 139 MMHG | OXYGEN SATURATION: 95 % | RESPIRATION RATE: 16 BRPM

## 2022-08-04 DIAGNOSIS — C80.1 SMALL CELL CARCINOMA (HCC): Primary | ICD-10-CM

## 2022-08-04 DIAGNOSIS — Z51.11 ENCOUNTER FOR ANTINEOPLASTIC CHEMOTHERAPY: ICD-10-CM

## 2022-08-04 PROCEDURE — 74011250636 HC RX REV CODE- 250/636: Performed by: INTERNAL MEDICINE

## 2022-08-04 PROCEDURE — 96413 CHEMO IV INFUSION 1 HR: CPT

## 2022-08-04 PROCEDURE — 96375 TX/PRO/DX INJ NEW DRUG ADDON: CPT

## 2022-08-04 RX ORDER — SODIUM CHLORIDE 9 MG/ML
25 INJECTION, SOLUTION INTRAVENOUS CONTINUOUS
Status: DISPENSED | OUTPATIENT
Start: 2022-08-04 | End: 2022-08-04

## 2022-08-04 RX ORDER — SODIUM CHLORIDE 0.9 % (FLUSH) 0.9 %
10 SYRINGE (ML) INJECTION AS NEEDED
Status: DISPENSED | OUTPATIENT
Start: 2022-08-04 | End: 2022-08-04

## 2022-08-04 RX ORDER — SODIUM CHLORIDE 9 MG/ML
10 INJECTION INTRAMUSCULAR; INTRAVENOUS; SUBCUTANEOUS AS NEEDED
Status: ACTIVE | OUTPATIENT
Start: 2022-08-04 | End: 2022-08-04

## 2022-08-04 RX ORDER — DEXAMETHASONE SODIUM PHOSPHATE 4 MG/ML
8 INJECTION, SOLUTION INTRA-ARTICULAR; INTRALESIONAL; INTRAMUSCULAR; INTRAVENOUS; SOFT TISSUE ONCE
Status: COMPLETED | OUTPATIENT
Start: 2022-08-04 | End: 2022-08-04

## 2022-08-04 RX ORDER — HEPARIN 100 UNIT/ML
300-500 SYRINGE INTRAVENOUS AS NEEDED
Status: ACTIVE | OUTPATIENT
Start: 2022-08-04 | End: 2022-08-04

## 2022-08-04 RX ADMIN — DEXAMETHASONE SODIUM PHOSPHATE 8 MG: 4 INJECTION, SOLUTION INTRAMUSCULAR; INTRAVENOUS at 09:23

## 2022-08-04 RX ADMIN — SODIUM CHLORIDE 25 ML/HR: 9 INJECTION, SOLUTION INTRAVENOUS at 09:23

## 2022-08-04 RX ADMIN — ETOPOSIDE 238 MG: 20 INJECTION INTRAVENOUS at 10:52

## 2022-08-04 NOTE — PROGRESS NOTES
[facility] Progress Note    Date: 2022    Name: Hafsa Webb. MRN: [id number]         : 1950    Mr. Kobi Lemus Arrived ambulatory and in no distress for C4D2 of Etoposide Regimen. Assessment was completed, no acute issues at this time, no new complaints voiced. R chest wall port accessed yesterday and flushes with     Chemotherapy Flowsheet 2022   Cycle C4D2   Date 2022   Drug / Regimen Etoposide   Pre Meds given   Notes given         Mr. Gisela Foote vitals were reviewed. Visit Vitals  BP (!) 143/65 (BP 1 Location: Left arm, BP Patient Position: Sitting)   Pulse (!) 51   Temp 97.6 °F (36.4 °C)   Resp 16   SpO2 95%         Medications:    Medications Administered       0.9% sodium chloride infusion       Admin Date  2022 Action  New Bag Dose  25 mL/hr Rate  25 mL/hr Route  IntraVENous Administered By  [employer]              dexamethasone (DECADRON) 4 mg/mL injection 8 mg       Admin Date  2022 Action  Given Dose  8 mg Route  IntraVENous Administered By  [employer]              etoposide (VEPESID) 238 mg in 0.9% sodium chloride 1,000 mL, overfill volume 100 mL chemo infusion       Admin Date  2022 Action  New Bag Dose  238 mg Rate  1,111.9 mL/hr Route  IntraVENous Administered By  [employer]                       Two nurses verified prior to administering:  Drug name  Drug dose  Infusion volume or drug volume when prepared in a syringe  Rate of administration  Route of administration  Expiration dates and/or times  Appearance and physical integrity of the drugs  Rate set on infusion pump, when used  Sequencing of drug administration          Mr. Bowman tolerated treatment well and was discharged from [place] [address] in stable condition. Port secured for use tomorrow. He is to return on  for his next appointment.     Danita Chiu  2022

## 2022-08-05 ENCOUNTER — HOSPITAL ENCOUNTER (OUTPATIENT)
Dept: INFUSION THERAPY | Age: 72
Discharge: HOME | End: 2022-08-05
Payer: MEDICARE

## 2022-08-05 VITALS
HEART RATE: 48 BPM | WEIGHT: 252.2 LBS | BODY MASS INDEX: 36.11 KG/M2 | DIASTOLIC BLOOD PRESSURE: 63 MMHG | TEMPERATURE: 97.9 F | SYSTOLIC BLOOD PRESSURE: 142 MMHG | HEIGHT: 70 IN | RESPIRATION RATE: 16 BRPM | OXYGEN SATURATION: 98 %

## 2022-08-05 DIAGNOSIS — Z51.11 ENCOUNTER FOR ANTINEOPLASTIC CHEMOTHERAPY: ICD-10-CM

## 2022-08-05 DIAGNOSIS — C80.1 SMALL CELL CARCINOMA (HCC): Primary | ICD-10-CM

## 2022-08-05 PROCEDURE — 74011000250 HC RX REV CODE- 250: Performed by: INTERNAL MEDICINE

## 2022-08-05 PROCEDURE — 96413 CHEMO IV INFUSION 1 HR: CPT

## 2022-08-05 PROCEDURE — 74011250636 HC RX REV CODE- 250/636: Performed by: INTERNAL MEDICINE

## 2022-08-05 PROCEDURE — 96375 TX/PRO/DX INJ NEW DRUG ADDON: CPT

## 2022-08-05 RX ORDER — SODIUM CHLORIDE 9 MG/ML
10 INJECTION INTRAMUSCULAR; INTRAVENOUS; SUBCUTANEOUS AS NEEDED
Status: ACTIVE | OUTPATIENT
Start: 2022-08-05 | End: 2022-08-05

## 2022-08-05 RX ORDER — DEXAMETHASONE SODIUM PHOSPHATE 4 MG/ML
8 INJECTION, SOLUTION INTRA-ARTICULAR; INTRALESIONAL; INTRAMUSCULAR; INTRAVENOUS; SOFT TISSUE ONCE
Status: COMPLETED | OUTPATIENT
Start: 2022-08-05 | End: 2022-08-05

## 2022-08-05 RX ORDER — SODIUM CHLORIDE 0.9 % (FLUSH) 0.9 %
10 SYRINGE (ML) INJECTION AS NEEDED
Status: DISPENSED | OUTPATIENT
Start: 2022-08-05 | End: 2022-08-05

## 2022-08-05 RX ORDER — HEPARIN 100 UNIT/ML
300-500 SYRINGE INTRAVENOUS AS NEEDED
Status: ACTIVE | OUTPATIENT
Start: 2022-08-05 | End: 2022-08-05

## 2022-08-05 RX ORDER — SODIUM CHLORIDE 9 MG/ML
25 INJECTION, SOLUTION INTRAVENOUS CONTINUOUS
Status: DISPENSED | OUTPATIENT
Start: 2022-08-05 | End: 2022-08-05

## 2022-08-05 RX ADMIN — Medication 10 ML: at 11:32

## 2022-08-05 RX ADMIN — ETOPOSIDE 238 MG: 20 INJECTION INTRAVENOUS at 10:22

## 2022-08-05 RX ADMIN — Medication 10 ML: at 09:34

## 2022-08-05 RX ADMIN — HEPARIN 500 UNITS: 100 SYRINGE at 11:35

## 2022-08-05 RX ADMIN — SODIUM CHLORIDE 25 ML/HR: 9 INJECTION, SOLUTION INTRAVENOUS at 09:34

## 2022-08-05 RX ADMIN — DEXAMETHASONE SODIUM PHOSPHATE 8 MG: 4 INJECTION INTRA-ARTICULAR; INTRALESIONAL; INTRAMUSCULAR; INTRAVENOUS; SOFT TISSUE at 09:34

## 2022-08-05 NOTE — PROGRESS NOTES
[facility] Progress Note    Date: 2022    Name: Tara Cruz. MRN: [id number]         : 1950    Mr. Rasheed Lerma arrived ambulatory and in no distress for C4D3 of Etoposide Regimen. Assessment was completed, no acute issues at this time, no new complaints voiced. HR 40, alerted JEAN Cheung. Will recheck at end of treatment. Right chest wall port flushed, blood return noted. Chemotherapy Flowsheet 2022   Cycle C4D3   Date 2022   Drug / Regimen Etoposide   Pre Meds given   Notes given          Mr. Yola Hernandez vitals were reviewed.   Visit Vitals  BP (!) 155/67 (BP 1 Location: Right upper arm, BP Patient Position: At rest;Sitting)   Pulse (!) 40   Temp 97.8 °F (36.6 °C)   Resp 20   Ht 5' 10\" (1.778 m)   Wt 114.4 kg (252 lb 3.2 oz)   SpO2 98%   BMI 36.19 kg/m²           Medications:  Medications Administered       0.9% sodium chloride infusion       Admin Date  2022 Action  New Bag Dose  25 mL/hr Rate  25 mL/hr Route  IntraVENous Administered By  Mónica Galdamez RN              0.9% sodium chloride injection 10 mL       Admin Date  2022 Action  Given Dose  10 mL Route  IntraVENous Administered By  Mónica Galdamez RN               Admin Date  2022 Action  Given Dose  10 mL Route  IntraVENous Administered By  Mónica Galdamez RN              dexamethasone (DECADRON) 4 mg/mL injection 8 mg       Admin Date  2022 Action  Given Dose  8 mg Route  IntraVENous Administered By  Mónica Galdamez RN              etoposide (VEPESID) 238 mg in 0.9% sodium chloride 1,000 mL, overfill volume 100 mL chemo infusion       Admin Date  2022 Action  New Bag Dose  238 mg Rate  1,111.9 mL/hr Route  IntraVENous Administered By  Mónica Galdamez RN              heparin (porcine) pf 300-500 Units       Admin Date  2022 Action  Given Dose  500 Units Route  InterCATHeter Administered By  Mónica Galdamez RN                   AVS refused      Two nurses verified prior to administering:  Drug name  Drug dose  Infusion volume or drug volume when prepared in a syringe  Rate of administration  Route of administration  Expiration dates and/or times  Appearance and physical integrity of the drugs  Rate set on infusion pump, when used  Sequencing of drug administration          Mr. Bowman tolerated treatment well and was discharged from [place] [address] in stable condition at 1135. Port de-accessed, flushed & heparinized per protocol. He will have additional appointments made based on scan.     Kelsi Worthy RN  August 5, 2022

## 2022-08-17 RX ORDER — DIPHENHYDRAMINE HYDROCHLORIDE 50 MG/ML
50 INJECTION, SOLUTION INTRAMUSCULAR; INTRAVENOUS AS NEEDED
Status: CANCELLED
Start: 2022-08-24

## 2022-08-17 RX ORDER — DEXAMETHASONE SODIUM PHOSPHATE 4 MG/ML
8 INJECTION, SOLUTION INTRA-ARTICULAR; INTRALESIONAL; INTRAMUSCULAR; INTRAVENOUS; SOFT TISSUE ONCE
Status: CANCELLED | OUTPATIENT
Start: 2022-08-25 | End: 2022-08-25

## 2022-08-17 RX ORDER — DIPHENHYDRAMINE HYDROCHLORIDE 50 MG/ML
25 INJECTION, SOLUTION INTRAMUSCULAR; INTRAVENOUS AS NEEDED
Status: CANCELLED
Start: 2022-08-25

## 2022-08-17 RX ORDER — EPINEPHRINE 1 MG/ML
0.3 INJECTION, SOLUTION, CONCENTRATE INTRAVENOUS AS NEEDED
Status: CANCELLED | OUTPATIENT
Start: 2022-08-25

## 2022-08-17 RX ORDER — EPINEPHRINE 1 MG/ML
0.3 INJECTION, SOLUTION, CONCENTRATE INTRAVENOUS AS NEEDED
Status: CANCELLED | OUTPATIENT
Start: 2022-08-24

## 2022-08-17 RX ORDER — SODIUM CHLORIDE 9 MG/ML
10 INJECTION INTRAMUSCULAR; INTRAVENOUS; SUBCUTANEOUS AS NEEDED
Status: CANCELLED | OUTPATIENT
Start: 2022-08-25

## 2022-08-17 RX ORDER — SODIUM CHLORIDE 0.9 % (FLUSH) 0.9 %
10 SYRINGE (ML) INJECTION AS NEEDED
Status: CANCELLED | OUTPATIENT
Start: 2022-08-26

## 2022-08-17 RX ORDER — SODIUM CHLORIDE 9 MG/ML
10 INJECTION INTRAMUSCULAR; INTRAVENOUS; SUBCUTANEOUS AS NEEDED
Status: CANCELLED | OUTPATIENT
Start: 2022-08-26

## 2022-08-17 RX ORDER — ACETAMINOPHEN 325 MG/1
650 TABLET ORAL AS NEEDED
Status: CANCELLED
Start: 2022-08-26

## 2022-08-17 RX ORDER — DIPHENHYDRAMINE HYDROCHLORIDE 50 MG/ML
50 INJECTION, SOLUTION INTRAMUSCULAR; INTRAVENOUS AS NEEDED
Status: CANCELLED
Start: 2022-08-26

## 2022-08-17 RX ORDER — DIPHENHYDRAMINE HYDROCHLORIDE 50 MG/ML
25 INJECTION, SOLUTION INTRAMUSCULAR; INTRAVENOUS AS NEEDED
Status: CANCELLED
Start: 2022-08-24

## 2022-08-17 RX ORDER — ONDANSETRON 2 MG/ML
8 INJECTION INTRAMUSCULAR; INTRAVENOUS AS NEEDED
Status: CANCELLED | OUTPATIENT
Start: 2022-08-26

## 2022-08-17 RX ORDER — ALBUTEROL SULFATE 0.83 MG/ML
2.5 SOLUTION RESPIRATORY (INHALATION) AS NEEDED
Status: CANCELLED
Start: 2022-08-24

## 2022-08-17 RX ORDER — ONDANSETRON 2 MG/ML
8 INJECTION INTRAMUSCULAR; INTRAVENOUS AS NEEDED
Status: CANCELLED | OUTPATIENT
Start: 2022-08-24

## 2022-08-17 RX ORDER — HYDROCORTISONE SODIUM SUCCINATE 100 MG/2ML
100 INJECTION, POWDER, FOR SOLUTION INTRAMUSCULAR; INTRAVENOUS AS NEEDED
Status: CANCELLED | OUTPATIENT
Start: 2022-08-25

## 2022-08-17 RX ORDER — ONDANSETRON 2 MG/ML
8 INJECTION INTRAMUSCULAR; INTRAVENOUS
Status: CANCELLED | OUTPATIENT
Start: 2022-08-25

## 2022-08-17 RX ORDER — SODIUM CHLORIDE 0.9 % (FLUSH) 0.9 %
10 SYRINGE (ML) INJECTION AS NEEDED
Status: CANCELLED | OUTPATIENT
Start: 2022-08-25

## 2022-08-17 RX ORDER — SODIUM CHLORIDE 9 MG/ML
25 INJECTION, SOLUTION INTRAVENOUS CONTINUOUS
Status: CANCELLED | OUTPATIENT
Start: 2022-08-25

## 2022-08-17 RX ORDER — HYDROCORTISONE SODIUM SUCCINATE 100 MG/2ML
100 INJECTION, POWDER, FOR SOLUTION INTRAMUSCULAR; INTRAVENOUS AS NEEDED
Status: CANCELLED | OUTPATIENT
Start: 2022-08-24

## 2022-08-17 RX ORDER — EPINEPHRINE 1 MG/ML
0.3 INJECTION, SOLUTION, CONCENTRATE INTRAVENOUS AS NEEDED
Status: CANCELLED | OUTPATIENT
Start: 2022-08-26

## 2022-08-17 RX ORDER — ALBUTEROL SULFATE 0.83 MG/ML
2.5 SOLUTION RESPIRATORY (INHALATION) AS NEEDED
Status: CANCELLED
Start: 2022-08-25

## 2022-08-17 RX ORDER — ALBUTEROL SULFATE 0.83 MG/ML
2.5 SOLUTION RESPIRATORY (INHALATION) AS NEEDED
Status: CANCELLED
Start: 2022-08-26

## 2022-08-17 RX ORDER — DIPHENHYDRAMINE HYDROCHLORIDE 50 MG/ML
50 INJECTION, SOLUTION INTRAMUSCULAR; INTRAVENOUS AS NEEDED
Status: CANCELLED
Start: 2022-08-25

## 2022-08-17 RX ORDER — DIPHENHYDRAMINE HYDROCHLORIDE 50 MG/ML
25 INJECTION, SOLUTION INTRAMUSCULAR; INTRAVENOUS AS NEEDED
Status: CANCELLED
Start: 2022-08-26

## 2022-08-17 RX ORDER — SODIUM CHLORIDE 9 MG/ML
25 INJECTION, SOLUTION INTRAVENOUS CONTINUOUS
Status: CANCELLED | OUTPATIENT
Start: 2022-08-26

## 2022-08-17 RX ORDER — ONDANSETRON 2 MG/ML
8 INJECTION INTRAMUSCULAR; INTRAVENOUS AS NEEDED
Status: CANCELLED | OUTPATIENT
Start: 2022-08-25

## 2022-08-17 RX ORDER — DEXAMETHASONE SODIUM PHOSPHATE 4 MG/ML
8 INJECTION, SOLUTION INTRA-ARTICULAR; INTRALESIONAL; INTRAMUSCULAR; INTRAVENOUS; SOFT TISSUE ONCE
Status: CANCELLED | OUTPATIENT
Start: 2022-08-26 | End: 2022-08-26

## 2022-08-17 RX ORDER — ACETAMINOPHEN 325 MG/1
650 TABLET ORAL AS NEEDED
Status: CANCELLED
Start: 2022-08-24

## 2022-08-17 RX ORDER — HEPARIN 100 UNIT/ML
300-500 SYRINGE INTRAVENOUS AS NEEDED
Status: CANCELLED | OUTPATIENT
Start: 2022-08-25

## 2022-08-17 RX ORDER — HEPARIN 100 UNIT/ML
300-500 SYRINGE INTRAVENOUS AS NEEDED
Status: CANCELLED | OUTPATIENT
Start: 2022-08-26

## 2022-08-17 RX ORDER — HYDROCORTISONE SODIUM SUCCINATE 100 MG/2ML
100 INJECTION, POWDER, FOR SOLUTION INTRAMUSCULAR; INTRAVENOUS AS NEEDED
Status: CANCELLED | OUTPATIENT
Start: 2022-08-26

## 2022-08-17 RX ORDER — ONDANSETRON 2 MG/ML
8 INJECTION INTRAMUSCULAR; INTRAVENOUS
Status: CANCELLED | OUTPATIENT
Start: 2022-08-26

## 2022-08-17 RX ORDER — ACETAMINOPHEN 325 MG/1
650 TABLET ORAL AS NEEDED
Status: CANCELLED
Start: 2022-08-25

## 2022-08-23 ENCOUNTER — TELEPHONE (OUTPATIENT)
Dept: ONCOLOGY | Age: 72
End: 2022-08-23

## 2022-08-23 ENCOUNTER — HOSPITAL ENCOUNTER (OUTPATIENT)
Dept: CT IMAGING | Age: 72
Discharge: HOME | End: 2022-08-23
Attending: NURSE PRACTITIONER
Payer: MEDICARE

## 2022-08-23 DIAGNOSIS — C25.0 MALIGNANT NEOPLASM OF HEAD OF PANCREAS (HCC): ICD-10-CM

## 2022-08-23 DIAGNOSIS — C78.7 METASTASIS TO LIVER (HCC): ICD-10-CM

## 2022-08-23 PROCEDURE — 74011000636 HC RX REV CODE- 636: Performed by: NURSE PRACTITIONER

## 2022-08-23 PROCEDURE — 74177 CT ABD & PELVIS W/CONTRAST: CPT

## 2022-08-23 RX ADMIN — IOPAMIDOL 100 ML: 755 INJECTION, SOLUTION INTRAVENOUS at 10:10

## 2022-08-23 NOTE — TELEPHONE ENCOUNTER
Lm with patient to move his infusion tomorrow to 830am per [facility]. Gave him office number to call back.

## 2022-08-24 ENCOUNTER — OFFICE VISIT (OUTPATIENT)
Dept: ONCOLOGY | Age: 72
End: 2022-08-24

## 2022-08-24 ENCOUNTER — HOSPITAL ENCOUNTER (OUTPATIENT)
Dept: INFUSION THERAPY | Age: 72
Discharge: HOME | End: 2022-08-24
Payer: MEDICARE

## 2022-08-24 VITALS
RESPIRATION RATE: 16 BRPM | HEART RATE: 57 BPM | DIASTOLIC BLOOD PRESSURE: 69 MMHG | SYSTOLIC BLOOD PRESSURE: 121 MMHG | WEIGHT: 242.8 LBS | BODY MASS INDEX: 34.76 KG/M2 | TEMPERATURE: 98 F | OXYGEN SATURATION: 96 % | HEIGHT: 70 IN

## 2022-08-24 VITALS
HEART RATE: 57 BPM | SYSTOLIC BLOOD PRESSURE: 134 MMHG | RESPIRATION RATE: 16 BRPM | WEIGHT: 242 LBS | HEIGHT: 70 IN | DIASTOLIC BLOOD PRESSURE: 66 MMHG | TEMPERATURE: 98 F | BODY MASS INDEX: 34.65 KG/M2 | OXYGEN SATURATION: 96 %

## 2022-08-24 DIAGNOSIS — Z51.11 ENCOUNTER FOR ANTINEOPLASTIC CHEMOTHERAPY: ICD-10-CM

## 2022-08-24 DIAGNOSIS — C25.0 MALIGNANT NEOPLASM OF HEAD OF PANCREAS (HCC): Primary | ICD-10-CM

## 2022-08-24 DIAGNOSIS — C80.1 SMALL CELL CARCINOMA (HCC): Primary | ICD-10-CM

## 2022-08-24 DIAGNOSIS — C78.7 METASTASIS TO LIVER (HCC): ICD-10-CM

## 2022-08-24 LAB
ALBUMIN SERPL-MCNC: 3.6 G/DL (ref 3.5–5)
ALBUMIN/GLOB SERPL: 1 {RATIO} (ref 1.1–2.2)
ALP SERPL-CCNC: 80 U/L (ref 45–117)
ALT SERPL-CCNC: 24 U/L (ref 12–78)
ANION GAP SERPL CALC-SCNC: 8 MMOL/L (ref 5–15)
AST SERPL-CCNC: 19 U/L (ref 15–37)
BASOPHILS # BLD: 0 K/UL (ref 0–0.1)
BASOPHILS NFR BLD: 0 % (ref 0–1)
BILIRUB SERPL-MCNC: 0.7 MG/DL (ref 0.2–1)
BUN SERPL-MCNC: 13 MG/DL (ref 6–20)
BUN/CREAT SERPL: 15 (ref 12–20)
CALCIUM SERPL-MCNC: 9.2 MG/DL (ref 8.5–10.1)
CHLORIDE SERPL-SCNC: 106 MMOL/L (ref 97–108)
CO2 SERPL-SCNC: 25 MMOL/L (ref 21–32)
CREAT SERPL-MCNC: 0.85 MG/DL (ref 0.7–1.3)
DIFFERENTIAL METHOD BLD: ABNORMAL
EOSINOPHIL # BLD: 0.1 K/UL (ref 0–0.4)
EOSINOPHIL NFR BLD: 2 % (ref 0–7)
ERYTHROCYTE [DISTWIDTH] IN BLOOD BY AUTOMATED COUNT: 19.3 % (ref 11.5–14.5)
GLOBULIN SER CALC-MCNC: 3.6 G/DL (ref 2–4)
GLUCOSE SERPL-MCNC: 181 MG/DL (ref 65–100)
HCT VFR BLD AUTO: 31 % (ref 36.6–50.3)
HGB BLD-MCNC: 10 G/DL (ref 12.1–17)
IMM GRANULOCYTES # BLD AUTO: 0 K/UL
IMM GRANULOCYTES NFR BLD AUTO: 0 %
LYMPHOCYTES # BLD: 1 K/UL (ref 0.8–3.5)
LYMPHOCYTES NFR BLD: 33 % (ref 12–49)
MCH RBC QN AUTO: 25.6 PG (ref 26–34)
MCHC RBC AUTO-ENTMCNC: 32.3 G/DL (ref 30–36.5)
MCV RBC AUTO: 79.3 FL (ref 80–99)
MONOCYTES # BLD: 0.3 K/UL (ref 0–1)
MONOCYTES NFR BLD: 10 % (ref 5–13)
NEUTS BAND NFR BLD MANUAL: 5 % (ref 0–6)
NEUTS SEG # BLD: 1.5 K/UL (ref 1.8–8)
NEUTS SEG NFR BLD: 50 % (ref 32–75)
NRBC # BLD: 0.02 K/UL (ref 0–0.01)
NRBC BLD-RTO: 0.7 PER 100 WBC
PLATELET # BLD AUTO: 187 K/UL (ref 150–400)
PMV BLD AUTO: 9.9 FL (ref 8.9–12.9)
POTASSIUM SERPL-SCNC: 3.8 MMOL/L (ref 3.5–5.1)
PROT SERPL-MCNC: 7.2 G/DL (ref 6.4–8.2)
RBC # BLD AUTO: 3.91 M/UL (ref 4.1–5.7)
RBC MORPH BLD: ABNORMAL
SODIUM SERPL-SCNC: 139 MMOL/L (ref 136–145)
WBC # BLD AUTO: 2.9 K/UL (ref 4.1–11.1)

## 2022-08-24 PROCEDURE — G8752 SYS BP LESS 140: HCPCS | Performed by: INTERNAL MEDICINE

## 2022-08-24 PROCEDURE — 96417 CHEMO IV INFUS EACH ADDL SEQ: CPT

## 2022-08-24 PROCEDURE — 96413 CHEMO IV INFUSION 1 HR: CPT

## 2022-08-24 PROCEDURE — 80053 COMPREHEN METABOLIC PANEL: CPT

## 2022-08-24 PROCEDURE — 36415 COLL VENOUS BLD VENIPUNCTURE: CPT

## 2022-08-24 PROCEDURE — 1101F PT FALLS ASSESS-DOCD LE1/YR: CPT | Performed by: INTERNAL MEDICINE

## 2022-08-24 PROCEDURE — G8427 DOCREV CUR MEDS BY ELIG CLIN: HCPCS | Performed by: INTERNAL MEDICINE

## 2022-08-24 PROCEDURE — 74011000258 HC RX REV CODE- 258: Performed by: INTERNAL MEDICINE

## 2022-08-24 PROCEDURE — 3017F COLORECTAL CA SCREEN DOC REV: CPT | Performed by: INTERNAL MEDICINE

## 2022-08-24 PROCEDURE — G8754 DIAS BP LESS 90: HCPCS | Performed by: INTERNAL MEDICINE

## 2022-08-24 PROCEDURE — 74011250636 HC RX REV CODE- 250/636: Performed by: INTERNAL MEDICINE

## 2022-08-24 PROCEDURE — G8536 NO DOC ELDER MAL SCRN: HCPCS | Performed by: INTERNAL MEDICINE

## 2022-08-24 PROCEDURE — 1123F ACP DISCUSS/DSCN MKR DOCD: CPT | Performed by: INTERNAL MEDICINE

## 2022-08-24 PROCEDURE — 96367 TX/PROPH/DG ADDL SEQ IV INF: CPT

## 2022-08-24 PROCEDURE — 96375 TX/PRO/DX INJ NEW DRUG ADDON: CPT

## 2022-08-24 PROCEDURE — G9717 DOC PT DX DEP/BP F/U NT REQ: HCPCS | Performed by: INTERNAL MEDICINE

## 2022-08-24 PROCEDURE — 77030016057 HC NDL HUBR APOL -B

## 2022-08-24 PROCEDURE — 85025 COMPLETE CBC W/AUTO DIFF WBC: CPT

## 2022-08-24 PROCEDURE — G8417 CALC BMI ABV UP PARAM F/U: HCPCS | Performed by: INTERNAL MEDICINE

## 2022-08-24 PROCEDURE — 99215 OFFICE O/P EST HI 40 MIN: CPT | Performed by: INTERNAL MEDICINE

## 2022-08-24 RX ORDER — HEPARIN 100 UNIT/ML
300-500 SYRINGE INTRAVENOUS AS NEEDED
Status: CANCELLED | OUTPATIENT
Start: 2022-09-14

## 2022-08-24 RX ORDER — ONDANSETRON 2 MG/ML
8 INJECTION INTRAMUSCULAR; INTRAVENOUS
Status: CANCELLED | OUTPATIENT
Start: 2022-09-15

## 2022-08-24 RX ORDER — HYDROCORTISONE SODIUM SUCCINATE 100 MG/2ML
100 INJECTION, POWDER, FOR SOLUTION INTRAMUSCULAR; INTRAVENOUS AS NEEDED
Status: CANCELLED | OUTPATIENT
Start: 2022-09-16

## 2022-08-24 RX ORDER — ONDANSETRON 2 MG/ML
8 INJECTION INTRAMUSCULAR; INTRAVENOUS AS NEEDED
Status: CANCELLED | OUTPATIENT
Start: 2022-09-16

## 2022-08-24 RX ORDER — SODIUM CHLORIDE 9 MG/ML
10 INJECTION INTRAMUSCULAR; INTRAVENOUS; SUBCUTANEOUS AS NEEDED
Status: CANCELLED | OUTPATIENT
Start: 2022-09-14

## 2022-08-24 RX ORDER — DIPHENHYDRAMINE HYDROCHLORIDE 50 MG/ML
50 INJECTION, SOLUTION INTRAMUSCULAR; INTRAVENOUS AS NEEDED
Status: CANCELLED
Start: 2022-09-15

## 2022-08-24 RX ORDER — HYDROCORTISONE SODIUM SUCCINATE 100 MG/2ML
100 INJECTION, POWDER, FOR SOLUTION INTRAMUSCULAR; INTRAVENOUS AS NEEDED
Status: CANCELLED | OUTPATIENT
Start: 2022-09-15

## 2022-08-24 RX ORDER — ALBUTEROL SULFATE 0.83 MG/ML
2.5 SOLUTION RESPIRATORY (INHALATION) AS NEEDED
Status: CANCELLED
Start: 2022-09-16

## 2022-08-24 RX ORDER — DEXAMETHASONE SODIUM PHOSPHATE 4 MG/ML
8 INJECTION, SOLUTION INTRA-ARTICULAR; INTRALESIONAL; INTRAMUSCULAR; INTRAVENOUS; SOFT TISSUE ONCE
Status: CANCELLED | OUTPATIENT
Start: 2022-09-16 | End: 2022-09-16

## 2022-08-24 RX ORDER — ONDANSETRON 2 MG/ML
8 INJECTION INTRAMUSCULAR; INTRAVENOUS AS NEEDED
Status: CANCELLED | OUTPATIENT
Start: 2022-09-15

## 2022-08-24 RX ORDER — DIPHENHYDRAMINE HYDROCHLORIDE 50 MG/ML
25 INJECTION, SOLUTION INTRAMUSCULAR; INTRAVENOUS AS NEEDED
Status: CANCELLED
Start: 2022-09-16

## 2022-08-24 RX ORDER — SODIUM CHLORIDE 9 MG/ML
10 INJECTION INTRAMUSCULAR; INTRAVENOUS; SUBCUTANEOUS AS NEEDED
Status: CANCELLED | OUTPATIENT
Start: 2022-09-16

## 2022-08-24 RX ORDER — SODIUM CHLORIDE 0.9 % (FLUSH) 0.9 %
10 SYRINGE (ML) INJECTION AS NEEDED
Status: CANCELLED | OUTPATIENT
Start: 2022-09-14

## 2022-08-24 RX ORDER — ONDANSETRON 2 MG/ML
8 INJECTION INTRAMUSCULAR; INTRAVENOUS
Status: CANCELLED | OUTPATIENT
Start: 2022-09-16

## 2022-08-24 RX ORDER — ACETAMINOPHEN 325 MG/1
650 TABLET ORAL AS NEEDED
Status: CANCELLED
Start: 2022-09-14

## 2022-08-24 RX ORDER — SODIUM CHLORIDE 9 MG/ML
10 INJECTION INTRAMUSCULAR; INTRAVENOUS; SUBCUTANEOUS AS NEEDED
Status: ACTIVE | OUTPATIENT
Start: 2022-08-24 | End: 2022-08-24

## 2022-08-24 RX ORDER — PALONOSETRON 0.05 MG/ML
0.25 INJECTION, SOLUTION INTRAVENOUS ONCE
Status: CANCELLED | OUTPATIENT
Start: 2022-09-14 | End: 2022-09-14

## 2022-08-24 RX ORDER — HEPARIN 100 UNIT/ML
300-500 SYRINGE INTRAVENOUS AS NEEDED
Status: CANCELLED | OUTPATIENT
Start: 2022-09-16

## 2022-08-24 RX ORDER — EPINEPHRINE 1 MG/ML
0.3 INJECTION, SOLUTION, CONCENTRATE INTRAVENOUS AS NEEDED
Status: CANCELLED | OUTPATIENT
Start: 2022-09-14

## 2022-08-24 RX ORDER — DIPHENHYDRAMINE HYDROCHLORIDE 50 MG/ML
50 INJECTION, SOLUTION INTRAMUSCULAR; INTRAVENOUS AS NEEDED
Status: CANCELLED
Start: 2022-09-16

## 2022-08-24 RX ORDER — SODIUM CHLORIDE 9 MG/ML
25 INJECTION, SOLUTION INTRAVENOUS CONTINUOUS
Status: CANCELLED | OUTPATIENT
Start: 2022-09-14

## 2022-08-24 RX ORDER — SODIUM CHLORIDE 0.9 % (FLUSH) 0.9 %
10 SYRINGE (ML) INJECTION AS NEEDED
Status: CANCELLED | OUTPATIENT
Start: 2022-09-15

## 2022-08-24 RX ORDER — EPINEPHRINE 1 MG/ML
0.3 INJECTION, SOLUTION, CONCENTRATE INTRAVENOUS AS NEEDED
Status: CANCELLED | OUTPATIENT
Start: 2022-09-16

## 2022-08-24 RX ORDER — HEPARIN 100 UNIT/ML
300-500 SYRINGE INTRAVENOUS AS NEEDED
Status: CANCELLED | OUTPATIENT
Start: 2022-09-15

## 2022-08-24 RX ORDER — SODIUM CHLORIDE 9 MG/ML
10 INJECTION INTRAMUSCULAR; INTRAVENOUS; SUBCUTANEOUS AS NEEDED
Status: CANCELLED | OUTPATIENT
Start: 2022-09-15

## 2022-08-24 RX ORDER — SODIUM CHLORIDE 9 MG/ML
25 INJECTION, SOLUTION INTRAVENOUS CONTINUOUS
Status: CANCELLED | OUTPATIENT
Start: 2022-09-16

## 2022-08-24 RX ORDER — EPINEPHRINE 1 MG/ML
0.3 INJECTION, SOLUTION, CONCENTRATE INTRAVENOUS AS NEEDED
Status: CANCELLED | OUTPATIENT
Start: 2022-09-15

## 2022-08-24 RX ORDER — HEPARIN 100 UNIT/ML
300-500 SYRINGE INTRAVENOUS AS NEEDED
Status: ACTIVE | OUTPATIENT
Start: 2022-08-24 | End: 2022-08-24

## 2022-08-24 RX ORDER — ACETAMINOPHEN 325 MG/1
650 TABLET ORAL AS NEEDED
Status: CANCELLED
Start: 2022-09-15

## 2022-08-24 RX ORDER — DIPHENHYDRAMINE HYDROCHLORIDE 50 MG/ML
50 INJECTION, SOLUTION INTRAMUSCULAR; INTRAVENOUS AS NEEDED
Status: CANCELLED
Start: 2022-09-14

## 2022-08-24 RX ORDER — ONDANSETRON 2 MG/ML
8 INJECTION INTRAMUSCULAR; INTRAVENOUS AS NEEDED
Status: CANCELLED | OUTPATIENT
Start: 2022-09-14

## 2022-08-24 RX ORDER — ALBUTEROL SULFATE 0.83 MG/ML
2.5 SOLUTION RESPIRATORY (INHALATION) AS NEEDED
Status: CANCELLED
Start: 2022-09-15

## 2022-08-24 RX ORDER — ACETAMINOPHEN 325 MG/1
650 TABLET ORAL AS NEEDED
Status: CANCELLED
Start: 2022-09-16

## 2022-08-24 RX ORDER — PALONOSETRON 0.05 MG/ML
0.25 INJECTION, SOLUTION INTRAVENOUS ONCE
Status: COMPLETED | OUTPATIENT
Start: 2022-08-24 | End: 2022-08-24

## 2022-08-24 RX ORDER — DEXAMETHASONE SODIUM PHOSPHATE 4 MG/ML
8 INJECTION, SOLUTION INTRA-ARTICULAR; INTRALESIONAL; INTRAMUSCULAR; INTRAVENOUS; SOFT TISSUE ONCE
Status: CANCELLED | OUTPATIENT
Start: 2022-09-15 | End: 2022-09-15

## 2022-08-24 RX ORDER — ALBUTEROL SULFATE 0.83 MG/ML
2.5 SOLUTION RESPIRATORY (INHALATION) AS NEEDED
Status: CANCELLED
Start: 2022-09-14

## 2022-08-24 RX ORDER — DIPHENHYDRAMINE HYDROCHLORIDE 50 MG/ML
25 INJECTION, SOLUTION INTRAMUSCULAR; INTRAVENOUS AS NEEDED
Status: CANCELLED
Start: 2022-09-15

## 2022-08-24 RX ORDER — HYDROCORTISONE SODIUM SUCCINATE 100 MG/2ML
100 INJECTION, POWDER, FOR SOLUTION INTRAMUSCULAR; INTRAVENOUS AS NEEDED
Status: CANCELLED | OUTPATIENT
Start: 2022-09-14

## 2022-08-24 RX ORDER — DIPHENHYDRAMINE HYDROCHLORIDE 50 MG/ML
25 INJECTION, SOLUTION INTRAMUSCULAR; INTRAVENOUS AS NEEDED
Status: CANCELLED
Start: 2022-09-14

## 2022-08-24 RX ORDER — SODIUM CHLORIDE 9 MG/ML
25 INJECTION, SOLUTION INTRAVENOUS CONTINUOUS
Status: DISPENSED | OUTPATIENT
Start: 2022-08-24 | End: 2022-08-24

## 2022-08-24 RX ORDER — SODIUM CHLORIDE 0.9 % (FLUSH) 0.9 %
10 SYRINGE (ML) INJECTION AS NEEDED
Status: DISPENSED | OUTPATIENT
Start: 2022-08-24 | End: 2022-08-24

## 2022-08-24 RX ORDER — SODIUM CHLORIDE 0.9 % (FLUSH) 0.9 %
10 SYRINGE (ML) INJECTION AS NEEDED
Status: CANCELLED | OUTPATIENT
Start: 2022-09-16

## 2022-08-24 RX ORDER — SODIUM CHLORIDE 9 MG/ML
25 INJECTION, SOLUTION INTRAVENOUS CONTINUOUS
Status: CANCELLED | OUTPATIENT
Start: 2022-09-15

## 2022-08-24 RX ADMIN — FOSAPREPITANT 150 MG: 150 INJECTION, POWDER, LYOPHILIZED, FOR SOLUTION INTRAVENOUS at 11:40

## 2022-08-24 RX ADMIN — DEXAMETHASONE SODIUM PHOSPHATE 12 MG: 4 INJECTION, SOLUTION INTRA-ARTICULAR; INTRALESIONAL; INTRAMUSCULAR; INTRAVENOUS; SOFT TISSUE at 11:40

## 2022-08-24 RX ADMIN — SODIUM CHLORIDE 25 ML/HR: 900 INJECTION, SOLUTION INTRAVENOUS at 11:30

## 2022-08-24 RX ADMIN — ETOPOSIDE 238 MG: 20 INJECTION INTRAVENOUS at 13:29

## 2022-08-24 RX ADMIN — PALONOSETRON 0.25 MG: 0.05 INJECTION, SOLUTION INTRAVENOUS at 11:37

## 2022-08-24 RX ADMIN — CARBOPLATIN 750 MG: 10 INJECTION, SOLUTION INTRAVENOUS at 12:38

## 2022-08-24 NOTE — PROGRESS NOTES
Alessandro Culver. is a 81 y.o. male follow up for SCC of pancreas. 1. Have you been to the ER, urgent care clinic since your last visit? Hospitalized since your last visit?no     2. Have you seen or consulted any other health care providers outside of the [address] [place] [address] since your last visit? Include any pap smears or colon screening.  No    Vitals 8/24/2022   Blood Pressure 134/66   Pulse 57   Temp 98   Resp 16   Height 5' 10\"   Weight 242 lb 12.8 oz   SpO2 96   BSA 2.33 m2   BMI 34.84 kg/m2   BP comment

## 2022-08-24 NOTE — PROGRESS NOTES
[facility] Progress Note    Date: 2022    Name: Zeynep Reynoso. MRN: [id number]         : 1950    Mr. Evangelina Cazares Arrived ambulatory and in no distress for C5D1 of Carbo+Etoposide Regimen. Assessment was completed, no acute issues at this time, no new complaints voiced. Right chest wall port accessed without difficulty, labs drawn & sent for processing. Chemotherapy Flowsheet 2022   Cycle C5D1   Date 2022   Drug / Regimen Carbo/Etoposide   Pre Meds given   Notes given       Patient proceed to appointment with Dr. Max Carrizales. Mr. Poli Hoyt vitals were reviewed. Visit Vitals  /66   Pulse (!) 57   Temp 98 °F (36.7 °C)   Resp 16   Ht 5' 10\" (1.778 m)   Wt 110.1 kg (242 lb 12.8 oz)   SpO2 96%   BMI 34.84 kg/m²       Lab results were obtained and reviewed. Recent Results (from the past 12 hour(s))   CBC WITH AUTOMATED DIFF    Collection Time: 22  9:00 AM   Result Value Ref Range    WBC 2.9 (L) 4.1 - 11.1 K/uL    RBC 3.91 (L) 4.10 - 5.70 M/uL    HGB 10.0 (L) 12.1 - 17.0 g/dL    HCT 31.0 (L) 36.6 - 50.3 %    MCV 79.3 (L) 80.0 - 99.0 FL    MCH 25.6 (L) 26.0 - 34.0 PG    MCHC 32.3 30.0 - 36.5 g/dL    RDW 19.3 (H) 11.5 - 14.5 %    PLATELET 827 393 - 713 K/uL    MPV 9.9 8.9 - 12.9 FL    NRBC 0.7 (H) 0  WBC    ABSOLUTE NRBC 0.02 (H) 0.00 - 0.01 K/uL    NEUTROPHILS 50 32 - 75 %    BAND NEUTROPHILS 5 0 - 6 %    LYMPHOCYTES 33 12 - 49 %    MONOCYTES 10 5 - 13 %    EOSINOPHILS 2 0 - 7 %    BASOPHILS 0 0 - 1 %    IMMATURE GRANULOCYTES 0 %    ABS. NEUTROPHILS 1.5 (L) 1.8 - 8.0 K/UL    ABS. LYMPHOCYTES 1.0 0.8 - 3.5 K/UL    ABS. MONOCYTES 0.3 0.0 - 1.0 K/UL    ABS. EOSINOPHILS 0.1 0.0 - 0.4 K/UL    ABS. BASOPHILS 0.0 0.0 - 0.1 K/UL    ABS. IMM.  GRANS. 0.0 K/UL    DF MANUAL      RBC COMMENTS ANISOCYTOSIS  1+        RBC COMMENTS MICROCYTOSIS  1+        RBC COMMENTS POLYCHROMASIA  1+        RBC COMMENTS OVALOCYTES  PRESENT       METABOLIC PANEL, COMPREHENSIVE    Collection Time: 22  9:00 AM Result Value Ref Range    Sodium 139 136 - 145 mmol/L    Potassium 3.8 3.5 - 5.1 mmol/L    Chloride 106 97 - 108 mmol/L    CO2 25 21 - 32 mmol/L    Anion gap 8 5 - 15 mmol/L    Glucose 181 (H) 65 - 100 mg/dL    BUN 13 6 - 20 MG/DL    Creatinine 0.85 0.70 - 1.30 MG/DL    BUN/Creatinine ratio 15 12 - 20      GFR est AA >60 >60 ml/min/1.73m2    GFR est non-AA >60 >60 ml/min/1.73m2    Calcium 9.2 8.5 - 10.1 MG/DL    Bilirubin, total 0.7 0.2 - 1.0 MG/DL    ALT (SGPT) 24 12 - 78 U/L    AST (SGOT) 19 15 - 37 U/L    Alk.  phosphatase 80 45 - 117 U/L    Protein, total 7.2 6.4 - 8.2 g/dL    Albumin 3.6 3.5 - 5.0 g/dL    Globulin 3.6 2.0 - 4.0 g/dL    A-G Ratio 1.0 (L) 1.1 - 2.2         Medications:  Medications Administered       0.9% sodium chloride infusion       Admin Date  08/24/2022 Action  New Bag Dose  25 mL/hr Rate  25 mL/hr Route  IntraVENous Administered By  Denise Hernandez RN              CARBOplatin (PARAPLATIN) 750 mg in 0.9% sodium chloride 250 mL, overfill volume 25 mL chemo infusion       Admin Date  08/24/2022 Action  New Bag Dose  750 mg Rate  700 mL/hr Route  IntraVENous Administered By  Denise Hernandez RN              dexamethasone (DECADRON) 12 mg in 0.9% sodium chloride 50 mL IVPB       Admin Date  08/24/2022 Action  New Bag Dose  12 mg Route  IntraVENous Administered By  Denise Hernandez RN              etoposide (VEPESID) 238 mg in 0.9% sodium chloride 1,000 mL, overfill volume 100 mL chemo infusion       Admin Date  08/24/2022 Action  New Bag Dose  238 mg Rate  1,111.9 mL/hr Route  IntraVENous Administered By  Denise Hernandez RN              fosaprepitant (EMEND) 150 mg in 0.9% sodium chloride 150 mL IVPB       Admin Date  08/24/2022 Action  New Bag Dose  150 mg Rate  450 mL/hr Route  IntraVENous Administered By  Denise Hernandez RN              palonosetron HCl (ALOXI) injection 0.25 mg       Admin Date  08/24/2022 Action  Given Dose  0.25 mg Route  IntraVENous Administered By  Wing Foster RN                  Two nurses verified prior to administering: Drug name, drug dose, infusion volume or drug volume when prepared in a syringe, rate of administration, route of administration,  expiration dates and/or times, appearance and physical integrity of the drugs, rate set on infusion pump, when used sequencing of drug administration. Mr. Mata tolerated treatment well and was discharged from [place] [address] in stable condition. Port de-accessed, flushed & heparinized per protocol. He is aware of future appointments.     Future Appointments   Date Time Provider Cindy Gee   8/25/2022  1:00 PM SS INF4 CH3 <4H [facility]   8/26/2022 10:30 AM SS INF5 CH3 <4H [facility]   9/12/2022 10:30 AM SHONDA [facility] BS AMB   9/14/2022  9:00 AM SS INF2 CH1 >4H [facility]   9/14/2022  9:15 AM Raddin, Orson Collet, MD [facility] BS AMB   9/15/2022 11:30 AM SS INF3 CH3 <4H [facility]   9/16/2022 11:30 AM SS INF5 CH3 <4H [facility] Paxtonyessy Heredia, TARUN  August 24, 2022

## 2022-08-25 ENCOUNTER — HOSPITAL ENCOUNTER (OUTPATIENT)
Dept: INFUSION THERAPY | Age: 72
Discharge: HOME | End: 2022-08-25
Payer: MEDICARE

## 2022-08-25 VITALS
TEMPERATURE: 97.9 F | RESPIRATION RATE: 16 BRPM | HEART RATE: 55 BPM | SYSTOLIC BLOOD PRESSURE: 145 MMHG | WEIGHT: 247.6 LBS | HEIGHT: 70 IN | BODY MASS INDEX: 35.45 KG/M2 | DIASTOLIC BLOOD PRESSURE: 65 MMHG | OXYGEN SATURATION: 97 %

## 2022-08-25 DIAGNOSIS — Z51.11 ENCOUNTER FOR ANTINEOPLASTIC CHEMOTHERAPY: ICD-10-CM

## 2022-08-25 DIAGNOSIS — C80.1 SMALL CELL CARCINOMA (HCC): Primary | ICD-10-CM

## 2022-08-25 PROCEDURE — 74011250636 HC RX REV CODE- 250/636: Performed by: INTERNAL MEDICINE

## 2022-08-25 PROCEDURE — 96375 TX/PRO/DX INJ NEW DRUG ADDON: CPT

## 2022-08-25 PROCEDURE — 74011000250 HC RX REV CODE- 250: Performed by: INTERNAL MEDICINE

## 2022-08-25 PROCEDURE — 96413 CHEMO IV INFUSION 1 HR: CPT

## 2022-08-25 RX ORDER — DEXAMETHASONE SODIUM PHOSPHATE 4 MG/ML
8 INJECTION, SOLUTION INTRA-ARTICULAR; INTRALESIONAL; INTRAMUSCULAR; INTRAVENOUS; SOFT TISSUE ONCE
Status: COMPLETED | OUTPATIENT
Start: 2022-08-25 | End: 2022-08-25

## 2022-08-25 RX ORDER — HEPARIN 100 UNIT/ML
300-500 SYRINGE INTRAVENOUS AS NEEDED
Status: DISCONTINUED | OUTPATIENT
Start: 2022-08-25 | End: 2022-08-26 | Stop reason: HOSPADM

## 2022-08-25 RX ORDER — SODIUM CHLORIDE 9 MG/ML
10 INJECTION INTRAMUSCULAR; INTRAVENOUS; SUBCUTANEOUS AS NEEDED
Status: DISCONTINUED | OUTPATIENT
Start: 2022-08-25 | End: 2022-08-26 | Stop reason: HOSPADM

## 2022-08-25 RX ORDER — SODIUM CHLORIDE 9 MG/ML
25 INJECTION, SOLUTION INTRAVENOUS CONTINUOUS
Status: DISCONTINUED | OUTPATIENT
Start: 2022-08-25 | End: 2022-08-26 | Stop reason: HOSPADM

## 2022-08-25 RX ORDER — SODIUM CHLORIDE 0.9 % (FLUSH) 0.9 %
10 SYRINGE (ML) INJECTION AS NEEDED
Status: DISCONTINUED | OUTPATIENT
Start: 2022-08-25 | End: 2022-08-26 | Stop reason: HOSPADM

## 2022-08-25 RX ADMIN — SODIUM CHLORIDE, PRESERVATIVE FREE 10 ML: 5 INJECTION INTRAVENOUS at 15:33

## 2022-08-25 RX ADMIN — DEXAMETHASONE SODIUM PHOSPHATE 8 MG: 4 INJECTION, SOLUTION INTRAMUSCULAR; INTRAVENOUS at 13:38

## 2022-08-25 RX ADMIN — SODIUM CHLORIDE 25 ML/HR: 9 INJECTION, SOLUTION INTRAVENOUS at 13:37

## 2022-08-25 RX ADMIN — HEPARIN 500 UNITS: 100 SYRINGE at 15:33

## 2022-08-25 RX ADMIN — ETOPOSIDE 238 MG: 20 INJECTION INTRAVENOUS at 14:12

## 2022-08-25 NOTE — PROGRESS NOTES
[facility] Progress Note    Date: 2022    Name: Brigette Cobos MRN: [id number]         : 1950    Mr. aTnya Ann Arrived ambulatory and in no distress for C5D2 of  Etoposide Regimen. Assessment was completed, no acute issues at this time, no new complaints voiced. Right chest wall port was still intact from yesterday's treatment. Port flushed with positive blood return. Chemotherapy Flowsheet 2022   Cycle C5D2   Date 2022   Drug / Regimen Etoposide   Pre Meds given   Notes -       Mr. Bowman's vitals were reviewed.   Visit Vitals  BP (!) 145/65   Pulse (!) 55   Temp 97.9 °F (36.6 °C)   Resp 16   Ht 5' 10\" (1.778 m)   Wt 112.3 kg (247 lb 9.6 oz)   SpO2 97%   BMI 35.53 kg/m²     Patient Vitals for the past 12 hrs:   Temp Pulse Resp BP SpO2   22 1533 -- (!) 55 -- (!) 145/65 97 %   22 1324 97.9 °F (36.6 °C) 61 16 131/60 95 %        Medications:  Medications Administered       0.9% sodium chloride infusion       Admin Date  2022 Action  New Bag Dose  25 mL/hr Rate  25 mL/hr Route  IntraVENous Administered By  Mani Grigsby RN              dexamethasone (DECADRON) 4 mg/mL injection 8 mg       Admin Date  2022 Action  Given Dose  8 mg Route  IntraVENous Administered By  Mani Grigsby RN              etoposide (VEPESID) 238 mg in 0.9% sodium chloride 1,000 mL, overfill volume 100 mL chemo infusion       Admin Date  2022 Action  New Bag Dose  238 mg Rate  1,111.9 mL/hr Route  IntraVENous Administered By  Mani Grigsby RN              heparin (porcine) pf 300-500 Units       Admin Date  2022 Action  Given Dose  500 Units Route  InterCATHeter Administered By  Katie Felix              sodium chloride (NS) flush 10 mL       Admin Date  2022 Action  Given Dose  10 mL Route  IntraVENous Administered By  Katie Felix                   Two nurses verified prior to administering: Drug name, drug dose, infusion volume or drug volume when prepared in a syringe, rate of administration, route of administration,  expiration dates and/or times, appearance and physical integrity of the drugs, rate set on infusion pump, when used sequencing of drug administration. Mr. Dara Lazaro tolerated treatment well and was discharged from [place] [address] in stable condition at 1540.    Port flushed & heparinized per protocol, stayed intact due to patient request. He is to return on     Future Appointments   Date Time Provider Cindy Gee   8/26/2022 10:30 AM [facility]   9/12/2022 10:30 AM [place] [address] [facility] BS AMB   9/14/2022  9:00 AM SS INF2 CH1 >4H [facility]   9/14/2022  9:15 AM Godfrey Patel MD [facility] BS AMB   9/15/2022 11:30 AM SS INF3 CH3 <4H [facility]   9/16/2022 11:30 AM SS INF5 CH3 <4H [facility] [address] [place] [address]

## 2022-08-26 ENCOUNTER — HOSPITAL ENCOUNTER (OUTPATIENT)
Dept: INFUSION THERAPY | Age: 72
Discharge: HOME | End: 2022-08-26
Payer: MEDICARE

## 2022-08-26 VITALS
OXYGEN SATURATION: 97 % | SYSTOLIC BLOOD PRESSURE: 139 MMHG | TEMPERATURE: 97.8 F | RESPIRATION RATE: 18 BRPM | HEART RATE: 63 BPM | DIASTOLIC BLOOD PRESSURE: 82 MMHG

## 2022-08-26 DIAGNOSIS — C80.1 SMALL CELL CARCINOMA (HCC): Primary | ICD-10-CM

## 2022-08-26 DIAGNOSIS — Z51.11 ENCOUNTER FOR ANTINEOPLASTIC CHEMOTHERAPY: ICD-10-CM

## 2022-08-26 PROCEDURE — 96375 TX/PRO/DX INJ NEW DRUG ADDON: CPT

## 2022-08-26 PROCEDURE — 74011250636 HC RX REV CODE- 250/636: Performed by: INTERNAL MEDICINE

## 2022-08-26 PROCEDURE — 96413 CHEMO IV INFUSION 1 HR: CPT

## 2022-08-26 PROCEDURE — 74011000250 HC RX REV CODE- 250: Performed by: INTERNAL MEDICINE

## 2022-08-26 RX ORDER — DEXAMETHASONE SODIUM PHOSPHATE 4 MG/ML
8 INJECTION, SOLUTION INTRA-ARTICULAR; INTRALESIONAL; INTRAMUSCULAR; INTRAVENOUS; SOFT TISSUE ONCE
Status: COMPLETED | OUTPATIENT
Start: 2022-08-26 | End: 2022-08-26

## 2022-08-26 RX ORDER — SODIUM CHLORIDE 0.9 % (FLUSH) 0.9 %
10 SYRINGE (ML) INJECTION AS NEEDED
Status: DISPENSED | OUTPATIENT
Start: 2022-08-26 | End: 2022-08-26

## 2022-08-26 RX ORDER — SODIUM CHLORIDE 9 MG/ML
25 INJECTION, SOLUTION INTRAVENOUS CONTINUOUS
Status: DISPENSED | OUTPATIENT
Start: 2022-08-26 | End: 2022-08-26

## 2022-08-26 RX ORDER — HEPARIN 100 UNIT/ML
300-500 SYRINGE INTRAVENOUS AS NEEDED
Status: ACTIVE | OUTPATIENT
Start: 2022-08-26 | End: 2022-08-26

## 2022-08-26 RX ADMIN — SODIUM CHLORIDE, PRESERVATIVE FREE 10 ML: 5 INJECTION INTRAVENOUS at 12:10

## 2022-08-26 RX ADMIN — SODIUM CHLORIDE, PRESERVATIVE FREE 10 ML: 5 INJECTION INTRAVENOUS at 10:25

## 2022-08-26 RX ADMIN — SODIUM CHLORIDE 25 ML/HR: 9 INJECTION, SOLUTION INTRAVENOUS at 10:26

## 2022-08-26 RX ADMIN — Medication 500 UNITS: at 12:10

## 2022-08-26 RX ADMIN — DEXAMETHASONE SODIUM PHOSPHATE 8 MG: 4 INJECTION, SOLUTION INTRAMUSCULAR; INTRAVENOUS at 10:30

## 2022-08-26 RX ADMIN — ETOPOSIDE 238 MG: 20 INJECTION INTRAVENOUS at 10:55

## 2022-08-26 NOTE — PROGRESS NOTES
[facility] Chemo Progress Note    Date: August 26, 2022        1010: Mr. Josué Parkinson Arrived to [facility] for  C5 D3 Etoposide ambulatory in stable condition. Assessment was completed. Port remains accessed, positive blood return noted. Criteria for treatment was met. Chemotherapy Flowsheet 8/26/2022   Cycle C5D3   Date 8/26/2022   Drug / Regimen Etoposide   Pre Meds Given   Notes Given       Mr. Rachael Preston vitals were reviewed. Patient Vitals for the past 12 hrs:   Temp Pulse Resp BP SpO2   08/26/22 1209 -- 63 18 139/82 --   08/26/22 1018 97.8 °F (36.6 °C) (!) 51 18 (!) 152/67 97 %       Pre-medications  were administered as ordered and chemotherapy was initiated.   Medications Administered       0.9% sodium chloride infusion       Admin Date  08/26/2022 Action  New Bag Dose  25 mL/hr Rate  25 mL/hr Route  IntraVENous Administered By  Dennis Cline              dexamethasone (DECADRON) 4 mg/mL injection 8 mg       Admin Date  08/26/2022 Action  Given Dose  8 mg Route  IntraVENous Administered By  Dennis Cline              etoposide (VEPESID) 238 mg in 0.9% sodium chloride 1,000 mL, overfill volume 100 mL chemo infusion       Admin Date  08/26/2022 Action  New Bag Dose  238 mg Rate  999 mL/hr Route  IntraVENous Administered By  Dennis Cline              heparin (porcine) pf 300-500 Units       Admin Date  08/26/2022 Action  Given Dose  500 Units Route  InterCATHeter Administered By  Dennis Cline              sodium chloride (NS) flush 10 mL       Admin Date  08/26/2022 Action  Given Dose  10 mL Route  IntraVENous Administered By  Kyle Rand Date  08/26/2022 Action  Given Dose  10 mL Route  IntraVENous Administered By  Dennis Cline               Two nurses verified prior to administering: Drug name, Drug dose, Infusion volume or drug volume when prepared in a syringe, Rate of administration, Route of administration, Expiration dates and/or times, Appearance and physical integrity of the drugs, Rate set on infusion pump, when used, and Sequencing of drug administration. 1210: Patient tolerated treatment well. Port maintained positive blood return throughout treatment. Port flushed, heparinized and de accessed per protocol. Patient was discharged in stable condition. Patient is aware of next scheduled [facility] appointment on 9/14/22.     Future Appointments   Date Time Provider Cindy Gee   9/12/2022 10:30 AM [address] CAV[facility] BS AMB   9/14/2022  9:00 [facility]   9/14/2022  9:15 AM Kory Patel MD [facility] BS AMB   9/15/2022 11:30 AM SS INF3 CH3 <4H [facility]   9/16/2022 11:30 AM SS INF5 CH3 <4H [facility] . Wilfredo Brannon RN  August 26, 2022

## 2022-09-12 ENCOUNTER — OFFICE VISIT (OUTPATIENT)
Dept: CARDIOLOGY CLINIC | Age: 72
End: 2022-09-12
Payer: MEDICARE

## 2022-09-12 DIAGNOSIS — Z95.818 STATUS POST PLACEMENT OF IMPLANTABLE LOOP RECORDER: Primary | ICD-10-CM

## 2022-09-12 PROCEDURE — 93298 REM INTERROG DEV EVAL SCRMS: CPT | Performed by: INTERNAL MEDICINE

## 2022-09-14 ENCOUNTER — HOSPITAL ENCOUNTER (OUTPATIENT)
Dept: INFUSION THERAPY | Age: 72
Discharge: HOME | End: 2022-09-14
Payer: MEDICARE

## 2022-09-14 ENCOUNTER — OFFICE VISIT (OUTPATIENT)
Dept: ONCOLOGY | Age: 72
End: 2022-09-14
Payer: MEDICARE

## 2022-09-14 VITALS
RESPIRATION RATE: 18 BRPM | BODY MASS INDEX: 35.09 KG/M2 | TEMPERATURE: 98 F | HEIGHT: 70 IN | DIASTOLIC BLOOD PRESSURE: 62 MMHG | SYSTOLIC BLOOD PRESSURE: 135 MMHG | WEIGHT: 245.1 LBS | HEART RATE: 62 BPM

## 2022-09-14 VITALS
OXYGEN SATURATION: 97 % | HEART RATE: 56 BPM | DIASTOLIC BLOOD PRESSURE: 62 MMHG | WEIGHT: 245 LBS | TEMPERATURE: 98 F | RESPIRATION RATE: 18 BRPM | SYSTOLIC BLOOD PRESSURE: 131 MMHG | BODY MASS INDEX: 35.07 KG/M2 | HEIGHT: 70 IN

## 2022-09-14 DIAGNOSIS — C25.0 MALIGNANT NEOPLASM OF HEAD OF PANCREAS (HCC): Primary | ICD-10-CM

## 2022-09-14 DIAGNOSIS — Z51.11 ENCOUNTER FOR ANTINEOPLASTIC CHEMOTHERAPY: ICD-10-CM

## 2022-09-14 DIAGNOSIS — C80.1 SMALL CELL CARCINOMA (HCC): Primary | ICD-10-CM

## 2022-09-14 DIAGNOSIS — C78.7 METASTASIS TO LIVER (HCC): ICD-10-CM

## 2022-09-14 LAB
ALBUMIN SERPL-MCNC: 3.4 G/DL (ref 3.5–5)
ALBUMIN/GLOB SERPL: 1 {RATIO} (ref 1.1–2.2)
ALP SERPL-CCNC: 81 U/L (ref 45–117)
ALT SERPL-CCNC: 25 U/L (ref 12–78)
ANION GAP SERPL CALC-SCNC: 9 MMOL/L (ref 5–15)
AST SERPL-CCNC: 21 U/L (ref 15–37)
BASOPHILS # BLD: 0 K/UL (ref 0–0.1)
BASOPHILS NFR BLD: 0 % (ref 0–1)
BILIRUB SERPL-MCNC: 0.6 MG/DL (ref 0.2–1)
BUN SERPL-MCNC: 15 MG/DL (ref 6–20)
BUN/CREAT SERPL: 19 (ref 12–20)
CALCIUM SERPL-MCNC: 8.5 MG/DL (ref 8.5–10.1)
CHLORIDE SERPL-SCNC: 106 MMOL/L (ref 97–108)
CO2 SERPL-SCNC: 26 MMOL/L (ref 21–32)
CREAT SERPL-MCNC: 0.81 MG/DL (ref 0.7–1.3)
DIFFERENTIAL METHOD BLD: ABNORMAL
EOSINOPHIL # BLD: 0 K/UL (ref 0–0.4)
EOSINOPHIL NFR BLD: 1 % (ref 0–7)
ERYTHROCYTE [DISTWIDTH] IN BLOOD BY AUTOMATED COUNT: 20.7 % (ref 11.5–14.5)
GLOBULIN SER CALC-MCNC: 3.4 G/DL (ref 2–4)
GLUCOSE SERPL-MCNC: 144 MG/DL (ref 65–100)
HCT VFR BLD AUTO: 26.9 % (ref 36.6–50.3)
HGB BLD-MCNC: 8.5 G/DL (ref 12.1–17)
IMM GRANULOCYTES # BLD AUTO: 0 K/UL
IMM GRANULOCYTES NFR BLD AUTO: 0 %
LYMPHOCYTES # BLD: 0.9 K/UL (ref 0.8–3.5)
LYMPHOCYTES NFR BLD: 32 % (ref 12–49)
MCH RBC QN AUTO: 25.8 PG (ref 26–34)
MCHC RBC AUTO-ENTMCNC: 31.6 G/DL (ref 30–36.5)
MCV RBC AUTO: 81.8 FL (ref 80–99)
MONOCYTES # BLD: 0.2 K/UL (ref 0–1)
MONOCYTES NFR BLD: 6 % (ref 5–13)
NEUTS SEG # BLD: 1.6 K/UL (ref 1.8–8)
NEUTS SEG NFR BLD: 61 % (ref 32–75)
NRBC # BLD: 0.04 K/UL (ref 0–0.01)
NRBC BLD-RTO: 1.5 PER 100 WBC
PLATELET # BLD AUTO: 150 K/UL (ref 150–400)
PMV BLD AUTO: 10.4 FL (ref 8.9–12.9)
POTASSIUM SERPL-SCNC: 3.8 MMOL/L (ref 3.5–5.1)
PROT SERPL-MCNC: 6.8 G/DL (ref 6.4–8.2)
RBC # BLD AUTO: 3.29 M/UL (ref 4.1–5.7)
RBC MORPH BLD: ABNORMAL
SODIUM SERPL-SCNC: 141 MMOL/L (ref 136–145)
WBC # BLD AUTO: 2.7 K/UL (ref 4.1–11.1)

## 2022-09-14 PROCEDURE — G8754 DIAS BP LESS 90: HCPCS | Performed by: INTERNAL MEDICINE

## 2022-09-14 PROCEDURE — G9717 DOC PT DX DEP/BP F/U NT REQ: HCPCS | Performed by: INTERNAL MEDICINE

## 2022-09-14 PROCEDURE — 80053 COMPREHEN METABOLIC PANEL: CPT

## 2022-09-14 PROCEDURE — 3017F COLORECTAL CA SCREEN DOC REV: CPT | Performed by: INTERNAL MEDICINE

## 2022-09-14 PROCEDURE — G8417 CALC BMI ABV UP PARAM F/U: HCPCS | Performed by: INTERNAL MEDICINE

## 2022-09-14 PROCEDURE — 96375 TX/PRO/DX INJ NEW DRUG ADDON: CPT

## 2022-09-14 PROCEDURE — 96413 CHEMO IV INFUSION 1 HR: CPT

## 2022-09-14 PROCEDURE — 74011250636 HC RX REV CODE- 250/636: Performed by: NURSE PRACTITIONER

## 2022-09-14 PROCEDURE — G8427 DOCREV CUR MEDS BY ELIG CLIN: HCPCS | Performed by: INTERNAL MEDICINE

## 2022-09-14 PROCEDURE — 36415 COLL VENOUS BLD VENIPUNCTURE: CPT

## 2022-09-14 PROCEDURE — 96367 TX/PROPH/DG ADDL SEQ IV INF: CPT

## 2022-09-14 PROCEDURE — G8752 SYS BP LESS 140: HCPCS | Performed by: INTERNAL MEDICINE

## 2022-09-14 PROCEDURE — 96417 CHEMO IV INFUS EACH ADDL SEQ: CPT

## 2022-09-14 PROCEDURE — 77030012965 HC NDL HUBR BBMI -A

## 2022-09-14 PROCEDURE — 85025 COMPLETE CBC W/AUTO DIFF WBC: CPT

## 2022-09-14 PROCEDURE — 1123F ACP DISCUSS/DSCN MKR DOCD: CPT | Performed by: INTERNAL MEDICINE

## 2022-09-14 PROCEDURE — G8536 NO DOC ELDER MAL SCRN: HCPCS | Performed by: INTERNAL MEDICINE

## 2022-09-14 PROCEDURE — 99215 OFFICE O/P EST HI 40 MIN: CPT | Performed by: INTERNAL MEDICINE

## 2022-09-14 PROCEDURE — 74011000258 HC RX REV CODE- 258: Performed by: NURSE PRACTITIONER

## 2022-09-14 PROCEDURE — 1101F PT FALLS ASSESS-DOCD LE1/YR: CPT | Performed by: INTERNAL MEDICINE

## 2022-09-14 RX ORDER — SODIUM CHLORIDE 9 MG/ML
25 INJECTION, SOLUTION INTRAVENOUS CONTINUOUS
Status: DISPENSED | OUTPATIENT
Start: 2022-09-14 | End: 2022-09-14

## 2022-09-14 RX ORDER — SODIUM CHLORIDE 0.9 % (FLUSH) 0.9 %
10 SYRINGE (ML) INJECTION AS NEEDED
Status: DISPENSED | OUTPATIENT
Start: 2022-09-14 | End: 2022-09-14

## 2022-09-14 RX ORDER — HEPARIN 100 UNIT/ML
300-500 SYRINGE INTRAVENOUS AS NEEDED
Status: ACTIVE | OUTPATIENT
Start: 2022-09-14 | End: 2022-09-14

## 2022-09-14 RX ORDER — SODIUM CHLORIDE 9 MG/ML
10 INJECTION INTRAMUSCULAR; INTRAVENOUS; SUBCUTANEOUS AS NEEDED
Status: ACTIVE | OUTPATIENT
Start: 2022-09-14 | End: 2022-09-14

## 2022-09-14 RX ORDER — PALONOSETRON 0.05 MG/ML
0.25 INJECTION, SOLUTION INTRAVENOUS ONCE
Status: COMPLETED | OUTPATIENT
Start: 2022-09-14 | End: 2022-09-14

## 2022-09-14 RX ADMIN — ETOPOSIDE 238 MG: 20 INJECTION INTRAVENOUS at 12:58

## 2022-09-14 RX ADMIN — PALONOSETRON 0.25 MG: 0.05 INJECTION, SOLUTION INTRAVENOUS at 10:54

## 2022-09-14 RX ADMIN — CARBOPLATIN 750 MG: 10 INJECTION, SOLUTION INTRAVENOUS at 12:14

## 2022-09-14 RX ADMIN — DEXAMETHASONE SODIUM PHOSPHATE 12 MG: 4 INJECTION, SOLUTION INTRA-ARTICULAR; INTRALESIONAL; INTRAMUSCULAR; INTRAVENOUS; SOFT TISSUE at 11:03

## 2022-09-14 RX ADMIN — SODIUM CHLORIDE 25 ML/HR: 9 INJECTION, SOLUTION INTRAVENOUS at 10:54

## 2022-09-14 RX ADMIN — SODIUM CHLORIDE 150 MG: 900 INJECTION, SOLUTION INTRAVENOUS at 11:01

## 2022-09-14 NOTE — PROGRESS NOTES
[facility] [place] at [address] [facility]  W: [phone]  F: [phone]      Reason for Visit:   Zeynep Daly is a 81 y.o. male who is seen for follow up of small cell carcinoma of the pancreas. Hematology/Oncology Treatment History:   CT A/P 4/19/2022: Possible mild acute pancreatitis. Prominent sigmoid diverticulosis. Enlarged prostate. Fat-containing hernias  CT Abdomen 5/17/2022: New intra-hepatic biliary dilatation and common bile duct dilatation. Ampullary prominence and 1.5 cm x 1.5 cm soft tissue density in the pancreaticoduodenal groove. New subcentimeter hepatic hypodense lesions, worrisome for metastatic disease. MRI Abdomen 5/17/2022:  Periampullary pancreatic mass with possible extension into the wall of the duodenum. Associated intrahepatic and extra hepatic biliary dilatation and mild pancreatic ductal dilatation. Small hyperenhancing lesions throughout the liver suspicious for metastatic disease. CT Chest 5/18/2022: no metastatic disease within thorax  ERCP by Dr. Dieter Jones 5/18/2022: Large ulcerated mass seen in the second portion of the duodenum taking half the circumference in the area of the major papilla and extending distally about 3 to 4 cm, appeared to be pancreatic and eroding into the duodenum. Cholangiogram with complete obstruction of CBD. Metal stent deployed. 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 Napping more during the day. Mild MEEHAN and mild HA. No blood in urine or stools. Appetite has been stable. No nausea or vomiting. No change in bowels. Denies pain. Denies recent fever or chills.      He is accompanied by his wife today.    Review of systems was obtained and pertinent findings reviewed above. Past medical history, social history, family history, medications, and allergies are located in the electronic medical record. Physical Exam:     Visit Vitals  /62   Pulse (!) 56   Temp 98 °F (36.7 °C)   Resp 18   Ht 5' 10\" (1.778 m)   Wt 245 lb (111.1 kg)   SpO2 97%   BMI 35.15 kg/m²     ECOG PS: 2  General: no distress, pallor noted  Eyes: anicteric sclerae  HENT: oropharynx clear  Neck: supple  Lymphatic: no cervical, supraclavicular adenopathy  Respiratory: normal respiratory effort  CV: no peripheral edema  GI: soft, nontender, nondistended, no masses  Skin: no rashes; no ecchymoses; no petechiae    Results:     Lab Results   Component Value Date/Time    WBC 2.7 (L) 09/14/2022 09:09 AM    HGB 8.5 (L) 09/14/2022 09:09 AM    HCT 26.9 (L) 09/14/2022 09:09 AM    PLATELET 256 80/33/6568 09:09 AM    MCV 81.8 09/14/2022 09:09 AM    ABS. NEUTROPHILS 1.6 (L) 09/14/2022 09:09 AM     Lab Results   Component Value Date/Time    Sodium 141 09/14/2022 09:09 AM    Potassium 3.8 09/14/2022 09:09 AM    Chloride 106 09/14/2022 09:09 AM    CO2 26 09/14/2022 09:09 AM    Glucose 144 (H) 09/14/2022 09:09 AM    BUN 15 09/14/2022 09:09 AM    Creatinine 0.81 09/14/2022 09:09 AM    GFR est AA >60 09/14/2022 09:09 AM    GFR est non-AA >60 09/14/2022 09:09 AM    Calcium 8.5 09/14/2022 09:09 AM    Glucose (POC) 277 (H) 06/03/2022 11:47 AM    Creatinine (POC) 1.00 04/19/2022 02:47 PM     Lab Results   Component Value Date/Time    Bilirubin, total 0.6 09/14/2022 09:09 AM    ALT (SGPT) 25 09/14/2022 09:09 AM    Alk.  phosphatase 81 09/14/2022 09:09 AM    Protein, total 6.8 09/14/2022 09:09 AM    Albumin 3.4 (L) 09/14/2022 09:09 AM    Globulin 3.4 09/14/2022 09:09 AM     Lab Results   Component Value Date/Time    Reticulocyte count 4.2 (H) 05/17/2022 03:04 PM    Iron % saturation 32 05/17/2022 03:04 PM    TIBC 289 05/17/2022 03:04 PM    Ferritin 219 05/17/2022 03:04 PM    Vitamin B12 944 05/17/2022 03:04 PM    Folate 26.7 (H) 05/17/2022 03:04 PM    Haptoglobin 90 05/17/2022 03:04 PM     05/17/2022 03:04 PM    TSH 2.18 05/17/2022 03:04 PM    Lipase 106 05/17/2022 10:10 AM    Hep C virus Ab Interp. NONREACTIVE 05/17/2022 03:04 PM       CT C/A/P 7/6/2022:  1. Status post placement of a common bile duct stent with resolution of intrahepatic biliary dilatation. 2.  The periocular mass noted previously is not as well-visualized and is likely decrease in size with some residual mass noted in the pancreatic head. 3.  Small liver lesions are no longer identified however there are 2 larger lesions in the right hepatic lobe which were not previously noted suspicious for metastatic disease. CT C/A/P 8/23/2022:   1. Multiple liver hypodensities. The larger lesion is slightly decreased in size. The other lesions are not significantly changed although are better visualized on the current study. This may be secondary to the phase of the contrast bolus. 2.  Status post common bile duct stent placement. Ill-defined hypodensity in the head of pancreas without measurable mass. Increased pancreatic atrophy and possible mild hepatic ductal dilatation noted. Assessment:   1) Metastatic Small Cell Carcinoma of the Pancreas  He has disease within his pancrease, invading into the duodenum. He additionally has metastatic disease within his liver, confirmed with biopsy. His cancer is not curable and management is with palliative intent. He is currently on palliative chemotherapy with Carboplatin and Etoposide given every 3 weeks with plans for 4-6 cycles. He is tolerating therapy well overall with exception of grade 2 fatigue and anemia. Will complete therapy with C6 today. Will plan to have CT imaging after this cycle. Move to surveillance. 2)  Biliary obstruction  Secondary to his pancreatic cancer. S/p ERCP on 5/18/2022 with stent placement.      He has follow up with GI upcoming, had to reschedule this for treatment today. 3)  Anemia, normocytic  Progressive, secondary to chemotherapy, anemia of chronic disease, and blood loss from his mass invading the duodenum. Worsening now due to Carboplatin. 4) DM  Follows endocrinology outpatient. Glucose worsens with steroids, taking higher insulin dose on those days. 5) Pancreatic insufficiency  Diarrhea improved with initiation of creon. 6) Emotional Well Being  No psychosocial concerns identified today. Patient has adequate support. Plan:     Proceed today with C6 of Carboplatin (AUC 5) on day1 and Etoposide (100mg/m2) Days 1-3 given every 3 weeks for 4-6 cycles  Labs prior to each treatment: CBC, BMP on day 1  Antiemetic prophylaxis: Palonosetron prior to each treatment on day 1 and Dexamethasone prior to therapy on day 1-3  PRN antiemetics: Ondansetron, Prochlorperazine at home  EMLA cream to port  Continue Creon  Flu injection on 10/4/2022  CT imaging after this cycle  Follow up in 4-5 weeks on surveillance    I personally saw and evaluated the patient and performed the key components of medical decision making. The history, physical exam, and documentation were performed by Adrian Vega NP. I reviewed and verified the above documentation and modified it as needed. He is having increasing asthenia from therapy. He would like to proceed with this final cycle, discussed risks and benefits. We will then restage and if all looks good we will move to active surveillance.       Signed By: Tyson Lyons MD

## 2022-09-14 NOTE — PROGRESS NOTES
[facility] VISIT NOTE  Date: 2022    Name: Michael Ag. MRN: [id number]         : 1950    Mr. Elizabeth Faye Arrived ambulatory and in no distress for C6D1 of Carbo/Etoposide Regimen. Assessment was completed Adeel RN, no acute issues at this time, no new complaints voiced. Right chest wall port accessed without difficulty by the assessment nurse, labs drawn & sent for processing. Do you have any symptoms of COVID-19? SOB, coughing, fever, or generally not feeling well NO    2. Have you been exposed to COVID-19 recently? NO    3. Have you had any recent contact with family/friend that has a pending COVID test? NO       Chemotherapy Flowsheet 2022   Cycle C6D1   Date 2022   Drug / Regimen Carbo/Etoposide   Pre Meds given   Notes given           Mr. Bowman's vitals were reviewed. Patient Vitals for the past 12 hrs:   Temp Pulse Resp BP   22 1404 -- 62 -- 135/62   22 0904 98 °F (36.7 °C) (!) 56 18 131/62         Lab results were obtained and reviewed. Recent Results (from the past 12 hour(s))   CBC WITH AUTOMATED DIFF    Collection Time: 22  9:09 AM   Result Value Ref Range    WBC 2.7 (L) 4.1 - 11.1 K/uL    RBC 3.29 (L) 4.10 - 5.70 M/uL    HGB 8.5 (L) 12.1 - 17.0 g/dL    HCT 26.9 (L) 36.6 - 50.3 %    MCV 81.8 80.0 - 99.0 FL    MCH 25.8 (L) 26.0 - 34.0 PG    MCHC 31.6 30.0 - 36.5 g/dL    RDW 20.7 (H) 11.5 - 14.5 %    PLATELET 254 229 - 849 K/uL    MPV 10.4 8.9 - 12.9 FL    NRBC 1.5 (H) 0  WBC    ABSOLUTE NRBC 0.04 (H) 0.00 - 0.01 K/uL    NEUTROPHILS 61 32 - 75 %    LYMPHOCYTES 32 12 - 49 %    MONOCYTES 6 5 - 13 %    EOSINOPHILS 1 0 - 7 %    BASOPHILS 0 0 - 1 %    IMMATURE GRANULOCYTES 0 %    ABS. NEUTROPHILS 1.6 (L) 1.8 - 8.0 K/UL    ABS. LYMPHOCYTES 0.9 0.8 - 3.5 K/UL    ABS. MONOCYTES 0.2 0.0 - 1.0 K/UL    ABS. EOSINOPHILS 0.0 0.0 - 0.4 K/UL    ABS. BASOPHILS 0.0 0.0 - 0.1 K/UL    ABS. IMM.  GRANS. 0.0 K/UL    DF MANUAL      RBC COMMENTS ANISOCYTOSIS  2+ RBC COMMENTS OVALOCYTES  PRESENT        RBC COMMENTS POLYCHROMASIA  PRESENT       METABOLIC PANEL, COMPREHENSIVE    Collection Time: 09/14/22  9:09 AM   Result Value Ref Range    Sodium 141 136 - 145 mmol/L    Potassium 3.8 3.5 - 5.1 mmol/L    Chloride 106 97 - 108 mmol/L    CO2 26 21 - 32 mmol/L    Anion gap 9 5 - 15 mmol/L    Glucose 144 (H) 65 - 100 mg/dL    BUN 15 6 - 20 MG/DL    Creatinine 0.81 0.70 - 1.30 MG/DL    BUN/Creatinine ratio 19 12 - 20      GFR est AA >60 >60 ml/min/1.73m2    GFR est non-AA >60 >60 ml/min/1.73m2    Calcium 8.5 8.5 - 10.1 MG/DL    Bilirubin, total 0.6 0.2 - 1.0 MG/DL    ALT (SGPT) 25 12 - 78 U/L    AST (SGOT) 21 15 - 37 U/L    Alk.  phosphatase 81 45 - 117 U/L    Protein, total 6.8 6.4 - 8.2 g/dL    Albumin 3.4 (L) 3.5 - 5.0 g/dL    Globulin 3.4 2.0 - 4.0 g/dL    A-G Ratio 1.0 (L) 1.1 - 2.2         Medications received:  Medications Administered       0.9% sodium chloride infusion       Admin Date  09/14/2022 Action  New Bag Dose  25 mL/hr Rate  25 mL/hr Route  IntraVENous Administered By  Devorah Aldana RN              CARBOplatin (PARAPLATIN) 750 mg in 0.9% sodium chloride 250 mL, overfill volume 25 mL chemo infusion       Admin Date  09/14/2022 Action  New Bag Dose  750 mg Rate  700 mL/hr Route  IntraVENous Administered By  Devorah Aldana RN              dexamethasone (DECADRON) 12 mg in 0.9% sodium chloride 50 mL IVPB       Admin Date  09/14/2022 Action  New Bag Dose  12 mg Route  IntraVENous Administered By  Devorah Aldana RN              etoposide (VEPESID) 238 mg in 0.9% sodium chloride 1,000 mL, overfill volume 100 mL chemo infusion       Admin Date  09/14/2022 Action  New Bag Dose  238 mg Rate  1,111.9 mL/hr Route  IntraVENous Administered By  Devorah Aldana RN              fosaprepitant (EMEND) 150 mg in 0.9% sodium chloride 150 mL IVPB       Admin Date  09/14/2022 Action  New Bag Dose  150 mg Rate  450 mL/hr Route  IntraVENous Administered By  Nehal Paloom RN              palonosetron HCl (ALOXI) injection 0.25 mg       Admin Date  09/14/2022 Action  Given Dose  0.25 mg Route  IntraVENous Administered By  Nehal Palomo RN                     Two nurses verified prior to administering:    Drug name  Drug dose  Infusion volume or drug volume when prepared in a syringe  Rate of administration  Route of administration  Expiration dates and/or times  Appearance and physical integrity of the drugs  Rate set on infusion pump, when used  Sequencing of drug administration        Mr. Bowman tolerated treatment well and was discharged from [place] [address] in stable condition at 1410. Port  flushed & heparinized and flushed per patient request. He is to return on  September 15, 2022 at 1130 for his next appointment.     Mana Mccann RN  September 14, 2022    Future Appointments:  Future Appointments   Date Time Provider Cindy Gee   9/15/2022 11:30 AM SS INF3 CH3 <4H [facility]   9/16/2022 11:30 AM SS INF5 CH3 <4H [facility]   10/19/2022  9:15 AM Romeo Patel MD [facility] BS AMB

## 2022-09-15 ENCOUNTER — HOSPITAL ENCOUNTER (OUTPATIENT)
Dept: INFUSION THERAPY | Age: 72
Discharge: HOME | End: 2022-09-15
Payer: MEDICARE

## 2022-09-15 VITALS
DIASTOLIC BLOOD PRESSURE: 65 MMHG | RESPIRATION RATE: 18 BRPM | TEMPERATURE: 98.3 F | HEART RATE: 52 BPM | OXYGEN SATURATION: 96 % | SYSTOLIC BLOOD PRESSURE: 135 MMHG

## 2022-09-15 DIAGNOSIS — Z51.11 ENCOUNTER FOR ANTINEOPLASTIC CHEMOTHERAPY: ICD-10-CM

## 2022-09-15 DIAGNOSIS — C80.1 SMALL CELL CARCINOMA (HCC): Primary | ICD-10-CM

## 2022-09-15 PROCEDURE — 74011250636 HC RX REV CODE- 250/636: Performed by: NURSE PRACTITIONER

## 2022-09-15 PROCEDURE — 96413 CHEMO IV INFUSION 1 HR: CPT

## 2022-09-15 PROCEDURE — 96375 TX/PRO/DX INJ NEW DRUG ADDON: CPT

## 2022-09-15 PROCEDURE — 74011000250 HC RX REV CODE- 250: Performed by: NURSE PRACTITIONER

## 2022-09-15 RX ORDER — SODIUM CHLORIDE 9 MG/ML
25 INJECTION, SOLUTION INTRAVENOUS CONTINUOUS
Status: DISPENSED | OUTPATIENT
Start: 2022-09-15 | End: 2022-09-15

## 2022-09-15 RX ORDER — SODIUM CHLORIDE 0.9 % (FLUSH) 0.9 %
10 SYRINGE (ML) INJECTION AS NEEDED
Status: DISPENSED | OUTPATIENT
Start: 2022-09-15 | End: 2022-09-15

## 2022-09-15 RX ORDER — HEPARIN 100 UNIT/ML
300-500 SYRINGE INTRAVENOUS AS NEEDED
Status: ACTIVE | OUTPATIENT
Start: 2022-09-15 | End: 2022-09-15

## 2022-09-15 RX ORDER — SODIUM CHLORIDE 9 MG/ML
10 INJECTION INTRAMUSCULAR; INTRAVENOUS; SUBCUTANEOUS AS NEEDED
Status: ACTIVE | OUTPATIENT
Start: 2022-09-15 | End: 2022-09-15

## 2022-09-15 RX ORDER — DEXAMETHASONE SODIUM PHOSPHATE 4 MG/ML
8 INJECTION, SOLUTION INTRA-ARTICULAR; INTRALESIONAL; INTRAMUSCULAR; INTRAVENOUS; SOFT TISSUE ONCE
Status: COMPLETED | OUTPATIENT
Start: 2022-09-15 | End: 2022-09-15

## 2022-09-15 RX ADMIN — Medication 500 UNITS: at 14:24

## 2022-09-15 RX ADMIN — SODIUM CHLORIDE, PRESERVATIVE FREE 10 ML: 5 INJECTION INTRAVENOUS at 14:24

## 2022-09-15 RX ADMIN — ETOPOSIDE 238 MG: 20 INJECTION INTRAVENOUS at 13:09

## 2022-09-15 RX ADMIN — DEXAMETHASONE SODIUM PHOSPHATE 8 MG: 4 INJECTION INTRA-ARTICULAR; INTRALESIONAL; INTRAMUSCULAR; INTRAVENOUS; SOFT TISSUE at 12:33

## 2022-09-15 RX ADMIN — SODIUM CHLORIDE 25 ML/HR: 9 INJECTION, SOLUTION INTRAVENOUS at 12:30

## 2022-09-15 NOTE — PROGRESS NOTES
[facility] Progress Note    Date: September 15, 2022    Name: Gracia Gamboa. MRN: [id number]         : 1950     [facility] Arrived ambulatory with spouse and in no distress for C6D2 of Etoposide  Regimen. Assessment was completed, no acute issues at this time, no new complaints voiced. right chest wall port left intact from previous treatment, flushed with positive blood return. Chemotherapy Flowsheet 9/15/2022   Cycle C6D2   Date 9/15/2022   Drug / Regimen Etoposide   Pre Meds given   Notes -       Mr. Bowman's vitals were reviewed. Visit Vitals  /65 (BP 1 Location: Left arm, BP Patient Position: Sitting)   Pulse (!) 52   Temp 98.3 °F (36.8 °C)   Resp 18   SpO2 96%     Patient Vitals for the past 12 hrs:   Temp Pulse Resp BP SpO2   09/15/22 1427 98.3 °F (36.8 °C) (!) 52 18 135/65 96 %   09/15/22 1254 97 °F (36.1 °C) 64 18 (!) 126/56 95 %        Medications:  Medications Administered       0.9% sodium chloride infusion       Admin Date  09/15/2022 Action  New Bag Dose  25 mL/hr Rate  25 mL/hr Route  IntraVENous Administered By  Kenney Sidhu RN              dexamethasone (DECADRON) 4 mg/mL injection 8 mg       Admin Date  09/15/2022 Action  Given Dose  8 mg Route  IntraVENous Administered By  Kenney Sidhu RN              etoposide (VEPESID) 238 mg in 0.9% sodium chloride 1,000 mL, overfill volume 100 mL chemo infusion       Admin Date  09/15/2022 Action  New Bag Dose  238 mg Rate  1,111.9 mL/hr Route  IntraVENous Administered By  Jasmeet Baxter RN              heparin (porcine) pf 300-500 Units       Admin Date  09/15/2022 Action  Given Dose  500 Units Route  InterCATHeter Administered By  Jasmeet Baxter RN              sodium chloride (NS) flush 10 mL       Admin Date  09/15/2022 Action  Given Dose  10 mL Route  IntraVENous Administered By  Jasmeet Baxter RN                      [facility] tolerated treatment well and was discharged from [place] [address] in stable condition at 1430.    Port flushed & heparinized per protocol, left intact per patient request for tomorrow's treatment.   He is to return on     Future Appointments   Date Time Provider Cindy Gee   9/16/2022 11:30 [facility]   10/19/2022  9:15 AM Kory Patel MD [facility] BS AMB

## 2022-09-16 ENCOUNTER — HOSPITAL ENCOUNTER (OUTPATIENT)
Dept: INFUSION THERAPY | Age: 72
Discharge: HOME | End: 2022-09-16
Payer: MEDICARE

## 2022-09-16 VITALS
BODY MASS INDEX: 35.82 KG/M2 | RESPIRATION RATE: 18 BRPM | TEMPERATURE: 97.8 F | SYSTOLIC BLOOD PRESSURE: 146 MMHG | HEART RATE: 52 BPM | WEIGHT: 250.2 LBS | HEIGHT: 70 IN | OXYGEN SATURATION: 97 % | DIASTOLIC BLOOD PRESSURE: 56 MMHG

## 2022-09-16 DIAGNOSIS — C80.1 SMALL CELL CARCINOMA (HCC): Primary | ICD-10-CM

## 2022-09-16 DIAGNOSIS — Z51.11 ENCOUNTER FOR ANTINEOPLASTIC CHEMOTHERAPY: ICD-10-CM

## 2022-09-16 PROCEDURE — 74011250636 HC RX REV CODE- 250/636: Performed by: NURSE PRACTITIONER

## 2022-09-16 PROCEDURE — 74011000250 HC RX REV CODE- 250: Performed by: NURSE PRACTITIONER

## 2022-09-16 PROCEDURE — 96375 TX/PRO/DX INJ NEW DRUG ADDON: CPT

## 2022-09-16 PROCEDURE — 96413 CHEMO IV INFUSION 1 HR: CPT

## 2022-09-16 RX ORDER — DEXAMETHASONE SODIUM PHOSPHATE 4 MG/ML
8 INJECTION, SOLUTION INTRA-ARTICULAR; INTRALESIONAL; INTRAMUSCULAR; INTRAVENOUS; SOFT TISSUE ONCE
Status: COMPLETED | OUTPATIENT
Start: 2022-09-16 | End: 2022-09-16

## 2022-09-16 RX ORDER — HEPARIN 100 UNIT/ML
300-500 SYRINGE INTRAVENOUS AS NEEDED
Status: ACTIVE | OUTPATIENT
Start: 2022-09-16 | End: 2022-09-16

## 2022-09-16 RX ORDER — SODIUM CHLORIDE 0.9 % (FLUSH) 0.9 %
10 SYRINGE (ML) INJECTION AS NEEDED
Status: DISPENSED | OUTPATIENT
Start: 2022-09-16 | End: 2022-09-16

## 2022-09-16 RX ORDER — SODIUM CHLORIDE 9 MG/ML
10 INJECTION INTRAMUSCULAR; INTRAVENOUS; SUBCUTANEOUS AS NEEDED
Status: ACTIVE | OUTPATIENT
Start: 2022-09-16 | End: 2022-09-16

## 2022-09-16 RX ORDER — SODIUM CHLORIDE 9 MG/ML
25 INJECTION, SOLUTION INTRAVENOUS CONTINUOUS
Status: DISPENSED | OUTPATIENT
Start: 2022-09-16 | End: 2022-09-16

## 2022-09-16 RX ADMIN — SODIUM CHLORIDE 25 ML/HR: 9 INJECTION, SOLUTION INTRAVENOUS at 11:50

## 2022-09-16 RX ADMIN — ETOPOSIDE 238 MG: 20 INJECTION INTRAVENOUS at 12:30

## 2022-09-16 RX ADMIN — HEPARIN 500 UNITS: 100 SYRINGE at 13:37

## 2022-09-16 RX ADMIN — SODIUM CHLORIDE, PRESERVATIVE FREE 10 ML: 5 INJECTION INTRAVENOUS at 13:37

## 2022-09-16 RX ADMIN — DEXAMETHASONE SODIUM PHOSPHATE 8 MG: 4 INJECTION INTRA-ARTICULAR; INTRALESIONAL; INTRAMUSCULAR; INTRAVENOUS; SOFT TISSUE at 11:54

## 2022-09-16 NOTE — PROGRESS NOTES
[facility] VISIT NOTE  Date: 2022    Name: Marco A Cerda. MRN: [id number]         : 1950    Mr. Josué Parkinson Arrived ambulatory and in no distress for C6D3 of Etoposide Regimen. Assessment was completed, no acute issues at this time, no new complaints voiced. Chest wall port left intact from previous treatment, Dressing CDI, flushed with + bld return. Do you have any symptoms of COVID-19? SOB, coughing, fever, or generally not feeling well NO    2. Have you been exposed to COVID-19 recently? NO    3. Have you had any recent contact with family/friend that has a pending COVID test? NO       Chemotherapy Flowsheet 2022   Cycle C6D3   Date 2022   Drug / Regimen Etoposide   Pre Meds given   Notes given           Mr. Rachael Preston vitals were reviewed. Patient Vitals for the past 12 hrs:   Temp Pulse Resp BP SpO2   22 1336 97.8 °F (36.6 °C) (!) 52 18 (!) 146/56 97 %   22 1143 98 °F (36.7 °C) (!) 51 18 128/60 95 %         Lab results were obtained and reviewed. No results found for this or any previous visit (from the past 12 hour(s)).     Medications received:  Medications Administered       0.9% sodium chloride infusion       Admin Date  2022 Action  New Bag Dose  25 mL/hr Rate  25 mL/hr Route  IntraVENous Administered By  Hans Salcedo RN              0.9% sodium chloride injection 10 mL       Admin Date  2022 Action  Given Dose  10 mL Route  IntraVENous Administered By  Hans Salcedo RN              dexamethasone (DECADRON) 4 mg/mL injection 8 mg       Admin Date  2022 Action  Given Dose  8 mg Route  IntraVENous Administered By  Hans Salcedo RN              etoposide (VEPESID) 238 mg in 0.9% sodium chloride 1,000 mL, overfill volume 100 mL chemo infusion       Admin Date  2022 Action  New Bag Dose  238 mg Rate  1,111.9 mL/hr Route  IntraVENous Administered By  Hans Salcedo RN              heparin (porcine) pf 300-500 Units       Admin Date  2022 Action  Given Dose  500 Units Route  InterCATHeter Administered By  Delta Esteban RN                     Two nurses verified prior to administering:    Drug name  Drug dose  Infusion volume or drug volume when prepared in a syringe  Rate of administration  Route of administration  Expiration dates and/or times  Appearance and physical integrity of the drugs  Rate set on infusion pump, when used  Sequencing of drug administration    AVS refused. Mr. Kobi Lemus tolerated treatment well and was discharged from [place] [address] in stable condition at 1345. Port de-accessed, flushed & heparinized per protocol. He has no further appointment for now.      Jagdeep White RN  September 16, 2022    Future Appointments:  Future Appointments   Date Time Provider Cindy Gee   10/7/2022 12:00 PM [facility]   10/17/2022 10:00 AM [place] [address], [facility] BS AMB   10/19/2022  9:15 AM Mumtaz Patel MD [facility] BS AMB

## 2022-09-22 ENCOUNTER — APPOINTMENT (OUTPATIENT)
Dept: CT IMAGING | Age: 72
End: 2022-09-22
Attending: EMERGENCY MEDICINE
Payer: MEDICARE

## 2022-09-22 ENCOUNTER — HOSPITAL ENCOUNTER (EMERGENCY)
Age: 72
Discharge: HOME | End: 2022-09-22
Attending: EMERGENCY MEDICINE
Payer: MEDICARE

## 2022-09-22 ENCOUNTER — TELEPHONE (OUTPATIENT)
Dept: ONCOLOGY | Age: 72
End: 2022-09-22

## 2022-09-22 VITALS
BODY MASS INDEX: 34.23 KG/M2 | SYSTOLIC BLOOD PRESSURE: 144 MMHG | WEIGHT: 244.49 LBS | OXYGEN SATURATION: 97 % | RESPIRATION RATE: 12 BRPM | DIASTOLIC BLOOD PRESSURE: 58 MMHG | HEART RATE: 60 BPM | TEMPERATURE: 98 F | HEIGHT: 71 IN

## 2022-09-22 DIAGNOSIS — S80.212A ABRASION OF LEFT KNEE, INITIAL ENCOUNTER: ICD-10-CM

## 2022-09-22 DIAGNOSIS — D64.9 SYMPTOMATIC ANEMIA: Primary | ICD-10-CM

## 2022-09-22 DIAGNOSIS — T45.1X5A ADVERSE EFFECT OF CHEMOTHERAPY, INITIAL ENCOUNTER: ICD-10-CM

## 2022-09-22 DIAGNOSIS — S00.31XA NOSE ABRASION, INITIAL ENCOUNTER: ICD-10-CM

## 2022-09-22 LAB
ALBUMIN SERPL-MCNC: 3.6 G/DL (ref 3.5–5)
ALBUMIN/GLOB SERPL: 1.2 {RATIO} (ref 1.1–2.2)
ALP SERPL-CCNC: 54 U/L (ref 45–117)
ALT SERPL-CCNC: 26 U/L (ref 12–78)
ANION GAP SERPL CALC-SCNC: 8 MMOL/L (ref 5–15)
AST SERPL-CCNC: 18 U/L (ref 15–37)
BASOPHILS # BLD: 0 K/UL (ref 0–0.1)
BASOPHILS NFR BLD: 1 % (ref 0–1)
BILIRUB SERPL-MCNC: 0.5 MG/DL (ref 0.2–1)
BUN SERPL-MCNC: 25 MG/DL (ref 6–20)
BUN/CREAT SERPL: 27 (ref 12–20)
CALCIUM SERPL-MCNC: 8.8 MG/DL (ref 8.5–10.1)
CHLORIDE SERPL-SCNC: 98 MMOL/L (ref 97–108)
CO2 SERPL-SCNC: 27 MMOL/L (ref 21–32)
CREAT SERPL-MCNC: 0.92 MG/DL (ref 0.7–1.3)
DIFFERENTIAL METHOD BLD: ABNORMAL
EOSINOPHIL # BLD: 0 K/UL (ref 0–0.4)
EOSINOPHIL NFR BLD: 1 % (ref 0–7)
ERYTHROCYTE [DISTWIDTH] IN BLOOD BY AUTOMATED COUNT: 18.4 % (ref 11.5–14.5)
GLOBULIN SER CALC-MCNC: 3.1 G/DL (ref 2–4)
GLUCOSE SERPL-MCNC: 291 MG/DL (ref 65–100)
HCT VFR BLD AUTO: 23.1 % (ref 36.6–50.3)
HGB BLD-MCNC: 7.6 G/DL (ref 12.1–17)
IMM GRANULOCYTES # BLD AUTO: 0 K/UL
IMM GRANULOCYTES NFR BLD AUTO: 0 %
LYMPHOCYTES # BLD: 0.5 K/UL (ref 0.8–3.5)
LYMPHOCYTES NFR BLD: 31 % (ref 12–49)
MCH RBC QN AUTO: 25.8 PG (ref 26–34)
MCHC RBC AUTO-ENTMCNC: 32.9 G/DL (ref 30–36.5)
MCV RBC AUTO: 78.3 FL (ref 80–99)
MONOCYTES # BLD: 0 K/UL (ref 0–1)
MONOCYTES NFR BLD: 1 % (ref 5–13)
NEUTS SEG # BLD: 1 K/UL (ref 1.8–8)
NEUTS SEG NFR BLD: 66 % (ref 32–75)
NRBC # BLD: 0 K/UL (ref 0–0.01)
NRBC BLD-RTO: 0 PER 100 WBC
PLATELET # BLD AUTO: 65 K/UL (ref 150–400)
PMV BLD AUTO: 10.3 FL (ref 8.9–12.9)
POTASSIUM SERPL-SCNC: 4.3 MMOL/L (ref 3.5–5.1)
PROT SERPL-MCNC: 6.7 G/DL (ref 6.4–8.2)
RBC # BLD AUTO: 2.95 M/UL (ref 4.1–5.7)
RBC MORPH BLD: ABNORMAL
SODIUM SERPL-SCNC: 133 MMOL/L (ref 136–145)
TROPONIN-HIGH SENSITIVITY: 9 NG/L (ref 0–76)
WBC # BLD AUTO: 1.5 K/UL (ref 4.1–11.1)

## 2022-09-22 PROCEDURE — 84484 ASSAY OF TROPONIN QUANT: CPT

## 2022-09-22 PROCEDURE — 99284 EMERGENCY DEPT VISIT MOD MDM: CPT

## 2022-09-22 PROCEDURE — 93005 ELECTROCARDIOGRAM TRACING: CPT

## 2022-09-22 PROCEDURE — 80053 COMPREHEN METABOLIC PANEL: CPT

## 2022-09-22 PROCEDURE — 36415 COLL VENOUS BLD VENIPUNCTURE: CPT

## 2022-09-22 PROCEDURE — 85025 COMPLETE CBC W/AUTO DIFF WBC: CPT

## 2022-09-22 PROCEDURE — 70450 CT HEAD/BRAIN W/O DYE: CPT

## 2022-09-22 RX ORDER — HEPARIN 100 UNIT/ML
500 SYRINGE INTRAVENOUS AS NEEDED
Status: CANCELLED | OUTPATIENT
Start: 2022-10-19

## 2022-09-22 RX ORDER — SODIUM CHLORIDE 9 MG/ML
10 INJECTION INTRAMUSCULAR; INTRAVENOUS; SUBCUTANEOUS AS NEEDED
Status: CANCELLED | OUTPATIENT
Start: 2022-09-28

## 2022-09-22 RX ORDER — HEPARIN 100 UNIT/ML
500 SYRINGE INTRAVENOUS AS NEEDED
Status: CANCELLED | OUTPATIENT
Start: 2022-09-28

## 2022-09-22 RX ORDER — SODIUM CHLORIDE 0.9 % (FLUSH) 0.9 %
5-10 SYRINGE (ML) INJECTION AS NEEDED
Status: CANCELLED | OUTPATIENT
Start: 2022-10-19

## 2022-09-22 RX ORDER — BACITRACIN 500 [USP'U]/G
OINTMENT TOPICAL 3 TIMES DAILY
Qty: 30 G | Refills: 0 | Status: SHIPPED | OUTPATIENT
Start: 2022-09-22 | End: 2022-10-02

## 2022-09-22 RX ORDER — SODIUM CHLORIDE 9 MG/ML
10 INJECTION INTRAMUSCULAR; INTRAVENOUS; SUBCUTANEOUS AS NEEDED
Status: CANCELLED | OUTPATIENT
Start: 2022-10-19

## 2022-09-22 RX ORDER — SODIUM CHLORIDE 0.9 % (FLUSH) 0.9 %
5-10 SYRINGE (ML) INJECTION AS NEEDED
Status: CANCELLED | OUTPATIENT
Start: 2022-09-28

## 2022-09-22 NOTE — ED PROVIDER NOTES
The history is provided by the patient. Fall  The accident occurred 3 to 5 hours ago. Fall occurred: while standing up felt slightly lightheaded then tripped and fell to the floor. He fell from a height of ground level. He landed on Hard floor. Point of impact: left knee and struck face. The pain is mild. He was Ambulatory at the scene. There was No entrapment after the fall. There was No drug use involved in the accident. There was No alcohol use involved in the accident. Pertinent negatives include no fever, no loss of consciousness and no laceration. Risk factors: recent chemotherapy and known anemia. The symptoms are aggravated by pressure on injury. He has tried nothing for the symptoms. Head Injury   The incident occurred just prior to arrival. The incident occurred at home. The injury mechanism was a fall. No protective equipment was used. There is an injury to the Nose. The pain is mild. Pertinent negatives include no loss of consciousness.       Past Medical History:   Diagnosis Date    Anxiety and depression     Arthritis     Cirrhosis ([facility]     Dementia ([facility]     DM (diabetes mellitus) ([facility]     Hyperlipidemia     Hypertension     Hypothyroid     Obese     SELINA on CPAP        Past Surgical History:   Procedure Laterality Date    HX ANKLE FRACTURE TX Right     Replacement    HX APPENDECTOMY  APR 2013    HX CHOLECYSTECTOMY      HX ORTHOPAEDIC      left knee     HX OTHER SURGICAL      deviated septum    IR INSERT TUNL CVC W PORT OVER 5 YEARS  5/31/2022    OK ABDOMEN SURGERY PROC UNLISTED      dbl hernia repair as infant    OK INSERTION SUBQ CARDIAC RHYTHM MONITOR W/PRGRMG N/A 3/12/2020    LOOP RECORDER INSERT performed by Mya Cates MD at [facility] LAB         Family History:   Problem Relation Age of Onset    Emphysema Mother     Other Mother         enlarged heart    Heart Attack Maternal Grandmother        Social History     Socioeconomic History    Marital status:      Spouse name: Not on file    Number of children: Not on file    Years of education: Not on file    Highest education level: Not on file   Occupational History    Not on file   Tobacco Use    Smoking status: Never    Smokeless tobacco: Never   Substance and Sexual Activity    Alcohol use: Yes     Comment: rarely    Drug use: No    Sexual activity: Not on file   Other Topics Concern    Not on file   Social History Narrative    Not on file     Social Determinants of Health     Financial Resource Strain: Not on file   Food Insecurity: No Food Insecurity    Worried About Running Out of Food in the Last Year: Never true    Ran Out of Food in the Last Year: Never true   Transportation Needs: No Transportation Needs    Lack of Transportation (Medical): No    Lack of Transportation (Non-Medical): No   Physical Activity: Not on file   Stress: Not on file   Social Connections: Not on file   Intimate Partner Violence: Not on file   Housing Stability: Not on file         ALLERGIES: Codeine    Review of Systems   Constitutional:  Negative for fever. Neurological:  Negative for loss of consciousness. All other systems reviewed and are negative. Vitals:    09/22/22 1426 09/22/22 1427 09/22/22 1446 09/22/22 1551   BP:       Pulse:       Resp:       Temp:       SpO2: 97% 97% 98% 95%   Weight:       Height:                Physical Exam  Vitals and nursing note reviewed. Constitutional:       General: He is not in acute distress. Appearance: He is well-developed. HENT:      Head: Normocephalic. Comments: abrasion of nasal bridge  Eyes:      Conjunctiva/sclera: Conjunctivae normal.   Neck:      Trachea: No tracheal deviation. Cardiovascular:      Rate and Rhythm: Normal rate and regular rhythm. Pulmonary:      Effort: Pulmonary effort is normal. No respiratory distress. Abdominal:      General: There is no distension. Musculoskeletal:         General: No deformity. Normal range of motion. Cervical back: Neck supple. Comments: Left knee abrasion   Skin:     General: Skin is warm and dry. Findings: No laceration. Neurological:      Mental Status: He is alert. Cranial Nerves: No cranial nerve deficit. Psychiatric:         Behavior: Behavior normal.        [facility]    ED Course as of 09/22/22 2245   Thu Sep 22, 2022   1523 EKG 1415: Rate 63, sinus rhythm with short AR. Occasional PVCs. No ST segment or T wave abnormalities. Otherwise normal EKG. [DK]      ED Course User Index  [DK] Miguel Kohler MD     81 y.o. male presents with fall from standing after brief lightheadedness which has resolved. Hb is downtrending with pancytopenia but he is on chemotherapy so this is not unanticipated. No transfusion indication. No evidence of cardiac ischemia or arrhythmia. No significant metabolic abnormalities to explain symptoms. Plan to follow up with oncology next week and return precautions discussed for worsening or new concerning symptoms. Procedures    3:59 PM  Consult called to JEAN Pierson with oncology on behalf of Dr Jose Rafael Morales. I reviewed available results and clinical information. They recommend follow up next week for monitoring of blood work and agree with outpatient follow up.

## 2022-09-22 NOTE — TELEPHONE ENCOUNTER
[facility] at [facility]  [phone]    09/22/22- Patient's wife stated patient just lost his balance and fell, hitting his face on the hardwood floor. Stated he's alert and didn't lose consciousness. He has a cut on the bridge of his nose and lump on his forehead. Patient stated the back of his neck was sore, but not painful while laying down. Confirmed that he's not taking any blood thinners. Discussed with [facility]/Dr. Patel, they recommend ED for further evaluation. Patient received chemotherapy 1 week ago and may be pancytopenic with increased risk of brain bleed. Patient's wife agreeable.

## 2022-09-22 NOTE — TELEPHONE ENCOUNTER
Patient spouse called and stated that the patient has had a fall and is complaining of a sore neck; patient spouse would like a call back to go over some concerns.      # [phone]

## 2022-09-22 NOTE — ED TRIAGE NOTES
Pt felt woozy after getting up out of a chair, fell on to the floor, fell on his left knee and hit the bridge of his nose on the floor. Pt denies LOC. Pt has been getting chemotherapy and has had history of anemia.

## 2022-09-25 LAB
ATRIAL RATE: 63 BPM
CALCULATED P AXIS, ECG09: -9 DEGREES
CALCULATED R AXIS, ECG10: 43 DEGREES
CALCULATED T AXIS, ECG11: 41 DEGREES
DIAGNOSIS, 93000: NORMAL
P-R INTERVAL, ECG05: 94 MS
Q-T INTERVAL, ECG07: 420 MS
QRS DURATION, ECG06: 92 MS
QTC CALCULATION (BEZET), ECG08: 429 MS
VENTRICULAR RATE, ECG03: 63 BPM

## 2022-09-27 ENCOUNTER — HOSPITAL ENCOUNTER (OUTPATIENT)
Dept: INFUSION THERAPY | Age: 72
Discharge: HOME | End: 2022-09-27
Payer: MEDICARE

## 2022-09-27 DIAGNOSIS — C80.1 SMALL CELL CARCINOMA (HCC): Primary | ICD-10-CM

## 2022-09-27 LAB
ABO + RH BLD: NORMAL
BASOPHILS # BLD: 0 K/UL (ref 0–0.1)
BASOPHILS NFR BLD: 0 % (ref 0–1)
BLOOD GROUP ANTIBODIES SERPL: NORMAL
DIFFERENTIAL METHOD BLD: ABNORMAL
EOSINOPHIL # BLD: 0 K/UL (ref 0–0.4)
EOSINOPHIL NFR BLD: 3 % (ref 0–7)
ERYTHROCYTE [DISTWIDTH] IN BLOOD BY AUTOMATED COUNT: 18.4 % (ref 11.5–14.5)
HCT VFR BLD AUTO: 21.6 % (ref 36.6–50.3)
HGB BLD-MCNC: 7.1 G/DL (ref 12.1–17)
IMM GRANULOCYTES # BLD AUTO: 0 K/UL
IMM GRANULOCYTES NFR BLD AUTO: 0 %
LYMPHOCYTES # BLD: 0.5 K/UL (ref 0.8–3.5)
LYMPHOCYTES NFR BLD: 80 % (ref 12–49)
MCH RBC QN AUTO: 26.3 PG (ref 26–34)
MCHC RBC AUTO-ENTMCNC: 32.9 G/DL (ref 30–36.5)
MCV RBC AUTO: 80 FL (ref 80–99)
MONOCYTES # BLD: 0 K/UL (ref 0–1)
MONOCYTES NFR BLD: 0 % (ref 5–13)
NEUTS SEG # BLD: 0.1 K/UL (ref 1.8–8)
NEUTS SEG NFR BLD: 17 % (ref 32–75)
NRBC # BLD: 0.03 K/UL (ref 0–0.01)
NRBC BLD-RTO: 5 PER 100 WBC
PLATELET # BLD AUTO: 35 K/UL (ref 150–400)
PMV BLD AUTO: ABNORMAL FL (ref 8.9–12.9)
RBC # BLD AUTO: 2.7 M/UL (ref 4.1–5.7)
RBC MORPH BLD: ABNORMAL
SPECIMEN EXP DATE BLD: NORMAL
WBC # BLD AUTO: 0.6 K/UL (ref 4.1–11.1)

## 2022-09-27 PROCEDURE — 36415 COLL VENOUS BLD VENIPUNCTURE: CPT

## 2022-09-27 PROCEDURE — 85025 COMPLETE CBC W/AUTO DIFF WBC: CPT

## 2022-09-27 PROCEDURE — 86900 BLOOD TYPING SEROLOGIC ABO: CPT

## 2022-09-27 RX ORDER — SODIUM CHLORIDE 9 MG/ML
10 INJECTION INTRAMUSCULAR; INTRAVENOUS; SUBCUTANEOUS AS NEEDED
Status: DISCONTINUED | OUTPATIENT
Start: 2022-09-27 | End: 2022-09-28 | Stop reason: HOSPADM

## 2022-09-27 RX ORDER — SODIUM CHLORIDE 0.9 % (FLUSH) 0.9 %
5-10 SYRINGE (ML) INJECTION AS NEEDED
Status: DISCONTINUED | OUTPATIENT
Start: 2022-09-27 | End: 2022-09-28 | Stop reason: HOSPADM

## 2022-09-27 RX ORDER — SODIUM CHLORIDE 0.9 % (FLUSH) 0.9 %
5-10 SYRINGE (ML) INJECTION AS NEEDED
Status: CANCELLED | OUTPATIENT
Start: 2022-10-05

## 2022-09-27 RX ORDER — HEPARIN 100 UNIT/ML
500 SYRINGE INTRAVENOUS AS NEEDED
Status: CANCELLED | OUTPATIENT
Start: 2022-10-05

## 2022-09-27 RX ORDER — SODIUM CHLORIDE 9 MG/ML
10 INJECTION INTRAMUSCULAR; INTRAVENOUS; SUBCUTANEOUS AS NEEDED
Status: CANCELLED | OUTPATIENT
Start: 2022-10-05

## 2022-09-27 RX ORDER — HEPARIN 100 UNIT/ML
500 SYRINGE INTRAVENOUS AS NEEDED
Status: DISCONTINUED | OUTPATIENT
Start: 2022-09-27 | End: 2022-09-28 | Stop reason: HOSPADM

## 2022-09-27 NOTE — PROGRESS NOTES
Outpatient Infusion Center   Visit Progress Note    Patient admitted to [facility] for Labs ambulatory in stable condition. Patient requested labs be drawn peripherally, PAC accessed a week ago per patient. Labs drawn peripherally from the left [facility] without difficulty. Labs pending in CC, patient instructed MD's office would reach out if blood transfusion necessary. Patient tolerated treatment well. Patient discharged from [place] [address] ambulatory in no distress. Patient aware of next appointment.     Future Appointments   Date Time Provider Cindy Gee   10/7/2022 12:00 [facility]   10/17/2022 10:00 AM [email or web address], [facility]AV[facility] BS AMB   10/19/2022  9:00 AM SS INF4 CH4 <1H [facility]   10/19/2022  9:15 AM Janie Patel MD ON[facility] BS AMB

## 2022-09-27 NOTE — PROGRESS NOTES
Call to patient to notify of current lab work. No answer, left message advising that I will send a [employer] message to communicate results. Will plan to repeat labs next week to ensure improvement post chemotherapy.

## 2022-09-28 ENCOUNTER — HOSPITAL ENCOUNTER (OUTPATIENT)
Dept: INFUSION THERAPY | Age: 72
End: 2022-09-28

## 2022-09-28 LAB — PATH REV BLD -IMP: NORMAL

## 2022-09-29 ENCOUNTER — TELEPHONE (OUTPATIENT)
Dept: ONCOLOGY | Age: 72
End: 2022-09-29

## 2022-09-29 NOTE — TELEPHONE ENCOUNTER
Lm with patient to get him scheduled for labs sometime next week per the team. Cassidy All him office number to call back.

## 2022-10-05 ENCOUNTER — HOSPITAL ENCOUNTER (OUTPATIENT)
Dept: INFUSION THERAPY | Age: 72
Discharge: HOME | End: 2022-10-05
Payer: MEDICARE

## 2022-10-05 VITALS
TEMPERATURE: 97.6 F | RESPIRATION RATE: 97 BRPM | HEART RATE: 63 BPM | SYSTOLIC BLOOD PRESSURE: 139 MMHG | DIASTOLIC BLOOD PRESSURE: 53 MMHG

## 2022-10-05 DIAGNOSIS — C80.1 SMALL CELL CARCINOMA (HCC): Primary | ICD-10-CM

## 2022-10-05 LAB
ABO + RH BLD: NORMAL
BASO+EOS+MONOS # BLD AUTO: 0.2 K/UL (ref 0.2–1.2)
BASO+EOS+MONOS NFR BLD AUTO: 9 % (ref 3.2–16.9)
BLOOD GROUP ANTIBODIES SERPL: NORMAL
DIFFERENTIAL METHOD BLD: ABNORMAL
ERYTHROCYTE [DISTWIDTH] IN BLOOD BY AUTOMATED COUNT: 23.1 % (ref 11.8–15.8)
HCT VFR BLD AUTO: 23.8 % (ref 36.6–50.3)
HGB BLD-MCNC: 7.4 G/DL (ref 12.1–17)
LYMPHOCYTES # BLD: 0.6 K/UL (ref 0.8–3.5)
LYMPHOCYTES NFR BLD: 25 % (ref 12–49)
MCH RBC QN AUTO: 25.6 PG (ref 26–34)
MCHC RBC AUTO-ENTMCNC: 31.1 G/DL (ref 30–36.5)
MCV RBC AUTO: 82.4 FL (ref 80–99)
NEUTS SEG # BLD: 1.6 K/UL (ref 1.8–8)
NEUTS SEG NFR BLD: 66 % (ref 32–75)
PLATELET # BLD AUTO: 126 K/UL (ref 150–400)
RBC # BLD AUTO: 2.89 M/UL (ref 4.1–5.7)
SPECIMEN EXP DATE BLD: NORMAL
WBC # BLD AUTO: 2.4 K/UL (ref 4.1–11.1)

## 2022-10-05 PROCEDURE — 36591 DRAW BLOOD OFF VENOUS DEVICE: CPT

## 2022-10-05 PROCEDURE — 85025 COMPLETE CBC W/AUTO DIFF WBC: CPT

## 2022-10-05 PROCEDURE — 77030012965 HC NDL HUBR BBMI -A

## 2022-10-05 PROCEDURE — 86900 BLOOD TYPING SEROLOGIC ABO: CPT

## 2022-10-05 RX ORDER — HEPARIN 100 UNIT/ML
500 SYRINGE INTRAVENOUS AS NEEDED
Status: ACTIVE | OUTPATIENT
Start: 2022-10-05 | End: 2022-10-05

## 2022-10-05 RX ORDER — SODIUM CHLORIDE 0.9 % (FLUSH) 0.9 %
5-10 SYRINGE (ML) INJECTION AS NEEDED
Status: DISPENSED | OUTPATIENT
Start: 2022-10-05 | End: 2022-10-05

## 2022-10-05 RX ORDER — SODIUM CHLORIDE 9 MG/ML
10 INJECTION INTRAMUSCULAR; INTRAVENOUS; SUBCUTANEOUS AS NEEDED
Status: ACTIVE | OUTPATIENT
Start: 2022-10-05 | End: 2022-10-05

## 2022-10-05 NOTE — PROGRESS NOTES
Spoke to patient to review lab work. Hgb is stable, but Plt and WBC are both improved. The Hgb should rebound here in the next 2 weeks. Will repeat labs on 10/19 with office follow up. Encouraged patient to call with any further concerns.

## 2022-10-05 NOTE — PROGRESS NOTES
Outpatient Infusion Center Progress Note     Pt admit to [facility] for pf/labs ambulatory in stable condition. Assessment completed. No new concerns voiced. R chest port accessed without difficulty, labs drawn and sent for processing. Visit Vitals  BP (!) 139/53   Pulse 63   Temp 97.6 °F (36.4 °C)   Resp (!) 97     Recent Results (from the past 12 hour(s))   CBC WITH 3 PART DIFF    Collection Time: 10/05/22 11:04 AM   Result Value Ref Range    WBC 2.4 (L) 4.1 - 11.1 K/uL    RBC 2.89 (L) 4.10 - 5.70 M/uL    HGB 7.4 (L) 12.1 - 17.0 g/dL    HCT 23.8 (L) 36.6 - 50.3 %    MCV 82.4 80.0 - 99.0 FL    MCH 25.6 (L) 26.0 - 34.0 PG    MCHC 31.1 30.0 - 36.5 g/dL    RDW 23.1 (H) 11.8 - 15.8 %    PLATELET 191 (L) 371 - 400 K/uL    NEUTROPHILS 66 32 - 75 %    Mixed cells 9 3.2 - 16.9 %    LYMPHOCYTES 25 12 - 49 %    ABS. NEUTROPHILS 1.6 (L) 1.8 - 8.0 K/UL    ABS. MIXED CELLS 0.2 0.2 - 1.2 K/uL    ABS. LYMPHOCYTES 0.6 (L) 0.8 - 3.5 K/UL    DF AUTOMATED         Medications:  Sterile saline  Saline flush  Heparin flush       Pt tolerated treatment well. Chest port flushed and discontinued per protocol. D/c home ambulatory in no distress. Pt aware of next appointment scheduled for 10/19.

## 2022-10-07 ENCOUNTER — HOSPITAL ENCOUNTER (OUTPATIENT)
Dept: CT IMAGING | Age: 72
Discharge: HOME | End: 2022-10-07
Attending: NURSE PRACTITIONER
Payer: MEDICARE

## 2022-10-07 DIAGNOSIS — C25.0 MALIGNANT NEOPLASM OF HEAD OF PANCREAS (HCC): ICD-10-CM

## 2022-10-07 DIAGNOSIS — C78.7 METASTASIS TO LIVER (HCC): ICD-10-CM

## 2022-10-07 PROCEDURE — 74177 CT ABD & PELVIS W/CONTRAST: CPT

## 2022-10-07 PROCEDURE — 74011000636 HC RX REV CODE- 636: Performed by: NURSE PRACTITIONER

## 2022-10-07 RX ADMIN — IOPAMIDOL 100 ML: 755 INJECTION, SOLUTION INTRAVENOUS at 12:10

## 2022-10-17 ENCOUNTER — OFFICE VISIT (OUTPATIENT)
Dept: CARDIOLOGY CLINIC | Age: 72
End: 2022-10-17
Payer: MEDICARE

## 2022-10-17 DIAGNOSIS — Z95.818 STATUS POST PLACEMENT OF IMPLANTABLE LOOP RECORDER: Primary | ICD-10-CM

## 2022-10-17 PROCEDURE — 93298 REM INTERROG DEV EVAL SCRMS: CPT | Performed by: INTERNAL MEDICINE

## 2022-10-17 NOTE — PROGRESS NOTES
[facility] [place] at [address], [address] [facility] 100[address] [facility]  W: [phone]  F: [phone]      Reason for Visit:   Anika Lakhani is a 81 y.o. male who is seen for follow up of small cell carcinoma of the pancreas. Hematology/Oncology Treatment History:   CT A/P 4/19/2022: Possible mild acute pancreatitis. Prominent sigmoid diverticulosis. Enlarged prostate. Fat-containing hernias  CT Abdomen 5/17/2022: New intra-hepatic biliary dilatation and common bile duct dilatation. Ampullary prominence and 1.5 cm x 1.5 cm soft tissue density in the pancreaticoduodenal groove. New subcentimeter hepatic hypodense lesions, worrisome for metastatic disease. MRI Abdomen 5/17/2022:  Periampullary pancreatic mass with possible extension into the wall of the duodenum. Associated intrahepatic and extra hepatic biliary dilatation and mild pancreatic ductal dilatation. Small hyperenhancing lesions throughout the liver suspicious for metastatic disease. CT Chest 5/18/2022: no metastatic disease within thorax  ERCP by Dr. Sri Summers 5/18/2022: Large ulcerated mass seen in the second portion of the duodenum taking half the circumference in the area of the major papilla and extending distally about 3 to 4 cm, appeared to be pancreatic and eroding into the duodenum. Cholangiogram with complete obstruction of CBD. Metal stent deployed. Biopsies taken, high grade neuroendocrine carcinoma, small cell type  US guided liver biopsy 5/19/2022: small cell carcinoma  Stage IV Small Cell Carcinoma of the Pancreas  Initiated therapy with Carboplatin/Etoposide on 6/1/2022, completed 6 cycles on 9/16/2022    History of Present Illness:   Reports overall he is doing well with no complaints today. No abdominal pain, nausea, vomiting, diarrhea or constipation. Present with wife today. Review of systems was obtained and pertinent findings reviewed above.  Past medical history, social history, family history, medications, and allergies are located in the electronic medical record. Physical Exam:     Visit Vitals  /63   Pulse (!) 58   Temp 97.7 °F (36.5 °C)   Resp 16   Ht 5' 10\" (1.778 m)   Wt 250 lb (113.4 kg)   SpO2 96%   BMI 35.87 kg/m²       ECOG PS: 2  General: no distress, pallor noted  Eyes: anicteric sclerae  HENT: oropharynx clear  Neck: supple  Lymphatic: no cervical, supraclavicular adenopathy  Respiratory: normal respiratory effort  CV: no peripheral edema  GI: soft, nontender, nondistended, no masses  Skin: no rashes; no ecchymoses; no petechiae    Results:     Lab Results   Component Value Date/Time    WBC 4.2 10/19/2022 09:21 AM    HGB 10.1 (L) 10/19/2022 09:21 AM    HCT 31.9 (L) 10/19/2022 09:21 AM    PLATELET 499 (L) 56/12/3069 09:21 AM    MCV 84.8 10/19/2022 09:21 AM    ABS. NEUTROPHILS 3.2 10/19/2022 09:21 AM     Lab Results   Component Value Date/Time    Sodium 140 10/19/2022 09:21 AM    Potassium 3.4 (L) 10/19/2022 09:21 AM    Chloride 106 10/19/2022 09:21 AM    CO2 27 10/19/2022 09:21 AM    Glucose 205 (H) 10/19/2022 09:21 AM    BUN 17 10/19/2022 09:21 AM    Creatinine 0.95 10/19/2022 09:21 AM    GFR est AA >60 09/22/2022 02:11 PM    GFR est non-AA >60 09/22/2022 02:11 PM    Calcium 9.0 10/19/2022 09:21 AM    Glucose (POC) 277 (H) 06/03/2022 11:47 AM    Creatinine (POC) 1.00 04/19/2022 02:47 PM     Lab Results   Component Value Date/Time    Bilirubin, total 0.8 10/19/2022 09:21 AM    ALT (SGPT) 25 10/19/2022 09:21 AM    Alk.  phosphatase 82 10/19/2022 09:21 AM    Protein, total 7.2 10/19/2022 09:21 AM    Albumin 3.8 10/19/2022 09:21 AM    Globulin 3.4 10/19/2022 09:21 AM     Lab Results   Component Value Date/Time    Reticulocyte count 4.2 (H) 05/17/2022 03:04 PM    Iron % saturation 32 05/17/2022 03:04 PM    TIBC 289 05/17/2022 03:04 PM    Ferritin 219 05/17/2022 03:04 PM    Vitamin B12 944 05/17/2022 03:04 PM    Folate 26.7 (H) 05/17/2022 03:04 PM    Haptoglobin 90 05/17/2022 03:04 PM     05/17/2022 03:04 PM    TSH 2.18 05/17/2022 03:04 PM    Lipase 106 05/17/2022 10:10 AM    Hep C virus Ab Interp. NONREACTIVE 05/17/2022 03:04 PM       CT C/A/P 7/6/2022:  1. Status post placement of a common bile duct stent with resolution of intrahepatic biliary dilatation. 2.  The periocular mass noted previously is not as well-visualized and is likely decrease in size with some residual mass noted in the pancreatic head. 3.  Small liver lesions are no longer identified however there are 2 larger lesions in the right hepatic lobe which were not previously noted suspicious for metastatic disease. CT C/A/P 8/23/2022:   1. Multiple liver hypodensities. The larger lesion is slightly decreased in size. The other lesions are not significantly changed although are better visualized on the current study. This may be secondary to the phase of the contrast bolus. 2.  Status post common bile duct stent placement. Ill-defined hypodensity in the head of pancreas without measurable mass. Increased pancreatic atrophy and possible mild hepatic ductal dilatation noted. CT C/A/P 10/7/2022:  1. No measurable pancreatic head mass. 2.  Patent common bile duct stent. 3.  Allowing for differences in contrast enhancement in the liver, the scattered  hypodense liver lesions are stable in size. 4.  Mild splenomegaly. 5.  No new lymphadenopathy. Assessment:   1) Metastatic Small Cell Carcinoma of the Pancreas  He has disease within his pancreas, invading into the duodenum. He additionally has metastatic disease within his liver, confirmed with biopsy. His cancer is not curable and management is with palliative intent. He completed palliative chemotherapy with Carboplatin and Etoposide given every 3 weeks x 6 cycles. Post treatment imaging reviewed today consistent with response to therapy. We will now move to surveillance with CT imaging and labs every 12 weeks.      2)  Biliary obstruction  Secondary to his pancreatic cancer. S/p ERCP on 5/18/2022 with stent placement. Follows with Dr. Kassidy Walden. 3)  Anemia, normocytic  Improving now that chemotherapy is complete. 4) DM  Follows endocrinology outpatient. 5) Pancreatic insufficiency  Diarrhea improved with initiation of creon. 6) Emotional Well Being  No psychosocial concerns identified today. Patient has adequate support. Plan:     Labs with PF today: CBC and CMP  PF in 12 weeks  CT imaging in 12 weeks  Continue Creon  Follow up in 12 weeks on surveillance    I personally saw and evaluated the patient and performed the key components of medical decision making. The history, physical exam, and documentation were performed by Sofie Segal NP. I reviewed and verified the above documentation and modified it as needed. He has completed 6 cycles of chemotherapy and his repeat CT looks overall stable. We will discontinue chemotherapy now and transition to surveillance with CT every 3 months.       Signed By: Jin Roberts MD

## 2022-10-19 ENCOUNTER — OFFICE VISIT (OUTPATIENT)
Dept: ONCOLOGY | Age: 72
End: 2022-10-19
Payer: MEDICARE

## 2022-10-19 ENCOUNTER — HOSPITAL ENCOUNTER (OUTPATIENT)
Dept: INFUSION THERAPY | Age: 72
Discharge: HOME | End: 2022-10-19
Payer: MEDICARE

## 2022-10-19 VITALS
TEMPERATURE: 97.7 F | BODY MASS INDEX: 35.79 KG/M2 | HEIGHT: 70 IN | WEIGHT: 250 LBS | RESPIRATION RATE: 16 BRPM | DIASTOLIC BLOOD PRESSURE: 63 MMHG | OXYGEN SATURATION: 96 % | SYSTOLIC BLOOD PRESSURE: 138 MMHG | HEART RATE: 58 BPM

## 2022-10-19 VITALS
DIASTOLIC BLOOD PRESSURE: 63 MMHG | HEIGHT: 70 IN | SYSTOLIC BLOOD PRESSURE: 138 MMHG | TEMPERATURE: 97.7 F | BODY MASS INDEX: 35.79 KG/M2 | WEIGHT: 250 LBS | OXYGEN SATURATION: 96 % | HEART RATE: 58 BPM | RESPIRATION RATE: 16 BRPM

## 2022-10-19 DIAGNOSIS — C78.7 METASTASIS TO LIVER (HCC): ICD-10-CM

## 2022-10-19 DIAGNOSIS — C80.1 SMALL CELL CARCINOMA (HCC): Primary | ICD-10-CM

## 2022-10-19 DIAGNOSIS — D64.9 NORMOCYTIC ANEMIA: ICD-10-CM

## 2022-10-19 DIAGNOSIS — C25.0 MALIGNANT NEOPLASM OF HEAD OF PANCREAS (HCC): Primary | ICD-10-CM

## 2022-10-19 LAB
ALBUMIN SERPL-MCNC: 3.8 G/DL (ref 3.5–5)
ALBUMIN/GLOB SERPL: 1.1 {RATIO} (ref 1.1–2.2)
ALP SERPL-CCNC: 82 U/L (ref 45–117)
ALT SERPL-CCNC: 25 U/L (ref 12–78)
ANION GAP SERPL CALC-SCNC: 7 MMOL/L (ref 5–15)
AST SERPL-CCNC: 19 U/L (ref 15–37)
BASOPHILS # BLD: 0 K/UL (ref 0–0.1)
BASOPHILS NFR BLD: 1 % (ref 0–1)
BILIRUB SERPL-MCNC: 0.8 MG/DL (ref 0.2–1)
BUN SERPL-MCNC: 17 MG/DL (ref 6–20)
BUN/CREAT SERPL: 18 (ref 12–20)
CALCIUM SERPL-MCNC: 9 MG/DL (ref 8.5–10.1)
CHLORIDE SERPL-SCNC: 106 MMOL/L (ref 97–108)
CO2 SERPL-SCNC: 27 MMOL/L (ref 21–32)
CREAT SERPL-MCNC: 0.95 MG/DL (ref 0.7–1.3)
DIFFERENTIAL METHOD BLD: ABNORMAL
EOSINOPHIL # BLD: 0 K/UL (ref 0–0.4)
EOSINOPHIL NFR BLD: 1 % (ref 0–7)
ERYTHROCYTE [DISTWIDTH] IN BLOOD BY AUTOMATED COUNT: 21 % (ref 11.5–14.5)
GLOBULIN SER CALC-MCNC: 3.4 G/DL (ref 2–4)
GLUCOSE SERPL-MCNC: 205 MG/DL (ref 65–100)
HCT VFR BLD AUTO: 31.9 % (ref 36.6–50.3)
HGB BLD-MCNC: 10.1 G/DL (ref 12.1–17)
IMM GRANULOCYTES # BLD AUTO: 0 K/UL (ref 0–0.04)
IMM GRANULOCYTES NFR BLD AUTO: 1 % (ref 0–0.5)
LYMPHOCYTES # BLD: 0.7 K/UL (ref 0.8–3.5)
LYMPHOCYTES NFR BLD: 17 % (ref 12–49)
MCH RBC QN AUTO: 26.9 PG (ref 26–34)
MCHC RBC AUTO-ENTMCNC: 31.7 G/DL (ref 30–36.5)
MCV RBC AUTO: 84.8 FL (ref 80–99)
MONOCYTES # BLD: 0.3 K/UL (ref 0–1)
MONOCYTES NFR BLD: 7 % (ref 5–13)
NEUTS SEG # BLD: 3.2 K/UL (ref 1.8–8)
NEUTS SEG NFR BLD: 73 % (ref 32–75)
NRBC # BLD: 0 K/UL (ref 0–0.01)
NRBC BLD-RTO: 0 PER 100 WBC
PLATELET # BLD AUTO: 124 K/UL (ref 150–400)
PMV BLD AUTO: 10.6 FL (ref 8.9–12.9)
POTASSIUM SERPL-SCNC: 3.4 MMOL/L (ref 3.5–5.1)
PROT SERPL-MCNC: 7.2 G/DL (ref 6.4–8.2)
RBC # BLD AUTO: 3.76 M/UL (ref 4.1–5.7)
RBC MORPH BLD: ABNORMAL
RBC MORPH BLD: ABNORMAL
SODIUM SERPL-SCNC: 140 MMOL/L (ref 136–145)
WBC # BLD AUTO: 4.2 K/UL (ref 4.1–11.1)

## 2022-10-19 PROCEDURE — 1123F ACP DISCUSS/DSCN MKR DOCD: CPT | Performed by: INTERNAL MEDICINE

## 2022-10-19 PROCEDURE — G8417 CALC BMI ABV UP PARAM F/U: HCPCS | Performed by: INTERNAL MEDICINE

## 2022-10-19 PROCEDURE — G8752 SYS BP LESS 140: HCPCS | Performed by: INTERNAL MEDICINE

## 2022-10-19 PROCEDURE — 80053 COMPREHEN METABOLIC PANEL: CPT

## 2022-10-19 PROCEDURE — 99215 OFFICE O/P EST HI 40 MIN: CPT | Performed by: INTERNAL MEDICINE

## 2022-10-19 PROCEDURE — G9717 DOC PT DX DEP/BP F/U NT REQ: HCPCS | Performed by: INTERNAL MEDICINE

## 2022-10-19 PROCEDURE — 85025 COMPLETE CBC W/AUTO DIFF WBC: CPT

## 2022-10-19 PROCEDURE — 36415 COLL VENOUS BLD VENIPUNCTURE: CPT

## 2022-10-19 PROCEDURE — 3017F COLORECTAL CA SCREEN DOC REV: CPT | Performed by: INTERNAL MEDICINE

## 2022-10-19 PROCEDURE — G8536 NO DOC ELDER MAL SCRN: HCPCS | Performed by: INTERNAL MEDICINE

## 2022-10-19 PROCEDURE — 1101F PT FALLS ASSESS-DOCD LE1/YR: CPT | Performed by: INTERNAL MEDICINE

## 2022-10-19 PROCEDURE — G8427 DOCREV CUR MEDS BY ELIG CLIN: HCPCS | Performed by: INTERNAL MEDICINE

## 2022-10-19 PROCEDURE — G8754 DIAS BP LESS 90: HCPCS | Performed by: INTERNAL MEDICINE

## 2022-10-19 RX ORDER — SODIUM CHLORIDE 9 MG/ML
10 INJECTION INTRAMUSCULAR; INTRAVENOUS; SUBCUTANEOUS AS NEEDED
Status: ACTIVE | OUTPATIENT
Start: 2022-10-19 | End: 2022-10-19

## 2022-10-19 RX ORDER — SODIUM CHLORIDE 0.9 % (FLUSH) 0.9 %
5-10 SYRINGE (ML) INJECTION AS NEEDED
Status: DISPENSED | OUTPATIENT
Start: 2022-10-19 | End: 2022-10-19

## 2022-10-19 RX ORDER — POTASSIUM CHLORIDE 750 MG/1
40 TABLET, FILM COATED, EXTENDED RELEASE ORAL
Status: ACTIVE | OUTPATIENT
Start: 2022-10-19 | End: 2022-10-19

## 2022-10-19 RX ORDER — HEPARIN 100 UNIT/ML
500 SYRINGE INTRAVENOUS AS NEEDED
Status: ACTIVE | OUTPATIENT
Start: 2022-10-19 | End: 2022-10-19

## 2022-10-19 NOTE — PROGRESS NOTES
[facility]patient [facility]fusion [facility] Short Visit Progress Note    Mr. Carlton Tse admitted to [facility] for PF and labs ambulatory in stable condition. No new concerns voiced. Patient stated he did not want his port accessed. Labs drawn peripherally and sent for processing. Vital Signs:  Visit Vitals  /63 (BP 1 Location: Left upper arm, BP Patient Position: Sitting)   Pulse (!) 58   Temp 97.7 °F (36.5 °C)   Resp 16   Ht 5' 10\" (1.778 m)   Wt 113.4 kg (250 lb)   SpO2 96%   BMI 35.87 kg/m²         Lab Results:  Recent Results (from the past 12 hour(s))   CBC WITH AUTOMATED DIFF    Collection Time: 10/19/22  9:21 AM   Result Value Ref Range    WBC 4.2 4.1 - 11.1 K/uL    RBC 3.76 (L) 4.10 - 5.70 M/uL    HGB 10.1 (L) 12.1 - 17.0 g/dL    HCT 31.9 (L) 36.6 - 50.3 %    MCV 84.8 80.0 - 99.0 FL    MCH 26.9 26.0 - 34.0 PG    MCHC 31.7 30.0 - 36.5 g/dL    RDW 21.0 (H) 11.5 - 14.5 %    PLATELET 952 (L) 961 - 400 K/uL    MPV 10.6 8.9 - 12.9 FL    NRBC 0.0 0  WBC    ABSOLUTE NRBC 0.00 0.00 - 0.01 K/uL    NEUTROPHILS 73 32 - 75 %    LYMPHOCYTES 17 12 - 49 %    MONOCYTES 7 5 - 13 %    EOSINOPHILS 1 0 - 7 %    BASOPHILS 1 0 - 1 %    IMMATURE GRANULOCYTES 1 (H) 0.0 - 0.5 %    ABS. NEUTROPHILS 3.2 1.8 - 8.0 K/UL    ABS. LYMPHOCYTES 0.7 (L) 0.8 - 3.5 K/UL    ABS. MONOCYTES 0.3 0.0 - 1.0 K/UL    ABS. EOSINOPHILS 0.0 0.0 - 0.4 K/UL    ABS. BASOPHILS 0.0 0.0 - 0.1 K/UL    ABS. IMM.  GRANS. 0.0 0.00 - 0.04 K/UL    DF SMEAR SCANNED      RBC COMMENTS ANISOCYTOSIS  2+        RBC COMMENTS POLYCHROMASIA  PRESENT       METABOLIC PANEL, COMPREHENSIVE    Collection Time: 10/19/22  9:21 AM   Result Value Ref Range    Sodium 140 136 - 145 mmol/L    Potassium 3.4 (L) 3.5 - 5.1 mmol/L    Chloride 106 97 - 108 mmol/L    CO2 27 21 - 32 mmol/L    Anion gap 7 5 - 15 mmol/L    Glucose 205 (H) 65 - 100 mg/dL    BUN 17 6 - 20 MG/DL    Creatinine 0.95 0.70 - 1.30 MG/DL    BUN/Creatinine ratio 18 12 - 20      eGFR >60 >60 ml/min/1.73m2    Calcium 9.0 8.5 - 10.1 MG/DL    Bilirubin, total 0.8 0.2 - 1.0 MG/DL    ALT (SGPT) 25 12 - 78 U/L    AST (SGOT) 19 15 - 37 U/L    Alk. phosphatase 82 45 - 117 U/L    Protein, total 7.2 6.4 - 8.2 g/dL    Albumin 3.8 3.5 - 5.0 g/dL    Globulin 3.4 2.0 - 4.0 g/dL    A-G Ratio 1.1 1.1 - 2.2             Patient tolerated treatment well. Patient discharged from [place] [address] ambulatory in no distress at 0920. Patient aware of next appointment.     Jie Vu RN  October 19, 2022    Future Appointments   Date Time Provider Cindy Duncani   1/11/2023  9:00 AM SS INF7 CH2 <1H [facility]   1/11/2023  9:15 AM Mirella Patel MD [facility] BS AMB

## 2022-10-19 NOTE — PROGRESS NOTES
Anish Marcos. is a 81 y.o. male follow up for SCC of pancreas. 1. Have you been to the ER, urgent care clinic since your last visit? Hospitalized since your last visit?no     2. Have you seen or consulted any other health care providers outside of the [address] [place] [address] since your last visit? Include any pap smears or colon screening.  No    Vitals 10/19/2022 10/19/2022   Blood Pressure 138/63 164/72   Pulse  58   Temp  97.7   Resp  16   Height  5' 10\"   Weight  250 lb   SpO2  96   BSA  2.37 m2   BMI  35.87 kg/m2

## 2022-11-14 ENCOUNTER — TELEPHONE (OUTPATIENT)
Dept: ONCOLOGY | Age: 72
End: 2022-11-14

## 2022-11-14 NOTE — TELEPHONE ENCOUNTER
11/14/22- Faxed completed My [employer] patient re enrollment application for [employer]. Fax Confirmation received.

## 2022-11-22 NOTE — TELEPHONE ENCOUNTER
11/22/22- Called My [employer] Assist to check status of patients re enrollment application. Per representative this application was received and is in process and can take 24-48 business hours for determination.

## 2023-01-01 ENCOUNTER — HOSPITAL ENCOUNTER (OUTPATIENT)
Facility: HOSPITAL | Age: 73
Setting detail: INFUSION SERIES
End: 2023-01-01

## 2023-01-01 RX ORDER — SODIUM CHLORIDE 9 MG/ML
5-250 INJECTION, SOLUTION INTRAVENOUS PRN
Status: CANCELLED | OUTPATIENT
Start: 2023-01-01

## 2023-01-01 RX ORDER — SODIUM CHLORIDE 0.9 % (FLUSH) 0.9 %
5-40 SYRINGE (ML) INJECTION PRN
Status: CANCELLED | OUTPATIENT
Start: 2023-01-01

## 2023-01-01 RX ORDER — HEPARIN 100 UNIT/ML
500 SYRINGE INTRAVENOUS PRN
Status: CANCELLED | OUTPATIENT
Start: 2023-01-01

## 2023-01-04 ENCOUNTER — HOSPITAL ENCOUNTER (OUTPATIENT)
Dept: CT IMAGING | Age: 73
Discharge: HOME | End: 2023-01-04
Attending: NURSE PRACTITIONER
Payer: MEDICARE

## 2023-01-04 DIAGNOSIS — C25.0 MALIGNANT NEOPLASM OF HEAD OF PANCREAS (HCC): ICD-10-CM

## 2023-01-04 DIAGNOSIS — C78.7 METASTASIS TO LIVER (HCC): ICD-10-CM

## 2023-01-04 PROCEDURE — 74011000636 HC RX REV CODE- 636: Performed by: NURSE PRACTITIONER

## 2023-01-04 PROCEDURE — 74177 CT ABD & PELVIS W/CONTRAST: CPT

## 2023-01-04 RX ADMIN — IOPAMIDOL 100 ML: 755 INJECTION, SOLUTION INTRAVENOUS at 10:51

## 2023-01-11 ENCOUNTER — OFFICE VISIT (OUTPATIENT)
Dept: ONCOLOGY | Age: 73
End: 2023-01-11

## 2023-01-11 ENCOUNTER — HOSPITAL ENCOUNTER (OUTPATIENT)
Dept: INFUSION THERAPY | Age: 73
Discharge: HOME | End: 2023-01-11
Payer: MEDICARE

## 2023-01-11 VITALS
OXYGEN SATURATION: 95 % | DIASTOLIC BLOOD PRESSURE: 67 MMHG | TEMPERATURE: 98 F | WEIGHT: 243.4 LBS | HEIGHT: 70 IN | BODY MASS INDEX: 34.84 KG/M2 | SYSTOLIC BLOOD PRESSURE: 149 MMHG | HEART RATE: 60 BPM | RESPIRATION RATE: 18 BRPM

## 2023-01-11 VITALS
SYSTOLIC BLOOD PRESSURE: 149 MMHG | HEART RATE: 60 BPM | OXYGEN SATURATION: 95 % | HEIGHT: 70 IN | TEMPERATURE: 98 F | RESPIRATION RATE: 18 BRPM | DIASTOLIC BLOOD PRESSURE: 67 MMHG | BODY MASS INDEX: 34.79 KG/M2 | WEIGHT: 243 LBS

## 2023-01-11 DIAGNOSIS — C25.0 MALIGNANT NEOPLASM OF HEAD OF PANCREAS (HCC): Primary | ICD-10-CM

## 2023-01-11 DIAGNOSIS — C78.7 METASTASIS TO LIVER (HCC): ICD-10-CM

## 2023-01-11 DIAGNOSIS — C80.1 SMALL CELL CARCINOMA (HCC): Primary | ICD-10-CM

## 2023-01-11 DIAGNOSIS — C80.1 SMALL CELL CARCINOMA (HCC): ICD-10-CM

## 2023-01-11 PROBLEM — C7A.8 NEUROENDOCRINE CARCINOMA OF PANCREAS (HCC): Status: ACTIVE | Noted: 2023-01-11

## 2023-01-11 LAB
ALBUMIN SERPL-MCNC: 3.7 G/DL (ref 3.5–5)
ALBUMIN/GLOB SERPL: 1.2 (ref 1.1–2.2)
ALP SERPL-CCNC: 101 U/L (ref 45–117)
ALT SERPL-CCNC: 41 U/L (ref 12–78)
ANION GAP SERPL CALC-SCNC: 6 MMOL/L (ref 5–15)
AST SERPL-CCNC: 49 U/L (ref 15–37)
BASOPHILS # BLD: 0 K/UL (ref 0–0.1)
BASOPHILS NFR BLD: 0 % (ref 0–1)
BILIRUB SERPL-MCNC: 0.7 MG/DL (ref 0.2–1)
BUN SERPL-MCNC: 17 MG/DL (ref 6–20)
BUN/CREAT SERPL: 18 (ref 12–20)
CALCIUM SERPL-MCNC: 9.1 MG/DL (ref 8.5–10.1)
CHLORIDE SERPL-SCNC: 103 MMOL/L (ref 97–108)
CO2 SERPL-SCNC: 29 MMOL/L (ref 21–32)
CREAT SERPL-MCNC: 0.94 MG/DL (ref 0.7–1.3)
DIFFERENTIAL METHOD BLD: ABNORMAL
EOSINOPHIL # BLD: 0.1 K/UL (ref 0–0.4)
EOSINOPHIL NFR BLD: 1 % (ref 0–7)
ERYTHROCYTE [DISTWIDTH] IN BLOOD BY AUTOMATED COUNT: 15.7 % (ref 11.5–14.5)
GLOBULIN SER CALC-MCNC: 3.2 G/DL (ref 2–4)
GLUCOSE SERPL-MCNC: 136 MG/DL (ref 65–100)
HCT VFR BLD AUTO: 36.4 % (ref 36.6–50.3)
HGB BLD-MCNC: 11.9 G/DL (ref 12.1–17)
IMM GRANULOCYTES # BLD AUTO: 0 K/UL (ref 0–0.04)
IMM GRANULOCYTES NFR BLD AUTO: 0 % (ref 0–0.5)
LYMPHOCYTES # BLD: 1 K/UL (ref 0.8–3.5)
LYMPHOCYTES NFR BLD: 13 % (ref 12–49)
MCH RBC QN AUTO: 25.9 PG (ref 26–34)
MCHC RBC AUTO-ENTMCNC: 32.7 G/DL (ref 30–36.5)
MCV RBC AUTO: 79.1 FL (ref 80–99)
MONOCYTES # BLD: 0.6 K/UL (ref 0–1)
MONOCYTES NFR BLD: 8 % (ref 5–13)
NEUTS SEG # BLD: 5.9 K/UL (ref 1.8–8)
NEUTS SEG NFR BLD: 78 % (ref 32–75)
NRBC # BLD: 0 K/UL (ref 0–0.01)
NRBC BLD-RTO: 0 PER 100 WBC
PLATELET # BLD AUTO: 143 K/UL (ref 150–400)
PMV BLD AUTO: 9.4 FL (ref 8.9–12.9)
POTASSIUM SERPL-SCNC: 3.7 MMOL/L (ref 3.5–5.1)
PROT SERPL-MCNC: 6.9 G/DL (ref 6.4–8.2)
RBC # BLD AUTO: 4.6 M/UL (ref 4.1–5.7)
SODIUM SERPL-SCNC: 138 MMOL/L (ref 136–145)
WBC # BLD AUTO: 7.6 K/UL (ref 4.1–11.1)

## 2023-01-11 PROCEDURE — 80053 COMPREHEN METABOLIC PANEL: CPT

## 2023-01-11 PROCEDURE — 3078F DIAST BP <80 MM HG: CPT | Performed by: INTERNAL MEDICINE

## 2023-01-11 PROCEDURE — 99215 OFFICE O/P EST HI 40 MIN: CPT | Performed by: INTERNAL MEDICINE

## 2023-01-11 PROCEDURE — 85025 COMPLETE CBC W/AUTO DIFF WBC: CPT

## 2023-01-11 PROCEDURE — 74011000250 HC RX REV CODE- 250: Performed by: NURSE PRACTITIONER

## 2023-01-11 PROCEDURE — 36591 DRAW BLOOD OFF VENOUS DEVICE: CPT

## 2023-01-11 PROCEDURE — 77030016057 HC NDL HUBR APOL -B

## 2023-01-11 PROCEDURE — 74011250636 HC RX REV CODE- 250/636: Performed by: NURSE PRACTITIONER

## 2023-01-11 PROCEDURE — G8427 DOCREV CUR MEDS BY ELIG CLIN: HCPCS | Performed by: INTERNAL MEDICINE

## 2023-01-11 PROCEDURE — G8417 CALC BMI ABV UP PARAM F/U: HCPCS | Performed by: INTERNAL MEDICINE

## 2023-01-11 PROCEDURE — 3077F SYST BP >= 140 MM HG: CPT | Performed by: INTERNAL MEDICINE

## 2023-01-11 PROCEDURE — G9717 DOC PT DX DEP/BP F/U NT REQ: HCPCS | Performed by: INTERNAL MEDICINE

## 2023-01-11 PROCEDURE — 1101F PT FALLS ASSESS-DOCD LE1/YR: CPT | Performed by: INTERNAL MEDICINE

## 2023-01-11 PROCEDURE — 3017F COLORECTAL CA SCREEN DOC REV: CPT | Performed by: INTERNAL MEDICINE

## 2023-01-11 PROCEDURE — G8536 NO DOC ELDER MAL SCRN: HCPCS | Performed by: INTERNAL MEDICINE

## 2023-01-11 PROCEDURE — 1123F ACP DISCUSS/DSCN MKR DOCD: CPT | Performed by: INTERNAL MEDICINE

## 2023-01-11 RX ORDER — HEPARIN 100 UNIT/ML
500 SYRINGE INTRAVENOUS AS NEEDED
Start: 2023-01-20

## 2023-01-11 RX ORDER — HYDROCORTISONE SODIUM SUCCINATE 100 MG/2ML
100 INJECTION, POWDER, FOR SOLUTION INTRAMUSCULAR; INTRAVENOUS AS NEEDED
OUTPATIENT
Start: 2023-01-18

## 2023-01-11 RX ORDER — FLUOROURACIL 50 MG/ML
400 INJECTION, SOLUTION INTRAVENOUS ONCE
OUTPATIENT
Start: 2023-01-18 | End: 2023-01-18

## 2023-01-11 RX ORDER — ONDANSETRON 2 MG/ML
8 INJECTION INTRAMUSCULAR; INTRAVENOUS AS NEEDED
OUTPATIENT
Start: 2023-01-18

## 2023-01-11 RX ORDER — SODIUM CHLORIDE 0.9 % (FLUSH) 0.9 %
5-40 SYRINGE (ML) INJECTION AS NEEDED
OUTPATIENT
Start: 2023-01-20

## 2023-01-11 RX ORDER — SODIUM CHLORIDE 9 MG/ML
10 INJECTION INTRAVENOUS AS NEEDED
Status: ACTIVE | OUTPATIENT
Start: 2023-01-11 | End: 2023-01-11

## 2023-01-11 RX ORDER — SODIUM CHLORIDE 9 MG/ML
5-250 INJECTION, SOLUTION INTRAVENOUS AS NEEDED
OUTPATIENT
Start: 2023-01-18

## 2023-01-11 RX ORDER — SODIUM CHLORIDE 9 MG/ML
5-250 INJECTION, SOLUTION INTRAVENOUS AS NEEDED
OUTPATIENT
Start: 2023-01-20

## 2023-01-11 RX ORDER — DIPHENHYDRAMINE HYDROCHLORIDE 50 MG/ML
50 INJECTION, SOLUTION INTRAMUSCULAR; INTRAVENOUS AS NEEDED
Start: 2023-01-18

## 2023-01-11 RX ORDER — ACETAMINOPHEN 325 MG/1
650 TABLET ORAL AS NEEDED
Start: 2023-01-18

## 2023-01-11 RX ORDER — PALONOSETRON 0.05 MG/ML
0.25 INJECTION, SOLUTION INTRAVENOUS ONCE
OUTPATIENT
Start: 2023-01-18 | End: 2023-01-18

## 2023-01-11 RX ORDER — SODIUM CHLORIDE 9 MG/ML
5-40 INJECTION INTRAVENOUS AS NEEDED
OUTPATIENT
Start: 2023-01-20

## 2023-01-11 RX ORDER — ALBUTEROL SULFATE 0.83 MG/ML
2.5 SOLUTION RESPIRATORY (INHALATION) AS NEEDED
Start: 2023-01-18

## 2023-01-11 RX ORDER — HEPARIN 100 UNIT/ML
500 SYRINGE INTRAVENOUS AS NEEDED
Start: 2023-01-18

## 2023-01-11 RX ORDER — EPINEPHRINE 1 MG/ML
0.3 INJECTION, SOLUTION, CONCENTRATE INTRAVENOUS AS NEEDED
OUTPATIENT
Start: 2023-01-18

## 2023-01-11 RX ORDER — SODIUM CHLORIDE 9 MG/ML
5-40 INJECTION INTRAVENOUS AS NEEDED
OUTPATIENT
Start: 2023-01-18

## 2023-01-11 RX ORDER — SODIUM CHLORIDE 0.9 % (FLUSH) 0.9 %
5-10 SYRINGE (ML) INJECTION AS NEEDED
Status: DISPENSED | OUTPATIENT
Start: 2023-01-11 | End: 2023-01-11

## 2023-01-11 RX ORDER — HEPARIN 100 UNIT/ML
500 SYRINGE INTRAVENOUS AS NEEDED
Status: ACTIVE | OUTPATIENT
Start: 2023-01-11 | End: 2023-01-11

## 2023-01-11 RX ORDER — DIPHENHYDRAMINE HYDROCHLORIDE 50 MG/ML
25 INJECTION, SOLUTION INTRAMUSCULAR; INTRAVENOUS AS NEEDED
Start: 2023-01-18

## 2023-01-11 RX ORDER — SODIUM CHLORIDE 0.9 % (FLUSH) 0.9 %
5-40 SYRINGE (ML) INJECTION AS NEEDED
OUTPATIENT
Start: 2023-01-18

## 2023-01-11 RX ORDER — DEXTROSE MONOHYDRATE 50 MG/ML
5-250 INJECTION, SOLUTION INTRAVENOUS AS NEEDED
OUTPATIENT
Start: 2023-01-18

## 2023-01-11 RX ORDER — ATROPINE SULFATE 0.4 MG/ML
0.4 INJECTION, SOLUTION ENDOTRACHEAL; INTRAMEDULLARY; INTRAMUSCULAR; INTRAVENOUS; SUBCUTANEOUS
OUTPATIENT
Start: 2023-01-18

## 2023-01-11 RX ADMIN — Medication 500 UNITS: at 09:16

## 2023-01-11 RX ADMIN — SODIUM CHLORIDE, PRESERVATIVE FREE 10 ML: 5 INJECTION INTRAVENOUS at 09:15

## 2023-01-11 NOTE — PROGRESS NOTES
\[facility]\"" Progress Note    Date: 2023    Name: Sparkle Perez. MRN: [id number]         : 1950    Mr. Sukhwinder Lainez Arrived ambulatory and in no distress for PF/Labs. R chest wall port accessed without difficulty, labs drawn & sent for processing. Mr. Britney Posada vitals were reviewed. Patient Vitals for the past 12 hrs:   Temp Pulse Resp BP SpO2   23 0916 98 °F (36.7 °C) 60 18 (!) 149/67 95 %     Labs pending, please see connect [facility] for results. Medications:    Medications Administered       heparin (porcine) pf 500 Units       Admin Date  2023 Action  Given Dose  500 Units Route  IntraVENous Administered By  Viv Collins RN              sodium chloride (NS) flush 5-10 mL       Admin Date  2023 Action  Given Dose  10 mL Route  IntraVENous Administered By  Viv Collins RN                          Mr. Sukhwinder Lainez tolerated treatment well and was discharged from [place] [address] in stable condition. Port de-accessed, flushed & heparinized per protocol. He is to return on 23 for his next appointment.     Maame Pollock RN  2023

## 2023-01-11 NOTE — PROGRESS NOTES
Bernie Collier. is a 81 y.o. male follow up for SCC of pancreas. 1. Have you been to the ER, urgent care clinic since your last visit? Hospitalized since your last visit?no     2. Have you seen or consulted any other health care providers outside of the [address] [place] [address] since your last visit? Include any pap smears or colon screening.  No    Vitals 1/11/2023   Blood Pressure 149/67   Pulse 60   Temp 98   Resp 18   Height 5' 10\"   Weight 243 lb 6.4 oz   SpO2 95   BSA 2.34 m2   BMI 34.92 kg/m2   BP comment

## 2023-01-11 NOTE — PROGRESS NOTES
[facility] [place] at [address], 2[address] [facility] 1007 [facility]  W: [phone]  F: [phone]      Reason for Visit:   Shady Arias is a 81 y.o. male who is seen for follow up of small cell carcinoma of the pancreas. Hematology/Oncology Treatment History:   CT A/P 4/19/2022: Possible mild acute pancreatitis. Prominent sigmoid diverticulosis. Enlarged prostate. Fat-containing hernias  CT Abdomen 5/17/2022: New intra-hepatic biliary dilatation and common bile duct dilatation. Ampullary prominence and 1.5 cm x 1.5 cm soft tissue density in the pancreaticoduodenal groove. New subcentimeter hepatic hypodense lesions, worrisome for metastatic disease. MRI Abdomen 5/17/2022:  Periampullary pancreatic mass with possible extension into the wall of the duodenum. Associated intrahepatic and extra hepatic biliary dilatation and mild pancreatic ductal dilatation. Small hyperenhancing lesions throughout the liver suspicious for metastatic disease. CT Chest 5/18/2022: no metastatic disease within thorax  ERCP by Dr. Lili Lopez 5/18/2022: Large ulcerated mass seen in the second portion of the duodenum taking half the circumference in the area of the major papilla and extending distally about 3 to 4 cm, appeared to be pancreatic and eroding into the duodenum. Cholangiogram with complete obstruction of CBD. Metal stent deployed. 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 Some \"catch\" of pain in right upper abdomen especially with coughing. No other aches or pains. Denies nausea, vomiting, diarrhea. Mild cough and MEEHAN. Present with wife today.      Review of systems was obtained and pertinent findings reviewed above. Past medical history, social history, family history, medications, and allergies are located in the electronic medical record. Physical Exam:     Visit Vitals  BP (!) 149/67   Pulse 60   Temp 98 °F (36.7 °C)   Resp 18   Ht 5' 10\" (1.778 m)   Wt 243 lb (110.2 kg)   SpO2 95%   BMI 34.87 kg/m²       ECOG PS: 2  General: no distress, pallor noted  Eyes: anicteric sclerae  HENT: oropharynx clear  Neck: supple  Lymphatic: no cervical, supraclavicular adenopathy  Respiratory: normal respiratory effort  CV: no peripheral edema  GI: soft, nontender, nondistended, no masses  Skin: no rashes; no ecchymoses; no petechiae    Results:     Lab Results   Component Value Date/Time    WBC 7.6 01/11/2023 09:14 AM    HGB 11.9 (L) 01/11/2023 09:14 AM    HCT 36.4 (L) 01/11/2023 09:14 AM    PLATELET 715 (L) 66/53/4944 09:14 AM    MCV 79.1 (L) 01/11/2023 09:14 AM    ABS. NEUTROPHILS 5.9 01/11/2023 09:14 AM     Lab Results   Component Value Date/Time    Sodium 138 01/11/2023 09:14 AM    Potassium 3.7 01/11/2023 09:14 AM    Chloride 103 01/11/2023 09:14 AM    CO2 29 01/11/2023 09:14 AM    Glucose 136 (H) 01/11/2023 09:14 AM    BUN 17 01/11/2023 09:14 AM    Creatinine 0.94 01/11/2023 09:14 AM    GFR est AA >60 09/22/2022 02:11 PM    GFR est non-AA >60 09/22/2022 02:11 PM    Calcium 9.1 01/11/2023 09:14 AM    Glucose (POC) 277 (H) 06/03/2022 11:47 AM    Creatinine (POC) 1.00 04/19/2022 02:47 PM     Lab Results   Component Value Date/Time    Bilirubin, total 0.7 01/11/2023 09:14 AM    ALT (SGPT) 41 01/11/2023 09:14 AM    Alk.  phosphatase 101 01/11/2023 09:14 AM    Protein, total 6.9 01/11/2023 09:14 AM    Albumin 3.7 01/11/2023 09:14 AM    Globulin 3.2 01/11/2023 09:14 AM     Lab Results   Component Value Date/Time    Reticulocyte count 4.2 (H) 05/17/2022 03:04 PM    Iron % saturation 32 05/17/2022 03:04 PM    TIBC 289 05/17/2022 03:04 PM    Ferritin 219 05/17/2022 03:04 PM    Vitamin B12 944 05/17/2022 03:04 PM Folate 26.7 (H) 05/17/2022 03:04 PM    Haptoglobin 90 05/17/2022 03:04 PM     05/17/2022 03:04 PM    TSH 2.18 05/17/2022 03:04 PM    Lipase 106 05/17/2022 10:10 AM    Hep C virus Ab Interp. NONREACTIVE 05/17/2022 03:04 PM       CT C/A/P 7/6/2022:  1. Status post placement of a common bile duct stent with resolution of intrahepatic biliary dilatation. 2.  The periocular mass noted previously is not as well-visualized and is likely decrease in size with some residual mass noted in the pancreatic head. 3.  Small liver lesions are no longer identified however there are 2 larger lesions in the right hepatic lobe which were not previously noted suspicious for metastatic disease. CT C/A/P 8/23/2022:   1. Multiple liver hypodensities. The larger lesion is slightly decreased in size. The other lesions are not significantly changed although are better visualized on the current study. This may be secondary to the phase of the contrast bolus. 2.  Status post common bile duct stent placement. Ill-defined hypodensity in the head of pancreas without measurable mass. Increased pancreatic atrophy and possible mild hepatic ductal dilatation noted. CT C/A/P 10/7/2022:  1. No measurable pancreatic head mass. 2.  Patent common bile duct stent. 3.  Allowing for differences in contrast enhancement in the liver, the scattered  hypodense liver lesions are stable in size. 4.  Mild splenomegaly. 5.  No new lymphadenopathy. CT C/A/P 1/4/2023:  1. Increased size and number of diffuse liver metastatic disease. 2.  Increased size of pancreatic head mass status post biliary stent placement. No intrahepatic biliary dilatation. 3.  Retroperitoneal adenopathy increased. Assessment:   1) Metastatic Small Cell Carcinoma of the Pancreas  He has disease within his pancreas, invading into the duodenum. He additionally has metastatic disease within his liver, confirmed with biopsy.   His cancer is not curable and management is with palliative intent. He completed palliative chemotherapy with Carboplatin and Etoposide given every 3 weeks x 6 cycles. Surveillance imaging reviewed today with concern for progression in liver. We discussed at length today. There is limited evidence to guide us for treatment of small cell carcinoma of the pancreas. We can extrapolate from small cell carcinoma of the lung, or from poorly differentiated neuroendocrine carcinoma of the pancreas. I reviewed with multiple colleagues and received multiple different recommendations. My recommendation is to try therapy with FOLFIRI. This regimen has been beneficial in neuroendocrine carcinoma, and irinotecan has been beneficial in small cell lung cancer. It is also reasonably well tolerated. We discussed the risks and benefits of FOLFIRI chemotherapy, including potential side effects. These include but are not limited to fatigue, nausea, vomiting, diarrhea, taste changes, allergic reactions, alopecia, mucositis, myelosuppression, risk for infection, infertility, and rarely, death. Rarely, a patient may have a condition where they do not metabolize fluorouracil appropriately (called DPD deficiency), and they may have excessive toxicity. A Port-A-Cath will be required in order to deliver the continuous infusion. The patient has consented to beginning therapy. Will obtain written consent with C1 of therapy. Proceed with C1 next week. At progression we can consider options such as Lurbinectedin or immunotherapy. 2)  Biliary obstruction  Secondary to his pancreatic cancer. S/p ERCP on 5/18/2022 with stent placement with Dr. Lili Lopez. 3)  Anemia, normocytic  CBC improved today, monitor on therapy. 4) DM  Follows endocrinology outpatient. 5) Pancreatic insufficiency  Continue Creon    6) Emotional Well Being  No psychosocial concerns identified today. Patient has adequate support. He reports needing to re-apply for Care Card.  Will refer to social workGita    Plan:     Labs with PF today: CBC and CMP  Initiate FOLFIRI in the next 1-2 weeks  Facilitate follow up with Dr. Lara Conte   Follow up with C1 of therapy    I personally saw and evaluated the patient and performed the key components of medical decision making. The history, physical exam, and documentation were performed by John Garner NP. I reviewed and verified the above documentation and modified it as needed.      Signed By: Nitish Blas MD

## 2023-01-16 NOTE — PROGRESS NOTES
[facility] [place] at [address] [facility]  W: [phone]  F: [phone]      Reason for Visit:   Odilon Jasso is a 81 y.o. male who is seen for follow up of small cell carcinoma of the pancreas. Hematology/Oncology Treatment History:   CT A/P 4/19/2022: Possible mild acute pancreatitis. Prominent sigmoid diverticulosis. Enlarged prostate. Fat-containing hernias  CT Abdomen 5/17/2022: New intra-hepatic biliary dilatation and common bile duct dilatation. Ampullary prominence and 1.5 cm x 1.5 cm soft tissue density in the pancreaticoduodenal groove. New subcentimeter hepatic hypodense lesions, worrisome for metastatic disease. MRI Abdomen 5/17/2022:  Periampullary pancreatic mass with possible extension into the wall of the duodenum. Associated intrahepatic and extra hepatic biliary dilatation and mild pancreatic ductal dilatation. Small hyperenhancing lesions throughout the liver suspicious for metastatic disease. CT Chest 5/18/2022: no metastatic disease within thorax  ERCP by Dr. Encarnacion Median 5/18/2022: Large ulcerated mass seen in the second portion of the duodenum taking half the circumference in the area of the major papilla and extending distally about 3 to 4 cm, appeared to be pancreatic and eroding into the duodenum. Cholangiogram with complete obstruction of CBD. Metal stent deployed. 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 No pain currently. Eating and drinking well. No change in appetite. No fever or chills. Present with wife today.      Review of systems was obtained and pertinent findings reviewed above. Past medical history, social history, family history, medications, and allergies are located in the electronic medical record. Physical Exam:     Visit Vitals  BP (!) 132/57   Pulse 63   Temp 97.4 °F (36.3 °C)   Resp 18   Ht 5' 10\" (1.778 m)   Wt 248 lb (112.5 kg)   SpO2 94%   BMI 35.58 kg/m²         ECOG PS: 2  General: no distress, pallor noted  Eyes: anicteric sclerae  HENT: oropharynx clear  Neck: supple  Lymphatic: no cervical, supraclavicular adenopathy  Respiratory: normal respiratory effort  CV: no peripheral edema  GI: soft, nontender, nondistended, no masses  Skin: no rashes; no ecchymoses; no petechiae    Results:     Lab Results   Component Value Date/Time    WBC 7.2 01/18/2023 09:15 AM    HGB 11.6 (L) 01/18/2023 09:15 AM    HCT 36.1 (L) 01/18/2023 09:15 AM    PLATELET 899 (L) 16/24/2470 09:15 AM    MCV 80.2 01/18/2023 09:15 AM    ABS. NEUTROPHILS 5.8 01/18/2023 09:15 AM     Lab Results   Component Value Date/Time    Sodium 138 01/18/2023 09:15 AM    Potassium 3.4 (L) 01/18/2023 09:15 AM    Chloride 103 01/18/2023 09:15 AM    CO2 26 01/18/2023 09:15 AM    Glucose 205 (H) 01/18/2023 09:15 AM    BUN 20 01/18/2023 09:15 AM    Creatinine 0.96 01/18/2023 09:15 AM    GFR est AA >60 09/22/2022 02:11 PM    GFR est non-AA >60 09/22/2022 02:11 PM    Calcium 9.1 01/18/2023 09:15 AM    Glucose (POC) 277 (H) 06/03/2022 11:47 AM    Creatinine (POC) 1.00 04/19/2022 02:47 PM     Lab Results   Component Value Date/Time    Bilirubin, total 0.5 01/18/2023 09:15 AM    ALT (SGPT) 105 (H) 01/18/2023 09:15 AM    Alk.  phosphatase 201 (H) 01/18/2023 09:15 AM    Protein, total 7.0 01/18/2023 09:15 AM    Albumin 3.3 (L) 01/18/2023 09:15 AM    Globulin 3.7 01/18/2023 09:15 AM     Lab Results   Component Value Date/Time    Reticulocyte count 4.2 (H) 05/17/2022 03:04 PM    Iron % saturation 32 05/17/2022 03:04 PM    TIBC 289 05/17/2022 03:04 PM    Ferritin 219 05/17/2022 03:04 PM Vitamin B12 944 05/17/2022 03:04 PM    Folate 26.7 (H) 05/17/2022 03:04 PM    Haptoglobin 90 05/17/2022 03:04 PM     05/17/2022 03:04 PM    TSH 2.18 05/17/2022 03:04 PM    Lipase 106 05/17/2022 10:10 AM    Hep C virus Ab Interp. NONREACTIVE 05/17/2022 03:04 PM       CT C/A/P 7/6/2022:  1. Status post placement of a common bile duct stent with resolution of intrahepatic biliary dilatation. 2.  The periocular mass noted previously is not as well-visualized and is likely decrease in size with some residual mass noted in the pancreatic head. 3.  Small liver lesions are no longer identified however there are 2 larger lesions in the right hepatic lobe which were not previously noted suspicious for metastatic disease. CT C/A/P 8/23/2022:   1. Multiple liver hypodensities. The larger lesion is slightly decreased in size. The other lesions are not significantly changed although are better visualized on the current study. This may be secondary to the phase of the contrast bolus. 2.  Status post common bile duct stent placement. Ill-defined hypodensity in the head of pancreas without measurable mass. Increased pancreatic atrophy and possible mild hepatic ductal dilatation noted. CT C/A/P 10/7/2022:  1. No measurable pancreatic head mass. 2.  Patent common bile duct stent. 3.  Allowing for differences in contrast enhancement in the liver, the scattered  hypodense liver lesions are stable in size. 4.  Mild splenomegaly. 5.  No new lymphadenopathy. CT C/A/P 1/4/2023:  1. Increased size and number of diffuse liver metastatic disease. 2.  Increased size of pancreatic head mass status post biliary stent placement. No intrahepatic biliary dilatation. 3.  Retroperitoneal adenopathy increased. Assessment:   1) Metastatic Small Cell Carcinoma of the Pancreas  He has disease within his pancreas, invading into the duodenum. He additionally has metastatic disease within his liver, confirmed with biopsy.   His cancer is not curable and management is with palliative intent. He completed palliative chemotherapy with Carboplatin and Etoposide given every 3 weeks x 6 cycles. Surveillance imaging reviewed today with concern for progression in liver. We discussed at length today. There is limited evidence to guide us for treatment of small cell carcinoma of the pancreas. We can extrapolate from small cell carcinoma of the lung, or from poorly differentiated neuroendocrine carcinoma of the pancreas. I reviewed with multiple colleagues and received multiple different recommendations. My recommendation is to try therapy with FOLFIRI. This regimen has been beneficial in neuroendocrine carcinoma, and irinotecan has been beneficial in small cell lung cancer. It is also reasonably well tolerated. We discussed the risks and benefits of FOLFIRI chemotherapy, including potential side effects. These include but are not limited to fatigue, nausea, vomiting, diarrhea, taste changes, allergic reactions, alopecia, mucositis, myelosuppression, risk for infection, infertility, and rarely, death. Rarely, a patient may have a condition where they do not metabolize fluorouracil appropriately (called DPD deficiency), and they may have excessive toxicity. A Port-A-Cath will be required in order to deliver the continuous infusion. Written consent obtained today. Proceed with C1 today as ordered     At progression we can consider options such as Lurbinectedin or immunotherapy. 2)  Biliary obstruction  Secondary to his pancreatic cancer. S/p ERCP on 5/18/2022 with stent placement with Dr. Marie Cruz. 3)  Anemia, normocytic  CBC improved today, monitor on therapy. 4) DM  Follows endocrinology outpatient. 5) Pancreatic insufficiency  Continue Creon    6) Emotional Well Being  No psychosocial concerns identified today. Patient has adequate support. Has applied for care card.      Plan:     Proceed today with C1 of FOLFIRI (irinotecan 180mg/m2, leucovorin 400 mg/m2, fluorouracil 400 mg/m2, and a 46 hour infusion of fluorouracil 2400 mg/m2) given every 2 weeks  Labs: CBC, BMP prior to each treatment, hepatic function panel   Prophylactic antiemetics: Palonosetron and dexamethasone on the day of each chemotherapy infusion. Dexamethasone 8mg PO on days 2 and 3 after chemotherapy. PRN antiemetics: Ondansetron, Prochlorperazine  PRN antidiarrheals: Atropine 0.4mg IV every 2 hours PRN during or immediately after irinotecan infusion, Imodium every 2 hours as needed at home  Return to clinic every 2 weeks on therapy    I personally saw and evaluated the patient and performed the key components of medical decision making. The history, physical exam, and documentation were performed by Perla Michaels NP. I reviewed and verified the above documentation and modified it as needed. He is ready to proceed with second line therapy today with FOLFIRI.     Signed By: Daniel Delgado MD

## 2023-01-18 ENCOUNTER — HOSPITAL ENCOUNTER (OUTPATIENT)
Dept: INFUSION THERAPY | Age: 73
Discharge: HOME | End: 2023-01-18
Payer: MEDICARE

## 2023-01-18 ENCOUNTER — DOCUMENTATION ONLY (OUTPATIENT)
Dept: ONCOLOGY | Age: 73
End: 2023-01-18

## 2023-01-18 ENCOUNTER — OFFICE VISIT (OUTPATIENT)
Dept: ONCOLOGY | Age: 73
End: 2023-01-18

## 2023-01-18 VITALS
RESPIRATION RATE: 18 BRPM | HEIGHT: 70 IN | DIASTOLIC BLOOD PRESSURE: 57 MMHG | WEIGHT: 248 LBS | OXYGEN SATURATION: 94 % | HEART RATE: 63 BPM | TEMPERATURE: 97.4 F | SYSTOLIC BLOOD PRESSURE: 132 MMHG | BODY MASS INDEX: 35.5 KG/M2

## 2023-01-18 VITALS
DIASTOLIC BLOOD PRESSURE: 67 MMHG | BODY MASS INDEX: 35.55 KG/M2 | WEIGHT: 248.3 LBS | HEART RATE: 63 BPM | HEIGHT: 70 IN | SYSTOLIC BLOOD PRESSURE: 143 MMHG | OXYGEN SATURATION: 94 % | RESPIRATION RATE: 18 BRPM | TEMPERATURE: 97.4 F

## 2023-01-18 DIAGNOSIS — C25.0 MALIGNANT NEOPLASM OF HEAD OF PANCREAS (HCC): ICD-10-CM

## 2023-01-18 DIAGNOSIS — C80.1 SMALL CELL CARCINOMA (HCC): Primary | ICD-10-CM

## 2023-01-18 DIAGNOSIS — C78.7 METASTASIS TO LIVER (HCC): ICD-10-CM

## 2023-01-18 DIAGNOSIS — C7A.8 NEUROENDOCRINE CARCINOMA OF PANCREAS (HCC): ICD-10-CM

## 2023-01-18 DIAGNOSIS — Z51.11 ENCOUNTER FOR ANTINEOPLASTIC CHEMOTHERAPY: ICD-10-CM

## 2023-01-18 DIAGNOSIS — C25.0 MALIGNANT NEOPLASM OF HEAD OF PANCREAS (HCC): Primary | ICD-10-CM

## 2023-01-18 LAB
ALBUMIN SERPL-MCNC: 3.3 G/DL (ref 3.5–5)
ALBUMIN/GLOB SERPL: 0.9 (ref 1.1–2.2)
ALP SERPL-CCNC: 201 U/L (ref 45–117)
ALT SERPL-CCNC: 105 U/L (ref 12–78)
ANION GAP SERPL CALC-SCNC: 9 MMOL/L (ref 5–15)
AST SERPL-CCNC: 95 U/L (ref 15–37)
BASOPHILS # BLD: 0 K/UL (ref 0–0.1)
BASOPHILS NFR BLD: 0 % (ref 0–1)
BILIRUB SERPL-MCNC: 0.5 MG/DL (ref 0.2–1)
BUN SERPL-MCNC: 20 MG/DL (ref 6–20)
BUN/CREAT SERPL: 21 (ref 12–20)
CALCIUM SERPL-MCNC: 9.1 MG/DL (ref 8.5–10.1)
CHLORIDE SERPL-SCNC: 103 MMOL/L (ref 97–108)
CO2 SERPL-SCNC: 26 MMOL/L (ref 21–32)
CREAT SERPL-MCNC: 0.96 MG/DL (ref 0.7–1.3)
DIFFERENTIAL METHOD BLD: ABNORMAL
EOSINOPHIL # BLD: 0.1 K/UL (ref 0–0.4)
EOSINOPHIL NFR BLD: 1 % (ref 0–7)
ERYTHROCYTE [DISTWIDTH] IN BLOOD BY AUTOMATED COUNT: 15.6 % (ref 11.5–14.5)
GLOBULIN SER CALC-MCNC: 3.7 G/DL (ref 2–4)
GLUCOSE SERPL-MCNC: 205 MG/DL (ref 65–100)
HCT VFR BLD AUTO: 36.1 % (ref 36.6–50.3)
HGB BLD-MCNC: 11.6 G/DL (ref 12.1–17)
IMM GRANULOCYTES # BLD AUTO: 0 K/UL (ref 0–0.04)
IMM GRANULOCYTES NFR BLD AUTO: 0 % (ref 0–0.5)
LYMPHOCYTES # BLD: 0.9 K/UL (ref 0.8–3.5)
LYMPHOCYTES NFR BLD: 13 % (ref 12–49)
MCH RBC QN AUTO: 25.8 PG (ref 26–34)
MCHC RBC AUTO-ENTMCNC: 32.1 G/DL (ref 30–36.5)
MCV RBC AUTO: 80.2 FL (ref 80–99)
MONOCYTES # BLD: 0.4 K/UL (ref 0–1)
MONOCYTES NFR BLD: 5 % (ref 5–13)
NEUTS SEG # BLD: 5.8 K/UL (ref 1.8–8)
NEUTS SEG NFR BLD: 81 % (ref 32–75)
NRBC # BLD: 0 K/UL (ref 0–0.01)
NRBC BLD-RTO: 0 PER 100 WBC
PLATELET # BLD AUTO: 143 K/UL (ref 150–400)
PMV BLD AUTO: 9.9 FL (ref 8.9–12.9)
POTASSIUM SERPL-SCNC: 3.4 MMOL/L (ref 3.5–5.1)
PROT SERPL-MCNC: 7 G/DL (ref 6.4–8.2)
RBC # BLD AUTO: 4.5 M/UL (ref 4.1–5.7)
SODIUM SERPL-SCNC: 138 MMOL/L (ref 136–145)
WBC # BLD AUTO: 7.2 K/UL (ref 4.1–11.1)

## 2023-01-18 PROCEDURE — 96416 CHEMO PROLONG INFUSE W/PUMP: CPT

## 2023-01-18 PROCEDURE — 1101F PT FALLS ASSESS-DOCD LE1/YR: CPT | Performed by: INTERNAL MEDICINE

## 2023-01-18 PROCEDURE — 96368 THER/DIAG CONCURRENT INF: CPT

## 2023-01-18 PROCEDURE — 96413 CHEMO IV INFUSION 1 HR: CPT

## 2023-01-18 PROCEDURE — 3078F DIAST BP <80 MM HG: CPT | Performed by: INTERNAL MEDICINE

## 2023-01-18 PROCEDURE — 96375 TX/PRO/DX INJ NEW DRUG ADDON: CPT

## 2023-01-18 PROCEDURE — 80053 COMPREHEN METABOLIC PANEL: CPT

## 2023-01-18 PROCEDURE — 1123F ACP DISCUSS/DSCN MKR DOCD: CPT | Performed by: INTERNAL MEDICINE

## 2023-01-18 PROCEDURE — 3075F SYST BP GE 130 - 139MM HG: CPT | Performed by: INTERNAL MEDICINE

## 2023-01-18 PROCEDURE — 74011250636 HC RX REV CODE- 250/636: Performed by: INTERNAL MEDICINE

## 2023-01-18 PROCEDURE — 99215 OFFICE O/P EST HI 40 MIN: CPT | Performed by: INTERNAL MEDICINE

## 2023-01-18 PROCEDURE — G9717 DOC PT DX DEP/BP F/U NT REQ: HCPCS | Performed by: INTERNAL MEDICINE

## 2023-01-18 PROCEDURE — G8427 DOCREV CUR MEDS BY ELIG CLIN: HCPCS | Performed by: INTERNAL MEDICINE

## 2023-01-18 PROCEDURE — G8536 NO DOC ELDER MAL SCRN: HCPCS | Performed by: INTERNAL MEDICINE

## 2023-01-18 PROCEDURE — 74011000258 HC RX REV CODE- 258: Performed by: INTERNAL MEDICINE

## 2023-01-18 PROCEDURE — 85025 COMPLETE CBC W/AUTO DIFF WBC: CPT

## 2023-01-18 PROCEDURE — G8417 CALC BMI ABV UP PARAM F/U: HCPCS | Performed by: INTERNAL MEDICINE

## 2023-01-18 PROCEDURE — 3017F COLORECTAL CA SCREEN DOC REV: CPT | Performed by: INTERNAL MEDICINE

## 2023-01-18 PROCEDURE — 36415 COLL VENOUS BLD VENIPUNCTURE: CPT

## 2023-01-18 PROCEDURE — 77030016057 HC NDL HUBR APOL -B

## 2023-01-18 RX ORDER — HYDROCORTISONE SODIUM SUCCINATE 100 MG/2ML
100 INJECTION, POWDER, FOR SOLUTION INTRAMUSCULAR; INTRAVENOUS AS NEEDED
Status: DISCONTINUED | OUTPATIENT
Start: 2023-01-18 | End: 2023-01-19 | Stop reason: HOSPADM

## 2023-01-18 RX ORDER — SODIUM CHLORIDE 9 MG/ML
5-40 INJECTION INTRAVENOUS AS NEEDED
OUTPATIENT
Start: 2023-02-01

## 2023-01-18 RX ORDER — SODIUM CHLORIDE 9 MG/ML
5-250 INJECTION, SOLUTION INTRAVENOUS AS NEEDED
OUTPATIENT
Start: 2023-02-01

## 2023-01-18 RX ORDER — SODIUM CHLORIDE 9 MG/ML
5-40 INJECTION INTRAVENOUS AS NEEDED
OUTPATIENT
Start: 2023-02-03

## 2023-01-18 RX ORDER — DEXTROSE MONOHYDRATE 50 MG/ML
5-250 INJECTION, SOLUTION INTRAVENOUS AS NEEDED
Status: DISPENSED | OUTPATIENT
Start: 2023-01-18 | End: 2023-01-18

## 2023-01-18 RX ORDER — HYDROCORTISONE SODIUM SUCCINATE 100 MG/2ML
100 INJECTION, POWDER, FOR SOLUTION INTRAMUSCULAR; INTRAVENOUS AS NEEDED
OUTPATIENT
Start: 2023-02-01

## 2023-01-18 RX ORDER — PALONOSETRON 0.05 MG/ML
0.25 INJECTION, SOLUTION INTRAVENOUS ONCE
Status: COMPLETED | OUTPATIENT
Start: 2023-01-18 | End: 2023-01-18

## 2023-01-18 RX ORDER — ALBUTEROL SULFATE 0.83 MG/ML
2.5 SOLUTION RESPIRATORY (INHALATION) AS NEEDED
Status: DISCONTINUED | OUTPATIENT
Start: 2023-01-18 | End: 2023-01-19 | Stop reason: HOSPADM

## 2023-01-18 RX ORDER — HEPARIN 100 UNIT/ML
500 SYRINGE INTRAVENOUS AS NEEDED
Start: 2023-02-01

## 2023-01-18 RX ORDER — SODIUM CHLORIDE 0.9 % (FLUSH) 0.9 %
5-40 SYRINGE (ML) INJECTION AS NEEDED
OUTPATIENT
Start: 2023-02-03

## 2023-01-18 RX ORDER — PALONOSETRON 0.05 MG/ML
0.25 INJECTION, SOLUTION INTRAVENOUS ONCE
OUTPATIENT
Start: 2023-02-01 | End: 2023-02-01

## 2023-01-18 RX ORDER — ATROPINE SULFATE 0.4 MG/ML
0.4 INJECTION, SOLUTION ENDOTRACHEAL; INTRAMEDULLARY; INTRAMUSCULAR; INTRAVENOUS; SUBCUTANEOUS
Status: DISCONTINUED | OUTPATIENT
Start: 2023-01-18 | End: 2023-01-19 | Stop reason: HOSPADM

## 2023-01-18 RX ORDER — DIPHENHYDRAMINE HYDROCHLORIDE 50 MG/ML
25 INJECTION, SOLUTION INTRAMUSCULAR; INTRAVENOUS AS NEEDED
Start: 2023-02-01

## 2023-01-18 RX ORDER — EPINEPHRINE 1 MG/ML
0.3 INJECTION, SOLUTION, CONCENTRATE INTRAVENOUS AS NEEDED
OUTPATIENT
Start: 2023-02-01

## 2023-01-18 RX ORDER — DIPHENHYDRAMINE HYDROCHLORIDE 50 MG/ML
50 INJECTION, SOLUTION INTRAMUSCULAR; INTRAVENOUS AS NEEDED
Status: DISCONTINUED | OUTPATIENT
Start: 2023-01-18 | End: 2023-01-19 | Stop reason: HOSPADM

## 2023-01-18 RX ORDER — HEPARIN 100 UNIT/ML
500 SYRINGE INTRAVENOUS AS NEEDED
Start: 2023-02-03

## 2023-01-18 RX ORDER — ALBUTEROL SULFATE 0.83 MG/ML
2.5 SOLUTION RESPIRATORY (INHALATION) AS NEEDED
Start: 2023-02-01

## 2023-01-18 RX ORDER — SODIUM CHLORIDE 9 MG/ML
5-250 INJECTION, SOLUTION INTRAVENOUS AS NEEDED
OUTPATIENT
Start: 2023-02-03

## 2023-01-18 RX ORDER — DEXTROSE MONOHYDRATE 50 MG/ML
5-250 INJECTION, SOLUTION INTRAVENOUS AS NEEDED
OUTPATIENT
Start: 2023-02-01

## 2023-01-18 RX ORDER — EPINEPHRINE 1 MG/ML
0.3 INJECTION, SOLUTION, CONCENTRATE INTRAVENOUS AS NEEDED
Status: DISCONTINUED | OUTPATIENT
Start: 2023-01-18 | End: 2023-01-19 | Stop reason: HOSPADM

## 2023-01-18 RX ORDER — ONDANSETRON 2 MG/ML
8 INJECTION INTRAMUSCULAR; INTRAVENOUS AS NEEDED
Status: DISCONTINUED | OUTPATIENT
Start: 2023-01-18 | End: 2023-01-19 | Stop reason: HOSPADM

## 2023-01-18 RX ORDER — ONDANSETRON 2 MG/ML
8 INJECTION INTRAMUSCULAR; INTRAVENOUS AS NEEDED
OUTPATIENT
Start: 2023-02-01

## 2023-01-18 RX ORDER — ATROPINE SULFATE 0.4 MG/ML
0.4 INJECTION, SOLUTION ENDOTRACHEAL; INTRAMEDULLARY; INTRAMUSCULAR; INTRAVENOUS; SUBCUTANEOUS
OUTPATIENT
Start: 2023-02-01

## 2023-01-18 RX ORDER — FLUOROURACIL 50 MG/ML
400 INJECTION, SOLUTION INTRAVENOUS ONCE
Status: COMPLETED | OUTPATIENT
Start: 2023-01-18 | End: 2023-01-18

## 2023-01-18 RX ORDER — DIPHENHYDRAMINE HYDROCHLORIDE 50 MG/ML
25 INJECTION, SOLUTION INTRAMUSCULAR; INTRAVENOUS AS NEEDED
Status: DISCONTINUED | OUTPATIENT
Start: 2023-01-18 | End: 2023-01-19 | Stop reason: HOSPADM

## 2023-01-18 RX ORDER — DIPHENHYDRAMINE HYDROCHLORIDE 50 MG/ML
50 INJECTION, SOLUTION INTRAMUSCULAR; INTRAVENOUS AS NEEDED
Start: 2023-02-01

## 2023-01-18 RX ORDER — SODIUM CHLORIDE 0.9 % (FLUSH) 0.9 %
5-40 SYRINGE (ML) INJECTION AS NEEDED
OUTPATIENT
Start: 2023-02-01

## 2023-01-18 RX ORDER — FLUOROURACIL 50 MG/ML
400 INJECTION, SOLUTION INTRAVENOUS ONCE
OUTPATIENT
Start: 2023-02-01 | End: 2023-02-01

## 2023-01-18 RX ORDER — ACETAMINOPHEN 325 MG/1
650 TABLET ORAL AS NEEDED
Start: 2023-02-01

## 2023-01-18 RX ORDER — ACETAMINOPHEN 325 MG/1
650 TABLET ORAL AS NEEDED
Status: DISCONTINUED | OUTPATIENT
Start: 2023-01-18 | End: 2023-01-19 | Stop reason: HOSPADM

## 2023-01-18 RX ADMIN — FLUOROURACIL 5592 MG: 50 INJECTION, SOLUTION INTRAVENOUS at 14:30

## 2023-01-18 RX ADMIN — LEUCOVORIN CALCIUM 932 MG: 500 INJECTION, POWDER, LYOPHILIZED, FOR SOLUTION INTRAMUSCULAR; INTRAVENOUS at 12:35

## 2023-01-18 RX ADMIN — DEXTROSE MONOHYDRATE 25 ML/HR: 50 INJECTION, SOLUTION INTRAVENOUS at 11:30

## 2023-01-18 RX ADMIN — PALONOSETRON 0.25 MG: 0.05 INJECTION, SOLUTION INTRAVENOUS at 11:36

## 2023-01-18 RX ADMIN — IRINOTECAN HYDROCHLORIDE 419 MG: 20 INJECTION, SOLUTION INTRAVENOUS at 12:35

## 2023-01-18 RX ADMIN — ATROPINE SULFATE 0.4 MG: 0.4 INJECTION, SOLUTION INTRAMUSCULAR; INTRAVENOUS; SUBCUTANEOUS at 13:26

## 2023-01-18 RX ADMIN — FLUOROURACIL 932 MG: 50 INJECTION, SOLUTION INTRAVENOUS at 14:31

## 2023-01-18 RX ADMIN — DEXAMETHASONE SODIUM PHOSPHATE 12 MG: 4 INJECTION, SOLUTION INTRA-ARTICULAR; INTRALESIONAL; INTRAMUSCULAR; INTRAVENOUS; SOFT TISSUE at 11:39

## 2023-01-18 NOTE — PROGRESS NOTES
[facility] Progress Note    Date: 2023    Name: Dominique Jung MRN: [id number]         : 1950    Mr. Jose C Thompson Arrived ambulatory and in no distress for C1D1 of Folfiri Regimen. Assessment was completed, no acute issues at this time, no new complaints voiced. Right chest wall port accessed without difficulty, labs drawn & sent for processing. Access and assessment performed by [facility]. Chemotherapy Flowsheet 2023   Cycle C1D1   Date 2023   Drug / Regimen Folfiri   Pre Meds -   Notes -       Patient proceed to appointment with Dr. Javier Dunn. Mr. Selvin Tavarez vitals were reviewed. Visit Vitals  BP (!) 132/57 (BP 1 Location: Left upper arm, BP Patient Position: Sitting)   Pulse 63   Temp 97.4 °F (36.3 °C)   Resp 18   Ht 5' 10\" (1.778 m)   Wt 112.6 kg (248 lb 4.8 oz)   SpO2 94%   BMI 35.63 kg/m²       Lab results were obtained and reviewed. Recent Results (from the past 12 hour(s))   CBC WITH AUTOMATED DIFF    Collection Time: 23  9:15 AM   Result Value Ref Range    WBC 7.2 4.1 - 11.1 K/uL    RBC 4.50 4. 10 - 5.70 M/uL    HGB 11.6 (L) 12.1 - 17.0 g/dL    HCT 36.1 (L) 36.6 - 50.3 %    MCV 80.2 80.0 - 99.0 FL    MCH 25.8 (L) 26.0 - 34.0 PG    MCHC 32.1 30.0 - 36.5 g/dL    RDW 15.6 (H) 11.5 - 14.5 %    PLATELET 872 (L) 061 - 400 K/uL    MPV 9.9 8.9 - 12.9 FL    NRBC 0.0 0  WBC    ABSOLUTE NRBC 0.00 0.00 - 0.01 K/uL    NEUTROPHILS 81 (H) 32 - 75 %    LYMPHOCYTES 13 12 - 49 %    MONOCYTES 5 5 - 13 %    EOSINOPHILS 1 0 - 7 %    BASOPHILS 0 0 - 1 %    IMMATURE GRANULOCYTES 0 0.0 - 0.5 %    ABS. NEUTROPHILS 5.8 1.8 - 8.0 K/UL    ABS. LYMPHOCYTES 0.9 0.8 - 3.5 K/UL    ABS. MONOCYTES 0.4 0.0 - 1.0 K/UL    ABS. EOSINOPHILS 0.1 0.0 - 0.4 K/UL    ABS. BASOPHILS 0.0 0.0 - 0.1 K/UL    ABS. IMM.  GRANS. 0.0 0.00 - 0.04 K/UL    DF AUTOMATED     METABOLIC PANEL, COMPREHENSIVE    Collection Time: 23  9:15 AM   Result Value Ref Range    Sodium 138 136 - 145 mmol/L    Potassium 3.4 (L) 3.5 - 5.1 mmol/L    Chloride 103 97 - 108 mmol/L    CO2 26 21 - 32 mmol/L    Anion gap 9 5 - 15 mmol/L    Glucose 205 (H) 65 - 100 mg/dL    BUN 20 6 - 20 MG/DL    Creatinine 0.96 0.70 - 1.30 MG/DL    BUN/Creatinine ratio 21 (H) 12 - 20      eGFR >60 >60 ml/min/1.73m2    Calcium 9.1 8.5 - 10.1 MG/DL    Bilirubin, total 0.5 0.2 - 1.0 MG/DL    ALT (SGPT) 105 (H) 12 - 78 U/L    AST (SGOT) 95 (H) 15 - 37 U/L    Alk.  phosphatase 201 (H) 45 - 117 U/L    Protein, total 7.0 6.4 - 8.2 g/dL    Albumin 3.3 (L) 3.5 - 5.0 g/dL    Globulin 3.7 2.0 - 4.0 g/dL    A-G Ratio 0.9 (L) 1.1 - 2.2         Medications:  Medications Administered       atropine 0.4 mg/mL injection 0.4 mg       Admin Date  01/18/2023 Action  Given Dose  0.4 mg Route  IntraVENous Administered By  Melissa Bowens, RN              dexamethasone (DECADRON) 12 mg in 0.9% sodium chloride 50 mL IVPB       Admin Date  01/18/2023 Action  New Bag Dose  12 mg Route  IntraVENous Administered By  Melissa Bwoens, RN              dextrose 5% infusion       Admin Date  01/18/2023 Action  New Bag Dose  25 mL/hr Rate  25 mL/hr Route  IntraVENous Administered By  Melissa Bowens, RN              fluorouraciL (ADRUCIL) 5,592 mg in 0.9% sodium chloride 250 mL CADD Cassette       Admin Date  01/18/2023 Action  New Bag Dose  5,592 mg Rate  5.4 mL/hr Route  IntraVENous Administered By  Melissa Bowens, RN              fluorouraciL (ADRUCIL) chemo syringe 932 mg       Admin Date  01/18/2023 Action  Given Dose  932 mg Rate  279.6 mL/hr Route  IntraVENous Administered By  Melissa Bowens, RN              irinotecan (CAMPTOSAR) 419 mg in dextrose 5% 250 mL, overfill volume 25 mL chemo infusion       Admin Date  01/18/2023 Action  New Bag Dose  419 mg Rate  197.3 mL/hr Route  IntraVENous Administered By  Melissa Bowens, RN              leucovorin (WELLCOVORIN) 932 mg in dextrose 5% 250 mL, overfill volume 25 mL IVPB       Admin Date  01/18/2023 Action  New Bag Dose  932 mg Rate  214.4 mL/hr Route  IntraVENous Administered By  Dayton May RN              palonosetron HCl (ALOXI) injection 0.25 mg       Admin Date  01/18/2023 Action  Given Dose  0.25 mg Route  IntraVENous Administered By  Dayton May RN                      Patient experienced increased salivation/nasal drip with one episode of diarrhea after an hour into Irinotecan infusion. Infusion stopped and Atropine given, infusion restarted after symptoms resolved. Patient tolerated remainder of infusion without incident. Two nurses verified prior to administering: Drug name, drug dose, infusion volume or drug volume when prepared in a syringe, rate of administration, route of administration,  expiration dates and/or times, appearance and physical integrity of the drugs, rate set on infusion pump, when used sequencing of drug administration. Mr. Rylee Mckeon tolerated treatment well and was discharged from [place] [address] in stable condition. Port remains accessed and attached to 5FU cassette. He is aware of future appointments.     Future Appointments   Date Time Provider [place]   1/20/2023 11:30 AM SS INF6 CH4 <1H [facility]   1/23/2023  9:00 AM [address] [facility] AMB   2/1/2023  9:00 AM SS INF6 CH1 >4H [facility]   2/1/2023  9:15 AM Joy Patel MD [facility] AMB   2/3/2023 11:30 AM SS INF6 CH4 <1H [facility]   2/15/2023  9:00 AM SS INF3 CH1 >4H [facility]   2/17/2023 11:30 AM SS INF6 CH4 <1H [facility]   3/1/2023  9:00 AM SS INF3 CH1 >4H [facility]   3/3/2023 11:30 AM SS INF7 CH3 <1H [facility] Maury Conner RN  January 18, 2023

## 2023-01-18 NOTE — PROGRESS NOTES
Dominique Jung is a 81 y.o. male follow up for SCC of pancreas. 1. Have you been to the ER, urgent care clinic since your last visit? Hospitalized since your last visit?no     2. Have you seen or consulted any other health care providers outside of the [address] [place] [address] since your last visit? Include any pap smears or colon screening.  No    Vitals 1/18/2023   Blood Pressure 132/57   Pulse 63   Temp 97.4   Resp 18   Height 5' 10\"   Weight 248 lb 4.8 oz   SpO2 94   BSA 2.36 m2   BMI 35.63 kg/m2   BP comment

## 2023-01-19 NOTE — PROGRESS NOTES
[address]  Oncology Social Work  Encounter     Patient Name:  Rylan Mackey.     Medical History: on file    Advance Directives: on file    Narrative: Met with patient and provided FAP application. Barriers to Care: financial     Plan:  Complete FAP application and submit to [place].     Referral/Handouts:   Financial/Medication assistance referral     Thank you,  Diane Disla LCSW

## 2023-01-20 ENCOUNTER — HOSPITAL ENCOUNTER (OUTPATIENT)
Dept: INFUSION THERAPY | Age: 73
Discharge: HOME | End: 2023-01-20
Payer: MEDICARE

## 2023-01-20 VITALS
DIASTOLIC BLOOD PRESSURE: 69 MMHG | TEMPERATURE: 97.6 F | SYSTOLIC BLOOD PRESSURE: 130 MMHG | OXYGEN SATURATION: 96 % | HEART RATE: 54 BPM | RESPIRATION RATE: 16 BRPM

## 2023-01-20 DIAGNOSIS — C78.7 METASTASIS TO LIVER (HCC): ICD-10-CM

## 2023-01-20 DIAGNOSIS — Z51.11 ENCOUNTER FOR ANTINEOPLASTIC CHEMOTHERAPY: ICD-10-CM

## 2023-01-20 DIAGNOSIS — C25.0 MALIGNANT NEOPLASM OF HEAD OF PANCREAS (HCC): ICD-10-CM

## 2023-01-20 DIAGNOSIS — C7A.8 NEUROENDOCRINE CARCINOMA OF PANCREAS (HCC): Primary | ICD-10-CM

## 2023-01-20 DIAGNOSIS — C80.1 SMALL CELL CARCINOMA (HCC): ICD-10-CM

## 2023-01-20 PROCEDURE — 74011250636 HC RX REV CODE- 250/636: Performed by: INTERNAL MEDICINE

## 2023-01-20 PROCEDURE — 96523 IRRIG DRUG DELIVERY DEVICE: CPT

## 2023-01-20 RX ORDER — HEPARIN 100 UNIT/ML
500 SYRINGE INTRAVENOUS AS NEEDED
Status: DISCONTINUED | OUTPATIENT
Start: 2023-01-20 | End: 2023-01-21 | Stop reason: HOSPADM

## 2023-01-20 RX ORDER — SODIUM CHLORIDE 9 MG/ML
5-250 INJECTION, SOLUTION INTRAVENOUS AS NEEDED
Status: DISCONTINUED | OUTPATIENT
Start: 2023-01-20 | End: 2023-01-21 | Stop reason: HOSPADM

## 2023-01-20 RX ORDER — SODIUM CHLORIDE 9 MG/ML
5-40 INJECTION INTRAVENOUS AS NEEDED
Status: DISCONTINUED | OUTPATIENT
Start: 2023-01-20 | End: 2023-01-21 | Stop reason: HOSPADM

## 2023-01-20 RX ORDER — SODIUM CHLORIDE 0.9 % (FLUSH) 0.9 %
5-40 SYRINGE (ML) INJECTION AS NEEDED
Status: DISCONTINUED | OUTPATIENT
Start: 2023-01-20 | End: 2023-01-21 | Stop reason: HOSPADM

## 2023-01-20 RX ADMIN — Medication 500 UNITS: at 13:26

## 2023-01-20 NOTE — PROGRESS NOTES
[facility] VISIT NOTE  Date: 2023    Name: Nay Gibson MRN: [id number]         : 1950      Pt arrived at [facility] ambulatory and in no distress for pump disconnect. No new complaints voiced. Visit Vitals  /69   Pulse (!) 54   Temp 97.6 °F (36.4 °C)   Resp 16   SpO2 96%       All chemo contents infused, pump completed at 1312. Mr. Vida Jones tolerated treatment well and was discharged from [place] [address] in stable condition. Port de-accessed, flushed & heparinized per protocol.      Clara Hdz RN  2023    Future Appointments:  Future Appointments   Date Time Provider [place] G[place]   2023  9:00 AM [address] [facility] BS AMB   2023  9:00 [facility] [place]   2023  9:15 AM Nba Patel MD [facility] BS AMB   2/3/2023 11:30 AM SS INF6 CH4 <1H [facility] [place]   2/15/2023  9:00 AM [facility] [place]   2023 11:30 AM [facility] [place]   3/1/2023  9:00 AM [facility] [place]   3/3/2023 11:30 AM SS INF7 CH3 <1H [facility] Nathalie Harvey

## 2023-01-23 ENCOUNTER — OFFICE VISIT (OUTPATIENT)
Dept: CARDIOLOGY CLINIC | Age: 73
End: 2023-01-23
Payer: MEDICARE

## 2023-01-23 DIAGNOSIS — Z95.818 STATUS POST PLACEMENT OF IMPLANTABLE LOOP RECORDER: Primary | ICD-10-CM

## 2023-02-01 ENCOUNTER — OFFICE VISIT (OUTPATIENT)
Dept: ONCOLOGY | Age: 73
End: 2023-02-01
Payer: MEDICARE

## 2023-02-01 ENCOUNTER — HOSPITAL ENCOUNTER (OUTPATIENT)
Dept: INFUSION THERAPY | Age: 73
Discharge: HOME | End: 2023-02-01
Payer: MEDICARE

## 2023-02-01 VITALS
BODY MASS INDEX: 34.65 KG/M2 | HEIGHT: 70 IN | DIASTOLIC BLOOD PRESSURE: 65 MMHG | SYSTOLIC BLOOD PRESSURE: 142 MMHG | RESPIRATION RATE: 16 BRPM | WEIGHT: 242 LBS | HEART RATE: 57 BPM | TEMPERATURE: 98.1 F | OXYGEN SATURATION: 94 %

## 2023-02-01 VITALS
TEMPERATURE: 97.7 F | HEIGHT: 70 IN | DIASTOLIC BLOOD PRESSURE: 67 MMHG | RESPIRATION RATE: 16 BRPM | BODY MASS INDEX: 34.65 KG/M2 | HEART RATE: 60 BPM | OXYGEN SATURATION: 97 % | WEIGHT: 242 LBS | SYSTOLIC BLOOD PRESSURE: 151 MMHG

## 2023-02-01 DIAGNOSIS — Z51.11 ENCOUNTER FOR ANTINEOPLASTIC CHEMOTHERAPY: ICD-10-CM

## 2023-02-01 DIAGNOSIS — C7A.8 NEUROENDOCRINE CARCINOMA OF PANCREAS (HCC): ICD-10-CM

## 2023-02-01 DIAGNOSIS — C7A.8 NEUROENDOCRINE CARCINOMA OF PANCREAS (HCC): Primary | ICD-10-CM

## 2023-02-01 DIAGNOSIS — C78.7 METASTASIS TO LIVER (HCC): ICD-10-CM

## 2023-02-01 DIAGNOSIS — C25.0 MALIGNANT NEOPLASM OF HEAD OF PANCREAS (HCC): Primary | ICD-10-CM

## 2023-02-01 DIAGNOSIS — C80.1 SMALL CELL CARCINOMA (HCC): ICD-10-CM

## 2023-02-01 DIAGNOSIS — C25.0 MALIGNANT NEOPLASM OF HEAD OF PANCREAS (HCC): ICD-10-CM

## 2023-02-01 LAB
ALBUMIN SERPL-MCNC: 3.4 G/DL (ref 3.5–5)
ALBUMIN/GLOB SERPL: 0.9 (ref 1.1–2.2)
ALP SERPL-CCNC: 105 U/L (ref 45–117)
ALT SERPL-CCNC: 37 U/L (ref 12–78)
ANION GAP SERPL CALC-SCNC: 8 MMOL/L (ref 5–15)
AST SERPL-CCNC: 21 U/L (ref 15–37)
BASOPHILS # BLD: 0 K/UL (ref 0–0.1)
BASOPHILS NFR BLD: 1 % (ref 0–1)
BILIRUB SERPL-MCNC: 0.5 MG/DL (ref 0.2–1)
BILIRUB SERPL-MCNC: 0.6 MG/DL (ref 0.2–1)
BUN SERPL-MCNC: 16 MG/DL (ref 6–20)
BUN/CREAT SERPL: 16 (ref 12–20)
CALCIUM SERPL-MCNC: 8.8 MG/DL (ref 8.5–10.1)
CHLORIDE SERPL-SCNC: 106 MMOL/L (ref 97–108)
CO2 SERPL-SCNC: 26 MMOL/L (ref 21–32)
CREAT SERPL-MCNC: 0.97 MG/DL (ref 0.7–1.3)
DIFFERENTIAL METHOD BLD: ABNORMAL
EOSINOPHIL # BLD: 0.2 K/UL (ref 0–0.4)
EOSINOPHIL NFR BLD: 4 % (ref 0–7)
ERYTHROCYTE [DISTWIDTH] IN BLOOD BY AUTOMATED COUNT: 15.5 % (ref 11.5–14.5)
GLOBULIN SER CALC-MCNC: 3.6 G/DL (ref 2–4)
GLUCOSE SERPL-MCNC: 156 MG/DL (ref 65–100)
HCT VFR BLD AUTO: 35 % (ref 36.6–50.3)
HGB BLD-MCNC: 11.3 G/DL (ref 12.1–17)
IMM GRANULOCYTES # BLD AUTO: 0 K/UL (ref 0–0.04)
IMM GRANULOCYTES NFR BLD AUTO: 0 % (ref 0–0.5)
LYMPHOCYTES # BLD: 1.4 K/UL (ref 0.8–3.5)
LYMPHOCYTES NFR BLD: 33 % (ref 12–49)
MCH RBC QN AUTO: 25.5 PG (ref 26–34)
MCHC RBC AUTO-ENTMCNC: 32.3 G/DL (ref 30–36.5)
MCV RBC AUTO: 78.8 FL (ref 80–99)
MONOCYTES # BLD: 0.3 K/UL (ref 0–1)
MONOCYTES NFR BLD: 7 % (ref 5–13)
NEUTS SEG # BLD: 2.4 K/UL (ref 1.8–8)
NEUTS SEG NFR BLD: 55 % (ref 32–75)
NRBC # BLD: 0 K/UL (ref 0–0.01)
NRBC BLD-RTO: 0 PER 100 WBC
PLATELET # BLD AUTO: 138 K/UL (ref 150–400)
PMV BLD AUTO: 9.3 FL (ref 8.9–12.9)
POTASSIUM SERPL-SCNC: 3.4 MMOL/L (ref 3.5–5.1)
PROT SERPL-MCNC: 7 G/DL (ref 6.4–8.2)
RBC # BLD AUTO: 4.44 M/UL (ref 4.1–5.7)
SODIUM SERPL-SCNC: 140 MMOL/L (ref 136–145)
WBC # BLD AUTO: 4.3 K/UL (ref 4.1–11.1)

## 2023-02-01 PROCEDURE — 3077F SYST BP >= 140 MM HG: CPT | Performed by: INTERNAL MEDICINE

## 2023-02-01 PROCEDURE — 74011000258 HC RX REV CODE- 258: Performed by: NURSE PRACTITIONER

## 2023-02-01 PROCEDURE — G9717 DOC PT DX DEP/BP F/U NT REQ: HCPCS | Performed by: INTERNAL MEDICINE

## 2023-02-01 PROCEDURE — 36415 COLL VENOUS BLD VENIPUNCTURE: CPT

## 2023-02-01 PROCEDURE — G8417 CALC BMI ABV UP PARAM F/U: HCPCS | Performed by: INTERNAL MEDICINE

## 2023-02-01 PROCEDURE — 74011250636 HC RX REV CODE- 250/636: Performed by: NURSE PRACTITIONER

## 2023-02-01 PROCEDURE — 82247 BILIRUBIN TOTAL: CPT

## 2023-02-01 PROCEDURE — 3078F DIAST BP <80 MM HG: CPT | Performed by: INTERNAL MEDICINE

## 2023-02-01 PROCEDURE — 99215 OFFICE O/P EST HI 40 MIN: CPT | Performed by: INTERNAL MEDICINE

## 2023-02-01 PROCEDURE — 96411 CHEMO IV PUSH ADDL DRUG: CPT

## 2023-02-01 PROCEDURE — 77030012965 HC NDL HUBR BBMI -A

## 2023-02-01 PROCEDURE — 96375 TX/PRO/DX INJ NEW DRUG ADDON: CPT

## 2023-02-01 PROCEDURE — 80053 COMPREHEN METABOLIC PANEL: CPT

## 2023-02-01 PROCEDURE — G0498 CHEMO EXTEND IV INFUS W/PUMP: HCPCS

## 2023-02-01 PROCEDURE — 1123F ACP DISCUSS/DSCN MKR DOCD: CPT | Performed by: INTERNAL MEDICINE

## 2023-02-01 PROCEDURE — 96413 CHEMO IV INFUSION 1 HR: CPT

## 2023-02-01 PROCEDURE — G8536 NO DOC ELDER MAL SCRN: HCPCS | Performed by: INTERNAL MEDICINE

## 2023-02-01 PROCEDURE — 1101F PT FALLS ASSESS-DOCD LE1/YR: CPT | Performed by: INTERNAL MEDICINE

## 2023-02-01 PROCEDURE — G8427 DOCREV CUR MEDS BY ELIG CLIN: HCPCS | Performed by: INTERNAL MEDICINE

## 2023-02-01 PROCEDURE — 3017F COLORECTAL CA SCREEN DOC REV: CPT | Performed by: INTERNAL MEDICINE

## 2023-02-01 PROCEDURE — 96368 THER/DIAG CONCURRENT INF: CPT

## 2023-02-01 PROCEDURE — 85025 COMPLETE CBC W/AUTO DIFF WBC: CPT

## 2023-02-01 RX ORDER — SODIUM CHLORIDE 9 MG/ML
5-40 INJECTION INTRAVENOUS AS NEEDED
Status: ACTIVE | OUTPATIENT
Start: 2023-02-01 | End: 2023-02-01

## 2023-02-01 RX ORDER — ATROPINE SULFATE 0.4 MG/ML
0.4 INJECTION, SOLUTION ENDOTRACHEAL; INTRAMEDULLARY; INTRAMUSCULAR; INTRAVENOUS; SUBCUTANEOUS
Status: DISCONTINUED | OUTPATIENT
Start: 2023-02-01 | End: 2023-02-02 | Stop reason: HOSPADM

## 2023-02-01 RX ORDER — SODIUM CHLORIDE 0.9 % (FLUSH) 0.9 %
5-40 SYRINGE (ML) INJECTION AS NEEDED
Status: DISPENSED | OUTPATIENT
Start: 2023-02-01 | End: 2023-02-01

## 2023-02-01 RX ORDER — SODIUM CHLORIDE 9 MG/ML
5-250 INJECTION, SOLUTION INTRAVENOUS AS NEEDED
Status: DISPENSED | OUTPATIENT
Start: 2023-02-01 | End: 2023-02-01

## 2023-02-01 RX ORDER — DEXTROSE MONOHYDRATE 50 MG/ML
5-250 INJECTION, SOLUTION INTRAVENOUS AS NEEDED
Status: DISPENSED | OUTPATIENT
Start: 2023-02-01 | End: 2023-02-01

## 2023-02-01 RX ORDER — PALONOSETRON 0.05 MG/ML
0.25 INJECTION, SOLUTION INTRAVENOUS ONCE
Status: COMPLETED | OUTPATIENT
Start: 2023-02-01 | End: 2023-02-01

## 2023-02-01 RX ORDER — HEPARIN 100 UNIT/ML
500 SYRINGE INTRAVENOUS AS NEEDED
Status: ACTIVE | OUTPATIENT
Start: 2023-02-01 | End: 2023-02-01

## 2023-02-01 RX ORDER — FLUOROURACIL 50 MG/ML
400 INJECTION, SOLUTION INTRAVENOUS ONCE
Status: COMPLETED | OUTPATIENT
Start: 2023-02-01 | End: 2023-02-01

## 2023-02-01 RX ADMIN — IRINOTECAN HYDROCHLORIDE 419 MG: 20 INJECTION, SOLUTION INTRAVENOUS at 11:51

## 2023-02-01 RX ADMIN — FLUOROURACIL 932 MG: 50 INJECTION, SOLUTION INTRAVENOUS at 13:36

## 2023-02-01 RX ADMIN — DEXAMETHASONE SODIUM PHOSPHATE 12 MG: 4 INJECTION, SOLUTION INTRAMUSCULAR; INTRAVENOUS at 10:44

## 2023-02-01 RX ADMIN — ATROPINE SULFATE 0.4 MG: 0.4 INJECTION, SOLUTION INTRAMUSCULAR; INTRAVENOUS; SUBCUTANEOUS at 12:35

## 2023-02-01 RX ADMIN — DEXTROSE MONOHYDRATE 25 ML/HR: 50 INJECTION, SOLUTION INTRAVENOUS at 10:37

## 2023-02-01 RX ADMIN — FLUOROURACIL 5592 MG: 50 INJECTION, SOLUTION INTRAVENOUS at 13:38

## 2023-02-01 RX ADMIN — LEUCOVORIN CALCIUM 932 MG: 200 INJECTION, POWDER, LYOPHILIZED, FOR SUSPENSION INTRAMUSCULAR; INTRAVENOUS at 11:51

## 2023-02-01 RX ADMIN — PALONOSETRON 0.25 MG: 0.05 INJECTION, SOLUTION INTRAVENOUS at 10:44

## 2023-02-01 NOTE — PROGRESS NOTES
[facility] [place] at [address] [facility] [address] [facility]  W: [phone]  F: [phone]      Reason for Visit:   Jose Albright. is a 72 y.o. male who is seen for follow up of small cell carcinoma of the pancreas. Hematology/Oncology Treatment History:   CT A/P 4/19/2022: Possible mild acute pancreatitis. Prominent sigmoid diverticulosis. Enlarged prostate. Fat-containing hernias  CT Abdomen 5/17/2022: New intra-hepatic biliary dilatation and common bile duct dilatation. Ampullary prominence and 1.5 cm x 1.5 cm soft tissue density in the pancreaticoduodenal groove. New subcentimeter hepatic hypodense lesions, worrisome for metastatic disease. MRI Abdomen 5/17/2022:  Periampullary pancreatic mass with possible extension into the wall of the duodenum. Associated intrahepatic and extra hepatic biliary dilatation and mild pancreatic ductal dilatation. Small hyperenhancing lesions throughout the liver suspicious for metastatic disease. CT Chest 5/18/2022: no metastatic disease within thorax  ERCP by Dr. Gadiel Moon 5/18/2022: Large ulcerated mass seen in the second portion of the duodenum taking half the circumference in the area of the major papilla and extending distally about 3 to 4 cm, appeared to be pancreatic and eroding into the duodenum. Cholangiogram with complete obstruction of CBD. Metal stent deployed. Biopsies taken, high grade neuroendocrine carcinoma, small cell type  US guided liver biopsy 5/19/2022: small cell carcinoma  Stage IV Small Cell Carcinoma of the Pancreas  Initiated therapy with Carboplatin/Etoposide on 6/1/2022, completed 6 cycles on 9/16/2022  Initiated palliative systemic therapy with FOLFIRI on 1/7/2023    History of Present Illness:   He did well with his first cycle. Some taste changes during the infusion. Some skin changes on his face for a few days. Minimal nausea. Minimal diarrhea. Minimal pain. Present with wife today. Review of systems was obtained and pertinent findings reviewed above. Past medical history, social history, family history, medications, and allergies are located in the electronic medical record. Physical Exam:     Visit Vitals  BP (!) 151/67   Pulse 60   Temp 97.7 °F (36.5 °C)   Resp 16   Ht 5' 10\" (1.778 m)   Wt 242 lb (109.8 kg)   SpO2 97%   BMI 34.72 kg/m²         ECOG PS: 1  General: no distress  Eyes: anicteric sclerae  HENT: oropharynx clear  Neck: supple  Lymphatic: no cervical, supraclavicular adenopathy  Respiratory: normal respiratory effort  CV: no peripheral edema  GI: soft, nontender, nondistended, no masses  Skin: no rashes; no ecchymoses; no petechiae    Results:     Lab Results   Component Value Date/Time    WBC 4.3 02/01/2023 09:07 AM    HGB 11.3 (L) 02/01/2023 09:07 AM    HCT 35.0 (L) 02/01/2023 09:07 AM    PLATELET 646 (L) 53/74/3630 09:07 AM    MCV 78.8 (L) 02/01/2023 09:07 AM    ABS. NEUTROPHILS 2.4 02/01/2023 09:07 AM     Lab Results   Component Value Date/Time    Sodium 138 01/18/2023 09:15 AM    Potassium 3.4 (L) 01/18/2023 09:15 AM    Chloride 103 01/18/2023 09:15 AM    CO2 26 01/18/2023 09:15 AM    Glucose 205 (H) 01/18/2023 09:15 AM    BUN 20 01/18/2023 09:15 AM    Creatinine 0.96 01/18/2023 09:15 AM    GFR est AA >60 09/22/2022 02:11 PM    GFR est non-AA >60 09/22/2022 02:11 PM    Calcium 9.1 01/18/2023 09:15 AM    Glucose (POC) 277 (H) 06/03/2022 11:47 AM    Creatinine (POC) 1.00 04/19/2022 02:47 PM     Lab Results   Component Value Date/Time    Bilirubin, total 0.5 01/18/2023 09:15 AM    ALT (SGPT) 105 (H) 01/18/2023 09:15 AM    Alk.  phosphatase 201 (H) 01/18/2023 09:15 AM    Protein, total 7.0 01/18/2023 09:15 AM    Albumin 3.3 (L) 01/18/2023 09:15 AM    Globulin 3.7 01/18/2023 09:15 AM     Lab Results   Component Value Date/Time    Reticulocyte count 4.2 (H) 05/17/2022 03:04 PM    Iron % saturation 32 05/17/2022 03:04 PM    TIBC 289 05/17/2022 03:04 PM    Ferritin 219 05/17/2022 03:04 PM    Vitamin B12 944 05/17/2022 03:04 PM    Folate 26.7 (H) 05/17/2022 03:04 PM    Haptoglobin 90 05/17/2022 03:04 PM     05/17/2022 03:04 PM    TSH 2.18 05/17/2022 03:04 PM    Lipase 106 05/17/2022 10:10 AM    Hep C virus Ab Interp. NONREACTIVE 05/17/2022 03:04 PM       CT C/A/P 7/6/2022:  1. Status post placement of a common bile duct stent with resolution of intrahepatic biliary dilatation. 2.  The periocular mass noted previously is not as well-visualized and is likely decrease in size with some residual mass noted in the pancreatic head. 3.  Small liver lesions are no longer identified however there are 2 larger lesions in the right hepatic lobe which were not previously noted suspicious for metastatic disease. CT C/A/P 8/23/2022:   1. Multiple liver hypodensities. The larger lesion is slightly decreased in size. The other lesions are not significantly changed although are better visualized on the current study. This may be secondary to the phase of the contrast bolus. 2.  Status post common bile duct stent placement. Ill-defined hypodensity in the head of pancreas without measurable mass. Increased pancreatic atrophy and possible mild hepatic ductal dilatation noted. CT C/A/P 10/7/2022:  1. No measurable pancreatic head mass. 2.  Patent common bile duct stent. 3.  Allowing for differences in contrast enhancement in the liver, the scattered  hypodense liver lesions are stable in size. 4.  Mild splenomegaly. 5.  No new lymphadenopathy. CT C/A/P 1/4/2023:  1. Increased size and number of diffuse liver metastatic disease. 2.  Increased size of pancreatic head mass status post biliary stent placement. No intrahepatic biliary dilatation. 3.  Retroperitoneal adenopathy increased. Assessment:   1) Metastatic Small Cell Carcinoma of the Pancreas  He has disease within his pancreas, invading into the duodenum.   He additionally has metastatic disease within his liver, confirmed with biopsy. His cancer is not curable and management is with palliative intent. He is currently on second line chemotherapy with FOLFIRI. He is tolerating therapy well so far. We will proceed with treatment today. The patient will be seen with each cycle of therapy, and labs will be monitored to assess for toxicity from therapy. Repeat CT every 4-6 cycles. At progression we can consider options such as Lurbinectedin or immunotherapy. 2)  Biliary obstruction  Secondary to his pancreatic cancer. S/p ERCP on 5/18/2022 with stent placement with Dr. Shankar Purcell. 3)  Anemia, normocytic  CBC improved today, monitor on therapy. 4) Thrombocytopenia  Mild, secondary to chemotherapy. Monitor. 5) DM  Follows endocrinology outpatient. 6) Pancreatic insufficiency  Continue Creon    7) Emotional Well Being  No psychosocial concerns identified today. Patient has adequate support. Has applied for care card. Plan:     Proceed today with C2 of FOLFIRI (irinotecan 180mg/m2, leucovorin 400 mg/m2, fluorouracil 400 mg/m2, and a 46 hour infusion of fluorouracil 2400 mg/m2) given every 2 weeks  Labs: CBC, BMP prior to each treatment, hepatic function panel   Prophylactic antiemetics: Palonosetron and dexamethasone on the day of each chemotherapy infusion. Dexamethasone 8mg PO on days 2 and 3 after chemotherapy. PRN antiemetics: Ondansetron, Prochlorperazine  PRN antidiarrheals: Atropine 0.4mg IV every 2 hours PRN during or immediately after irinotecan infusion, Imodium every 2 hours as needed at home  Return to clinic every 2 weeks on therapy    I personally performed the service.       Signed By: Abiodun Sun MD

## 2023-02-01 NOTE — PROGRESS NOTES
[facility] VISIT NOTE  Date: 2023    Name: Genny Yeung. MRN: [id number]         : 1950    Mr. Honorio Brooks Arrived ambulatory and in no distress for C2D1 of Folfiri Regimen. Assessment was completed by Loreto Abreu RN, no acute issues at this time, no new complaints voiced. Right chest wall port accessed without difficulty by the assessment nurse, labs drawn & sent for processing. Chemotherapy Flowsheet 2023   Cycle C2D1   Date 2023   Drug / Regimen Folfiri   Pre Meds given   Notes given           Mr. Randa Gamboa vitals were reviewed. Patient Vitals for the past 12 hrs:   Temp Pulse Resp BP SpO2   23 1348 98.1 °F (36.7 °C) (!) 57 16 (!) 142/65 94 %   23 0856 97.7 °F (36.5 °C) 60 16 (!) 151/67 97 %         Lab results were obtained and reviewed. Recent Results (from the past 12 hour(s))   CBC WITH AUTOMATED DIFF    Collection Time: 23  9:07 AM   Result Value Ref Range    WBC 4.3 4.1 - 11.1 K/uL    RBC 4.44 4.10 - 5.70 M/uL    HGB 11.3 (L) 12.1 - 17.0 g/dL    HCT 35.0 (L) 36.6 - 50.3 %    MCV 78.8 (L) 80.0 - 99.0 FL    MCH 25.5 (L) 26.0 - 34.0 PG    MCHC 32.3 30.0 - 36.5 g/dL    RDW 15.5 (H) 11.5 - 14.5 %    PLATELET 483 (L) 136 - 400 K/uL    MPV 9.3 8.9 - 12.9 FL    NRBC 0.0 0  WBC    ABSOLUTE NRBC 0.00 0.00 - 0.01 K/uL    NEUTROPHILS 55 32 - 75 %    LYMPHOCYTES 33 12 - 49 %    MONOCYTES 7 5 - 13 %    EOSINOPHILS 4 0 - 7 %    BASOPHILS 1 0 - 1 %    IMMATURE GRANULOCYTES 0 0.0 - 0.5 %    ABS. NEUTROPHILS 2.4 1.8 - 8.0 K/UL    ABS. LYMPHOCYTES 1.4 0.8 - 3.5 K/UL    ABS. MONOCYTES 0.3 0.0 - 1.0 K/UL    ABS. EOSINOPHILS 0.2 0.0 - 0.4 K/UL    ABS. BASOPHILS 0.0 0.0 - 0.1 K/UL    ABS. IMM.  GRANS. 0.0 0.00 - 0.04 K/UL    DF AUTOMATED     METABOLIC PANEL, COMPREHENSIVE    Collection Time: 23  9:07 AM   Result Value Ref Range    Sodium 140 136 - 145 mmol/L    Potassium 3.4 (L) 3.5 - 5.1 mmol/L    Chloride 106 97 - 108 mmol/L    CO2 26 21 - 32 mmol/L    Anion gap 8 5 - 15 mmol/L    Glucose 156 (H) 65 - 100 mg/dL    BUN 16 6 - 20 MG/DL    Creatinine 0.97 0.70 - 1.30 MG/DL    BUN/Creatinine ratio 16 12 - 20      eGFR >60 >60 ml/min/1.73m2    Calcium 8.8 8.5 - 10.1 MG/DL    Bilirubin, total 0.6 0.2 - 1.0 MG/DL    ALT (SGPT) 37 12 - 78 U/L    AST (SGOT) 21 15 - 37 U/L    Alk.  phosphatase 105 45 - 117 U/L    Protein, total 7.0 6.4 - 8.2 g/dL    Albumin 3.4 (L) 3.5 - 5.0 g/dL    Globulin 3.6 2.0 - 4.0 g/dL    A-G Ratio 0.9 (L) 1.1 - 2.2     BILIRUBIN, TOTAL    Collection Time: 02/01/23  9:07 AM   Result Value Ref Range    Bilirubin, total 0.5 0.2 - 1.0 MG/DL       Medications received:  Medications Administered       atropine 0.4 mg/mL injection 0.4 mg       Admin Date  02/01/2023 Action  Given Dose  0.4 mg Route  IntraVENous Administered By  Samantha Morrow RN              dexamethasone (DECADRON) 12 mg in 0.9% sodium chloride 50 mL IVPB       Admin Date  02/01/2023 Action  New Bag Dose  12 mg Route  IntraVENous Administered By  Samantha Morrow RN              dextrose 5% infusion       Admin Date  02/01/2023 Action  New Bag Dose  25 mL/hr Rate  25 mL/hr Route  IntraVENous Administered By  Samantha Morrow RN              fluorouraciL (ADRUCIL) 5,592 mg in 0.9% sodium chloride 250 mL CADD Cassette       Admin Date  02/01/2023 Action  New Bag Dose  5,592 mg Rate  5.4 mL/hr Route  IntraVENous Administered By  Samantha Morrow RN              fluorouraciL (ADRUCIL) chemo syringe 932 mg       Admin Date  02/01/2023 Action  Given Dose  932 mg Rate  279.6 mL/hr Route  IntraVENous Administered By  Samantha Morrow RN              irinotecan (CAMPTOSAR) 419 mg in dextrose 5% 250 mL, overfill volume 25 mL chemo infusion       Admin Date  02/01/2023 Action  New Bag Dose  419 mg Rate  197.3 mL/hr Route  IntraVENous Administered By  Samantha Morrow RN              leucovorin (WELLCOVORIN) 932 mg in dextrose 5% 250 mL, overfill volume 25 mL IVPB Admin Date  02/01/2023 Action  New Bag Dose  932 mg Rate  214.4 mL/hr Route  IntraVENous Administered By  Jed Duggan RN              palonosetron HCl (ALOXI) injection 0.25 mg       Admin Date  02/01/2023 Action  Given Dose  0.25 mg Route  IntraVENous Administered By  Jed Duggan RN                     Two nurses verified prior to administering:    Drug name  Drug dose  Infusion volume or drug volume when prepared in a syringe  Rate of administration  Route of administration  Expiration dates and/or times  Appearance and physical integrity of the drugs  Rate set on infusion pump, when used  Sequencing of drug administration        Mr. Bowman tolerated treatment well and was discharged from [place] [address] in stable condition at 1350. Port de-accessed, flushed & heparinized per protocol. He is to return on  January 3, 2023 at 1130 for his next appointment.     Rita Martinez RN  February 1, 2023    Future Appointments:  Future Appointments   Date Time Provider Cindy Marlen   2/3/2023 11:30 AM SS INF6 CH4 <1H [facility]   2/15/2023  9:00 AM [facility]   2/15/2023  9:15 AM Kalyani Patel MD [facility] BS AMB   2/17/2023 11:30 AM SS INF6 CH4 <1H [facility]   2/27/2023  9:45 AM SHONDA RANJEET [facility] BS AMB   3/1/2023  9:00 AM SS INF3 CH1 >4H [facility]   3/3/2023 11:30 AM SS INF7 CH3 <1H [facility] Beryle Servant

## 2023-02-01 NOTE — PROGRESS NOTES
Zonia Lynn. is a 72 y.o. male follow up for SCC of pancreas. 1. Have you been to the ER, urgent care clinic since your last visit? Hospitalized since your last visit?no    2. Have you seen or consulted any other health care providers outside of the [address] [place] [address] since your last visit? Include any pap smears or colon screening.  No    Vitals 2/1/2023   Blood Pressure 151/67   Pulse 60   Temp 97.7   Resp 16   Height 5' 10\"   Weight 242 lb   SpO2 97   BSA 2.33 m2   BMI 34.72 kg/m2   BP comment

## 2023-02-03 ENCOUNTER — HOSPITAL ENCOUNTER (OUTPATIENT)
Dept: INFUSION THERAPY | Age: 73
Discharge: HOME | End: 2023-02-03
Payer: MEDICARE

## 2023-02-03 VITALS
HEART RATE: 69 BPM | RESPIRATION RATE: 16 BRPM | SYSTOLIC BLOOD PRESSURE: 146 MMHG | DIASTOLIC BLOOD PRESSURE: 57 MMHG | TEMPERATURE: 97.9 F | OXYGEN SATURATION: 98 %

## 2023-02-03 DIAGNOSIS — C78.7 METASTASIS TO LIVER (HCC): ICD-10-CM

## 2023-02-03 DIAGNOSIS — C80.1 SMALL CELL CARCINOMA (HCC): ICD-10-CM

## 2023-02-03 DIAGNOSIS — C25.0 MALIGNANT NEOPLASM OF HEAD OF PANCREAS (HCC): ICD-10-CM

## 2023-02-03 DIAGNOSIS — C7A.8 NEUROENDOCRINE CARCINOMA OF PANCREAS (HCC): Primary | ICD-10-CM

## 2023-02-03 DIAGNOSIS — Z51.11 ENCOUNTER FOR ANTINEOPLASTIC CHEMOTHERAPY: ICD-10-CM

## 2023-02-03 PROCEDURE — 96523 IRRIG DRUG DELIVERY DEVICE: CPT

## 2023-02-03 RX ORDER — HEPARIN 100 UNIT/ML
500 SYRINGE INTRAVENOUS AS NEEDED
Status: ACTIVE | OUTPATIENT
Start: 2023-02-03 | End: 2023-02-03

## 2023-02-03 RX ORDER — SODIUM CHLORIDE 9 MG/ML
5-40 INJECTION INTRAVENOUS AS NEEDED
Status: ACTIVE | OUTPATIENT
Start: 2023-02-03 | End: 2023-02-03

## 2023-02-03 RX ORDER — SODIUM CHLORIDE 9 MG/ML
5-250 INJECTION, SOLUTION INTRAVENOUS AS NEEDED
Status: DISPENSED | OUTPATIENT
Start: 2023-02-03 | End: 2023-02-03

## 2023-02-03 NOTE — PROGRESS NOTES
[facility] Progress Note    Date: February 3, 2023    Name: Deborah Roper. MRN: [id number]         : 1950:             [facility] arrived ambulatory and in no distress for Pump Removal.  Assessment was completed, no acute issues at this time, no new complaints voiced. CADD completed in infusion center at 1200- 250 ml infused per order. Mr. Michelle Huynh vitals were reviewed. Visit Vitals  BP (!) 146/57   Pulse 69   Temp 97.9 °F (36.6 °C)   Resp 16   SpO2 98%        [facility] tolerated treatment well and was discharged from [place] [address] in stable condition at 1215. Port de-accessed, flushed & heparinized per protocol. He is to return on February 15 at 0900 for his next appointment.     [facility]  February 3, 2023

## 2023-02-08 RX ORDER — ONDANSETRON 2 MG/ML
8 INJECTION INTRAMUSCULAR; INTRAVENOUS AS NEEDED
Status: CANCELLED | OUTPATIENT
Start: 2023-02-15

## 2023-02-08 RX ORDER — ACETAMINOPHEN 325 MG/1
650 TABLET ORAL AS NEEDED
Status: CANCELLED
Start: 2023-02-15

## 2023-02-08 RX ORDER — DIPHENHYDRAMINE HYDROCHLORIDE 50 MG/ML
50 INJECTION, SOLUTION INTRAMUSCULAR; INTRAVENOUS AS NEEDED
Status: CANCELLED
Start: 2023-02-15

## 2023-02-08 RX ORDER — HYDROCORTISONE SODIUM SUCCINATE 100 MG/2ML
100 INJECTION, POWDER, FOR SOLUTION INTRAMUSCULAR; INTRAVENOUS AS NEEDED
Status: CANCELLED | OUTPATIENT
Start: 2023-02-15

## 2023-02-08 RX ORDER — HEPARIN 100 UNIT/ML
500 SYRINGE INTRAVENOUS AS NEEDED
Status: CANCELLED
Start: 2023-02-15

## 2023-02-08 RX ORDER — EPINEPHRINE 1 MG/ML
0.3 INJECTION, SOLUTION, CONCENTRATE INTRAVENOUS AS NEEDED
Status: CANCELLED | OUTPATIENT
Start: 2023-02-15

## 2023-02-08 RX ORDER — SODIUM CHLORIDE 9 MG/ML
5-250 INJECTION, SOLUTION INTRAVENOUS AS NEEDED
Status: CANCELLED | OUTPATIENT
Start: 2023-02-15

## 2023-02-08 RX ORDER — SODIUM CHLORIDE 9 MG/ML
5-40 INJECTION INTRAVENOUS AS NEEDED
Status: CANCELLED | OUTPATIENT
Start: 2023-02-17

## 2023-02-08 RX ORDER — DIPHENHYDRAMINE HYDROCHLORIDE 50 MG/ML
25 INJECTION, SOLUTION INTRAMUSCULAR; INTRAVENOUS AS NEEDED
Status: CANCELLED
Start: 2023-02-15

## 2023-02-08 RX ORDER — SODIUM CHLORIDE 9 MG/ML
5-250 INJECTION, SOLUTION INTRAVENOUS AS NEEDED
Status: CANCELLED | OUTPATIENT
Start: 2023-02-17

## 2023-02-08 RX ORDER — ALBUTEROL SULFATE 0.83 MG/ML
2.5 SOLUTION RESPIRATORY (INHALATION) AS NEEDED
Status: CANCELLED
Start: 2023-02-15

## 2023-02-08 RX ORDER — SODIUM CHLORIDE 9 MG/ML
5-40 INJECTION INTRAVENOUS AS NEEDED
Status: CANCELLED | OUTPATIENT
Start: 2023-02-15

## 2023-02-08 RX ORDER — SODIUM CHLORIDE 0.9 % (FLUSH) 0.9 %
5-40 SYRINGE (ML) INJECTION AS NEEDED
Status: CANCELLED | OUTPATIENT
Start: 2023-02-17

## 2023-02-08 RX ORDER — HEPARIN 100 UNIT/ML
500 SYRINGE INTRAVENOUS AS NEEDED
Status: CANCELLED
Start: 2023-02-17

## 2023-02-15 ENCOUNTER — DOCUMENTATION ONLY (OUTPATIENT)
Dept: ONCOLOGY | Age: 73
End: 2023-02-15

## 2023-02-15 ENCOUNTER — OFFICE VISIT (OUTPATIENT)
Dept: ONCOLOGY | Age: 73
End: 2023-02-15
Payer: MEDICARE

## 2023-02-15 ENCOUNTER — HOSPITAL ENCOUNTER (OUTPATIENT)
Dept: INFUSION THERAPY | Age: 73
Discharge: HOME | End: 2023-02-15
Payer: MEDICARE

## 2023-02-15 VITALS
OXYGEN SATURATION: 97 % | TEMPERATURE: 97.8 F | HEIGHT: 70 IN | DIASTOLIC BLOOD PRESSURE: 65 MMHG | SYSTOLIC BLOOD PRESSURE: 155 MMHG | BODY MASS INDEX: 36.51 KG/M2 | RESPIRATION RATE: 16 BRPM | HEART RATE: 54 BPM | WEIGHT: 255 LBS

## 2023-02-15 VITALS
BODY MASS INDEX: 35.15 KG/M2 | TEMPERATURE: 97.8 F | HEIGHT: 70 IN | WEIGHT: 245.5 LBS | RESPIRATION RATE: 18 BRPM | DIASTOLIC BLOOD PRESSURE: 66 MMHG | OXYGEN SATURATION: 97 % | HEART RATE: 55 BPM | SYSTOLIC BLOOD PRESSURE: 157 MMHG

## 2023-02-15 DIAGNOSIS — C78.7 METASTASIS TO LIVER (HCC): Primary | ICD-10-CM

## 2023-02-15 DIAGNOSIS — C25.0 MALIGNANT NEOPLASM OF HEAD OF PANCREAS (HCC): ICD-10-CM

## 2023-02-15 DIAGNOSIS — C7A.8 NEUROENDOCRINE CARCINOMA OF PANCREAS (HCC): Primary | ICD-10-CM

## 2023-02-15 DIAGNOSIS — C78.7 METASTASIS TO LIVER (HCC): ICD-10-CM

## 2023-02-15 DIAGNOSIS — Z51.11 ENCOUNTER FOR ANTINEOPLASTIC CHEMOTHERAPY: ICD-10-CM

## 2023-02-15 DIAGNOSIS — C80.1 SMALL CELL CARCINOMA (HCC): ICD-10-CM

## 2023-02-15 LAB
ALBUMIN SERPL-MCNC: 3.4 G/DL (ref 3.5–5)
ALBUMIN/GLOB SERPL: 1 (ref 1.1–2.2)
ALP SERPL-CCNC: 87 U/L (ref 45–117)
ALT SERPL-CCNC: 29 U/L (ref 12–78)
ANION GAP SERPL CALC-SCNC: 5 MMOL/L (ref 5–15)
AST SERPL-CCNC: 23 U/L (ref 15–37)
BASOPHILS # BLD: 0 K/UL (ref 0–0.1)
BASOPHILS NFR BLD: 0 % (ref 0–1)
BILIRUB SERPL-MCNC: 0.9 MG/DL (ref 0.2–1)
BUN SERPL-MCNC: 15 MG/DL (ref 6–20)
BUN/CREAT SERPL: 17 (ref 12–20)
CALCIUM SERPL-MCNC: 8.8 MG/DL (ref 8.5–10.1)
CHLORIDE SERPL-SCNC: 103 MMOL/L (ref 97–108)
CO2 SERPL-SCNC: 31 MMOL/L (ref 21–32)
CREAT SERPL-MCNC: 0.88 MG/DL (ref 0.7–1.3)
DIFFERENTIAL METHOD BLD: ABNORMAL
EOSINOPHIL # BLD: 0.1 K/UL (ref 0–0.4)
EOSINOPHIL NFR BLD: 2 % (ref 0–7)
ERYTHROCYTE [DISTWIDTH] IN BLOOD BY AUTOMATED COUNT: 17 % (ref 11.5–14.5)
FERRITIN SERPL-MCNC: 95 NG/ML (ref 26–388)
GLOBULIN SER CALC-MCNC: 3.3 G/DL (ref 2–4)
GLUCOSE SERPL-MCNC: 187 MG/DL (ref 65–100)
HCT VFR BLD AUTO: 32.2 % (ref 36.6–50.3)
HGB BLD-MCNC: 10.5 G/DL (ref 12.1–17)
IMM GRANULOCYTES # BLD AUTO: 0 K/UL (ref 0–0.04)
IMM GRANULOCYTES NFR BLD AUTO: 0 % (ref 0–0.5)
LYMPHOCYTES # BLD: 1 K/UL (ref 0.8–3.5)
LYMPHOCYTES NFR BLD: 29 % (ref 12–49)
MCH RBC QN AUTO: 25.8 PG (ref 26–34)
MCHC RBC AUTO-ENTMCNC: 32.6 G/DL (ref 30–36.5)
MCV RBC AUTO: 79.1 FL (ref 80–99)
MONOCYTES # BLD: 0.2 K/UL (ref 0–1)
MONOCYTES NFR BLD: 7 % (ref 5–13)
NEUTS SEG # BLD: 2.1 K/UL (ref 1.8–8)
NEUTS SEG NFR BLD: 62 % (ref 32–75)
NRBC # BLD: 0 K/UL (ref 0–0.01)
NRBC BLD-RTO: 0 PER 100 WBC
PLATELET # BLD AUTO: 100 K/UL (ref 150–400)
PMV BLD AUTO: 9.7 FL (ref 8.9–12.9)
POTASSIUM SERPL-SCNC: 3.5 MMOL/L (ref 3.5–5.1)
PROT SERPL-MCNC: 6.7 G/DL (ref 6.4–8.2)
RBC # BLD AUTO: 4.07 M/UL (ref 4.1–5.7)
SODIUM SERPL-SCNC: 139 MMOL/L (ref 136–145)
WBC # BLD AUTO: 3.4 K/UL (ref 4.1–11.1)

## 2023-02-15 PROCEDURE — 96375 TX/PRO/DX INJ NEW DRUG ADDON: CPT

## 2023-02-15 PROCEDURE — 96416 CHEMO PROLONG INFUSE W/PUMP: CPT

## 2023-02-15 PROCEDURE — G8536 NO DOC ELDER MAL SCRN: HCPCS | Performed by: INTERNAL MEDICINE

## 2023-02-15 PROCEDURE — 85025 COMPLETE CBC W/AUTO DIFF WBC: CPT

## 2023-02-15 PROCEDURE — 3078F DIAST BP <80 MM HG: CPT | Performed by: INTERNAL MEDICINE

## 2023-02-15 PROCEDURE — G8427 DOCREV CUR MEDS BY ELIG CLIN: HCPCS | Performed by: INTERNAL MEDICINE

## 2023-02-15 PROCEDURE — 80053 COMPREHEN METABOLIC PANEL: CPT

## 2023-02-15 PROCEDURE — 1101F PT FALLS ASSESS-DOCD LE1/YR: CPT | Performed by: INTERNAL MEDICINE

## 2023-02-15 PROCEDURE — 99215 OFFICE O/P EST HI 40 MIN: CPT | Performed by: INTERNAL MEDICINE

## 2023-02-15 PROCEDURE — 96413 CHEMO IV INFUSION 1 HR: CPT

## 2023-02-15 PROCEDURE — 74011000258 HC RX REV CODE- 258: Performed by: INTERNAL MEDICINE

## 2023-02-15 PROCEDURE — 96368 THER/DIAG CONCURRENT INF: CPT

## 2023-02-15 PROCEDURE — 74011250636 HC RX REV CODE- 250/636: Performed by: INTERNAL MEDICINE

## 2023-02-15 PROCEDURE — 82728 ASSAY OF FERRITIN: CPT

## 2023-02-15 PROCEDURE — G9717 DOC PT DX DEP/BP F/U NT REQ: HCPCS | Performed by: INTERNAL MEDICINE

## 2023-02-15 PROCEDURE — 96411 CHEMO IV PUSH ADDL DRUG: CPT

## 2023-02-15 PROCEDURE — 3017F COLORECTAL CA SCREEN DOC REV: CPT | Performed by: INTERNAL MEDICINE

## 2023-02-15 PROCEDURE — 3077F SYST BP >= 140 MM HG: CPT | Performed by: INTERNAL MEDICINE

## 2023-02-15 PROCEDURE — 36415 COLL VENOUS BLD VENIPUNCTURE: CPT

## 2023-02-15 PROCEDURE — G8417 CALC BMI ABV UP PARAM F/U: HCPCS | Performed by: INTERNAL MEDICINE

## 2023-02-15 PROCEDURE — 1123F ACP DISCUSS/DSCN MKR DOCD: CPT | Performed by: INTERNAL MEDICINE

## 2023-02-15 PROCEDURE — 77030012965 HC NDL HUBR BBMI -A

## 2023-02-15 RX ORDER — FLUOROURACIL 50 MG/ML
400 INJECTION, SOLUTION INTRAVENOUS ONCE
Status: COMPLETED | OUTPATIENT
Start: 2023-02-15 | End: 2023-02-15

## 2023-02-15 RX ORDER — PALONOSETRON 0.05 MG/ML
0.25 INJECTION, SOLUTION INTRAVENOUS ONCE
Status: COMPLETED | OUTPATIENT
Start: 2023-02-15 | End: 2023-02-15

## 2023-02-15 RX ORDER — ATROPINE SULFATE 0.4 MG/ML
0.4 INJECTION, SOLUTION ENDOTRACHEAL; INTRAMEDULLARY; INTRAMUSCULAR; INTRAVENOUS; SUBCUTANEOUS
Status: DISPENSED | OUTPATIENT
Start: 2023-02-15 | End: 2023-02-15

## 2023-02-15 RX ORDER — DEXTROSE MONOHYDRATE 50 MG/ML
5-250 INJECTION, SOLUTION INTRAVENOUS AS NEEDED
Status: DISPENSED | OUTPATIENT
Start: 2023-02-15 | End: 2023-02-15

## 2023-02-15 RX ORDER — SODIUM CHLORIDE 0.9 % (FLUSH) 0.9 %
5-40 SYRINGE (ML) INJECTION AS NEEDED
Status: DISPENSED | OUTPATIENT
Start: 2023-02-15 | End: 2023-02-15

## 2023-02-15 RX ADMIN — DEXTROSE MONOHYDRATE 25 ML/HR: 50 INJECTION, SOLUTION INTRAVENOUS at 10:17

## 2023-02-15 RX ADMIN — PALONOSETRON HYDROCHLORIDE 0.25 MG: 0.25 INJECTION INTRAVENOUS at 10:18

## 2023-02-15 RX ADMIN — FLUOROURACIL 932 MG: 50 INJECTION, SOLUTION INTRAVENOUS at 12:50

## 2023-02-15 RX ADMIN — LEUCOVORIN CALCIUM 932 MG: 500 INJECTION, POWDER, LYOPHILIZED, FOR SOLUTION INTRAMUSCULAR; INTRAVENOUS at 11:10

## 2023-02-15 RX ADMIN — IRINOTECAN HYDROCHLORIDE 419 MG: 20 INJECTION, SOLUTION INTRAVENOUS at 11:10

## 2023-02-15 RX ADMIN — ATROPINE SULFATE 0.4 MG: 0.4 INJECTION, SOLUTION INTRAMUSCULAR; INTRAVENOUS; SUBCUTANEOUS at 11:10

## 2023-02-15 RX ADMIN — DEXAMETHASONE SODIUM PHOSPHATE 12 MG: 4 INJECTION, SOLUTION INTRAMUSCULAR; INTRAVENOUS at 10:20

## 2023-02-15 RX ADMIN — FLUOROURACIL 5592 MG: 50 INJECTION, SOLUTION INTRAVENOUS at 12:55

## 2023-02-15 NOTE — PROGRESS NOTES
[facility] [place] at [place] [address]  [address] [facility], [address] [place] [address]  W: [phone]  F: [phone]      Reason for Visit:   Manasherbert Fontaine. is a 72 y.o. male who is seen for follow up of small cell carcinoma of the pancreas. Hematology/Oncology Treatment History:   CT A/P 4/19/2022: Possible mild acute pancreatitis. Prominent sigmoid diverticulosis. Enlarged prostate. Fat-containing hernias  CT Abdomen 5/17/2022: New intra-hepatic biliary dilatation and common bile duct dilatation. Ampullary prominence and 1.5 cm x 1.5 cm soft tissue density in the pancreaticoduodenal groove. New subcentimeter hepatic hypodense lesions, worrisome for metastatic disease. MRI Abdomen 5/17/2022:  Periampullary pancreatic mass with possible extension into the wall of the duodenum. Associated intrahepatic and extra hepatic biliary dilatation and mild pancreatic ductal dilatation. Small hyperenhancing lesions throughout the liver suspicious for metastatic disease. CT Chest 5/18/2022: no metastatic disease within thorax  ERCP by Dr. Abiel Banda 5/18/2022: Large ulcerated mass seen in the second portion of the duodenum taking half the circumference in the area of the major papilla and extending distally about 3 to 4 cm, appeared to be pancreatic and eroding into the duodenum. Cholangiogram with complete obstruction of CBD. Metal stent deployed. Biopsies taken, high grade neuroendocrine carcinoma, small cell type  US guided liver biopsy 5/19/2022: small cell carcinoma  Stage IV Small Cell Carcinoma of the Pancreas  Initiated therapy with Carboplatin/Etoposide on 6/1/2022, completed 6 cycles on 9/16/2022  Initiated palliative systemic therapy with FOLFIRI on 1/7/2023    History of Present Illness:   Reports good appetite. No GI concerns-denies nausea, vomiting, diarrhea. Mild fatigue, states he is getting a good nights rest and napping during the day as needed. Denies pain currently.  Had some abdominal fullness, he found that walking helped this. They recently went to [facility], then went to [place] to celebrate anniversary. Present with wife today. Review of systems was obtained and pertinent findings reviewed above. Past medical history, social history, family history, medications, and allergies are located in the electronic medical record. Physical Exam:     Visit Vitals  BP (!) 155/65 (BP 1 Location: Right arm, BP Patient Position: Sitting, BP Cuff Size: Large adult)   Pulse (!) 54   Temp 97.8 °F (36.6 °C)   Resp 16   Ht 5' 10\" (1.778 m)   Wt 255 lb (115.7 kg)   SpO2 97%   BMI 36.59 kg/m²     ECOG PS: 1  General: no distress  Eyes: anicteric sclerae  HENT: oropharynx clear  Neck: supple  Lymphatic: no cervical, supraclavicular adenopathy  Respiratory: normal respiratory effort  CV: no peripheral edema  GI: soft, nontender, nondistended, no masses  Skin: no rashes; no ecchymoses; no petechiae    Results:     Lab Results   Component Value Date/Time    WBC 3.4 (L) 02/15/2023 09:06 AM    HGB 10.5 (L) 02/15/2023 09:06 AM    HCT 32.2 (L) 02/15/2023 09:06 AM    PLATELET 000 (L) 15/21/0743 09:06 AM    MCV 79.1 (L) 02/15/2023 09:06 AM    ABS. NEUTROPHILS 2.1 02/15/2023 09:06 AM     Lab Results   Component Value Date/Time    Sodium 139 02/15/2023 09:06 AM    Potassium 3.5 02/15/2023 09:06 AM    Chloride 103 02/15/2023 09:06 AM    CO2 31 02/15/2023 09:06 AM    Glucose 187 (H) 02/15/2023 09:06 AM    BUN 15 02/15/2023 09:06 AM    Creatinine 0.88 02/15/2023 09:06 AM    GFR est AA >60 09/22/2022 02:11 PM    GFR est non-AA >60 09/22/2022 02:11 PM    Calcium 8.8 02/15/2023 09:06 AM    Glucose (POC) 277 (H) 06/03/2022 11:47 AM    Creatinine (POC) 1.00 04/19/2022 02:47 PM     Lab Results   Component Value Date/Time    Bilirubin, total 0.9 02/15/2023 09:06 AM    ALT (SGPT) 29 02/15/2023 09:06 AM    Alk.  phosphatase 87 02/15/2023 09:06 AM    Protein, total 6.7 02/15/2023 09:06 AM Albumin 3.4 (L) 02/15/2023 09:06 AM    Globulin 3.3 02/15/2023 09:06 AM     Lab Results   Component Value Date/Time    Reticulocyte count 4.2 (H) 05/17/2022 03:04 PM    Iron % saturation 32 05/17/2022 03:04 PM    TIBC 289 05/17/2022 03:04 PM    Ferritin 219 05/17/2022 03:04 PM    Vitamin B12 944 05/17/2022 03:04 PM    Folate 26.7 (H) 05/17/2022 03:04 PM    Haptoglobin 90 05/17/2022 03:04 PM     05/17/2022 03:04 PM    TSH 2.18 05/17/2022 03:04 PM    Lipase 106 05/17/2022 10:10 AM    Hep C virus Ab Interp. NONREACTIVE 05/17/2022 03:04 PM       CT C/A/P 7/6/2022:  1. Status post placement of a common bile duct stent with resolution of intrahepatic biliary dilatation. 2.  The periocular mass noted previously is not as well-visualized and is likely decrease in size with some residual mass noted in the pancreatic head. 3.  Small liver lesions are no longer identified however there are 2 larger lesions in the right hepatic lobe which were not previously noted suspicious for metastatic disease. CT C/A/P 8/23/2022:   1. Multiple liver hypodensities. The larger lesion is slightly decreased in size. The other lesions are not significantly changed although are better visualized on the current study. This may be secondary to the phase of the contrast bolus. 2.  Status post common bile duct stent placement. Ill-defined hypodensity in the head of pancreas without measurable mass. Increased pancreatic atrophy and possible mild hepatic ductal dilatation noted. CT C/A/P 10/7/2022:  1. No measurable pancreatic head mass. 2.  Patent common bile duct stent. 3.  Allowing for differences in contrast enhancement in the liver, the scattered  hypodense liver lesions are stable in size. 4.  Mild splenomegaly. 5.  No new lymphadenopathy. CT C/A/P 1/4/2023:  1. Increased size and number of diffuse liver metastatic disease. 2.  Increased size of pancreatic head mass status post biliary stent placement. No intrahepatic biliary dilatation. 3.  Retroperitoneal adenopathy increased. Assessment:   1) Metastatic Small Cell Carcinoma of the Pancreas  He has disease within his pancreas, invading into the duodenum. He additionally has metastatic disease within his liver, confirmed with biopsy. His cancer is not curable and management is with palliative intent. He is currently on second line chemotherapy with FOLFIRI. He is tolerating therapy well so far. We will proceed with treatment today. The patient will be seen with each cycle of therapy, and labs will be monitored to assess for toxicity from therapy. Repeat CT every 4-6 cycles. At progression we can consider options such as Lurbinectedin or immunotherapy. 2)  Biliary obstruction  Secondary to his pancreatic cancer. S/p ERCP on 5/18/2022 with stent placement with Dr. Kameron Alexander. They will set up follow up as stent may need to be replaced for 1 year. 3)  Anemia, normocytic  CBC improved today, monitor on therapy. 4) Thrombocytopenia  Mild, secondary to chemotherapy. Monitor. 5) DM  Follows endocrinology outpatient. 6) Pancreatic insufficiency  Continue Creon    7) Emotional Well Being  No psychosocial concerns identified today. Patient has adequate support. Has applied for care card. Plan:     Proceed today with C3 of FOLFIRI (irinotecan 180mg/m2, leucovorin 400 mg/m2, fluorouracil 400 mg/m2, and a 46 hour infusion of fluorouracil 2400 mg/m2) given every 2 weeks  Labs: CBC, BMP prior to each treatment, hepatic function panel   Prophylactic antiemetics: Palonosetron and dexamethasone on the day of each chemotherapy infusion. Dexamethasone 8mg PO on days 2 and 3 after chemotherapy.   PRN antiemetics: Ondansetron, Prochlorperazine  PRN antidiarrheals: Atropine 0.4mg IV every 2 hours PRN during or immediately after irinotecan infusion, Imodium every 2 hours as needed at home  Return to clinic every 2 weeks on therapy    I personally saw and evaluated the patient and performed the key components of medical decision making. The history, physical exam, and documentation were performed by Neel Raines NP. I reviewed and verified the above documentation and modified it as needed. Metastatic small cell pancreatic cancer, on FOLFIRI. He is tolerating systemic therapy with manageable toxicity, so we will proceed with treatment.       Signed By: Ioana Villanueva MD

## 2023-02-15 NOTE — PROGRESS NOTES
[facility] Chemo Progress Note    Date: February 15, 2023        0900: Mr. [place]na Funes Arrived to [facility] for  C3D1 FOLFIRI ambulatory in stable condition. Assessment was completed and port accessed by Sahra Winston RN. Labs drawn and sent for processing. Went to provider appointment with Medical Oncology. 1000: Returned from provider appointment. Labs reviewed. Criteria for treatment was met. Chemotherapy Flowsheet 2/15/2023   Cycle C3D1   Date 2/15/2023   Drug / Regimen Folfiri   Time Down D/c w/ CADD   Pre Meds Given   Notes Given       Mr. Janet William vitals were reviewed. Patient Vitals for the past 12 hrs:   Temp Pulse Resp BP SpO2   02/15/23 1259 -- (!) 55 18 (!) 157/66 --   02/15/23 0903 97.8 °F (36.6 °C) (!) 54 16 (!) 155/65 97 %         Lab results were obtained and reviewed. Recent Results (from the past 12 hour(s))   CBC WITH AUTOMATED DIFF    Collection Time: 02/15/23  9:06 AM   Result Value Ref Range    WBC 3.4 (L) 4.1 - 11.1 K/uL    RBC 4.07 (L) 4.10 - 5.70 M/uL    HGB 10.5 (L) 12.1 - 17.0 g/dL    HCT 32.2 (L) 36.6 - 50.3 %    MCV 79.1 (L) 80.0 - 99.0 FL    MCH 25.8 (L) 26.0 - 34.0 PG    MCHC 32.6 30.0 - 36.5 g/dL    RDW 17.0 (H) 11.5 - 14.5 %    PLATELET 069 (L) 223 - 400 K/uL    MPV 9.7 8.9 - 12.9 FL    NRBC 0.0 0  WBC    ABSOLUTE NRBC 0.00 0.00 - 0.01 K/uL    NEUTROPHILS 62 32 - 75 %    LYMPHOCYTES 29 12 - 49 %    MONOCYTES 7 5 - 13 %    EOSINOPHILS 2 0 - 7 %    BASOPHILS 0 0 - 1 %    IMMATURE GRANULOCYTES 0 0.0 - 0.5 %    ABS. NEUTROPHILS 2.1 1.8 - 8.0 K/UL    ABS. LYMPHOCYTES 1.0 0.8 - 3.5 K/UL    ABS. MONOCYTES 0.2 0.0 - 1.0 K/UL    ABS. EOSINOPHILS 0.1 0.0 - 0.4 K/UL    ABS. BASOPHILS 0.0 0.0 - 0.1 K/UL    ABS. IMM.  GRANS. 0.0 0.00 - 0.04 K/UL    DF AUTOMATED     METABOLIC PANEL, COMPREHENSIVE    Collection Time: 02/15/23  9:06 AM   Result Value Ref Range    Sodium 139 136 - 145 mmol/L    Potassium 3.5 3.5 - 5.1 mmol/L    Chloride 103 97 - 108 mmol/L    CO2 31 21 - 32 mmol/L    Anion gap 5 5 - 15 mmol/L    Glucose 187 (H) 65 - 100 mg/dL    BUN 15 6 - 20 MG/DL    Creatinine 0.88 0.70 - 1.30 MG/DL    BUN/Creatinine ratio 17 12 - 20      eGFR >60 >60 ml/min/1.73m2    Calcium 8.8 8.5 - 10.1 MG/DL    Bilirubin, total 0.9 0.2 - 1.0 MG/DL    ALT (SGPT) 29 12 - 78 U/L    AST (SGOT) 23 15 - 37 U/L    Alk. phosphatase 87 45 - 117 U/L    Protein, total 6.7 6.4 - 8.2 g/dL    Albumin 3.4 (L) 3.5 - 5.0 g/dL    Globulin 3.3 2.0 - 4.0 g/dL    A-G Ratio 1.0 (L) 1.1 - 2.2          Pre-medications  were administered as ordered and chemotherapy was initiated.   Medications Administered       atropine 0.4 mg/mL injection 0.4 mg       Admin Date  02/15/2023 Action  Given Dose  0.4 mg Route  IntraVENous Administered By  [facility]              dexamethasone (DECADRON) 12 mg in 0.9% sodium chloride 50 mL IVPB       Admin Date  02/15/2023 Action  New Bag Dose  12 mg Route  IntraVENous Administered By  [facility]              dextrose 5% infusion       Admin Date  02/15/2023 Action  New Bag Dose  25 mL/hr Rate  25 mL/hr Route  IntraVENous Administered By  [facility]              fluorouraciL (ADRUCIL) 5,592 mg in 0.9% sodium chloride 250 mL CADD Cassette       Admin Date  02/15/2023 Action  New Bag Dose  5,592 mg Rate  5.4 mL/hr Route  IntraVENous Administered By  [facility]              fluorouraciL (ADRUCIL) chemo syringe 932 mg       Admin Date  02/15/2023 Action  Given Dose  932 mg Rate  279.6 mL/hr Route  IntraVENous Administered By  [facility]              irinotecan (CAMPTOSAR) 419 mg in dextrose 5% 250 mL, overfill volume 25 mL chemo infusion       Admin Date  02/15/2023 Action  New Bag Dose  419 mg Rate  197.3 mL/hr Route  IntraVENous Administered By  [facility]              leucovorin (WELLCOVORIN) 932 mg in dextrose 5% 250 mL, overfill volume 25 mL IVPB       Admin Date  02/15/2023 Action  New Bag Dose  932 mg Rate  214.4 mL/hr Route  IntraVENous Administered By  Mckayla Mcmullen palonosetron HCl (ALOXI) injection 0.25 mg       Admin Date  02/15/2023 Action  Given Dose  0.25 mg Route  IntraVENous Administered By  Mckayla Mcmullen               Two nurses verified prior to administering: Drug name, Drug dose, Infusion volume or drug volume when prepared in a syringe, Rate of administration, Route of administration, Expiration dates and/or times, Appearance and physical integrity of the drugs, Rate set on infusion pump, when used, and Sequencing of drug administration. 1300: Patient tolerated treatment well. Port maintained positive blood return throughout treatment. Port flushed and connected to continuous infusion pump. Verified to be running. Patient is aware of next scheduled [facility] appointment on 2/17/23 @ 11:30 am for disconnection.      Future Appointments   Date Time Provider Cindy Gee   2/17/2023 11:30 AM SS INF6 CH4 <1H [facility]   2/27/2023  9:45 AM [place] [address] [facility] BS AMB   3/1/2023  9:00 AM SS INF3 CH1 >4H [facility]   3/1/2023  9:15 AM Anne Marie Patel MD [facility] BS AMB   3/3/2023 11:30 AM [facility]  February 15, 2023

## 2023-02-15 NOTE — PROGRESS NOTES
Matt Davila. is a 72 y.o. male follow up for SCC of pancreas. 1. Have you been to the ER, urgent care clinic since your last visit? Hospitalized since your last visit?no     2. Have you seen or consulted any other health care providers outside of the [address] [place] [address] since your last visit? Include any pap smears or colon screening.  No    Vitals 2/15/2023   Blood Pressure 155/65   Pulse 54   Temp 97.8   Resp 16   Height 5' 10\"   Weight 245 lb 8 oz   SpO2 97   BSA 2.35 m2   BMI 35.23 kg/m2

## 2023-02-16 NOTE — PROGRESS NOTES
[facility] [place] at [facility]   [address] [facility] [place], [address]  [address] [place] [address]: [phone]  F: [phone]    Medical Nutrition Therapy  Nutrition Encounter:    Met with patient in [facility]. He reports eating well and reports using creon w/ meals and snacks. He often carries it around with him so he does not forget to eat it. He usually takes 2 w/ meals. He indicates that recently he went to a party and had some pepperoni without creon and then ate a large dinner (which he took creon) and then had loose stool. We discussed how to manage these situations. He may need to adjust amount of creon- he could take 3 at a meal time depending on fat content. Pancreatic Cancer      Chemotherapy Flowsheet 2/15/2023   Cycle C3D1   Date 2/15/2023   Drug / Regimen Folfiri   Time Down D/c w/ CADD   Pre Meds Given   Notes Given     Wt Readings from Last 5 Encounters:   02/15/23 255 lb (115.7 kg)   02/15/23 245 lb 8 oz (111.4 kg)   02/01/23 242 lb (109.8 kg)   02/01/23 242 lb (109.8 kg)   01/18/23 248 lb 4.8 oz (112.6 kg)     Plan:  - Continue with current Creon regimen, in certain situation he may need 3 at meals, or 1 at beginning and 2 in the middle.         Signed By: Rober Kumari, [address]

## 2023-02-17 ENCOUNTER — HOSPITAL ENCOUNTER (OUTPATIENT)
Dept: INFUSION THERAPY | Age: 73
Discharge: HOME | End: 2023-02-17
Payer: MEDICARE

## 2023-02-17 VITALS
SYSTOLIC BLOOD PRESSURE: 116 MMHG | RESPIRATION RATE: 16 BRPM | DIASTOLIC BLOOD PRESSURE: 59 MMHG | HEART RATE: 67 BPM | TEMPERATURE: 98.1 F | OXYGEN SATURATION: 98 %

## 2023-02-17 DIAGNOSIS — C7A.8 NEUROENDOCRINE CARCINOMA OF PANCREAS (HCC): Primary | ICD-10-CM

## 2023-02-17 DIAGNOSIS — C78.7 METASTASIS TO LIVER (HCC): ICD-10-CM

## 2023-02-17 DIAGNOSIS — C25.0 MALIGNANT NEOPLASM OF HEAD OF PANCREAS (HCC): ICD-10-CM

## 2023-02-17 DIAGNOSIS — Z51.11 ENCOUNTER FOR ANTINEOPLASTIC CHEMOTHERAPY: ICD-10-CM

## 2023-02-17 DIAGNOSIS — C80.1 SMALL CELL CARCINOMA (HCC): ICD-10-CM

## 2023-02-17 PROCEDURE — 74011000250 HC RX REV CODE- 250: Performed by: INTERNAL MEDICINE

## 2023-02-17 PROCEDURE — 96523 IRRIG DRUG DELIVERY DEVICE: CPT

## 2023-02-17 PROCEDURE — 74011250636 HC RX REV CODE- 250/636: Performed by: INTERNAL MEDICINE

## 2023-02-17 RX ORDER — SODIUM CHLORIDE 9 MG/ML
5-40 INJECTION INTRAVENOUS AS NEEDED
Status: ACTIVE | OUTPATIENT
Start: 2023-02-17 | End: 2023-02-17

## 2023-02-17 RX ORDER — SODIUM CHLORIDE 0.9 % (FLUSH) 0.9 %
5-40 SYRINGE (ML) INJECTION AS NEEDED
Status: DISPENSED | OUTPATIENT
Start: 2023-02-17 | End: 2023-02-17

## 2023-02-17 RX ORDER — SODIUM CHLORIDE 9 MG/ML
5-250 INJECTION, SOLUTION INTRAVENOUS AS NEEDED
Status: DISPENSED | OUTPATIENT
Start: 2023-02-17 | End: 2023-02-17

## 2023-02-17 RX ORDER — HEPARIN 100 UNIT/ML
500 SYRINGE INTRAVENOUS AS NEEDED
Status: ACTIVE | OUTPATIENT
Start: 2023-02-17 | End: 2023-02-17

## 2023-02-17 RX ADMIN — SODIUM CHLORIDE, PRESERVATIVE FREE 10 ML: 5 INJECTION INTRAVENOUS at 11:28

## 2023-02-17 RX ADMIN — Medication 500 UNITS: at 11:29

## 2023-02-21 RX ORDER — EPINEPHRINE 1 MG/ML
0.3 INJECTION, SOLUTION, CONCENTRATE INTRAVENOUS AS NEEDED
Status: CANCELLED | OUTPATIENT
Start: 2023-03-01

## 2023-02-21 RX ORDER — ONDANSETRON 2 MG/ML
8 INJECTION INTRAMUSCULAR; INTRAVENOUS AS NEEDED
Status: CANCELLED | OUTPATIENT
Start: 2023-03-01

## 2023-02-21 RX ORDER — SODIUM CHLORIDE 9 MG/ML
5-40 INJECTION INTRAVENOUS AS NEEDED
Status: CANCELLED | OUTPATIENT
Start: 2023-03-01

## 2023-02-21 RX ORDER — ACETAMINOPHEN 325 MG/1
650 TABLET ORAL AS NEEDED
Status: CANCELLED
Start: 2023-03-01

## 2023-02-21 RX ORDER — ALBUTEROL SULFATE 0.83 MG/ML
2.5 SOLUTION RESPIRATORY (INHALATION) AS NEEDED
Status: CANCELLED
Start: 2023-03-01

## 2023-02-21 RX ORDER — DIPHENHYDRAMINE HYDROCHLORIDE 50 MG/ML
25 INJECTION, SOLUTION INTRAMUSCULAR; INTRAVENOUS AS NEEDED
Status: CANCELLED
Start: 2023-03-01

## 2023-02-21 RX ORDER — SODIUM CHLORIDE 9 MG/ML
5-40 INJECTION INTRAVENOUS AS NEEDED
Status: CANCELLED | OUTPATIENT
Start: 2023-03-03

## 2023-02-21 RX ORDER — SODIUM CHLORIDE 9 MG/ML
5-250 INJECTION, SOLUTION INTRAVENOUS AS NEEDED
Status: CANCELLED | OUTPATIENT
Start: 2023-03-01

## 2023-02-21 RX ORDER — SODIUM CHLORIDE 9 MG/ML
5-250 INJECTION, SOLUTION INTRAVENOUS AS NEEDED
Status: CANCELLED | OUTPATIENT
Start: 2023-03-03

## 2023-02-21 RX ORDER — SODIUM CHLORIDE 0.9 % (FLUSH) 0.9 %
5-40 SYRINGE (ML) INJECTION AS NEEDED
Status: CANCELLED | OUTPATIENT
Start: 2023-03-03

## 2023-02-21 RX ORDER — DIPHENHYDRAMINE HYDROCHLORIDE 50 MG/ML
50 INJECTION, SOLUTION INTRAMUSCULAR; INTRAVENOUS AS NEEDED
Status: CANCELLED
Start: 2023-03-01

## 2023-02-21 RX ORDER — HEPARIN 100 UNIT/ML
500 SYRINGE INTRAVENOUS AS NEEDED
Status: CANCELLED
Start: 2023-03-03

## 2023-02-21 RX ORDER — HYDROCORTISONE SODIUM SUCCINATE 100 MG/2ML
100 INJECTION, POWDER, FOR SOLUTION INTRAMUSCULAR; INTRAVENOUS AS NEEDED
Status: CANCELLED | OUTPATIENT
Start: 2023-03-01

## 2023-02-24 ENCOUNTER — TELEPHONE (OUTPATIENT)
Dept: ONCOLOGY | Age: 73
End: 2023-02-24

## 2023-02-24 RX ORDER — SODIUM CHLORIDE 0.9 % (FLUSH) 0.9 %
5-10 SYRINGE (ML) INJECTION AS NEEDED
OUTPATIENT
Start: 2023-02-27

## 2023-02-24 RX ORDER — SODIUM CHLORIDE 9 MG/ML
10 INJECTION INTRAVENOUS AS NEEDED
OUTPATIENT
Start: 2023-02-27

## 2023-02-24 RX ORDER — HEPARIN 100 UNIT/ML
500 SYRINGE INTRAVENOUS AS NEEDED
OUTPATIENT
Start: 2023-02-27

## 2023-02-24 NOTE — TELEPHONE ENCOUNTER
[address] [facility]  [phone]    02/24/23- Patient reports \"dizzy episodes\" with changing positions and worsening fatigue over the last week. Feels he's drinking a lot of water. Mild diarrhea that's controlled. No vomiting. He also reports nasal congestion for about 2 weeks. Denies chest congestion or SOB. Mild headaches and blurred vision, not significant. Unsure about fevers, but reports having occasional chills. Will discuss the above with Dr. Caryl Paris and call patient back. 2:31 PM- Encouraged patient to test for COVID. If positive, we can prescribe antiviral.  Confirmed that he's able to check BP at home, encouraged him to do this and call back with results. Also, offered to bring him in tomorrow for labs/fluids. Stated he would prefer to wait until Monday. 4:06 PM- Patient called back, he reports negative COVID test.  /49 and HR 52. Reviewed with Dr. Caryl Paris. Appointment scheduled in [facility] Monday at 2 pm for labs/fluids. Patient verbalized understanding.

## 2023-02-27 ENCOUNTER — OFFICE VISIT (OUTPATIENT)
Dept: CARDIOLOGY CLINIC | Age: 73
End: 2023-02-27
Payer: MEDICARE

## 2023-02-27 ENCOUNTER — HOSPITAL ENCOUNTER (OUTPATIENT)
Dept: INFUSION THERAPY | Age: 73
Discharge: HOME | End: 2023-02-27
Payer: MEDICARE

## 2023-02-27 VITALS
DIASTOLIC BLOOD PRESSURE: 78 MMHG | HEIGHT: 70 IN | SYSTOLIC BLOOD PRESSURE: 136 MMHG | BODY MASS INDEX: 34.22 KG/M2 | OXYGEN SATURATION: 98 % | WEIGHT: 239 LBS | RESPIRATION RATE: 16 BRPM | HEART RATE: 57 BPM | TEMPERATURE: 97.7 F

## 2023-02-27 DIAGNOSIS — C25.0 MALIGNANT NEOPLASM OF HEAD OF PANCREAS (HCC): ICD-10-CM

## 2023-02-27 DIAGNOSIS — C78.7 METASTASIS TO LIVER (HCC): ICD-10-CM

## 2023-02-27 DIAGNOSIS — C80.1 SMALL CELL CARCINOMA (HCC): Primary | ICD-10-CM

## 2023-02-27 DIAGNOSIS — Z95.818 STATUS POST PLACEMENT OF IMPLANTABLE LOOP RECORDER: Primary | ICD-10-CM

## 2023-02-27 DIAGNOSIS — Z51.11 ENCOUNTER FOR ANTINEOPLASTIC CHEMOTHERAPY: ICD-10-CM

## 2023-02-27 DIAGNOSIS — C7A.8 NEUROENDOCRINE CARCINOMA OF PANCREAS (HCC): ICD-10-CM

## 2023-02-27 LAB
ANION GAP SERPL CALC-SCNC: 7 MMOL/L (ref 5–15)
BUN SERPL-MCNC: 17 MG/DL (ref 6–20)
BUN/CREAT SERPL: 17 (ref 12–20)
CALCIUM SERPL-MCNC: 8.6 MG/DL (ref 8.5–10.1)
CHLORIDE SERPL-SCNC: 106 MMOL/L (ref 97–108)
CO2 SERPL-SCNC: 26 MMOL/L (ref 21–32)
CREAT SERPL-MCNC: 1.01 MG/DL (ref 0.7–1.3)
GLUCOSE SERPL-MCNC: 164 MG/DL (ref 65–100)
POTASSIUM SERPL-SCNC: 3.3 MMOL/L (ref 3.5–5.1)
SODIUM SERPL-SCNC: 139 MMOL/L (ref 136–145)

## 2023-02-27 PROCEDURE — 36415 COLL VENOUS BLD VENIPUNCTURE: CPT

## 2023-02-27 PROCEDURE — 93298 REM INTERROG DEV EVAL SCRMS: CPT | Performed by: INTERNAL MEDICINE

## 2023-02-27 PROCEDURE — 96360 HYDRATION IV INFUSION INIT: CPT

## 2023-02-27 PROCEDURE — 74011250636 HC RX REV CODE- 250/636: Performed by: NURSE PRACTITIONER

## 2023-02-27 PROCEDURE — 80048 BASIC METABOLIC PNL TOTAL CA: CPT

## 2023-02-27 RX ADMIN — SODIUM CHLORIDE 1000 ML: 9 INJECTION, SOLUTION INTRAVENOUS at 14:19

## 2023-02-27 NOTE — PROGRESS NOTES
[facility] Progress Note    Date: 2023    Name: Rosa Tabares. MRN: [id number]         : 1950    Mr. Abdullahi Mcmullen Arrived ambulatory and in no distress for Hydration. Assessment was completed, no acute issues at this time, no new complaints voiced. Right PIV placed without difficulty, labs drawn & sent for processing. Mr. Ishmael Burciaga vitals were reviewed. Visit Vitals  /71 (BP 1 Location: Right arm, BP Patient Position: Supine)   Pulse (!) 57   Temp 97.7 °F (36.5 °C)   Resp 16   Ht 5' 10\" (1.778 m)   Wt 108.4 kg (239 lb)   SpO2 98%   BMI 34.29 kg/m²     Recent Results (from the past 12 hour(s))   METABOLIC PANEL, BASIC    Collection Time: 23  2:11 PM   Result Value Ref Range    Sodium 139 136 - 145 mmol/L    Potassium 3.3 (L) 3.5 - 5.1 mmol/L    Chloride 106 97 - 108 mmol/L    CO2 26 21 - 32 mmol/L    Anion gap 7 5 - 15 mmol/L    Glucose 164 (H) 65 - 100 mg/dL    BUN 17 6 - 20 MG/DL    Creatinine 1.01 0.70 - 1.30 MG/DL    BUN/Creatinine ratio 17 12 - 20      eGFR >60 >60 ml/min/1.73m2    Calcium 8.6 8.5 - 10.1 MG/DL         Medications:  1 L Bolus     Mr. Abdullahi Mcmullen tolerated treatment well and was discharged from [place] [address] in stable condition. PIV removed. He is to return on  at 0900 for his next appointment.     Margot Reyez RN  2023

## 2023-02-27 NOTE — PROGRESS NOTES
[facility] [place] at [address], [address] [facility] [address] [facility]  W: [phone]  F: [phone]      Reason for Visit:   Frederic Carroll is a 72 y.o. male who is seen for follow up of small cell carcinoma of the pancreas. Hematology/Oncology Treatment History:   CT A/P 4/19/2022: Possible mild acute pancreatitis. Prominent sigmoid diverticulosis. Enlarged prostate. Fat-containing hernias  CT Abdomen 5/17/2022: New intra-hepatic biliary dilatation and common bile duct dilatation. Ampullary prominence and 1.5 cm x 1.5 cm soft tissue density in the pancreaticoduodenal groove. New subcentimeter hepatic hypodense lesions, worrisome for metastatic disease. MRI Abdomen 5/17/2022:  Periampullary pancreatic mass with possible extension into the wall of the duodenum. Associated intrahepatic and extra hepatic biliary dilatation and mild pancreatic ductal dilatation. Small hyperenhancing lesions throughout the liver suspicious for metastatic disease. CT Chest 5/18/2022: no metastatic disease within thorax  ERCP by Dr. Fernanda Solis 5/18/2022: Large ulcerated mass seen in the second portion of the duodenum taking half the circumference in the area of the major papilla and extending distally about 3 to 4 cm, appeared to be pancreatic and eroding into the duodenum. Cholangiogram with complete obstruction of CBD. Metal stent deployed. 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 States he was having trouble with feeling run down and tired. Some dizziness as well. Symptoms worsened on Thursday night and they called in on Friday.  Felt better after IV fluids on Monday. Appetite has been stable lately, weight stable. Mild fatigue. Denies pain. Trying to watch what he eats in relation to diarrhea. Taking Creon as prescribed. Present with wife today. Review of systems was obtained and pertinent findings reviewed above. Past medical history, social history, family history, medications, and allergies are located in the electronic medical record. Physical Exam:     Visit Vitals  /63   Pulse (!) 57   Temp 97.6 °F (36.4 °C)   Resp 18   Ht 5' 10\" (1.778 m)   Wt 239 lb (108.4 kg)   SpO2 97%   BMI 34.29 kg/m²       ECOG PS: 1  General: no distress  Eyes: anicteric sclerae  HENT: oropharynx clear  Neck: supple  Lymphatic: no cervical, supraclavicular adenopathy  Respiratory: normal respiratory effort  CV: no peripheral edema  GI: soft, nontender, nondistended, no masses  Skin: no rashes; no ecchymoses; no petechiae    Results:     Lab Results   Component Value Date/Time    WBC 3.5 (L) 03/01/2023 09:10 AM    HGB 10.6 (L) 03/01/2023 09:10 AM    HCT 32.2 (L) 03/01/2023 09:10 AM    PLATELET 176 (L) 55/14/6978 09:10 AM    MCV 80.3 03/01/2023 09:10 AM    ABS. NEUTROPHILS 2.1 03/01/2023 09:10 AM     Lab Results   Component Value Date/Time    Sodium 139 02/27/2023 02:11 PM    Potassium 3.3 (L) 02/27/2023 02:11 PM    Chloride 106 02/27/2023 02:11 PM    CO2 26 02/27/2023 02:11 PM    Glucose 164 (H) 02/27/2023 02:11 PM    BUN 17 02/27/2023 02:11 PM    Creatinine 1.01 02/27/2023 02:11 PM    GFR est AA >60 09/22/2022 02:11 PM    GFR est non-AA >60 09/22/2022 02:11 PM    Calcium 8.6 02/27/2023 02:11 PM    Glucose (POC) 277 (H) 06/03/2022 11:47 AM    Creatinine (POC) 1.00 04/19/2022 02:47 PM     Lab Results   Component Value Date/Time    Bilirubin, total 1.3 (H) 03/01/2023 09:10 AM    ALT (SGPT) 29 02/15/2023 09:06 AM    Alk.  phosphatase 87 02/15/2023 09:06 AM    Protein, total 6.7 02/15/2023 09:06 AM    Albumin 3.4 (L) 02/15/2023 09:06 AM    Globulin 3.3 02/15/2023 09:06 AM     Lab Results   Component Value Date/Time    Reticulocyte count 4.2 (H) 05/17/2022 03:04 PM    Iron % saturation 32 05/17/2022 03:04 PM    TIBC 289 05/17/2022 03:04 PM    Ferritin 95 02/15/2023 10:43 AM    Vitamin B12 944 05/17/2022 03:04 PM    Folate 26.7 (H) 05/17/2022 03:04 PM    Haptoglobin 90 05/17/2022 03:04 PM     05/17/2022 03:04 PM    TSH 2.18 05/17/2022 03:04 PM    Lipase 106 05/17/2022 10:10 AM    Hep C virus Ab Interp. NONREACTIVE 05/17/2022 03:04 PM       CT C/A/P 7/6/2022:  1. Status post placement of a common bile duct stent with resolution of intrahepatic biliary dilatation. 2.  The periocular mass noted previously is not as well-visualized and is likely decrease in size with some residual mass noted in the pancreatic head. 3.  Small liver lesions are no longer identified however there are 2 larger lesions in the right hepatic lobe which were not previously noted suspicious for metastatic disease. CT C/A/P 8/23/2022:   1. Multiple liver hypodensities. The larger lesion is slightly decreased in size. The other lesions are not significantly changed although are better visualized on the current study. This may be secondary to the phase of the contrast bolus. 2.  Status post common bile duct stent placement. Ill-defined hypodensity in the head of pancreas without measurable mass. Increased pancreatic atrophy and possible mild hepatic ductal dilatation noted. CT C/A/P 10/7/2022:  1. No measurable pancreatic head mass. 2.  Patent common bile duct stent. 3.  Allowing for differences in contrast enhancement in the liver, the scattered  hypodense liver lesions are stable in size. 4.  Mild splenomegaly. 5.  No new lymphadenopathy. CT C/A/P 1/4/2023:  1. Increased size and number of diffuse liver metastatic disease. 2.  Increased size of pancreatic head mass status post biliary stent placement. No intrahepatic biliary dilatation. 3.  Retroperitoneal adenopathy increased.     Assessment: 1) Metastatic Small Cell Carcinoma of the Pancreas  He has disease within his pancreas, invading into the duodenum. He additionally has metastatic disease within his liver, confirmed with biopsy. His cancer is not curable and management is with palliative intent. He is currently on second line chemotherapy with FOLFIRI. He is tolerating therapy well so far with exception of mild fatigue. We will proceed with treatment today. The patient will be seen with each cycle of therapy, and labs will be monitored to assess for toxicity from therapy. Repeat CT every 4-6 cycles. At progression we can consider options such as Lurbinectedin or immunotherapy. 2)  Biliary obstruction  Secondary to his pancreatic cancer. S/p ERCP on 5/18/2022 with stent placement with Dr. Bonita Martinez. They will set up follow up as stent may need to be replaced for 1 year. 3)  Anemia, normocytic  CBC improved today, monitor on therapy. 4) Thrombocytopenia  Mild, secondary to chemotherapy. Monitor. 5) DM  Follows endocrinology outpatient. 6) Pancreatic insufficiency  Continue Creon    7) Emotional Well Being  No psychosocial concerns identified today. Patient has adequate support. Has applied for care card. 8) Hypokalemia  Due to diarrhea, chemotherapy. Will replete in infusion center PRN. Plan:     Proceed today with C4 of FOLFIRI (irinotecan 180mg/m2, leucovorin 400 mg/m2, fluorouracil 400 mg/m2, and a 46 hour infusion of fluorouracil 2400 mg/m2) given every 2 weeks  Labs: CBC, BMP prior to each treatment, hepatic function panel   Prophylactic antiemetics: Palonosetron and dexamethasone on the day of each chemotherapy infusion. Dexamethasone 8mg PO on days 2 and 3 after chemotherapy.   PRN antiemetics: Ondansetron, Prochlorperazine  PRN antidiarrheals: Atropine 0.4mg IV every 2 hours PRN during or immediately after irinotecan infusion, Imodium every 2 hours as needed at home  CT after C6   Return to clinic every 2 weeks on therapy    I personally saw and evaluated the patient and performed the key components of medical decision making. The history, physical exam, and documentation were performed by Rafael Bermudez NP. I reviewed and verified the above documentation and modified it as needed. He is tolerating systemic therapy with manageable toxicity, so we will proceed with treatment. Discussed option of repeating CT after this visit, but given that he is doing so well we elected to wait until after C6.       Signed By: Jonny Boudreaux MD

## 2023-03-01 ENCOUNTER — OFFICE VISIT (OUTPATIENT)
Dept: ONCOLOGY | Age: 73
End: 2023-03-01

## 2023-03-01 ENCOUNTER — HOSPITAL ENCOUNTER (OUTPATIENT)
Dept: INFUSION THERAPY | Age: 73
Discharge: HOME | End: 2023-03-01
Payer: MEDICARE

## 2023-03-01 VITALS
BODY MASS INDEX: 34.23 KG/M2 | RESPIRATION RATE: 18 BRPM | OXYGEN SATURATION: 97 % | TEMPERATURE: 97.6 F | HEART RATE: 63 BPM | SYSTOLIC BLOOD PRESSURE: 120 MMHG | DIASTOLIC BLOOD PRESSURE: 58 MMHG | HEIGHT: 70 IN | WEIGHT: 239.1 LBS

## 2023-03-01 VITALS
TEMPERATURE: 97.6 F | DIASTOLIC BLOOD PRESSURE: 63 MMHG | SYSTOLIC BLOOD PRESSURE: 123 MMHG | OXYGEN SATURATION: 97 % | HEART RATE: 57 BPM | HEIGHT: 70 IN | RESPIRATION RATE: 18 BRPM | WEIGHT: 239 LBS | BODY MASS INDEX: 34.22 KG/M2

## 2023-03-01 DIAGNOSIS — C25.0 MALIGNANT NEOPLASM OF HEAD OF PANCREAS (HCC): ICD-10-CM

## 2023-03-01 DIAGNOSIS — Z51.11 ENCOUNTER FOR ANTINEOPLASTIC CHEMOTHERAPY: ICD-10-CM

## 2023-03-01 DIAGNOSIS — C7A.8 NEUROENDOCRINE CARCINOMA OF PANCREAS (HCC): ICD-10-CM

## 2023-03-01 DIAGNOSIS — C80.1 SMALL CELL CARCINOMA (HCC): ICD-10-CM

## 2023-03-01 DIAGNOSIS — C7A.8 NEUROENDOCRINE CARCINOMA OF PANCREAS (HCC): Primary | ICD-10-CM

## 2023-03-01 DIAGNOSIS — C78.7 METASTASIS TO LIVER (HCC): ICD-10-CM

## 2023-03-01 DIAGNOSIS — C25.0 MALIGNANT NEOPLASM OF HEAD OF PANCREAS (HCC): Primary | ICD-10-CM

## 2023-03-01 LAB
BASOPHILS # BLD: 0 K/UL (ref 0–0.1)
BASOPHILS NFR BLD: 0 % (ref 0–1)
BILIRUB SERPL-MCNC: 1.3 MG/DL (ref 0.2–1)
DIFFERENTIAL METHOD BLD: ABNORMAL
EOSINOPHIL # BLD: 0.1 K/UL (ref 0–0.4)
EOSINOPHIL NFR BLD: 2 % (ref 0–7)
ERYTHROCYTE [DISTWIDTH] IN BLOOD BY AUTOMATED COUNT: 18 % (ref 11.5–14.5)
HCT VFR BLD AUTO: 32.2 % (ref 36.6–50.3)
HGB BLD-MCNC: 10.6 G/DL (ref 12.1–17)
IMM GRANULOCYTES # BLD AUTO: 0 K/UL (ref 0–0.04)
IMM GRANULOCYTES NFR BLD AUTO: 1 % (ref 0–0.5)
LYMPHOCYTES # BLD: 1.1 K/UL (ref 0.8–3.5)
LYMPHOCYTES NFR BLD: 31 % (ref 12–49)
MCH RBC QN AUTO: 26.4 PG (ref 26–34)
MCHC RBC AUTO-ENTMCNC: 32.9 G/DL (ref 30–36.5)
MCV RBC AUTO: 80.3 FL (ref 80–99)
MONOCYTES # BLD: 0.2 K/UL (ref 0–1)
MONOCYTES NFR BLD: 7 % (ref 5–13)
NEUTS SEG # BLD: 2.1 K/UL (ref 1.8–8)
NEUTS SEG NFR BLD: 59 % (ref 32–75)
NRBC # BLD: 0 K/UL (ref 0–0.01)
NRBC BLD-RTO: 0 PER 100 WBC
PLATELET # BLD AUTO: 122 K/UL (ref 150–400)
PMV BLD AUTO: 9 FL (ref 8.9–12.9)
RBC # BLD AUTO: 4.01 M/UL (ref 4.1–5.7)
WBC # BLD AUTO: 3.5 K/UL (ref 4.1–11.1)

## 2023-03-01 PROCEDURE — G9717 DOC PT DX DEP/BP F/U NT REQ: HCPCS | Performed by: INTERNAL MEDICINE

## 2023-03-01 PROCEDURE — 3017F COLORECTAL CA SCREEN DOC REV: CPT | Performed by: INTERNAL MEDICINE

## 2023-03-01 PROCEDURE — G8417 CALC BMI ABV UP PARAM F/U: HCPCS | Performed by: INTERNAL MEDICINE

## 2023-03-01 PROCEDURE — 3074F SYST BP LT 130 MM HG: CPT | Performed by: INTERNAL MEDICINE

## 2023-03-01 PROCEDURE — 96368 THER/DIAG CONCURRENT INF: CPT

## 2023-03-01 PROCEDURE — G8536 NO DOC ELDER MAL SCRN: HCPCS | Performed by: INTERNAL MEDICINE

## 2023-03-01 PROCEDURE — 96366 THER/PROPH/DIAG IV INF ADDON: CPT

## 2023-03-01 PROCEDURE — 96415 CHEMO IV INFUSION ADDL HR: CPT

## 2023-03-01 PROCEDURE — 36415 COLL VENOUS BLD VENIPUNCTURE: CPT

## 2023-03-01 PROCEDURE — G8427 DOCREV CUR MEDS BY ELIG CLIN: HCPCS | Performed by: INTERNAL MEDICINE

## 2023-03-01 PROCEDURE — 96413 CHEMO IV INFUSION 1 HR: CPT

## 2023-03-01 PROCEDURE — 74011250636 HC RX REV CODE- 250/636: Performed by: INTERNAL MEDICINE

## 2023-03-01 PROCEDURE — 96416 CHEMO PROLONG INFUSE W/PUMP: CPT

## 2023-03-01 PROCEDURE — 74011000258 HC RX REV CODE- 258: Performed by: INTERNAL MEDICINE

## 2023-03-01 PROCEDURE — 1101F PT FALLS ASSESS-DOCD LE1/YR: CPT | Performed by: INTERNAL MEDICINE

## 2023-03-01 PROCEDURE — 96411 CHEMO IV PUSH ADDL DRUG: CPT

## 2023-03-01 PROCEDURE — 85025 COMPLETE CBC W/AUTO DIFF WBC: CPT

## 2023-03-01 PROCEDURE — 96375 TX/PRO/DX INJ NEW DRUG ADDON: CPT

## 2023-03-01 PROCEDURE — 77030012965 HC NDL HUBR BBMI -A

## 2023-03-01 PROCEDURE — 99215 OFFICE O/P EST HI 40 MIN: CPT | Performed by: INTERNAL MEDICINE

## 2023-03-01 PROCEDURE — 74011250637 HC RX REV CODE- 250/637: Performed by: NURSE PRACTITIONER

## 2023-03-01 PROCEDURE — 3078F DIAST BP <80 MM HG: CPT | Performed by: INTERNAL MEDICINE

## 2023-03-01 PROCEDURE — 82247 BILIRUBIN TOTAL: CPT

## 2023-03-01 PROCEDURE — 1123F ACP DISCUSS/DSCN MKR DOCD: CPT | Performed by: INTERNAL MEDICINE

## 2023-03-01 RX ORDER — PALONOSETRON 0.05 MG/ML
0.25 INJECTION, SOLUTION INTRAVENOUS ONCE
OUTPATIENT
Start: 2023-04-12 | End: 2023-04-12

## 2023-03-01 RX ORDER — HEPARIN 100 UNIT/ML
500 SYRINGE INTRAVENOUS AS NEEDED
Start: 2023-04-28

## 2023-03-01 RX ORDER — DEXTROSE MONOHYDRATE 50 MG/ML
5-250 INJECTION, SOLUTION INTRAVENOUS AS NEEDED
OUTPATIENT
Start: 2023-04-26

## 2023-03-01 RX ORDER — FLUOROURACIL 50 MG/ML
400 INJECTION, SOLUTION INTRAVENOUS ONCE
Status: COMPLETED | OUTPATIENT
Start: 2023-03-01 | End: 2023-03-01

## 2023-03-01 RX ORDER — SODIUM CHLORIDE 9 MG/ML
5-250 INJECTION, SOLUTION INTRAVENOUS AS NEEDED
OUTPATIENT
Start: 2023-04-28

## 2023-03-01 RX ORDER — FLUOROURACIL 50 MG/ML
400 INJECTION, SOLUTION INTRAVENOUS ONCE
OUTPATIENT
Start: 2023-04-26 | End: 2023-04-26

## 2023-03-01 RX ORDER — HEPARIN 100 UNIT/ML
500 SYRINGE INTRAVENOUS AS NEEDED
Start: 2023-03-15

## 2023-03-01 RX ORDER — HEPARIN 100 UNIT/ML
500 SYRINGE INTRAVENOUS AS NEEDED
Start: 2023-03-17

## 2023-03-01 RX ORDER — SODIUM CHLORIDE 0.9 % (FLUSH) 0.9 %
5-40 SYRINGE (ML) INJECTION AS NEEDED
OUTPATIENT
Start: 2023-03-15

## 2023-03-01 RX ORDER — ATROPINE SULFATE 0.4 MG/ML
0.4 INJECTION, SOLUTION ENDOTRACHEAL; INTRAMEDULLARY; INTRAMUSCULAR; INTRAVENOUS; SUBCUTANEOUS
OUTPATIENT
Start: 2023-03-15

## 2023-03-01 RX ORDER — SODIUM CHLORIDE 9 MG/ML
5-40 INJECTION INTRAVENOUS AS NEEDED
OUTPATIENT
Start: 2023-04-12

## 2023-03-01 RX ORDER — HYDROCORTISONE SODIUM SUCCINATE 100 MG/2ML
100 INJECTION, POWDER, FOR SOLUTION INTRAMUSCULAR; INTRAVENOUS AS NEEDED
OUTPATIENT
Start: 2023-03-15

## 2023-03-01 RX ORDER — DIPHENHYDRAMINE HYDROCHLORIDE 50 MG/ML
50 INJECTION, SOLUTION INTRAMUSCULAR; INTRAVENOUS AS NEEDED
Start: 2023-03-29

## 2023-03-01 RX ORDER — ALBUTEROL SULFATE 0.83 MG/ML
2.5 SOLUTION RESPIRATORY (INHALATION) AS NEEDED
Start: 2023-03-15

## 2023-03-01 RX ORDER — DEXTROSE MONOHYDRATE 50 MG/ML
5-250 INJECTION, SOLUTION INTRAVENOUS AS NEEDED
OUTPATIENT
Start: 2023-04-12

## 2023-03-01 RX ORDER — SODIUM CHLORIDE 9 MG/ML
5-250 INJECTION, SOLUTION INTRAVENOUS AS NEEDED
OUTPATIENT
Start: 2023-03-31

## 2023-03-01 RX ORDER — EPINEPHRINE 1 MG/ML
0.3 INJECTION, SOLUTION, CONCENTRATE INTRAVENOUS AS NEEDED
OUTPATIENT
Start: 2023-03-15

## 2023-03-01 RX ORDER — SODIUM CHLORIDE 9 MG/ML
5-250 INJECTION, SOLUTION INTRAVENOUS AS NEEDED
OUTPATIENT
Start: 2023-03-17

## 2023-03-01 RX ORDER — SODIUM CHLORIDE 9 MG/ML
5-250 INJECTION, SOLUTION INTRAVENOUS AS NEEDED
OUTPATIENT
Start: 2023-03-29

## 2023-03-01 RX ORDER — DIPHENHYDRAMINE HYDROCHLORIDE 50 MG/ML
50 INJECTION, SOLUTION INTRAMUSCULAR; INTRAVENOUS AS NEEDED
Start: 2023-04-26

## 2023-03-01 RX ORDER — DIPHENHYDRAMINE HYDROCHLORIDE 50 MG/ML
50 INJECTION, SOLUTION INTRAMUSCULAR; INTRAVENOUS AS NEEDED
Start: 2023-03-15

## 2023-03-01 RX ORDER — SODIUM CHLORIDE 0.9 % (FLUSH) 0.9 %
5-40 SYRINGE (ML) INJECTION AS NEEDED
OUTPATIENT
Start: 2023-04-28

## 2023-03-01 RX ORDER — ONDANSETRON 2 MG/ML
8 INJECTION INTRAMUSCULAR; INTRAVENOUS AS NEEDED
OUTPATIENT
Start: 2023-03-15

## 2023-03-01 RX ORDER — DIPHENHYDRAMINE HYDROCHLORIDE 50 MG/ML
25 INJECTION, SOLUTION INTRAMUSCULAR; INTRAVENOUS AS NEEDED
Start: 2023-04-12

## 2023-03-01 RX ORDER — HEPARIN 100 UNIT/ML
500 SYRINGE INTRAVENOUS AS NEEDED
Start: 2023-03-29

## 2023-03-01 RX ORDER — DEXTROSE MONOHYDRATE 50 MG/ML
5-250 INJECTION, SOLUTION INTRAVENOUS AS NEEDED
OUTPATIENT
Start: 2023-03-29

## 2023-03-01 RX ORDER — SODIUM CHLORIDE 9 MG/ML
5-40 INJECTION INTRAVENOUS AS NEEDED
OUTPATIENT
Start: 2023-04-28

## 2023-03-01 RX ORDER — HEPARIN 100 UNIT/ML
500 SYRINGE INTRAVENOUS AS NEEDED
Start: 2023-03-31

## 2023-03-01 RX ORDER — EPINEPHRINE 1 MG/ML
0.3 INJECTION, SOLUTION, CONCENTRATE INTRAVENOUS AS NEEDED
OUTPATIENT
Start: 2023-04-12

## 2023-03-01 RX ORDER — FLUOROURACIL 50 MG/ML
400 INJECTION, SOLUTION INTRAVENOUS ONCE
OUTPATIENT
Start: 2023-03-29 | End: 2023-03-29

## 2023-03-01 RX ORDER — HEPARIN 100 UNIT/ML
500 SYRINGE INTRAVENOUS AS NEEDED
Start: 2023-04-12

## 2023-03-01 RX ORDER — ALBUTEROL SULFATE 0.83 MG/ML
2.5 SOLUTION RESPIRATORY (INHALATION) AS NEEDED
Start: 2023-04-26

## 2023-03-01 RX ORDER — SODIUM CHLORIDE 9 MG/ML
5-250 INJECTION, SOLUTION INTRAVENOUS AS NEEDED
OUTPATIENT
Start: 2023-04-26

## 2023-03-01 RX ORDER — EPINEPHRINE 1 MG/ML
0.3 INJECTION, SOLUTION, CONCENTRATE INTRAVENOUS AS NEEDED
OUTPATIENT
Start: 2023-03-29

## 2023-03-01 RX ORDER — ATROPINE SULFATE 0.4 MG/ML
0.4 INJECTION, SOLUTION ENDOTRACHEAL; INTRAMEDULLARY; INTRAMUSCULAR; INTRAVENOUS; SUBCUTANEOUS
Status: DISPENSED | OUTPATIENT
Start: 2023-03-01 | End: 2023-03-01

## 2023-03-01 RX ORDER — EPINEPHRINE 1 MG/ML
0.3 INJECTION, SOLUTION, CONCENTRATE INTRAVENOUS AS NEEDED
OUTPATIENT
Start: 2023-04-26

## 2023-03-01 RX ORDER — ONDANSETRON 2 MG/ML
8 INJECTION INTRAMUSCULAR; INTRAVENOUS AS NEEDED
OUTPATIENT
Start: 2023-04-12

## 2023-03-01 RX ORDER — PALONOSETRON 0.05 MG/ML
0.25 INJECTION, SOLUTION INTRAVENOUS ONCE
OUTPATIENT
Start: 2023-04-26 | End: 2023-04-26

## 2023-03-01 RX ORDER — ATROPINE SULFATE 0.4 MG/ML
0.4 INJECTION, SOLUTION ENDOTRACHEAL; INTRAMEDULLARY; INTRAMUSCULAR; INTRAVENOUS; SUBCUTANEOUS
OUTPATIENT
Start: 2023-04-12

## 2023-03-01 RX ORDER — PALONOSETRON 0.05 MG/ML
0.25 INJECTION, SOLUTION INTRAVENOUS ONCE
OUTPATIENT
Start: 2023-03-15 | End: 2023-03-15

## 2023-03-01 RX ORDER — ONDANSETRON 2 MG/ML
8 INJECTION INTRAMUSCULAR; INTRAVENOUS AS NEEDED
OUTPATIENT
Start: 2023-03-29

## 2023-03-01 RX ORDER — DEXTROSE MONOHYDRATE 50 MG/ML
5-250 INJECTION, SOLUTION INTRAVENOUS AS NEEDED
OUTPATIENT
Start: 2023-03-15

## 2023-03-01 RX ORDER — SODIUM CHLORIDE 0.9 % (FLUSH) 0.9 %
5-40 SYRINGE (ML) INJECTION AS NEEDED
OUTPATIENT
Start: 2023-03-31

## 2023-03-01 RX ORDER — HYDROCORTISONE SODIUM SUCCINATE 100 MG/2ML
100 INJECTION, POWDER, FOR SOLUTION INTRAMUSCULAR; INTRAVENOUS AS NEEDED
OUTPATIENT
Start: 2023-04-12

## 2023-03-01 RX ORDER — ACETAMINOPHEN 325 MG/1
650 TABLET ORAL AS NEEDED
Start: 2023-04-12

## 2023-03-01 RX ORDER — SODIUM CHLORIDE 9 MG/ML
5-250 INJECTION, SOLUTION INTRAVENOUS AS NEEDED
OUTPATIENT
Start: 2023-04-14

## 2023-03-01 RX ORDER — ATROPINE SULFATE 0.4 MG/ML
0.4 INJECTION, SOLUTION ENDOTRACHEAL; INTRAMEDULLARY; INTRAMUSCULAR; INTRAVENOUS; SUBCUTANEOUS
OUTPATIENT
Start: 2023-04-26

## 2023-03-01 RX ORDER — ACETAMINOPHEN 325 MG/1
650 TABLET ORAL AS NEEDED
Start: 2023-03-29

## 2023-03-01 RX ORDER — FLUOROURACIL 50 MG/ML
400 INJECTION, SOLUTION INTRAVENOUS ONCE
OUTPATIENT
Start: 2023-04-12 | End: 2023-04-12

## 2023-03-01 RX ORDER — SODIUM CHLORIDE 0.9 % (FLUSH) 0.9 %
5-40 SYRINGE (ML) INJECTION AS NEEDED
Status: DISPENSED | OUTPATIENT
Start: 2023-03-01 | End: 2023-03-01

## 2023-03-01 RX ORDER — HEPARIN 100 UNIT/ML
500 SYRINGE INTRAVENOUS AS NEEDED
Start: 2023-04-26

## 2023-03-01 RX ORDER — HEPARIN 100 UNIT/ML
500 SYRINGE INTRAVENOUS AS NEEDED
Status: ACTIVE | OUTPATIENT
Start: 2023-03-01 | End: 2023-03-01

## 2023-03-01 RX ORDER — HEPARIN 100 UNIT/ML
500 SYRINGE INTRAVENOUS AS NEEDED
Start: 2023-04-14

## 2023-03-01 RX ORDER — ACETAMINOPHEN 325 MG/1
650 TABLET ORAL AS NEEDED
Start: 2023-04-26

## 2023-03-01 RX ORDER — ALBUTEROL SULFATE 0.83 MG/ML
2.5 SOLUTION RESPIRATORY (INHALATION) AS NEEDED
Start: 2023-03-29

## 2023-03-01 RX ORDER — SODIUM CHLORIDE 9 MG/ML
5-40 INJECTION INTRAVENOUS AS NEEDED
OUTPATIENT
Start: 2023-03-17

## 2023-03-01 RX ORDER — PALONOSETRON 0.05 MG/ML
0.25 INJECTION, SOLUTION INTRAVENOUS ONCE
OUTPATIENT
Start: 2023-03-29 | End: 2023-03-29

## 2023-03-01 RX ORDER — FLUOROURACIL 50 MG/ML
400 INJECTION, SOLUTION INTRAVENOUS ONCE
OUTPATIENT
Start: 2023-03-15 | End: 2023-03-15

## 2023-03-01 RX ORDER — DIPHENHYDRAMINE HYDROCHLORIDE 50 MG/ML
25 INJECTION, SOLUTION INTRAMUSCULAR; INTRAVENOUS AS NEEDED
Start: 2023-04-26

## 2023-03-01 RX ORDER — ALBUTEROL SULFATE 0.83 MG/ML
2.5 SOLUTION RESPIRATORY (INHALATION) AS NEEDED
Start: 2023-04-12

## 2023-03-01 RX ORDER — ATROPINE SULFATE 0.4 MG/ML
0.4 INJECTION, SOLUTION ENDOTRACHEAL; INTRAMEDULLARY; INTRAMUSCULAR; INTRAVENOUS; SUBCUTANEOUS
OUTPATIENT
Start: 2023-03-29

## 2023-03-01 RX ORDER — SODIUM CHLORIDE 0.9 % (FLUSH) 0.9 %
5-40 SYRINGE (ML) INJECTION AS NEEDED
OUTPATIENT
Start: 2023-03-29

## 2023-03-01 RX ORDER — SODIUM CHLORIDE 9 MG/ML
5-40 INJECTION INTRAVENOUS AS NEEDED
OUTPATIENT
Start: 2023-03-31

## 2023-03-01 RX ORDER — DIPHENHYDRAMINE HYDROCHLORIDE 50 MG/ML
25 INJECTION, SOLUTION INTRAMUSCULAR; INTRAVENOUS AS NEEDED
Start: 2023-03-15

## 2023-03-01 RX ORDER — SODIUM CHLORIDE 9 MG/ML
5-250 INJECTION, SOLUTION INTRAVENOUS AS NEEDED
OUTPATIENT
Start: 2023-04-12

## 2023-03-01 RX ORDER — PALONOSETRON 0.05 MG/ML
0.25 INJECTION, SOLUTION INTRAVENOUS ONCE
Status: COMPLETED | OUTPATIENT
Start: 2023-03-01 | End: 2023-03-01

## 2023-03-01 RX ORDER — HYDROCORTISONE SODIUM SUCCINATE 100 MG/2ML
100 INJECTION, POWDER, FOR SOLUTION INTRAMUSCULAR; INTRAVENOUS AS NEEDED
OUTPATIENT
Start: 2023-04-26

## 2023-03-01 RX ORDER — DIPHENHYDRAMINE HYDROCHLORIDE 50 MG/ML
25 INJECTION, SOLUTION INTRAMUSCULAR; INTRAVENOUS AS NEEDED
Start: 2023-03-29

## 2023-03-01 RX ORDER — SODIUM CHLORIDE 9 MG/ML
5-40 INJECTION INTRAVENOUS AS NEEDED
OUTPATIENT
Start: 2023-04-14

## 2023-03-01 RX ORDER — POTASSIUM CHLORIDE 750 MG/1
40 TABLET, FILM COATED, EXTENDED RELEASE ORAL
Status: COMPLETED | OUTPATIENT
Start: 2023-03-01 | End: 2023-03-01

## 2023-03-01 RX ORDER — SODIUM CHLORIDE 9 MG/ML
5-40 INJECTION INTRAVENOUS AS NEEDED
OUTPATIENT
Start: 2023-03-29

## 2023-03-01 RX ORDER — SODIUM CHLORIDE 9 MG/ML
5-250 INJECTION, SOLUTION INTRAVENOUS AS NEEDED
OUTPATIENT
Start: 2023-03-15

## 2023-03-01 RX ORDER — SODIUM CHLORIDE 9 MG/ML
5-40 INJECTION INTRAVENOUS AS NEEDED
OUTPATIENT
Start: 2023-03-15

## 2023-03-01 RX ORDER — ACETAMINOPHEN 325 MG/1
650 TABLET ORAL AS NEEDED
Start: 2023-03-15

## 2023-03-01 RX ORDER — ONDANSETRON 2 MG/ML
8 INJECTION INTRAMUSCULAR; INTRAVENOUS AS NEEDED
OUTPATIENT
Start: 2023-04-26

## 2023-03-01 RX ORDER — HYDROCORTISONE SODIUM SUCCINATE 100 MG/2ML
100 INJECTION, POWDER, FOR SOLUTION INTRAMUSCULAR; INTRAVENOUS AS NEEDED
OUTPATIENT
Start: 2023-03-29

## 2023-03-01 RX ORDER — SODIUM CHLORIDE 0.9 % (FLUSH) 0.9 %
5-40 SYRINGE (ML) INJECTION AS NEEDED
OUTPATIENT
Start: 2023-04-14

## 2023-03-01 RX ORDER — SODIUM CHLORIDE 9 MG/ML
5-40 INJECTION INTRAVENOUS AS NEEDED
OUTPATIENT
Start: 2023-04-26

## 2023-03-01 RX ORDER — SODIUM CHLORIDE 0.9 % (FLUSH) 0.9 %
5-40 SYRINGE (ML) INJECTION AS NEEDED
OUTPATIENT
Start: 2023-04-26

## 2023-03-01 RX ORDER — SODIUM CHLORIDE 0.9 % (FLUSH) 0.9 %
5-40 SYRINGE (ML) INJECTION AS NEEDED
OUTPATIENT
Start: 2023-04-12

## 2023-03-01 RX ORDER — SODIUM CHLORIDE 0.9 % (FLUSH) 0.9 %
5-40 SYRINGE (ML) INJECTION AS NEEDED
OUTPATIENT
Start: 2023-03-17

## 2023-03-01 RX ORDER — DIPHENHYDRAMINE HYDROCHLORIDE 50 MG/ML
50 INJECTION, SOLUTION INTRAMUSCULAR; INTRAVENOUS AS NEEDED
Start: 2023-04-12

## 2023-03-01 RX ORDER — DEXTROSE MONOHYDRATE 50 MG/ML
5-250 INJECTION, SOLUTION INTRAVENOUS AS NEEDED
Status: DISPENSED | OUTPATIENT
Start: 2023-03-01 | End: 2023-03-01

## 2023-03-01 RX ADMIN — LEUCOVORIN CALCIUM 932 MG: 500 INJECTION, POWDER, LYOPHILIZED, FOR SOLUTION INTRAMUSCULAR; INTRAVENOUS at 12:10

## 2023-03-01 RX ADMIN — IRINOTECAN HYDROCHLORIDE 419 MG: 20 INJECTION, SOLUTION INTRAVENOUS at 12:10

## 2023-03-01 RX ADMIN — DEXTROSE MONOHYDRATE 25 ML/HR: 50 INJECTION, SOLUTION INTRAVENOUS at 10:59

## 2023-03-01 RX ADMIN — FLUOROURACIL 5592 MG: 50 INJECTION, SOLUTION INTRAVENOUS at 14:20

## 2023-03-01 RX ADMIN — PALONOSETRON HYDROCHLORIDE 0.25 MG: 0.25 INJECTION, SOLUTION INTRAVENOUS at 11:01

## 2023-03-01 RX ADMIN — ATROPINE SULFATE 0.4 MG: 0.4 INJECTION, SOLUTION INTRAMUSCULAR; INTRAVENOUS; SUBCUTANEOUS at 11:56

## 2023-03-01 RX ADMIN — POTASSIUM CHLORIDE 40 MEQ: 750 TABLET, FILM COATED, EXTENDED RELEASE ORAL at 13:54

## 2023-03-01 RX ADMIN — FLUOROURACIL 932 MG: 50 INJECTION, SOLUTION INTRAVENOUS at 14:15

## 2023-03-01 RX ADMIN — DEXAMETHASONE SODIUM PHOSPHATE 12 MG: 4 INJECTION, SOLUTION INTRA-ARTICULAR; INTRALESIONAL; INTRAMUSCULAR; INTRAVENOUS; SOFT TISSUE at 11:05

## 2023-03-01 NOTE — PROGRESS NOTES
Elliott Lana. is a 72 y.o. male follow up for pancreatic neuroendocrine. 1. Have you been to the ER, urgent care clinic since your last visit? Hospitalized since your last visit?no     2. Have you seen or consulted any other health care providers outside of the [address] [place] [address] since your last visit? Include any pap smears or colon screening.  No    Vitals 3/1/2023   Blood Pressure 123/63   Pulse 57   Temp 97.6   Resp 18   Height 5' 10\"   Weight 239 lb 1.6 oz   SpO2 97   BSA 2.31 m2   BMI 34.31 kg/m2   BP comment

## 2023-03-01 NOTE — PROGRESS NOTES
[facility] - Chemotherapy Progress Note    0900 Pt admit to [facility] for C4D1 Folfiri in stable condition. Assessment completed. No new concerns voiced. PAC with positive blood return. Labs were drawn and sent for processing. Pt proceeded to scheduled appt.       Chemotherapy Flowsheet 3/1/2023   Cycle C4D1   Date 3/1/2023   Drug / Regimen Folfiri   Time Down -   Pre Meds -   Notes -         VS  Patient Vitals for the past 12 hrs:   Temp Pulse Resp BP SpO2   03/01/23 1427 -- 63 -- (!) 120/58 --   03/01/23 0905 97.6 °F (36.4 °C) (!) 57 18 123/63 97 %         Medications:  Medications Administered       atropine 0.4 mg/mL injection 0.4 mg       Admin Date  03/01/2023 Action  Given Dose  0.4 mg Route  IntraVENous Administered By  Rasheeda Powell RN              dexamethasone (DECADRON) 12 mg in 0.9% sodium chloride 50 mL IVPB       Admin Date  03/01/2023 Action  New Bag Dose  12 mg Route  IntraVENous Administered By  Rasheeda Powell RN              dextrose 5% infusion       Admin Date  03/01/2023 Action  New Bag Dose  25 mL/hr Rate  25 mL/hr Route  IntraVENous Administered By  Rasheeda Powell RN              fluorouraciL (ADRUCIL) 5,592 mg in 0.9% sodium chloride 250 mL CADD Cassette       Admin Date  03/01/2023 Action  New Bag Dose  5,592 mg Rate  5.4 mL/hr Route  IntraVENous Administered By  Rasheeda Powell RN              fluorouraciL (ADRUCIL) chemo syringe 932 mg       Admin Date  03/01/2023 Action  Given Dose  932 mg Rate  279.6 mL/hr Route  IntraVENous Administered By  Rasheeda Powell RN              irinotecan (CAMPTOSAR) 419 mg in dextrose 5% 250 mL, overfill volume 25 mL chemo infusion       Admin Date  03/01/2023 Action  New Bag Dose  419 mg Rate  197.3 mL/hr Route  IntraVENous Administered By  Rasheeda Powell RN              leucovorin (WELLCOVORIN) 932 mg in dextrose 5% 250 mL, overfill volume 25 mL IVPB       Admin Date  03/01/2023 Action  New Bag Dose  932 mg Rate  214.4 mL/hr Route  IntraVENous Administered By  Malissa Meigs, RN              palonosetron HCl (ALOXI) injection 0.25 mg       Admin Date  03/01/2023 Action  Given Dose  0.25 mg Route  IntraVENous Administered By  Malissa Meigs, RN              potassium chloride SR (KLOR-CON 10) tablet 40 mEq       Admin Date  03/01/2023 Action  Given Dose  40 mEq Route  Oral Administered By  Malissa Meigs, RN                      Two nurses verified prior to administering:  drug name, drug dose, infusion volume or drug volume when prepared in a syringe, rate of administration, route of administration, expiration dates and/or times, appearance and physical integrity of the drugs, rate set on infusion pump, when used, sequencing of drug administration    1430 Pt tolerated treatment well. PAC maintained positive blood return throughout treatment, flushed with positive blood return at conclusion and connected to infusing CADD pump. D/c home in no distress. Pt aware of next appointment scheduled for 3/3 at 1300. Labs  Recent Results (from the past 12 hour(s))   BILIRUBIN, TOTAL    Collection Time: 03/01/23  9:10 AM   Result Value Ref Range    Bilirubin, total 1.3 (H) 0.2 - 1.0 MG/DL   CBC WITH AUTOMATED DIFF    Collection Time: 03/01/23  9:10 AM   Result Value Ref Range    WBC 3.5 (L) 4.1 - 11.1 K/uL    RBC 4.01 (L) 4.10 - 5.70 M/uL    HGB 10.6 (L) 12.1 - 17.0 g/dL    HCT 32.2 (L) 36.6 - 50.3 %    MCV 80.3 80.0 - 99.0 FL    MCH 26.4 26.0 - 34.0 PG    MCHC 32.9 30.0 - 36.5 g/dL    RDW 18.0 (H) 11.5 - 14.5 %    PLATELET 804 (L) 507 - 400 K/uL    MPV 9.0 8.9 - 12.9 FL    NRBC 0.0 0  WBC    ABSOLUTE NRBC 0.00 0.00 - 0.01 K/uL    NEUTROPHILS 59 32 - 75 %    LYMPHOCYTES 31 12 - 49 %    MONOCYTES 7 5 - 13 %    EOSINOPHILS 2 0 - 7 %    BASOPHILS 0 0 - 1 %    IMMATURE GRANULOCYTES 1 (H) 0.0 - 0.5 %    ABS. NEUTROPHILS 2.1 1.8 - 8.0 K/UL    ABS.  LYMPHOCYTES 1.1 0.8 - 3.5 K/UL    ABS. MONOCYTES 0.2 0.0 - 1.0 K/UL    ABS. EOSINOPHILS 0.1 0.0 - 0.4 K/UL    ABS. BASOPHILS 0.0 0.0 - 0.1 K/UL    ABS. IMM.  GRANS. 0.0 0.00 - 0.04 K/UL    DF AUTOMATED

## 2023-03-03 ENCOUNTER — HOSPITAL ENCOUNTER (OUTPATIENT)
Dept: INFUSION THERAPY | Age: 73
Discharge: HOME | End: 2023-03-03
Payer: MEDICARE

## 2023-03-03 VITALS
SYSTOLIC BLOOD PRESSURE: 138 MMHG | RESPIRATION RATE: 16 BRPM | TEMPERATURE: 97.8 F | OXYGEN SATURATION: 94 % | DIASTOLIC BLOOD PRESSURE: 62 MMHG | HEART RATE: 59 BPM

## 2023-03-03 DIAGNOSIS — C25.0 MALIGNANT NEOPLASM OF HEAD OF PANCREAS (HCC): ICD-10-CM

## 2023-03-03 DIAGNOSIS — C78.7 METASTASIS TO LIVER (HCC): ICD-10-CM

## 2023-03-03 DIAGNOSIS — C80.1 SMALL CELL CARCINOMA (HCC): ICD-10-CM

## 2023-03-03 DIAGNOSIS — Z51.11 ENCOUNTER FOR ANTINEOPLASTIC CHEMOTHERAPY: ICD-10-CM

## 2023-03-03 DIAGNOSIS — C7A.8 NEUROENDOCRINE CARCINOMA OF PANCREAS (HCC): Primary | ICD-10-CM

## 2023-03-03 PROCEDURE — 74011250636 HC RX REV CODE- 250/636: Performed by: INTERNAL MEDICINE

## 2023-03-03 PROCEDURE — 74011000250 HC RX REV CODE- 250: Performed by: INTERNAL MEDICINE

## 2023-03-03 PROCEDURE — 96523 IRRIG DRUG DELIVERY DEVICE: CPT

## 2023-03-03 RX ORDER — SODIUM CHLORIDE 9 MG/ML
5-40 INJECTION INTRAVENOUS AS NEEDED
Status: DISCONTINUED | OUTPATIENT
Start: 2023-03-03 | End: 2023-03-04 | Stop reason: HOSPADM

## 2023-03-03 RX ORDER — SODIUM CHLORIDE 9 MG/ML
5-250 INJECTION, SOLUTION INTRAVENOUS AS NEEDED
Status: DISCONTINUED | OUTPATIENT
Start: 2023-03-03 | End: 2023-03-04 | Stop reason: HOSPADM

## 2023-03-03 RX ORDER — HEPARIN 100 UNIT/ML
500 SYRINGE INTRAVENOUS AS NEEDED
Status: DISCONTINUED | OUTPATIENT
Start: 2023-03-03 | End: 2023-03-04 | Stop reason: HOSPADM

## 2023-03-03 RX ORDER — SODIUM CHLORIDE 0.9 % (FLUSH) 0.9 %
5-40 SYRINGE (ML) INJECTION AS NEEDED
Status: DISCONTINUED | OUTPATIENT
Start: 2023-03-03 | End: 2023-03-04 | Stop reason: HOSPADM

## 2023-03-03 RX ADMIN — Medication 500 UNITS: at 12:58

## 2023-03-03 RX ADMIN — SODIUM CHLORIDE, PRESERVATIVE FREE 10 ML: 5 INJECTION INTRAVENOUS at 12:57

## 2023-03-03 NOTE — PROGRESS NOTES
[facility] VISIT NOTE  Date: March 3, 2023    Name: Pushpa Meneses. MRN: [id number]         : 1950      Pt arrived at [address] [place] [address] ambulatory and in no distress for pump disconnect. No new complaints voiced. Visit Vitals  /62   Pulse (!) 59   Temp 97.8 °F (36.6 °C)   Resp 16   SpO2 94%       All chemo contents infused, pump completed. Mr. Pee Alba tolerated treatment well and was discharged from [place] [address] in stable condition. Port de-accessed, flushed & heparinized per protocol. He is to return on  March 15, 2023  for his next appointment.     Adithya Otero RN  March 3, 2023    Future Appointments:  Future Appointments   Date Time Provider Cindy Gee   3/15/2023  9:00 AM SS INF5 CH2 >4H [facility]   3/15/2023  9:45 AM Pat Patel MD [facility] BS AMB   3/17/2023 11:30 AM SS INF5 CH4 <1H [facility]   3/29/2023  9:00 AM SS INF5 CH2 >4H [facility]   3/31/2023 11:30 AM SS INF6 CH4 <1H [facility] Rodrigo Singh

## 2023-03-15 ENCOUNTER — OFFICE VISIT (OUTPATIENT)
Dept: ONCOLOGY | Age: 73
End: 2023-03-15

## 2023-03-15 ENCOUNTER — HOSPITAL ENCOUNTER (OUTPATIENT)
Dept: INFUSION THERAPY | Age: 73
Discharge: HOME | End: 2023-03-15
Payer: MEDICARE

## 2023-03-15 VITALS
DIASTOLIC BLOOD PRESSURE: 90 MMHG | SYSTOLIC BLOOD PRESSURE: 130 MMHG | OXYGEN SATURATION: 94 % | TEMPERATURE: 97.9 F | RESPIRATION RATE: 18 BRPM | HEIGHT: 70 IN | WEIGHT: 244.1 LBS | BODY MASS INDEX: 34.95 KG/M2 | HEART RATE: 58 BPM

## 2023-03-15 VITALS
HEART RATE: 54 BPM | TEMPERATURE: 97.7 F | WEIGHT: 244 LBS | BODY MASS INDEX: 34.93 KG/M2 | DIASTOLIC BLOOD PRESSURE: 70 MMHG | SYSTOLIC BLOOD PRESSURE: 127 MMHG | RESPIRATION RATE: 18 BRPM | HEIGHT: 70 IN | OXYGEN SATURATION: 96 %

## 2023-03-15 DIAGNOSIS — C7A.8 NEUROENDOCRINE CARCINOMA OF PANCREAS (HCC): Primary | ICD-10-CM

## 2023-03-15 DIAGNOSIS — C78.7 METASTASIS TO LIVER (HCC): ICD-10-CM

## 2023-03-15 DIAGNOSIS — C80.1 SMALL CELL CARCINOMA (HCC): ICD-10-CM

## 2023-03-15 DIAGNOSIS — C25.0 MALIGNANT NEOPLASM OF HEAD OF PANCREAS (HCC): ICD-10-CM

## 2023-03-15 DIAGNOSIS — C25.0 MALIGNANT NEOPLASM OF HEAD OF PANCREAS (HCC): Primary | ICD-10-CM

## 2023-03-15 DIAGNOSIS — C7A.8 NEUROENDOCRINE CARCINOMA OF PANCREAS (HCC): ICD-10-CM

## 2023-03-15 DIAGNOSIS — Z51.11 ENCOUNTER FOR ANTINEOPLASTIC CHEMOTHERAPY: ICD-10-CM

## 2023-03-15 LAB
ALBUMIN SERPL-MCNC: 3.3 G/DL (ref 3.5–5)
ALBUMIN/GLOB SERPL: 1 (ref 1.1–2.2)
ALP SERPL-CCNC: 78 U/L (ref 45–117)
ALT SERPL-CCNC: 23 U/L (ref 12–78)
ANION GAP SERPL CALC-SCNC: 5 MMOL/L (ref 5–15)
AST SERPL-CCNC: 21 U/L (ref 15–37)
BASOPHILS # BLD: 0 K/UL (ref 0–0.1)
BASOPHILS NFR BLD: 0 % (ref 0–1)
BILIRUB SERPL-MCNC: 1 MG/DL (ref 0.2–1)
BILIRUB SERPL-MCNC: 1.2 MG/DL (ref 0.2–1)
BUN SERPL-MCNC: 19 MG/DL (ref 6–20)
BUN/CREAT SERPL: 20 (ref 12–20)
CALCIUM SERPL-MCNC: 8.7 MG/DL (ref 8.5–10.1)
CHLORIDE SERPL-SCNC: 105 MMOL/L (ref 97–108)
CO2 SERPL-SCNC: 28 MMOL/L (ref 21–32)
CREAT SERPL-MCNC: 0.93 MG/DL (ref 0.7–1.3)
DIFFERENTIAL METHOD BLD: ABNORMAL
EOSINOPHIL # BLD: 0.1 K/UL (ref 0–0.4)
EOSINOPHIL NFR BLD: 2 % (ref 0–7)
ERYTHROCYTE [DISTWIDTH] IN BLOOD BY AUTOMATED COUNT: 19.1 % (ref 11.5–14.5)
GLOBULIN SER CALC-MCNC: 3.3 G/DL (ref 2–4)
GLUCOSE SERPL-MCNC: 230 MG/DL (ref 65–100)
HCT VFR BLD AUTO: 31.6 % (ref 36.6–50.3)
HGB BLD-MCNC: 10.4 G/DL (ref 12.1–17)
IMM GRANULOCYTES # BLD AUTO: 0 K/UL (ref 0–0.04)
IMM GRANULOCYTES NFR BLD AUTO: 1 % (ref 0–0.5)
LYMPHOCYTES # BLD: 0.9 K/UL (ref 0.8–3.5)
LYMPHOCYTES NFR BLD: 29 % (ref 12–49)
MCH RBC QN AUTO: 26.5 PG (ref 26–34)
MCHC RBC AUTO-ENTMCNC: 32.9 G/DL (ref 30–36.5)
MCV RBC AUTO: 80.4 FL (ref 80–99)
MONOCYTES # BLD: 0.2 K/UL (ref 0–1)
MONOCYTES NFR BLD: 7 % (ref 5–13)
NEUTS SEG # BLD: 2 K/UL (ref 1.8–8)
NEUTS SEG NFR BLD: 61 % (ref 32–75)
NRBC # BLD: 0 K/UL (ref 0–0.01)
NRBC BLD-RTO: 0 PER 100 WBC
PLATELET # BLD AUTO: 111 K/UL (ref 150–400)
PMV BLD AUTO: 10.1 FL (ref 8.9–12.9)
POTASSIUM SERPL-SCNC: 2.8 MMOL/L (ref 3.5–5.1)
PROT SERPL-MCNC: 6.6 G/DL (ref 6.4–8.2)
RBC # BLD AUTO: 3.93 M/UL (ref 4.1–5.7)
SODIUM SERPL-SCNC: 138 MMOL/L (ref 136–145)
WBC # BLD AUTO: 3.2 K/UL (ref 4.1–11.1)

## 2023-03-15 PROCEDURE — 77030012965 HC NDL HUBR BBMI -A

## 2023-03-15 PROCEDURE — G9717 DOC PT DX DEP/BP F/U NT REQ: HCPCS | Performed by: INTERNAL MEDICINE

## 2023-03-15 PROCEDURE — 74011250637 HC RX REV CODE- 250/637: Performed by: NURSE PRACTITIONER

## 2023-03-15 PROCEDURE — 74011000258 HC RX REV CODE- 258: Performed by: NURSE PRACTITIONER

## 2023-03-15 PROCEDURE — 80053 COMPREHEN METABOLIC PANEL: CPT

## 2023-03-15 PROCEDURE — G8536 NO DOC ELDER MAL SCRN: HCPCS | Performed by: INTERNAL MEDICINE

## 2023-03-15 PROCEDURE — 3078F DIAST BP <80 MM HG: CPT | Performed by: INTERNAL MEDICINE

## 2023-03-15 PROCEDURE — 1123F ACP DISCUSS/DSCN MKR DOCD: CPT | Performed by: INTERNAL MEDICINE

## 2023-03-15 PROCEDURE — 96413 CHEMO IV INFUSION 1 HR: CPT

## 2023-03-15 PROCEDURE — 96416 CHEMO PROLONG INFUSE W/PUMP: CPT

## 2023-03-15 PROCEDURE — 3074F SYST BP LT 130 MM HG: CPT | Performed by: INTERNAL MEDICINE

## 2023-03-15 PROCEDURE — 1101F PT FALLS ASSESS-DOCD LE1/YR: CPT | Performed by: INTERNAL MEDICINE

## 2023-03-15 PROCEDURE — 96375 TX/PRO/DX INJ NEW DRUG ADDON: CPT

## 2023-03-15 PROCEDURE — 82247 BILIRUBIN TOTAL: CPT

## 2023-03-15 PROCEDURE — 74011250636 HC RX REV CODE- 250/636: Performed by: NURSE PRACTITIONER

## 2023-03-15 PROCEDURE — 96411 CHEMO IV PUSH ADDL DRUG: CPT

## 2023-03-15 PROCEDURE — 85025 COMPLETE CBC W/AUTO DIFF WBC: CPT

## 2023-03-15 PROCEDURE — G8417 CALC BMI ABV UP PARAM F/U: HCPCS | Performed by: INTERNAL MEDICINE

## 2023-03-15 PROCEDURE — 36415 COLL VENOUS BLD VENIPUNCTURE: CPT

## 2023-03-15 PROCEDURE — 99215 OFFICE O/P EST HI 40 MIN: CPT | Performed by: INTERNAL MEDICINE

## 2023-03-15 PROCEDURE — 74011000250 HC RX REV CODE- 250: Performed by: NURSE PRACTITIONER

## 2023-03-15 PROCEDURE — G8427 DOCREV CUR MEDS BY ELIG CLIN: HCPCS | Performed by: INTERNAL MEDICINE

## 2023-03-15 PROCEDURE — 3017F COLORECTAL CA SCREEN DOC REV: CPT | Performed by: INTERNAL MEDICINE

## 2023-03-15 PROCEDURE — 96368 THER/DIAG CONCURRENT INF: CPT

## 2023-03-15 RX ORDER — FLUOROURACIL 50 MG/ML
400 INJECTION, SOLUTION INTRAVENOUS ONCE
Status: COMPLETED | OUTPATIENT
Start: 2023-03-15 | End: 2023-03-15

## 2023-03-15 RX ORDER — SODIUM CHLORIDE 0.9 % (FLUSH) 0.9 %
5-40 SYRINGE (ML) INJECTION AS NEEDED
Status: DISPENSED | OUTPATIENT
Start: 2023-03-15 | End: 2023-03-15

## 2023-03-15 RX ORDER — POTASSIUM CHLORIDE 750 MG/1
60 TABLET, FILM COATED, EXTENDED RELEASE ORAL
Status: COMPLETED | OUTPATIENT
Start: 2023-03-15 | End: 2023-03-15

## 2023-03-15 RX ORDER — PALONOSETRON 0.05 MG/ML
0.25 INJECTION, SOLUTION INTRAVENOUS ONCE
Status: COMPLETED | OUTPATIENT
Start: 2023-03-15 | End: 2023-03-15

## 2023-03-15 RX ORDER — ATROPINE SULFATE 0.4 MG/ML
0.4 INJECTION, SOLUTION ENDOTRACHEAL; INTRAMEDULLARY; INTRAMUSCULAR; INTRAVENOUS; SUBCUTANEOUS
Status: DISPENSED | OUTPATIENT
Start: 2023-03-15 | End: 2023-03-15

## 2023-03-15 RX ORDER — DEXTROSE MONOHYDRATE 50 MG/ML
5-250 INJECTION, SOLUTION INTRAVENOUS AS NEEDED
Status: DISPENSED | OUTPATIENT
Start: 2023-03-15 | End: 2023-03-15

## 2023-03-15 RX ADMIN — FLUOROURACIL 932 MG: 50 INJECTION, SOLUTION INTRAVENOUS at 13:21

## 2023-03-15 RX ADMIN — IRINOTECAN HYDROCHLORIDE 419 MG: 20 INJECTION, SOLUTION INTRAVENOUS at 11:40

## 2023-03-15 RX ADMIN — FLUOROURACIL 5592 MG: 50 INJECTION, SOLUTION INTRAVENOUS at 13:30

## 2023-03-15 RX ADMIN — SODIUM CHLORIDE, PRESERVATIVE FREE 10 ML: 5 INJECTION INTRAVENOUS at 09:15

## 2023-03-15 RX ADMIN — SODIUM CHLORIDE, PRESERVATIVE FREE 10 ML: 5 INJECTION INTRAVENOUS at 11:37

## 2023-03-15 RX ADMIN — DEXAMETHASONE SODIUM PHOSPHATE 12 MG: 4 INJECTION, SOLUTION INTRA-ARTICULAR; INTRALESIONAL; INTRAMUSCULAR; INTRAVENOUS; SOFT TISSUE at 10:30

## 2023-03-15 RX ADMIN — PALONOSETRON HYDROCHLORIDE 0.25 MG: 0.25 INJECTION INTRAVENOUS at 10:27

## 2023-03-15 RX ADMIN — DEXTROSE MONOHYDRATE 25 ML/HR: 50 INJECTION, SOLUTION INTRAVENOUS at 10:26

## 2023-03-15 RX ADMIN — ATROPINE SULFATE 0.4 MG: 0.4 INJECTION, SOLUTION INTRAMUSCULAR; INTRAVENOUS; SUBCUTANEOUS at 11:36

## 2023-03-15 RX ADMIN — POTASSIUM CHLORIDE 60 MEQ: 750 TABLET, FILM COATED, EXTENDED RELEASE ORAL at 11:00

## 2023-03-15 RX ADMIN — LEUCOVORIN CALCIUM 932 MG: 500 INJECTION, POWDER, LYOPHILIZED, FOR SOLUTION INTRAMUSCULAR; INTRAVENOUS at 11:40

## 2023-03-15 NOTE — PROGRESS NOTES
[facility] Chemo Progress Note    Date: March 15, 2023        9:00: Mr. Mata Arrived to [facility] for  Folfiri C5 D1 ambulatory in stable condition. Assessment was completed and port accessed by William Pulido RN. Labs drawn and sent for processing. Went to provider appointment with Medical Oncology. Returned from provider appointment. Labs reviewed. Criteria for treatment was met. Chemotherapy Flowsheet 3/15/2023   Cycle C5D1   Date 3/15/2023   Drug / Regimen Folfiri   Time Down -   Pre Meds given   Notes 60 PO K         Mr. Bowman's vitals were reviewed. Patient Vitals for the past 12 hrs:   Temp Pulse Resp BP SpO2   03/15/23 1331 97.9 °F (36.6 °C) (!) 58 18 (!) 130/90 94 %   03/15/23 0904 97.7 °F (36.5 °C) (!) 54 18 127/70 96 %         Lab results were obtained and reviewed. Recent Results (from the past 12 hour(s))   BILIRUBIN, TOTAL    Collection Time: 03/15/23  9:07 AM   Result Value Ref Range    Bilirubin, total 1.2 (H) 0.2 - 1.0 MG/DL   CBC WITH AUTOMATED DIFF    Collection Time: 03/15/23  9:07 AM   Result Value Ref Range    WBC 3.2 (L) 4.1 - 11.1 K/uL    RBC 3.93 (L) 4.10 - 5.70 M/uL    HGB 10.4 (L) 12.1 - 17.0 g/dL    HCT 31.6 (L) 36.6 - 50.3 %    MCV 80.4 80.0 - 99.0 FL    MCH 26.5 26.0 - 34.0 PG    MCHC 32.9 30.0 - 36.5 g/dL    RDW 19.1 (H) 11.5 - 14.5 %    PLATELET 631 (L) 102 - 400 K/uL    MPV 10.1 8.9 - 12.9 FL    NRBC 0.0 0  WBC    ABSOLUTE NRBC 0.00 0.00 - 0.01 K/uL    NEUTROPHILS 61 32 - 75 %    LYMPHOCYTES 29 12 - 49 %    MONOCYTES 7 5 - 13 %    EOSINOPHILS 2 0 - 7 %    BASOPHILS 0 0 - 1 %    IMMATURE GRANULOCYTES 1 (H) 0.0 - 0.5 %    ABS. NEUTROPHILS 2.0 1.8 - 8.0 K/UL    ABS. LYMPHOCYTES 0.9 0.8 - 3.5 K/UL    ABS. MONOCYTES 0.2 0.0 - 1.0 K/UL    ABS. EOSINOPHILS 0.1 0.0 - 0.4 K/UL    ABS. BASOPHILS 0.0 0.0 - 0.1 K/UL    ABS. IMM.  GRANS. 0.0 0.00 - 0.04 K/UL    DF AUTOMATED     METABOLIC PANEL, COMPREHENSIVE    Collection Time: 03/15/23  9:07 AM   Result Value Ref Range    Sodium 138 136 - 145 mmol/L    Potassium 2.8 (L) 3.5 - 5.1 mmol/L    Chloride 105 97 - 108 mmol/L    CO2 28 21 - 32 mmol/L    Anion gap 5 5 - 15 mmol/L    Glucose 230 (H) 65 - 100 mg/dL    BUN 19 6 - 20 MG/DL    Creatinine 0.93 0.70 - 1.30 MG/DL    BUN/Creatinine ratio 20 12 - 20      eGFR >60 >60 ml/min/1.73m2    Calcium 8.7 8.5 - 10.1 MG/DL    Bilirubin, total 1.0 0.2 - 1.0 MG/DL    ALT (SGPT) 23 12 - 78 U/L    AST (SGOT) 21 15 - 37 U/L    Alk. phosphatase 78 45 - 117 U/L    Protein, total 6.6 6.4 - 8.2 g/dL    Albumin 3.3 (L) 3.5 - 5.0 g/dL    Globulin 3.3 2.0 - 4.0 g/dL    A-G Ratio 1.0 (L) 1.1 - 2.2          Pre-medications  were administered as ordered and chemotherapy was initiated.   Medications Administered       atropine 0.4 mg/mL injection 0.4 mg       Admin Date  03/15/2023 Action  Given Dose  0.4 mg Route  IntraVENous Administered By  Latisha Moon              dexamethasone (DECADRON) 12 mg in 0.9% sodium chloride 50 mL IVPB       Admin Date  03/15/2023 Action  New Bag Dose  12 mg Route  IntraVENous Administered By  Devonte La RN              dextrose 5% infusion       Admin Date  03/15/2023 Action  New Bag Dose  25 mL/hr Rate  25 mL/hr Route  IntraVENous Administered By  Devonte La RN              fluorouraciL (ADRUCIL) 5,592 mg in 0.9% sodium chloride 250 mL CADD Cassette       Admin Date  03/15/2023 Action  New Bag Dose  5,592 mg Rate  5.4 mL/hr Route  IntraVENous Administered By  Devonte La RN              fluorouraciL (ADRUCIL) chemo syringe 932 mg       Admin Date  03/15/2023 Action  Given Dose  932 mg Rate  279.6 mL/hr Route  IntraVENous Administered By  Devonte La RN              irinotecan (CAMPTOSAR) 419 mg in dextrose 5% 250 mL, overfill volume 25 mL chemo infusion       Admin Date  03/15/2023 Action  New Bag Dose  419 mg Rate  197.3 mL/hr Route  IntraVENous Administered By  Latisha Moon              leucovorin (WELLCOVORIN) 932 mg in dextrose 5% 250 mL, overfill volume 25 mL IVPB       Admin Date  03/15/2023 Action  New Bag Dose  932 mg Rate  214.4 mL/hr Route  IntraVENous Administered By  Diane Clark              palonosetron HCl (ALOXI) injection 0.25 mg       Admin Date  03/15/2023 Action  Given Dose  0.25 mg Route  IntraVENous Administered By  Trung Nails RN              potassium chloride SR (KLOR-CON 10) tablet 60 mEq       Admin Date  03/15/2023 Action  Given Dose  60 mEq Route  Oral Administered By  Trung Nails RN              sodium chloride (NS) flush 5-40 mL       Admin Date  03/15/2023 Action  Given Dose  10 mL Route  IntraVENous Administered By  Orlando Rosado RN               Admin Date  03/15/2023 Action  Given Dose  10 mL Route  IntraVENous Administered By  Diane Clark                      Two nurses verified prior to administering: Drug name, Drug dose, Infusion volume or drug volume when prepared in a syringe, Rate of administration, Route of administration, Expiration dates and/or times, Appearance and physical integrity of the drugs, Rate set on infusion pump, when used, and Sequencing of drug administration. 1335 Patient tolerated treatment well. Port maintained positive blood return throughout treatment. Port flushed and connected to continuous infusion pump. Verified to be running. Patient is aware of next scheduled [facility] appointment on 3/17/23 at 11:30 AM for disconnection.      Future Appointments   Date Time Provider Cindy Gee   3/17/2023 11:30 AM [facility]   3/29/2023  9:00 AM [facility]   3/29/2023  9:15 AM Jann Patel MD [facility] AMB   3/31/2023 11:30 [facility]   4/3/2023 11:00 AM [place] [address] [facility] AMB   4/12/2023  9:00 AM [facility]   4/14/2023 11:30 AM [facility]   4/26/2023  9:30 AM [facility]   4/28/2023 11:30 AM SS INF7 CH3 <1H [facility] [place] Samra Hairston, RN, RN  March 15, 2023

## 2023-03-15 NOTE — PROGRESS NOTES
[facility] [place] at [address] [facility] [address] [facility]  W: [phone]  F: [phone]      Reason for Visit:   Abran Leahy is a 72 y.o. male who is seen for follow up of small cell carcinoma of the pancreas. Hematology/Oncology Treatment History:   CT A/P 4/19/2022: Possible mild acute pancreatitis. Prominent sigmoid diverticulosis. Enlarged prostate. Fat-containing hernias  CT Abdomen 5/17/2022: New intra-hepatic biliary dilatation and common bile duct dilatation. Ampullary prominence and 1.5 cm x 1.5 cm soft tissue density in the pancreaticoduodenal groove. New subcentimeter hepatic hypodense lesions, worrisome for metastatic disease. MRI Abdomen 5/17/2022:  Periampullary pancreatic mass with possible extension into the wall of the duodenum. Associated intrahepatic and extra hepatic biliary dilatation and mild pancreatic ductal dilatation. Small hyperenhancing lesions throughout the liver suspicious for metastatic disease. CT Chest 5/18/2022: no metastatic disease within thorax  ERCP by Dr. Raulito Abrams 5/18/2022: Large ulcerated mass seen in the second portion of the duodenum taking half the circumference in the area of the major papilla and extending distally about 3 to 4 cm, appeared to be pancreatic and eroding into the duodenum. Cholangiogram with complete obstruction of CBD. Metal stent deployed. 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 Started antibiotic therapy, has been on medication twice daily, given 10 day course. Started last week Friday.      Mild fatigue, mild diarrhea. Denies pain. Present with wife today. Review of systems was obtained and pertinent findings reviewed above. Past medical history, social history, family history, medications, and allergies are located in the electronic medical record. Physical Exam:     Visit Vitals  /70   Pulse (!) 54   Temp 97.7 °F (36.5 °C)   Resp 18   Ht 5' 10\" (1.778 m)   Wt 244 lb (110.7 kg)   SpO2 96%   BMI 35.01 kg/m²       ECOG PS: 1  General: no distress  Eyes: anicteric sclerae  HENT: oropharynx clear  Neck: supple  Lymphatic: no cervical, supraclavicular adenopathy  Respiratory: normal respiratory effort  CV: no peripheral edema  GI: soft, nontender, nondistended, no masses  Skin: no rashes; no ecchymoses; no petechiae    Results:     Lab Results   Component Value Date/Time    WBC 3.2 (L) 03/15/2023 09:07 AM    HGB 10.4 (L) 03/15/2023 09:07 AM    HCT 31.6 (L) 03/15/2023 09:07 AM    PLATELET 737 (L) 88/30/7788 09:07 AM    MCV 80.4 03/15/2023 09:07 AM    ABS. NEUTROPHILS 2.0 03/15/2023 09:07 AM     Lab Results   Component Value Date/Time    Sodium 138 03/15/2023 09:07 AM    Potassium 2.8 (L) 03/15/2023 09:07 AM    Chloride 105 03/15/2023 09:07 AM    CO2 28 03/15/2023 09:07 AM    Glucose 230 (H) 03/15/2023 09:07 AM    BUN 19 03/15/2023 09:07 AM    Creatinine 0.93 03/15/2023 09:07 AM    GFR est AA >60 09/22/2022 02:11 PM    GFR est non-AA >60 09/22/2022 02:11 PM    Calcium 8.7 03/15/2023 09:07 AM    Glucose (POC) 277 (H) 06/03/2022 11:47 AM    Creatinine (POC) 1.00 04/19/2022 02:47 PM     Lab Results   Component Value Date/Time    Bilirubin, total 1.2 (H) 03/15/2023 09:07 AM    Bilirubin, total 1.0 03/15/2023 09:07 AM    ALT (SGPT) 23 03/15/2023 09:07 AM    Alk.  phosphatase 78 03/15/2023 09:07 AM    Protein, total 6.6 03/15/2023 09:07 AM    Albumin 3.3 (L) 03/15/2023 09:07 AM    Globulin 3.3 03/15/2023 09:07 AM     Lab Results   Component Value Date/Time    Reticulocyte count 4.2 (H) 05/17/2022 03:04 PM    Iron % saturation 32 05/17/2022 03:04 PM    TIBC 289 05/17/2022 03:04 PM    Ferritin 95 02/15/2023 10:43 AM    Vitamin B12 944 05/17/2022 03:04 PM    Folate 26.7 (H) 05/17/2022 03:04 PM    Haptoglobin 90 05/17/2022 03:04 PM     05/17/2022 03:04 PM    TSH 2.18 05/17/2022 03:04 PM    Lipase 106 05/17/2022 10:10 AM    Hep C virus Ab Interp. NONREACTIVE 05/17/2022 03:04 PM       CT C/A/P 7/6/2022:  1. Status post placement of a common bile duct stent with resolution of intrahepatic biliary dilatation. 2.  The periocular mass noted previously is not as well-visualized and is likely decrease in size with some residual mass noted in the pancreatic head. 3.  Small liver lesions are no longer identified however there are 2 larger lesions in the right hepatic lobe which were not previously noted suspicious for metastatic disease. CT C/A/P 8/23/2022:   1. Multiple liver hypodensities. The larger lesion is slightly decreased in size. The other lesions are not significantly changed although are better visualized on the current study. This may be secondary to the phase of the contrast bolus. 2.  Status post common bile duct stent placement. Ill-defined hypodensity in the head of pancreas without measurable mass. Increased pancreatic atrophy and possible mild hepatic ductal dilatation noted. CT C/A/P 10/7/2022:  1. No measurable pancreatic head mass. 2.  Patent common bile duct stent. 3.  Allowing for differences in contrast enhancement in the liver, the scattered  hypodense liver lesions are stable in size. 4.  Mild splenomegaly. 5.  No new lymphadenopathy. CT C/A/P 1/4/2023:  1. Increased size and number of diffuse liver metastatic disease. 2.  Increased size of pancreatic head mass status post biliary stent placement. No intrahepatic biliary dilatation. 3.  Retroperitoneal adenopathy increased.     Assessment:   1) Metastatic Small Cell Carcinoma of the Pancreas  He has disease within his pancreas, invading into the duodenum. He additionally has metastatic disease within his liver, confirmed with biopsy. His cancer is not curable and management is with palliative intent. He is currently on second line chemotherapy with FOLFIRI. He is tolerating therapy well so far with exception of mild fatigue. We will proceed with treatment today. The patient will be seen with each cycle of therapy, and labs will be monitored to assess for toxicity from therapy. Repeat CT every 4-6 cycles. At progression we can consider options such as Lurbinectedin or immunotherapy. 2)  Biliary obstruction  Secondary to his pancreatic cancer. S/p ERCP on 5/18/2022 with stent placement with Dr. Radha Amin. Has appointment on 6/21/2023 at 1pm with Dr. Radha Amin as stent may need to be replaced in 1 year. 3)  Anemia, normocytic  CBC improved today, monitor on therapy. 4) Thrombocytopenia  Mild, secondary to chemotherapy. Monitor. 5) DM  Follows endocrinology outpatient. 6) Pancreatic insufficiency  Continue Creon, taking 6-8 daily now. 7) Emotional Well Being  No psychosocial concerns identified today. Patient has adequate support. Has applied for care card. 8) Hypokalemia  Due to diarrhea, chemotherapy. Will replete in infusion center PRN. Plan:     Proceed today with C5 of FOLFIRI (irinotecan 180mg/m2, leucovorin 400 mg/m2, fluorouracil 400 mg/m2, and a 46 hour infusion of fluorouracil 2400 mg/m2) given every 2 weeks  Labs: CBC, BMP prior to each treatment, hepatic function panel   Prophylactic antiemetics: Palonosetron and dexamethasone on the day of each chemotherapy infusion. Dexamethasone 8mg PO on days 2 and 3 after chemotherapy.   PRN antiemetics: Ondansetron, Prochlorperazine  PRN antidiarrheals: Atropine 0.4mg IV every 2 hours PRN during or immediately after irinotecan infusion, Imodium every 2 hours as needed at home  CT after C6   Return to clinic every 2 weeks on therapy    I personally saw and evaluated the patient and performed the key components of medical decision making. The history, physical exam, and documentation were performed by Nahum Ahmadi NP. I reviewed and verified the above documentation and modified it as needed. He is tolerating systemic therapy with manageable toxicity, so we will proceed with treatment.       Signed By: Lam Casanova MD

## 2023-03-15 NOTE — TELEPHONE ENCOUNTER
2/15/23- Received an approval letter from [address] that patient is now approved for Creon with the cost of care $0 copay until 12/31/23. No further financial assistance required at this time.

## 2023-03-15 NOTE — PROGRESS NOTES
Chinedu Olivo. is a 72 y.o. male follow up for SCC. 1. Have you been to the ER, urgent care clinic since your last visit? Hospitalized since your last visit?no     2. Have you seen or consulted any other health care providers outside of the [address] [place] [address] since your last visit? Include any pap smears or colon screening.  No    Vitals 3/15/2023   Blood Pressure 127/70   Pulse 54   Temp 97.7   Resp 18   Height 5' 10\"   Weight 244 lb 1.6 oz   SpO2 96   BSA 2.34 m2   BMI 35.02 kg/m2   BP comment

## 2023-03-17 ENCOUNTER — HOSPITAL ENCOUNTER (OUTPATIENT)
Dept: INFUSION THERAPY | Age: 73
Discharge: HOME | End: 2023-03-17
Payer: MEDICARE

## 2023-03-17 VITALS
RESPIRATION RATE: 18 BRPM | DIASTOLIC BLOOD PRESSURE: 67 MMHG | TEMPERATURE: 98 F | HEART RATE: 56 BPM | SYSTOLIC BLOOD PRESSURE: 144 MMHG | OXYGEN SATURATION: 96 %

## 2023-03-17 DIAGNOSIS — C78.7 METASTASIS TO LIVER (HCC): ICD-10-CM

## 2023-03-17 DIAGNOSIS — C7A.8 NEUROENDOCRINE CARCINOMA OF PANCREAS (HCC): Primary | ICD-10-CM

## 2023-03-17 DIAGNOSIS — Z51.11 ENCOUNTER FOR ANTINEOPLASTIC CHEMOTHERAPY: ICD-10-CM

## 2023-03-17 DIAGNOSIS — C25.0 MALIGNANT NEOPLASM OF HEAD OF PANCREAS (HCC): ICD-10-CM

## 2023-03-17 DIAGNOSIS — C80.1 SMALL CELL CARCINOMA (HCC): ICD-10-CM

## 2023-03-17 PROCEDURE — 74011250636 HC RX REV CODE- 250/636: Performed by: NURSE PRACTITIONER

## 2023-03-17 PROCEDURE — 96523 IRRIG DRUG DELIVERY DEVICE: CPT

## 2023-03-17 PROCEDURE — 74011000250 HC RX REV CODE- 250: Performed by: NURSE PRACTITIONER

## 2023-03-17 RX ORDER — SODIUM CHLORIDE 0.9 % (FLUSH) 0.9 %
5-40 SYRINGE (ML) INJECTION AS NEEDED
Status: DISPENSED | OUTPATIENT
Start: 2023-03-17 | End: 2023-03-17

## 2023-03-17 RX ORDER — HEPARIN 100 UNIT/ML
500 SYRINGE INTRAVENOUS AS NEEDED
Status: ACTIVE | OUTPATIENT
Start: 2023-03-17 | End: 2023-03-17

## 2023-03-17 RX ADMIN — Medication 500 UNITS: at 11:59

## 2023-03-17 RX ADMIN — SODIUM CHLORIDE, PRESERVATIVE FREE 10 ML: 5 INJECTION INTRAVENOUS at 11:59

## 2023-03-29 ENCOUNTER — OFFICE VISIT (OUTPATIENT)
Dept: ONCOLOGY | Age: 73
End: 2023-03-29
Payer: MEDICARE

## 2023-03-29 ENCOUNTER — HOSPITAL ENCOUNTER (OUTPATIENT)
Dept: INFUSION THERAPY | Age: 73
Discharge: HOME | End: 2023-03-29
Payer: MEDICARE

## 2023-03-29 VITALS
HEIGHT: 70 IN | BODY MASS INDEX: 34.84 KG/M2 | SYSTOLIC BLOOD PRESSURE: 161 MMHG | RESPIRATION RATE: 19 BRPM | WEIGHT: 243.4 LBS | OXYGEN SATURATION: 94 % | TEMPERATURE: 98.2 F | HEART RATE: 60 BPM | DIASTOLIC BLOOD PRESSURE: 75 MMHG

## 2023-03-29 VITALS
BODY MASS INDEX: 34.79 KG/M2 | DIASTOLIC BLOOD PRESSURE: 63 MMHG | RESPIRATION RATE: 20 BRPM | SYSTOLIC BLOOD PRESSURE: 143 MMHG | OXYGEN SATURATION: 98 % | TEMPERATURE: 98.2 F | HEART RATE: 65 BPM | WEIGHT: 243 LBS | HEIGHT: 70 IN

## 2023-03-29 DIAGNOSIS — C25.0 MALIGNANT NEOPLASM OF HEAD OF PANCREAS (HCC): ICD-10-CM

## 2023-03-29 DIAGNOSIS — C78.7 METASTASIS TO LIVER (HCC): ICD-10-CM

## 2023-03-29 DIAGNOSIS — C80.1 SMALL CELL CARCINOMA (HCC): ICD-10-CM

## 2023-03-29 DIAGNOSIS — Z51.11 ENCOUNTER FOR ANTINEOPLASTIC CHEMOTHERAPY: ICD-10-CM

## 2023-03-29 DIAGNOSIS — C25.0 MALIGNANT NEOPLASM OF HEAD OF PANCREAS (HCC): Primary | ICD-10-CM

## 2023-03-29 DIAGNOSIS — C7A.8 NEUROENDOCRINE CARCINOMA OF PANCREAS (HCC): Primary | ICD-10-CM

## 2023-03-29 LAB
ALBUMIN SERPL-MCNC: 3.3 G/DL (ref 3.5–5)
ALBUMIN/GLOB SERPL: 1.1 (ref 1.1–2.2)
ALP SERPL-CCNC: 88 U/L (ref 45–117)
ALT SERPL-CCNC: 24 U/L (ref 12–78)
ANION GAP SERPL CALC-SCNC: 5 MMOL/L (ref 5–15)
AST SERPL-CCNC: 21 U/L (ref 15–37)
BASOPHILS # BLD: 0 K/UL (ref 0–0.1)
BASOPHILS NFR BLD: 0 % (ref 0–1)
BILIRUB SERPL-MCNC: 1.2 MG/DL (ref 0.2–1)
BUN SERPL-MCNC: 12 MG/DL (ref 6–20)
BUN/CREAT SERPL: 12 (ref 12–20)
CALCIUM SERPL-MCNC: 8.6 MG/DL (ref 8.5–10.1)
CHLORIDE SERPL-SCNC: 104 MMOL/L (ref 97–108)
CO2 SERPL-SCNC: 28 MMOL/L (ref 21–32)
CREAT SERPL-MCNC: 1 MG/DL (ref 0.7–1.3)
DIFFERENTIAL METHOD BLD: ABNORMAL
EOSINOPHIL # BLD: 0.1 K/UL (ref 0–0.4)
EOSINOPHIL NFR BLD: 2 % (ref 0–7)
ERYTHROCYTE [DISTWIDTH] IN BLOOD BY AUTOMATED COUNT: 19.6 % (ref 11.5–14.5)
GLOBULIN SER CALC-MCNC: 3.1 G/DL (ref 2–4)
GLUCOSE SERPL-MCNC: 263 MG/DL (ref 65–100)
HCT VFR BLD AUTO: 32.2 % (ref 36.6–50.3)
HGB BLD-MCNC: 10.5 G/DL (ref 12.1–17)
IMM GRANULOCYTES # BLD AUTO: 0 K/UL (ref 0–0.04)
IMM GRANULOCYTES NFR BLD AUTO: 1 % (ref 0–0.5)
LYMPHOCYTES # BLD: 1.2 K/UL (ref 0.8–3.5)
LYMPHOCYTES NFR BLD: 34 % (ref 12–49)
MCH RBC QN AUTO: 26.6 PG (ref 26–34)
MCHC RBC AUTO-ENTMCNC: 32.6 G/DL (ref 30–36.5)
MCV RBC AUTO: 81.7 FL (ref 80–99)
MONOCYTES # BLD: 0.2 K/UL (ref 0–1)
MONOCYTES NFR BLD: 7 % (ref 5–13)
NEUTS SEG # BLD: 2 K/UL (ref 1.8–8)
NEUTS SEG NFR BLD: 56 % (ref 32–75)
NRBC # BLD: 0 K/UL (ref 0–0.01)
NRBC BLD-RTO: 0 PER 100 WBC
PLATELET # BLD AUTO: 116 K/UL (ref 150–400)
PMV BLD AUTO: 10.3 FL (ref 8.9–12.9)
POTASSIUM SERPL-SCNC: 3.5 MMOL/L (ref 3.5–5.1)
PROT SERPL-MCNC: 6.4 G/DL (ref 6.4–8.2)
RBC # BLD AUTO: 3.94 M/UL (ref 4.1–5.7)
SODIUM SERPL-SCNC: 137 MMOL/L (ref 136–145)
WBC # BLD AUTO: 3.4 K/UL (ref 4.1–11.1)

## 2023-03-29 PROCEDURE — 96415 CHEMO IV INFUSION ADDL HR: CPT

## 2023-03-29 PROCEDURE — 74011250636 HC RX REV CODE- 250/636: Performed by: NURSE PRACTITIONER

## 2023-03-29 PROCEDURE — 77030012965 HC NDL HUBR BBMI -A

## 2023-03-29 PROCEDURE — G8417 CALC BMI ABV UP PARAM F/U: HCPCS | Performed by: INTERNAL MEDICINE

## 2023-03-29 PROCEDURE — 96411 CHEMO IV PUSH ADDL DRUG: CPT

## 2023-03-29 PROCEDURE — 85025 COMPLETE CBC W/AUTO DIFF WBC: CPT

## 2023-03-29 PROCEDURE — 96416 CHEMO PROLONG INFUSE W/PUMP: CPT

## 2023-03-29 PROCEDURE — 3017F COLORECTAL CA SCREEN DOC REV: CPT | Performed by: INTERNAL MEDICINE

## 2023-03-29 PROCEDURE — G8427 DOCREV CUR MEDS BY ELIG CLIN: HCPCS | Performed by: INTERNAL MEDICINE

## 2023-03-29 PROCEDURE — 1123F ACP DISCUSS/DSCN MKR DOCD: CPT | Performed by: INTERNAL MEDICINE

## 2023-03-29 PROCEDURE — 96368 THER/DIAG CONCURRENT INF: CPT

## 2023-03-29 PROCEDURE — 1101F PT FALLS ASSESS-DOCD LE1/YR: CPT | Performed by: INTERNAL MEDICINE

## 2023-03-29 PROCEDURE — 96367 TX/PROPH/DG ADDL SEQ IV INF: CPT

## 2023-03-29 PROCEDURE — 99215 OFFICE O/P EST HI 40 MIN: CPT | Performed by: INTERNAL MEDICINE

## 2023-03-29 PROCEDURE — G9717 DOC PT DX DEP/BP F/U NT REQ: HCPCS | Performed by: INTERNAL MEDICINE

## 2023-03-29 PROCEDURE — 74011000258 HC RX REV CODE- 258: Performed by: NURSE PRACTITIONER

## 2023-03-29 PROCEDURE — 80053 COMPREHEN METABOLIC PANEL: CPT

## 2023-03-29 PROCEDURE — 3077F SYST BP >= 140 MM HG: CPT | Performed by: INTERNAL MEDICINE

## 2023-03-29 PROCEDURE — G8536 NO DOC ELDER MAL SCRN: HCPCS | Performed by: INTERNAL MEDICINE

## 2023-03-29 PROCEDURE — 96413 CHEMO IV INFUSION 1 HR: CPT

## 2023-03-29 PROCEDURE — 36415 COLL VENOUS BLD VENIPUNCTURE: CPT

## 2023-03-29 PROCEDURE — 3078F DIAST BP <80 MM HG: CPT | Performed by: INTERNAL MEDICINE

## 2023-03-29 PROCEDURE — 96375 TX/PRO/DX INJ NEW DRUG ADDON: CPT

## 2023-03-29 RX ORDER — FLUOROURACIL 50 MG/ML
400 INJECTION, SOLUTION INTRAVENOUS ONCE
Status: COMPLETED | OUTPATIENT
Start: 2023-03-29 | End: 2023-03-29

## 2023-03-29 RX ORDER — ATROPINE SULFATE 0.4 MG/ML
0.4 INJECTION, SOLUTION ENDOTRACHEAL; INTRAMEDULLARY; INTRAMUSCULAR; INTRAVENOUS; SUBCUTANEOUS
Status: DISPENSED | OUTPATIENT
Start: 2023-03-29 | End: 2023-03-29

## 2023-03-29 RX ORDER — DEXTROSE MONOHYDRATE 50 MG/ML
5-250 INJECTION, SOLUTION INTRAVENOUS AS NEEDED
Status: DISPENSED | OUTPATIENT
Start: 2023-03-29 | End: 2023-03-29

## 2023-03-29 RX ORDER — SODIUM CHLORIDE 0.9 % (FLUSH) 0.9 %
5-40 SYRINGE (ML) INJECTION AS NEEDED
Status: DISPENSED | OUTPATIENT
Start: 2023-03-29 | End: 2023-03-29

## 2023-03-29 RX ORDER — LIDOCAINE AND PRILOCAINE 25; 25 MG/G; MG/G
CREAM TOPICAL AS NEEDED
Qty: 30 G | Refills: 0 | Status: SHIPPED | OUTPATIENT
Start: 2023-03-29

## 2023-03-29 RX ORDER — PALONOSETRON 0.05 MG/ML
0.25 INJECTION, SOLUTION INTRAVENOUS ONCE
Status: COMPLETED | OUTPATIENT
Start: 2023-03-29 | End: 2023-03-29

## 2023-03-29 RX ADMIN — DEXAMETHASONE SODIUM PHOSPHATE 12 MG: 4 INJECTION, SOLUTION INTRA-ARTICULAR; INTRALESIONAL; INTRAMUSCULAR; INTRAVENOUS; SOFT TISSUE at 11:12

## 2023-03-29 RX ADMIN — FLUOROURACIL 5592 MG: 50 INJECTION, SOLUTION INTRAVENOUS at 14:18

## 2023-03-29 RX ADMIN — IRINOTECAN HYDROCHLORIDE 419 MG: 20 INJECTION, SOLUTION INTRAVENOUS at 12:24

## 2023-03-29 RX ADMIN — PALONOSETRON 0.25 MG: 0.05 INJECTION, SOLUTION INTRAVENOUS at 11:10

## 2023-03-29 RX ADMIN — ATROPINE SULFATE 0.4 MG: 0.4 INJECTION, SOLUTION INTRAMUSCULAR; INTRAVENOUS; SUBCUTANEOUS at 12:12

## 2023-03-29 RX ADMIN — LEUCOVORIN CALCIUM 932 MG: 500 INJECTION, POWDER, LYOPHILIZED, FOR SOLUTION INTRAMUSCULAR; INTRAVENOUS at 12:24

## 2023-03-29 RX ADMIN — DEXTROSE MONOHYDRATE 25 ML/HR: 50 INJECTION, SOLUTION INTRAVENOUS at 11:10

## 2023-03-29 RX ADMIN — FLUOROURACIL 932 MG: 50 INJECTION, SOLUTION INTRAVENOUS at 14:09

## 2023-03-29 NOTE — PROGRESS NOTES
[facility] [place] at [address], [address] [facility] [address] [facility]  W: [phone]  F: [phone]      Reason for Visit:   Brendon Berry. is a 72 y.o. male who is seen for follow up of small cell carcinoma of the pancreas. Hematology/Oncology Treatment History:   CT A/P 4/19/2022: Possible mild acute pancreatitis. Prominent sigmoid diverticulosis. Enlarged prostate. Fat-containing hernias  CT Abdomen 5/17/2022: New intra-hepatic biliary dilatation and common bile duct dilatation. Ampullary prominence and 1.5 cm x 1.5 cm soft tissue density in the pancreaticoduodenal groove. New subcentimeter hepatic hypodense lesions, worrisome for metastatic disease. MRI Abdomen 5/17/2022:  Periampullary pancreatic mass with possible extension into the wall of the duodenum. Associated intrahepatic and extra hepatic biliary dilatation and mild pancreatic ductal dilatation. Small hyperenhancing lesions throughout the liver suspicious for metastatic disease. CT Chest 5/18/2022: no metastatic disease within thorax  ERCP by Dr. Romario Shields 5/18/2022: Large ulcerated mass seen in the second portion of the duodenum taking half the circumference in the area of the major papilla and extending distally about 3 to 4 cm, appeared to be pancreatic and eroding into the duodenum. Cholangiogram with complete obstruction of CBD. Metal stent deployed. 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 Taking Creon as prescribed. Denies nausea or vomiting. Mild fatigue. Denies pain today. He has been enjoying time watching baseball with his grandchildren. Present with wife today. Review of systems was obtained and pertinent findings reviewed above. Past medical history, social history, family history, medications, and allergies are located in the electronic medical record. Physical Exam:     Visit Vitals  BP (!) 143/63   Pulse 65   Temp 98.2 °F (36.8 °C)   Resp 20   Ht 5' 10\" (1.778 m)   Wt 243 lb (110.2 kg)   SpO2 98%   BMI 34.87 kg/m²     ECOG PS: 1  General: no distress  Eyes: anicteric sclerae  HENT: oropharynx clear  Neck: supple  Lymphatic: no cervical, supraclavicular adenopathy  Respiratory: normal respiratory effort  CV: no peripheral edema  GI: soft, nontender, nondistended, no masses  Skin: no rashes; no ecchymoses; no petechiae    Results:     Lab Results   Component Value Date/Time    WBC 3.4 (L) 03/29/2023 09:17 AM    HGB 10.5 (L) 03/29/2023 09:17 AM    HCT 32.2 (L) 03/29/2023 09:17 AM    PLATELET 754 (L) 15/29/9624 09:17 AM    MCV 81.7 03/29/2023 09:17 AM    ABS. NEUTROPHILS 2.0 03/29/2023 09:17 AM     Lab Results   Component Value Date/Time    Sodium 137 03/29/2023 09:17 AM    Potassium 3.5 03/29/2023 09:17 AM    Chloride 104 03/29/2023 09:17 AM    CO2 28 03/29/2023 09:17 AM    Glucose 263 (H) 03/29/2023 09:17 AM    BUN 12 03/29/2023 09:17 AM    Creatinine 1.00 03/29/2023 09:17 AM    GFR est AA >60 09/22/2022 02:11 PM    GFR est non-AA >60 09/22/2022 02:11 PM    Calcium 8.6 03/29/2023 09:17 AM    Glucose (POC) 277 (H) 06/03/2022 11:47 AM    Creatinine (POC) 1.00 04/19/2022 02:47 PM     Lab Results   Component Value Date/Time    Bilirubin, total 1.2 (H) 03/29/2023 09:17 AM    ALT (SGPT) 24 03/29/2023 09:17 AM    Alk.  phosphatase 88 03/29/2023 09:17 AM    Protein, total 6.4 03/29/2023 09:17 AM    Albumin 3.3 (L) 03/29/2023 09:17 AM    Globulin 3.1 03/29/2023 09:17 AM     Lab Results   Component Value Date/Time    Reticulocyte count 4.2 (H) 05/17/2022 03:04 PM    Iron % saturation 32 05/17/2022 03:04 PM    TIBC 289 05/17/2022 03:04 PM Ferritin 95 02/15/2023 10:43 AM    Vitamin B12 944 05/17/2022 03:04 PM    Folate 26.7 (H) 05/17/2022 03:04 PM    Haptoglobin 90 05/17/2022 03:04 PM     05/17/2022 03:04 PM    TSH 2.18 05/17/2022 03:04 PM    Lipase 106 05/17/2022 10:10 AM    Hep C virus Ab Interp. NONREACTIVE 05/17/2022 03:04 PM       CT C/A/P 7/6/2022:  1. Status post placement of a common bile duct stent with resolution of intrahepatic biliary dilatation. 2.  The periocular mass noted previously is not as well-visualized and is likely decrease in size with some residual mass noted in the pancreatic head. 3.  Small liver lesions are no longer identified however there are 2 larger lesions in the right hepatic lobe which were not previously noted suspicious for metastatic disease. CT C/A/P 8/23/2022:   1. Multiple liver hypodensities. The larger lesion is slightly decreased in size. The other lesions are not significantly changed although are better visualized on the current study. This may be secondary to the phase of the contrast bolus. 2.  Status post common bile duct stent placement. Ill-defined hypodensity in the head of pancreas without measurable mass. Increased pancreatic atrophy and possible mild hepatic ductal dilatation noted. CT C/A/P 10/7/2022:  1. No measurable pancreatic head mass. 2.  Patent common bile duct stent. 3.  Allowing for differences in contrast enhancement in the liver, the scattered  hypodense liver lesions are stable in size. 4.  Mild splenomegaly. 5.  No new lymphadenopathy. CT C/A/P 1/4/2023:  1. Increased size and number of diffuse liver metastatic disease. 2.  Increased size of pancreatic head mass status post biliary stent placement. No intrahepatic biliary dilatation. 3.  Retroperitoneal adenopathy increased. Assessment:   1) Metastatic Small Cell Carcinoma of the Pancreas  He has disease within his pancreas, invading into the duodenum.   He additionally has metastatic disease within his liver, confirmed with biopsy. His cancer is not curable and management is with palliative intent. He is currently on second line chemotherapy with FOLFIRI. He is tolerating therapy well so far with exception of mild fatigue. We will proceed with treatment today. The patient will be seen with each cycle of therapy, and labs will be monitored to assess for toxicity from therapy. Repeat CT every 4-6 cycles. At progression we can consider options such as Lurbinectedin or immunotherapy. 2)  Biliary obstruction  Secondary to his pancreatic cancer. S/p ERCP on 5/18/2022 with stent placement with Dr. Richard Harrison. Has appointment on 6/21/2023 at 1pm with Dr. Richard Harrison as stent may need to be replaced in 1 year. 3)  Anemia, normocytic  CBC improved today, monitor on therapy. 4) Thrombocytopenia  Mild, secondary to chemotherapy. Monitor. 5) DM  Follows endocrinology outpatient. Has appointment upcoming 4/18/2023. 6) Pancreatic insufficiency  Continue Creon, taking 6-8 daily now. 7) Emotional Well Being  No psychosocial concerns identified today. Patient has adequate support. Has applied for care card. 8) Hypokalemia  Due to diarrhea, chemotherapy. Will replete in infusion center PRN. Plan:     Proceed today with C6 of FOLFIRI (irinotecan 180mg/m2, leucovorin 400 mg/m2, fluorouracil 400 mg/m2, and a 46 hour infusion of fluorouracil 2400 mg/m2) given every 2 weeks  Labs: CBC, BMP prior to each treatment, hepatic function panel   Prophylactic antiemetics: Palonosetron and dexamethasone on the day of each chemotherapy infusion. Dexamethasone 8mg PO on days 2 and 3 after chemotherapy.   PRN antiemetics: Ondansetron, Prochlorperazine  PRN antidiarrheals: Atropine 0.4mg IV every 2 hours PRN during or immediately after irinotecan infusion, Imodium every 2 hours as needed at home  CT after this cycle, ordered  Return to clinic every 2 weeks on therapy    I personally saw and evaluated the patient and performed the key components of medical decision making. The history, physical exam, and documentation were performed by Germain Arroyo NP. I reviewed and verified the above documentation and modified it as needed. He is tolerating systemic therapy with manageable toxicity, so we will proceed with treatment.       Signed By: Letty Rader MD

## 2023-03-29 NOTE — PROGRESS NOTES
[facility] Chemo Progress Note    Date: March 29, 2023        0855: Mr. Benita López Arrived to [facility] for  [facility] [place] C6 D1 ambulatory in stable condition. Assessment was completed and port accessed by Sinan Reza RN. Labs drawn and sent for processing. Went to provider appointment with Medical Oncology. Returned from provider appointment. Labs reviewed. Criteria for treatment was met. Chemotherapy Flowsheet 3/29/2023   Cycle C6D1   Date 3/29/2023   Drug / Regimen Folfiri   Time Down -   Pre Meds given   Notes given       Mr. Eliud Low vitals were reviewed. Patient Vitals for the past 12 hrs:   Temp Pulse Resp BP SpO2   03/29/23 1424 -- 60 19 (!) 161/75 94 %   03/29/23 0901 98.2 °F (36.8 °C) 65 20 (!) 143/63 98 %         Lab results were obtained and reviewed. Recent Results (from the past 12 hour(s))   CBC WITH AUTOMATED DIFF    Collection Time: 03/29/23  9:17 AM   Result Value Ref Range    WBC 3.4 (L) 4.1 - 11.1 K/uL    RBC 3.94 (L) 4.10 - 5.70 M/uL    HGB 10.5 (L) 12.1 - 17.0 g/dL    HCT 32.2 (L) 36.6 - 50.3 %    MCV 81.7 80.0 - 99.0 FL    MCH 26.6 26.0 - 34.0 PG    MCHC 32.6 30.0 - 36.5 g/dL    RDW 19.6 (H) 11.5 - 14.5 %    PLATELET 368 (L) 922 - 400 K/uL    MPV 10.3 8.9 - 12.9 FL    NRBC 0.0 0  WBC    ABSOLUTE NRBC 0.00 0.00 - 0.01 K/uL    NEUTROPHILS 56 32 - 75 %    LYMPHOCYTES 34 12 - 49 %    MONOCYTES 7 5 - 13 %    EOSINOPHILS 2 0 - 7 %    BASOPHILS 0 0 - 1 %    IMMATURE GRANULOCYTES 1 (H) 0.0 - 0.5 %    ABS. NEUTROPHILS 2.0 1.8 - 8.0 K/UL    ABS. LYMPHOCYTES 1.2 0.8 - 3.5 K/UL    ABS. MONOCYTES 0.2 0.0 - 1.0 K/UL    ABS. EOSINOPHILS 0.1 0.0 - 0.4 K/UL    ABS. BASOPHILS 0.0 0.0 - 0.1 K/UL    ABS. IMM.  GRANS. 0.0 0.00 - 0.04 K/UL    DF AUTOMATED     METABOLIC PANEL, COMPREHENSIVE    Collection Time: 03/29/23  9:17 AM   Result Value Ref Range    Sodium 137 136 - 145 mmol/L    Potassium 3.5 3.5 - 5.1 mmol/L    Chloride 104 97 - 108 mmol/L    CO2 28 21 - 32 mmol/L    Anion gap 5 5 - 15 mmol/L    Glucose 263 (H) 65 - 100 mg/dL    BUN 12 6 - 20 MG/DL    Creatinine 1.00 0.70 - 1.30 MG/DL    BUN/Creatinine ratio 12 12 - 20      eGFR >60 >60 ml/min/1.73m2    Calcium 8.6 8.5 - 10.1 MG/DL    Bilirubin, total 1.2 (H) 0.2 - 1.0 MG/DL    ALT (SGPT) 24 12 - 78 U/L    AST (SGOT) 21 15 - 37 U/L    Alk. phosphatase 88 45 - 117 U/L    Protein, total 6.4 6.4 - 8.2 g/dL    Albumin 3.3 (L) 3.5 - 5.0 g/dL    Globulin 3.1 2.0 - 4.0 g/dL    A-G Ratio 1.1 1.1 - 2.2           Pre-medications  were administered as ordered and chemotherapy was initiated.   Medications Administered       atropine 0.4 mg/mL injection 0.4 mg       Admin Date  03/29/2023 Action  Given Dose  0.4 mg Route  IntraVENous Administered By  Vargas Luna RN              dexamethasone (DECADRON) 12 mg in 0.9% sodium chloride 50 mL IVPB       Admin Date  03/29/2023 Action  New Bag Dose  12 mg Route  IntraVENous Administered By  [facility]              dextrose 5% infusion       Admin Date  03/29/2023 Action  New Bag Dose  25 mL/hr Rate  25 mL/hr Route  IntraVENous Administered By  [facility]              fluorouraciL (ADRUCIL) 5,592 mg in 0.9% sodium chloride 250 mL CADD Cassette       Admin Date  03/29/2023 Action  New Bag Dose  5,592 mg Rate  5.4 mL/hr Route  IntraVENous Administered By  Vargas Luna RN              fluorouraciL (ADRUCIL) chemo syringe 932 mg       Admin Date  03/29/2023 Action  Given Dose  932 mg Rate  279.6 mL/hr Route  IntraVENous Administered By  Vargas Luna RN              irinotecan (CAMPTOSAR) 419 mg in dextrose 5% 250 mL, overfill volume 25 mL chemo infusion       Admin Date  03/29/2023 Action  New Bag Dose  419 mg Rate  197.3 mL/hr Route  IntraVENous Administered By  Vargas Luna RN              leucovorin (WELLCOVORIN) 932 mg in dextrose 5% 250 mL, overfill volume 25 mL IVPB       Admin Date  03/29/2023 Action  New Bag Dose  932 mg Rate  214.4 mL/hr Route  IntraVENous Administered By  Vargas Luna RN palonosetron HCl (ALOXI) injection 0.25 mg       Admin Date  03/29/2023 Action  Given Dose  0.25 mg Route  IntraVENous Administered By  Javier Arriola                      Two nurses verified prior to administering: Drug name, Drug dose, Infusion volume or drug volume when prepared in a syringe, Rate of administration, Route of administration, Expiration dates and/or times, Appearance and physical integrity of the drugs, Rate set on infusion pump, when used, and Sequencing of drug administration. 1430 Patient tolerated treatment well. Port maintained positive blood return throughout treatment. Port flushed and connected to continuous infusion pump. Verified to be running. Patient is aware of next scheduled [facility] appointment on 3/31/23 at 11:30 AM for disconnection.    Future Appointments   Date Time Provider Cindy Gee   3/31/2023 11:30 AM [facility]   4/3/2023 11:00 AM [place] [address] [facility]AV[facility] BS AMB   4/12/2023  9:00 AM [facility]   4/12/2023  9:15 AM Bcuk Patel MD ONCSF BS AMB   4/14/2023 11:30 AM SS INF7 [facility]   4/26/2023  9:30 AM SS INF5 [facility]2 [facility]   4/28/2023 11:30 AM SS INF7 CH3 <1H [facility] Nuvia Anand, RN, RN  March 29, 2023

## 2023-03-29 NOTE — PROGRESS NOTES
[facility]. is a 72 y.o. male follow up for SCC of pancreas. 1. Have you been to the ER, urgent care clinic since your last visit? Hospitalized since your last visit?no      2. Have you seen or consulted any other health care providers outside of the [address] [place] [address] since your last visit? Include any pap smears or colon screening.  No        Vitals 3/29/2023   Blood Pressure 143/63   Pulse 65   Temp 98.2   Resp 20   Height 5' 10\"   Weight 243 lb 6.4 oz   SpO2 98   BSA 2.34 m2   BMI 34.92 kg/m2

## 2023-03-31 ENCOUNTER — HOSPITAL ENCOUNTER (OUTPATIENT)
Dept: INFUSION THERAPY | Age: 73
Discharge: HOME | End: 2023-03-31
Payer: MEDICARE

## 2023-03-31 VITALS
WEIGHT: 242 LBS | RESPIRATION RATE: 17 BRPM | TEMPERATURE: 98.1 F | BODY MASS INDEX: 34.65 KG/M2 | DIASTOLIC BLOOD PRESSURE: 71 MMHG | HEART RATE: 52 BPM | HEIGHT: 70 IN | SYSTOLIC BLOOD PRESSURE: 146 MMHG | OXYGEN SATURATION: 95 %

## 2023-03-31 DIAGNOSIS — C7A.8 NEUROENDOCRINE CARCINOMA OF PANCREAS (HCC): Primary | ICD-10-CM

## 2023-03-31 DIAGNOSIS — C78.7 METASTASIS TO LIVER (HCC): ICD-10-CM

## 2023-03-31 DIAGNOSIS — C80.1 SMALL CELL CARCINOMA (HCC): ICD-10-CM

## 2023-03-31 DIAGNOSIS — Z51.11 ENCOUNTER FOR ANTINEOPLASTIC CHEMOTHERAPY: ICD-10-CM

## 2023-03-31 DIAGNOSIS — C25.0 MALIGNANT NEOPLASM OF HEAD OF PANCREAS (HCC): ICD-10-CM

## 2023-03-31 PROCEDURE — 74011000250 HC RX REV CODE- 250: Performed by: NURSE PRACTITIONER

## 2023-03-31 PROCEDURE — 96523 IRRIG DRUG DELIVERY DEVICE: CPT

## 2023-03-31 PROCEDURE — 74011250636 HC RX REV CODE- 250/636: Performed by: NURSE PRACTITIONER

## 2023-03-31 RX ORDER — HEPARIN 100 UNIT/ML
500 SYRINGE INTRAVENOUS AS NEEDED
Status: ACTIVE | OUTPATIENT
Start: 2023-03-31 | End: 2023-03-31

## 2023-03-31 RX ORDER — SODIUM CHLORIDE 0.9 % (FLUSH) 0.9 %
5-40 SYRINGE (ML) INJECTION AS NEEDED
Status: DISPENSED | OUTPATIENT
Start: 2023-03-31 | End: 2023-03-31

## 2023-03-31 RX ADMIN — Medication 500 UNITS: at 12:39

## 2023-03-31 RX ADMIN — SODIUM CHLORIDE, PRESERVATIVE FREE 10 ML: 5 INJECTION INTRAVENOUS at 12:38

## 2023-03-31 NOTE — PROGRESS NOTES
[facility] Progress Note    Date: March 31, 2023      1155: Pt arrived ambulatory to [facility] for Pump Disconnection + Port Flush in stable condition. Assessment completed. No new complaints voiced at this time. Pump completed on arrival. Per patient report, pump completed at 1237. Pump disconnected from port. Port flushed with positive blood return. Visit Vitals  BP (!) 146/71 (BP 1 Location: Right upper arm, BP Patient Position: Sitting)   Pulse (!) 52   Temp 98.1 °F (36.7 °C)   Resp 17   Ht 5' 10\" (1.778 m)   Wt 109.8 kg (242 lb)   SpO2 95%   BMI 34.72 kg/m²            1240: Tolerated treatment well, no adverse reactions noted. Port flushed, heparinized and de- accessed per protocol. D/Cd from [facility] ambulatory and in no distress.   Patient is aware of next scheduled OPIC appointment 4/14/23 at 11:30 AM.    Future Appointments   Date Time Provider Cindy Gee   4/3/2023 11:00 AM [address] CAVSF BS AMB   4/12/2023  9:00 AM [facility]   4/12/2023  9:15 AM Karina Patel MD ONCSF BS AMB   4/14/2023 11:30 AM [facility]   4/26/2023  9:30 AM [facility]5 [facility]   4/28/2023 11:30 AM [facility],   March 31, 2023

## 2023-04-03 ENCOUNTER — OFFICE VISIT (OUTPATIENT)
Dept: CARDIOLOGY CLINIC | Age: 73
End: 2023-04-03
Payer: MEDICARE

## 2023-04-03 ENCOUNTER — TELEPHONE (OUTPATIENT)
Dept: ONCOLOGY | Age: 73
End: 2023-04-03

## 2023-04-03 PROCEDURE — 93298 REM INTERROG DEV EVAL SCRMS: CPT | Performed by: INTERNAL MEDICINE

## 2023-04-03 NOTE — TELEPHONE ENCOUNTER
[address] at [facility]  [phone]    04/03/23- Phone call placed to pt to remind pt to have imaging completed prior to his follow up appointment with . Pt verbalized understanding.

## 2023-04-05 ENCOUNTER — APPOINTMENT (OUTPATIENT)
Dept: INFUSION THERAPY | Age: 73
End: 2023-04-05

## 2023-04-06 ENCOUNTER — HOSPITAL ENCOUNTER (OUTPATIENT)
Dept: CT IMAGING | Age: 73
End: 2023-04-06
Attending: NURSE PRACTITIONER
Payer: MEDICARE

## 2023-04-06 PROCEDURE — 74011000636 HC RX REV CODE- 636: Performed by: NURSE PRACTITIONER

## 2023-04-06 PROCEDURE — 71260 CT THORAX DX C+: CPT

## 2023-04-06 PROCEDURE — 74177 CT ABD & PELVIS W/CONTRAST: CPT

## 2023-04-06 RX ADMIN — IOPAMIDOL 100 ML: 755 INJECTION, SOLUTION INTRAVENOUS at 15:36

## 2023-04-12 ENCOUNTER — HOSPITAL ENCOUNTER (OUTPATIENT)
Dept: INFUSION THERAPY | Age: 73
Discharge: HOME | End: 2023-04-12
Payer: MEDICARE

## 2023-04-12 VITALS
TEMPERATURE: 97.8 F | RESPIRATION RATE: 18 BRPM | HEART RATE: 60 BPM | DIASTOLIC BLOOD PRESSURE: 66 MMHG | OXYGEN SATURATION: 96 % | HEIGHT: 70 IN | WEIGHT: 237.8 LBS | BODY MASS INDEX: 34.04 KG/M2 | SYSTOLIC BLOOD PRESSURE: 133 MMHG

## 2023-04-12 DIAGNOSIS — C80.1 SMALL CELL CARCINOMA (HCC): ICD-10-CM

## 2023-04-12 DIAGNOSIS — C25.0 MALIGNANT NEOPLASM OF HEAD OF PANCREAS (HCC): ICD-10-CM

## 2023-04-12 DIAGNOSIS — C78.7 METASTASIS TO LIVER (HCC): ICD-10-CM

## 2023-04-12 DIAGNOSIS — C7A.8 NEUROENDOCRINE CARCINOMA OF PANCREAS (HCC): Primary | ICD-10-CM

## 2023-04-12 DIAGNOSIS — Z51.11 ENCOUNTER FOR ANTINEOPLASTIC CHEMOTHERAPY: ICD-10-CM

## 2023-04-12 LAB
ALBUMIN SERPL-MCNC: 3.4 G/DL (ref 3.5–5)
ALBUMIN/GLOB SERPL: 1.1 (ref 1.1–2.2)
ALP SERPL-CCNC: 83 U/L (ref 45–117)
ALT SERPL-CCNC: 24 U/L (ref 12–78)
ANION GAP SERPL CALC-SCNC: 3 MMOL/L (ref 5–15)
AST SERPL-CCNC: 16 U/L (ref 15–37)
BASOPHILS # BLD: 0 K/UL (ref 0–0.1)
BASOPHILS NFR BLD: 1 % (ref 0–1)
BILIRUB SERPL-MCNC: 0.9 MG/DL (ref 0.2–1)
BILIRUB SERPL-MCNC: 0.9 MG/DL (ref 0.2–1)
BUN SERPL-MCNC: 16 MG/DL (ref 6–20)
BUN/CREAT SERPL: 18 (ref 12–20)
CALCIUM SERPL-MCNC: 8.7 MG/DL (ref 8.5–10.1)
CHLORIDE SERPL-SCNC: 109 MMOL/L (ref 97–108)
CO2 SERPL-SCNC: 28 MMOL/L (ref 21–32)
CREAT SERPL-MCNC: 0.9 MG/DL (ref 0.7–1.3)
DIFFERENTIAL METHOD BLD: ABNORMAL
EOSINOPHIL # BLD: 0.1 K/UL (ref 0–0.4)
EOSINOPHIL NFR BLD: 2 % (ref 0–7)
ERYTHROCYTE [DISTWIDTH] IN BLOOD BY AUTOMATED COUNT: 19.3 % (ref 11.5–14.5)
GLOBULIN SER CALC-MCNC: 3.1 G/DL (ref 2–4)
GLUCOSE SERPL-MCNC: 169 MG/DL (ref 65–100)
HCT VFR BLD AUTO: 32.5 % (ref 36.6–50.3)
HGB BLD-MCNC: 10.6 G/DL (ref 12.1–17)
IMM GRANULOCYTES # BLD AUTO: 0 K/UL (ref 0–0.04)
IMM GRANULOCYTES NFR BLD AUTO: 1 % (ref 0–0.5)
LYMPHOCYTES # BLD: 1.2 K/UL (ref 0.8–3.5)
LYMPHOCYTES NFR BLD: 34 % (ref 12–49)
MCH RBC QN AUTO: 26.8 PG (ref 26–34)
MCHC RBC AUTO-ENTMCNC: 32.6 G/DL (ref 30–36.5)
MCV RBC AUTO: 82.1 FL (ref 80–99)
MONOCYTES # BLD: 0.2 K/UL (ref 0–1)
MONOCYTES NFR BLD: 7 % (ref 5–13)
NEUTS SEG # BLD: 2 K/UL (ref 1.8–8)
NEUTS SEG NFR BLD: 55 % (ref 32–75)
NRBC # BLD: 0 K/UL (ref 0–0.01)
NRBC BLD-RTO: 0 PER 100 WBC
PLATELET # BLD AUTO: 125 K/UL (ref 150–400)
PMV BLD AUTO: 10.4 FL (ref 8.9–12.9)
POTASSIUM SERPL-SCNC: 2.9 MMOL/L (ref 3.5–5.1)
PROT SERPL-MCNC: 6.5 G/DL (ref 6.4–8.2)
RBC # BLD AUTO: 3.96 M/UL (ref 4.1–5.7)
SODIUM SERPL-SCNC: 140 MMOL/L (ref 136–145)
WBC # BLD AUTO: 3.6 K/UL (ref 4.1–11.1)

## 2023-04-12 PROCEDURE — 74011000258 HC RX REV CODE- 258: Performed by: NURSE PRACTITIONER

## 2023-04-12 PROCEDURE — 96415 CHEMO IV INFUSION ADDL HR: CPT

## 2023-04-12 PROCEDURE — 96413 CHEMO IV INFUSION 1 HR: CPT

## 2023-04-12 PROCEDURE — 74011000250 HC RX REV CODE- 250: Performed by: NURSE PRACTITIONER

## 2023-04-12 PROCEDURE — 96416 CHEMO PROLONG INFUSE W/PUMP: CPT

## 2023-04-12 PROCEDURE — 96368 THER/DIAG CONCURRENT INF: CPT

## 2023-04-12 PROCEDURE — 74011250636 HC RX REV CODE- 250/636: Performed by: NURSE PRACTITIONER

## 2023-04-12 PROCEDURE — 82247 BILIRUBIN TOTAL: CPT

## 2023-04-12 PROCEDURE — 96411 CHEMO IV PUSH ADDL DRUG: CPT

## 2023-04-12 PROCEDURE — 36415 COLL VENOUS BLD VENIPUNCTURE: CPT

## 2023-04-12 PROCEDURE — 74011250637 HC RX REV CODE- 250/637: Performed by: NURSE PRACTITIONER

## 2023-04-12 PROCEDURE — 85025 COMPLETE CBC W/AUTO DIFF WBC: CPT

## 2023-04-12 PROCEDURE — 96375 TX/PRO/DX INJ NEW DRUG ADDON: CPT

## 2023-04-12 PROCEDURE — 80053 COMPREHEN METABOLIC PANEL: CPT

## 2023-04-12 PROCEDURE — 96366 THER/PROPH/DIAG IV INF ADDON: CPT

## 2023-04-12 PROCEDURE — 77030012965 HC NDL HUBR BBMI -A

## 2023-04-12 RX ORDER — ATROPINE SULFATE 0.4 MG/ML
0.4 INJECTION, SOLUTION ENDOTRACHEAL; INTRAMEDULLARY; INTRAMUSCULAR; INTRAVENOUS; SUBCUTANEOUS
Status: DISPENSED | OUTPATIENT
Start: 2023-04-12 | End: 2023-04-12

## 2023-04-12 RX ORDER — DEXTROSE MONOHYDRATE 50 MG/ML
5-250 INJECTION, SOLUTION INTRAVENOUS AS NEEDED
Status: DISPENSED | OUTPATIENT
Start: 2023-04-12 | End: 2023-04-12

## 2023-04-12 RX ORDER — FLUOROURACIL 50 MG/ML
400 INJECTION, SOLUTION INTRAVENOUS ONCE
Status: COMPLETED | OUTPATIENT
Start: 2023-04-12 | End: 2023-04-12

## 2023-04-12 RX ORDER — PALONOSETRON 0.05 MG/ML
0.25 INJECTION, SOLUTION INTRAVENOUS ONCE
Status: COMPLETED | OUTPATIENT
Start: 2023-04-12 | End: 2023-04-12

## 2023-04-12 RX ORDER — SODIUM CHLORIDE 0.9 % (FLUSH) 0.9 %
5-40 SYRINGE (ML) INJECTION AS NEEDED
Status: DISPENSED | OUTPATIENT
Start: 2023-04-12 | End: 2023-04-12

## 2023-04-12 RX ORDER — POTASSIUM CHLORIDE 750 MG/1
80 TABLET, FILM COATED, EXTENDED RELEASE ORAL
Status: COMPLETED | OUTPATIENT
Start: 2023-04-12 | End: 2023-04-12

## 2023-04-12 RX ORDER — SODIUM CHLORIDE 9 MG/ML
5-250 INJECTION, SOLUTION INTRAVENOUS AS NEEDED
Status: DISPENSED | OUTPATIENT
Start: 2023-04-12 | End: 2023-04-12

## 2023-04-12 RX ORDER — SODIUM CHLORIDE 9 MG/ML
5-40 INJECTION INTRAVENOUS AS NEEDED
Status: ACTIVE | OUTPATIENT
Start: 2023-04-12 | End: 2023-04-12

## 2023-04-12 RX ORDER — HEPARIN 100 UNIT/ML
500 SYRINGE INTRAVENOUS AS NEEDED
Status: ACTIVE | OUTPATIENT
Start: 2023-04-12 | End: 2023-04-12

## 2023-04-12 RX ADMIN — POTASSIUM CHLORIDE 80 MEQ: 750 TABLET, EXTENDED RELEASE ORAL at 11:31

## 2023-04-12 RX ADMIN — DEXAMETHASONE SODIUM PHOSPHATE 12 MG: 4 INJECTION, SOLUTION INTRA-ARTICULAR; INTRALESIONAL; INTRAMUSCULAR; INTRAVENOUS; SOFT TISSUE at 11:39

## 2023-04-12 RX ADMIN — PALONOSETRON HYDROCHLORIDE 0.25 MG: 0.25 INJECTION INTRAVENOUS at 11:32

## 2023-04-12 RX ADMIN — DEXTROSE MONOHYDRATE 25 ML/HR: 50 INJECTION, SOLUTION INTRAVENOUS at 11:39

## 2023-04-12 RX ADMIN — IRINOTECAN HYDROCHLORIDE 419 MG: 20 INJECTION, SOLUTION INTRAVENOUS at 12:40

## 2023-04-12 RX ADMIN — FLUOROURACIL 5592 MG: 50 INJECTION, SOLUTION INTRAVENOUS at 14:34

## 2023-04-12 RX ADMIN — SODIUM CHLORIDE, PRESERVATIVE FREE 10 ML: 5 INJECTION INTRAVENOUS at 14:28

## 2023-04-12 RX ADMIN — ATROPINE SULFATE 0.4 MG: 0.4 INJECTION, SOLUTION INTRAMUSCULAR; INTRAVENOUS; SUBCUTANEOUS at 12:27

## 2023-04-12 RX ADMIN — FLUOROURACIL 932 MG: 50 INJECTION, SOLUTION INTRAVENOUS at 14:29

## 2023-04-12 RX ADMIN — LEUCOVORIN CALCIUM 932 MG: 500 INJECTION, POWDER, LYOPHILIZED, FOR SOLUTION INTRAMUSCULAR; INTRAVENOUS at 12:40

## 2023-04-12 NOTE — PROGRESS NOTES
[place] VISIT NOTE  Date: 2023    Name: David Andrew. MRN: [id number]         : 1950    Mr. Romero Buitrago Arrived ambulatory and in no distress for C7D1 of Folfiri Regimen. Assessment was completed by Lynne Vazquez RN, no acute issues at this time, no new complaints voiced. Chest wall port accessed without difficulty by the assessment nurse, labs drawn & sent for processing. Pt proceeded to MD appointment with /Diamond. Chemotherapy Flowsheet 2023   Cycle C7D1   Date 2023   Drug / Regimen Folfiri   Time Down -   Pre Meds given   Notes given+80 PO K           Mr. Bowman's vitals were reviewed. Patient Vitals for the past 12 hrs:   Temp Pulse Resp BP SpO2   23 1421 97.8 °F (36.6 °C) 60 18 133/66 96 %   23 0930 97.6 °F (36.4 °C) 67 18 (!) 122/90 97 %         Lab results were obtained and reviewed. Recent Results (from the past 12 hour(s))   BILIRUBIN, TOTAL    Collection Time: 23  9:38 AM   Result Value Ref Range    Bilirubin, total 0.9 0.2 - 1.0 MG/DL   CBC WITH AUTOMATED DIFF    Collection Time: 23  9:38 AM   Result Value Ref Range    WBC 3.6 (L) 4.1 - 11.1 K/uL    RBC 3.96 (L) 4.10 - 5.70 M/uL    HGB 10.6 (L) 12.1 - 17.0 g/dL    HCT 32.5 (L) 36.6 - 50.3 %    MCV 82.1 80.0 - 99.0 FL    MCH 26.8 26.0 - 34.0 PG    MCHC 32.6 30.0 - 36.5 g/dL    RDW 19.3 (H) 11.5 - 14.5 %    PLATELET 955 (L) 758 - 400 K/uL    MPV 10.4 8.9 - 12.9 FL    NRBC 0.0 0  WBC    ABSOLUTE NRBC 0.00 0.00 - 0.01 K/uL    NEUTROPHILS 55 32 - 75 %    LYMPHOCYTES 34 12 - 49 %    MONOCYTES 7 5 - 13 %    EOSINOPHILS 2 0 - 7 %    BASOPHILS 1 0 - 1 %    IMMATURE GRANULOCYTES 1 (H) 0.0 - 0.5 %    ABS. NEUTROPHILS 2.0 1.8 - 8.0 K/UL    ABS. LYMPHOCYTES 1.2 0.8 - 3.5 K/UL    ABS. MONOCYTES 0.2 0.0 - 1.0 K/UL    ABS. EOSINOPHILS 0.1 0.0 - 0.4 K/UL    ABS. BASOPHILS 0.0 0.0 - 0.1 K/UL    ABS. IMM.  GRANS. 0.0 0.00 - 0.04 K/UL    DF AUTOMATED     METABOLIC PANEL, COMPREHENSIVE    Collection Time: 04/12/23  9:38 AM   Result Value Ref Range    Sodium 140 136 - 145 mmol/L    Potassium 2.9 (L) 3.5 - 5.1 mmol/L    Chloride 109 (H) 97 - 108 mmol/L    CO2 28 21 - 32 mmol/L    Anion gap 3 (L) 5 - 15 mmol/L    Glucose 169 (H) 65 - 100 mg/dL    BUN 16 6 - 20 MG/DL    Creatinine 0.90 0.70 - 1.30 MG/DL    BUN/Creatinine ratio 18 12 - 20      eGFR >60 >60 ml/min/1.73m2    Calcium 8.7 8.5 - 10.1 MG/DL    Bilirubin, total 0.9 0.2 - 1.0 MG/DL    ALT (SGPT) 24 12 - 78 U/L    AST (SGOT) 16 15 - 37 U/L    Alk.  phosphatase 83 45 - 117 U/L    Protein, total 6.5 6.4 - 8.2 g/dL    Albumin 3.4 (L) 3.5 - 5.0 g/dL    Globulin 3.1 2.0 - 4.0 g/dL    A-G Ratio 1.1 1.1 - 2.2         Medications received:  Medications Administered       atropine 0.4 mg/mL injection 0.4 mg       Admin Date  04/12/2023 Action  Given Dose  0.4 mg Route  IntraVENous Administered By  Luis Armando Lorenzo RN              dexamethasone (DECADRON) 12 mg in 0.9% sodium chloride 50 mL IVPB       Admin Date  04/12/2023 Action  New Bag Dose  12 mg Route  IntraVENous Administered By  Luis Armando Lorenzo RN              dextrose 5% infusion       Admin Date  04/12/2023 Action  New Bag Dose  25 mL/hr Rate  25 mL/hr Route  IntraVENous Administered By  Luis Armando Lorenzo RN              fluorouraciL (ADRUCIL) 5,592 mg in 0.9% sodium chloride 250 mL CADD Cassette       Admin Date  04/12/2023 Action  New Bag Dose  5,592 mg Rate  5.4 mL/hr Route  IntraVENous Administered By  Luis Armando Lorenzo RN              fluorouraciL (ADRUCIL) chemo syringe 932 mg       Admin Date  04/12/2023 Action  Given Dose  932 mg Rate  279.6 mL/hr Route  IntraVENous Administered By  Luis Armando Lorenzo RN              irinotecan (CAMPTOSAR) 419 mg in dextrose 5% 250 mL, overfill volume 25 mL chemo infusion       Admin Date  04/12/2023 Action  New Bag Dose  419 mg Rate  197.3 mL/hr Route  IntraVENous Administered By  Luis Armando Lorenzo RN              leucovorin (WELLCOVORIN) 932 mg in dextrose 5% 250 mL, overfill volume 25 mL IVPB Admin Date  04/12/2023 Action  New Bag Dose  932 mg Rate  214.4 mL/hr Route  IntraVENous Administered By  Phuc Brenner RN              palonosetron HCl (ALOXI) injection 0.25 mg       Admin Date  04/12/2023 Action  Given Dose  0.25 mg Route  IntraVENous Administered By  Phuc Brenner RN              potassium chloride SR (KLOR-CON 10) tablet 80 mEq       Admin Date  04/12/2023 Action  Given Dose  80 mEq Route  Oral Administered By  Phuc Brenner RN              sodium chloride (NS) flush 5-40 mL       Admin Date  04/12/2023 Action  Given Dose  10 mL Route  IntraVENous Administered By  Phuc Brenner RN                     Two nurses verified prior to administering:    Drug name  Drug dose  Infusion volume or drug volume when prepared in a syringe  Rate of administration  Route of administration  Expiration dates and/or times  Appearance and physical integrity of the drugs  Rate set on infusion pump, when used  Sequencing of drug administration        Mr. Bowman tolerated treatment well and was discharged from [place] [address] in stable condition at 1440. Port flushed and connected to infusing CADD pump. He is to return on  April 14, 2023 at 1130 for his next appointment.     Mahesh Maddox RN  April 12, 2023    Future Appointments:  Future Appointments   Date Time Provider Cindy Gee   4/14/2023 11:30 AM [facility]   4/26/2023  9:30 AM [facility]5 [facility]   4/26/2023  9:45 AM Deirdre Patel MD ONCSF BS AMB   4/28/2023 11:30 AM SS INF7 [facility]3 [facility]H [facility]   5/8/2023  8:45 AM [address] CAVSF BS AMB

## 2023-04-14 ENCOUNTER — HOSPITAL ENCOUNTER (OUTPATIENT)
Dept: INFUSION THERAPY | Age: 73
Discharge: HOME | End: 2023-04-14
Payer: MEDICARE

## 2023-04-14 VITALS
HEIGHT: 70 IN | OXYGEN SATURATION: 95 % | DIASTOLIC BLOOD PRESSURE: 66 MMHG | TEMPERATURE: 98.2 F | WEIGHT: 237 LBS | SYSTOLIC BLOOD PRESSURE: 124 MMHG | HEART RATE: 55 BPM | RESPIRATION RATE: 18 BRPM | BODY MASS INDEX: 33.93 KG/M2

## 2023-04-14 DIAGNOSIS — C80.1 SMALL CELL CARCINOMA (HCC): ICD-10-CM

## 2023-04-14 DIAGNOSIS — Z51.11 ENCOUNTER FOR ANTINEOPLASTIC CHEMOTHERAPY: ICD-10-CM

## 2023-04-14 DIAGNOSIS — C78.7 METASTASIS TO LIVER (HCC): ICD-10-CM

## 2023-04-14 DIAGNOSIS — C25.0 MALIGNANT NEOPLASM OF HEAD OF PANCREAS (HCC): ICD-10-CM

## 2023-04-14 DIAGNOSIS — C7A.8 NEUROENDOCRINE CARCINOMA OF PANCREAS (HCC): Primary | ICD-10-CM

## 2023-04-14 PROCEDURE — 74011250636 HC RX REV CODE- 250/636: Performed by: NURSE PRACTITIONER

## 2023-04-14 PROCEDURE — 74011000250 HC RX REV CODE- 250: Performed by: NURSE PRACTITIONER

## 2023-04-14 PROCEDURE — 96523 IRRIG DRUG DELIVERY DEVICE: CPT

## 2023-04-14 RX ORDER — SODIUM CHLORIDE 9 MG/ML
5-250 INJECTION, SOLUTION INTRAVENOUS AS NEEDED
Status: DISCONTINUED | OUTPATIENT
Start: 2023-04-14 | End: 2023-04-15 | Stop reason: HOSPADM

## 2023-04-14 RX ORDER — SODIUM CHLORIDE 0.9 % (FLUSH) 0.9 %
5-40 SYRINGE (ML) INJECTION AS NEEDED
Status: DISCONTINUED | OUTPATIENT
Start: 2023-04-14 | End: 2023-04-15 | Stop reason: HOSPADM

## 2023-04-14 RX ORDER — HEPARIN 100 UNIT/ML
500 SYRINGE INTRAVENOUS AS NEEDED
Status: DISCONTINUED | OUTPATIENT
Start: 2023-04-14 | End: 2023-04-15 | Stop reason: HOSPADM

## 2023-04-14 RX ADMIN — SODIUM CHLORIDE, PRESERVATIVE FREE 10 ML: 5 INJECTION INTRAVENOUS at 12:51

## 2023-04-14 RX ADMIN — Medication 500 UNITS: at 12:51

## 2023-04-14 NOTE — PROGRESS NOTES
OPIC Progress Note    Date: April 14, 2023      1245: Pt arrived ambulatory to [facility] for Pump Disconnection + Port Flush in stable condition. Assessment completed. No new complaints voiced at this time. Pump completed on arrival. Per patient report, pump completed at 1252. Pump disconnected from port. Port flushed with positive blood return. Visit Vitals  /66 (BP 1 Location: Right upper arm, BP Patient Position: Sitting)   Pulse (!) 55   Temp 98.2 °F (36.8 °C)   Resp 18   Ht 5' 10\" (1.778 m)   Wt 107.5 kg (237 lb)   SpO2 95%   BMI 34.01 kg/m²            1300: Tolerated treatment well, no adverse reactions noted. Port flushed, heparinized and de- accessed per protocol. D/Cd from [facility] ambulatory and in no distress. Patient is aware of next scheduled [facility] appointment.     Future Appointments   Date Time Provider Cindy Gee   4/26/2023  9:30 AM SS INF5 CH2 >4H [facility]   4/26/2023  9:45 AM Ezequiel Patel MD ONCSF BS JAYY   4/28/2023 11:30 AM SS INF7 CH3 <1H [facility]   5/8/2023  8:45 AM [place] [address] [facility] CAVSF DELFINO Singh RN  April 14, 2023

## 2023-04-17 ENCOUNTER — TELEPHONE (OUTPATIENT)
Dept: ONCOLOGY | Age: 73
End: 2023-04-17

## 2023-04-17 NOTE — TELEPHONE ENCOUNTER
PT called in.  Stating he is having issues with his back and wanted to consult with the team    CB# [phone]

## 2023-04-17 NOTE — TELEPHONE ENCOUNTER
[address] [place] [address]  at [place]  [phone]    04/17/23- Patient reports pain in the middle of his left shoulder blade that radiates to the top of his shoulder. Started a couple weeks ago, worse with movement. He stated the pain was severe yesterday, but not having any issues with it this morning. He denies adenopathy, SOB, or any other pain. Patient denies known injury, he did do some yard work recently that may have exacerbated symptoms. Stated he has relief with Advil and rest.  Patient just wanted to make sure there wasn't something else going on that may be concerning. Will discuss with Noah Robertson and call patient back. 11:46 AM- Per NP, okay to continue with supportive/OTC measures if pain is improving. If pain persists, we can order imaging to further evaluate. Patient agreeable and will continue to monitor symptoms. No further questions or concerns.

## 2023-04-24 NOTE — PROGRESS NOTES
[facility] at [address] [facility]  W: [phone]  F: [phone]      Reason for Visit:   Mariluz Eller is a 72 y.o. male who is seen for follow up of small cell carcinoma of the pancreas. Hematology/Oncology Treatment History:   CT A/P 4/19/2022: Possible mild acute pancreatitis. Prominent sigmoid diverticulosis. Enlarged prostate. Fat-containing hernias  CT Abdomen 5/17/2022: New intra-hepatic biliary dilatation and common bile duct dilatation. Ampullary prominence and 1.5 cm x 1.5 cm soft tissue density in the pancreaticoduodenal groove. New subcentimeter hepatic hypodense lesions, worrisome for metastatic disease. MRI Abdomen 5/17/2022:  Periampullary pancreatic mass with possible extension into the wall of the duodenum. Associated intrahepatic and extra hepatic biliary dilatation and mild pancreatic ductal dilatation. Small hyperenhancing lesions throughout the liver suspicious for metastatic disease. CT Chest 5/18/2022: no metastatic disease within thorax  ERCP by Dr. Radha Amin 5/18/2022: Large ulcerated mass seen in the second portion of the duodenum taking half the circumference in the area of the major papilla and extending distally about 3 to 4 cm, appeared to be pancreatic and eroding into the duodenum. Cholangiogram with complete obstruction of CBD. Metal stent deployed. Biopsies taken, high grade neuroendocrine carcinoma, small cell type  US guided liver biopsy 5/19/2022: small cell carcinoma  Stage IV Small Cell Carcinoma of the Pancreas  Initiated therapy with Carboplatin/Etoposide on 6/1/2022, completed 6 cycles on 9/16/2022  Initiated palliative systemic therapy with FOLFIRI on 1/7/2023    History of Present Illness:   He reports doing about the same. No new issues. Gained some weight back after having lost some weight previously. No pain currently.   He had some left shoulder pain for a few days, took some advil and it resolved. Breathing ok. Some allergic rhinitis. Present with wife today. Review of systems was obtained and pertinent findings reviewed above. Past medical history, social history, family history, medications, and allergies are located in the electronic medical record. Physical Exam:     Visit Vitals  /60 (BP 1 Location: Left arm, BP Patient Position: Sitting)   Pulse (!) 58   Temp 97.8 °F (36.6 °C)   Ht 5' 10\" (1.778 m)   Wt 243 lb (110.2 kg)   SpO2 98%   BMI 34.87 kg/m²       ECOG PS: 1  General: no distress  Eyes: anicteric sclerae  HENT: oropharynx clear  Neck: supple  Lymphatic: no cervical, supraclavicular, or inguinal adenopathy  Respiratory: normal respiratory effort  CV: no peripheral edema  GI: soft, nontender, nondistended, no masses  Skin: no rashes; no ecchymoses; no petechiae        Results:     Lab Results   Component Value Date/Time    WBC 3.6 (L) 04/12/2023 09:38 AM    HGB 10.6 (L) 04/12/2023 09:38 AM    HCT 32.5 (L) 04/12/2023 09:38 AM    PLATELET 014 (L) 12/65/9973 09:38 AM    MCV 82.1 04/12/2023 09:38 AM    ABS. NEUTROPHILS 2.0 04/12/2023 09:38 AM     Lab Results   Component Value Date/Time    Sodium 140 04/12/2023 09:38 AM    Potassium 2.9 (L) 04/12/2023 09:38 AM    Chloride 109 (H) 04/12/2023 09:38 AM    CO2 28 04/12/2023 09:38 AM    Glucose 169 (H) 04/12/2023 09:38 AM    BUN 16 04/12/2023 09:38 AM    Creatinine 0.90 04/12/2023 09:38 AM    GFR est AA >60 09/22/2022 02:11 PM    GFR est non-AA >60 09/22/2022 02:11 PM    Calcium 8.7 04/12/2023 09:38 AM    Glucose (POC) 277 (H) 06/03/2022 11:47 AM    Creatinine (POC) 1.00 04/19/2022 02:47 PM     Lab Results   Component Value Date/Time    Bilirubin, total 0.9 04/12/2023 09:38 AM    Bilirubin, total 0.9 04/12/2023 09:38 AM    ALT (SGPT) 24 04/12/2023 09:38 AM    Alk.  phosphatase 83 04/12/2023 09:38 AM    Protein, total 6.5 04/12/2023 09:38 AM    Albumin 3.4 (L) 04/12/2023 09:38 AM    Globulin 3.1 04/12/2023 09:38 AM     Lab Results   Component Value Date/Time    Reticulocyte count 4.2 (H) 05/17/2022 03:04 PM    Iron % saturation 32 05/17/2022 03:04 PM    TIBC 289 05/17/2022 03:04 PM    Ferritin 95 02/15/2023 10:43 AM    Vitamin B12 944 05/17/2022 03:04 PM    Folate 26.7 (H) 05/17/2022 03:04 PM    Haptoglobin 90 05/17/2022 03:04 PM     05/17/2022 03:04 PM    TSH 2.18 05/17/2022 03:04 PM    Lipase 106 05/17/2022 10:10 AM    Hep C virus Ab Interp. NONREACTIVE 05/17/2022 03:04 PM       CT C/A/P 1/4/2023:  1. Increased size and number of diffuse liver metastatic disease. 2.  Increased size of pancreatic head mass status post biliary stent placement. No intrahepatic biliary dilatation. 3.  Retroperitoneal adenopathy increased. CT C/A/P 4/6/2023:  1. Overall picture of treatment response. Decreased pancreatic head mass and retroperitoneal lymphadenopathy. 2.  Decreased density of multifocal liver lesions which appear increased in size but may be pseudoprogression from lesional edema and necrosis rather than true progression. Continued attention on follow-up recommended. 3.  No evidence of intrathoracic metastatic disease. Assessment:   1) Metastatic Small Cell Carcinoma of the Pancreas  He has disease within his pancreas, invading into the duodenum. He additionally has metastatic disease within his liver, confirmed with biopsy. His cancer is not curable and management is with palliative intent. He is currently on second line chemotherapy with FOLFIRI. He is tolerating therapy with manageable toxicity. We will proceed with his next cycle today. The patient will be seen with each cycle of therapy, and labs will be monitored to assess for toxicity from therapy. Repeat CT every 4-6 cycles. At progression we can consider options such as Lurbinectedin or immunotherapy. 2)  Biliary obstruction  Secondary to his pancreatic cancer. S/p ERCP on 5/18/2022 with stent placement with Dr. Gerard Wallis.      Has appointment on 6/21/2023 at 1pm with Dr. Vanessa Bonilla as stent may need to be replaced in 1 year. 3)  Anemia, normocytic  CBC improved today, monitor on therapy. 4) Thrombocytopenia  Mild, secondary to chemotherapy. Monitor. 5) DM  Follows endocrinology outpatient. Has appointment upcoming 4/18/2023. 6) Pancreatic insufficiency  Continue Creon, taking 6-8 daily now. 7) Emotional Well Being  No psychosocial concerns identified today. Patient has adequate support. Has applied for care card. 8) Hypokalemia  Due to diarrhea, chemotherapy. Will replete in infusion center PRN. He has been on KCL 20MEQ daily for the past week.       Plan:     Proceed today with C8 of FOLFIRI (irinotecan 180mg/m2, leucovorin 400 mg/m2, fluorouracil 400 mg/m2, and a 46 hour infusion of fluorouracil 2400 mg/m2) given every 2 weeks  Labs: CBC, CMP prior to each treatment  Continue KCL daily  Return to clinic every 2 weeks on therapy      Signed By: Emily Keen MD

## 2023-04-26 ENCOUNTER — OFFICE VISIT (OUTPATIENT)
Dept: ONCOLOGY | Age: 73
End: 2023-04-26
Payer: MEDICARE

## 2023-04-26 ENCOUNTER — HOSPITAL ENCOUNTER (OUTPATIENT)
Dept: INFUSION THERAPY | Age: 73
Discharge: HOME | End: 2023-04-26
Payer: MEDICARE

## 2023-04-26 VITALS
HEART RATE: 58 BPM | TEMPERATURE: 97.8 F | BODY MASS INDEX: 34.79 KG/M2 | OXYGEN SATURATION: 98 % | WEIGHT: 243 LBS | SYSTOLIC BLOOD PRESSURE: 125 MMHG | DIASTOLIC BLOOD PRESSURE: 60 MMHG | HEIGHT: 70 IN

## 2023-04-26 VITALS
HEIGHT: 70 IN | TEMPERATURE: 97.8 F | DIASTOLIC BLOOD PRESSURE: 52 MMHG | BODY MASS INDEX: 34.8 KG/M2 | WEIGHT: 243.1 LBS | RESPIRATION RATE: 18 BRPM | OXYGEN SATURATION: 95 % | HEART RATE: 57 BPM | SYSTOLIC BLOOD PRESSURE: 142 MMHG

## 2023-04-26 DIAGNOSIS — C25.0 MALIGNANT NEOPLASM OF HEAD OF PANCREAS (HCC): Primary | ICD-10-CM

## 2023-04-26 DIAGNOSIS — C80.1 SMALL CELL CARCINOMA (HCC): ICD-10-CM

## 2023-04-26 DIAGNOSIS — C78.7 METASTASIS TO LIVER (HCC): ICD-10-CM

## 2023-04-26 DIAGNOSIS — C25.0 MALIGNANT NEOPLASM OF HEAD OF PANCREAS (HCC): ICD-10-CM

## 2023-04-26 DIAGNOSIS — C7A.8 NEUROENDOCRINE CARCINOMA OF PANCREAS (HCC): Primary | ICD-10-CM

## 2023-04-26 DIAGNOSIS — Z51.11 ENCOUNTER FOR ANTINEOPLASTIC CHEMOTHERAPY: ICD-10-CM

## 2023-04-26 LAB
ALBUMIN SERPL-MCNC: 3.5 G/DL (ref 3.5–5)
ALBUMIN/GLOB SERPL: 1.1 (ref 1.1–2.2)
ALP SERPL-CCNC: 85 U/L (ref 45–117)
ALT SERPL-CCNC: 25 U/L (ref 12–78)
ANION GAP SERPL CALC-SCNC: 2 MMOL/L (ref 5–15)
AST SERPL-CCNC: 21 U/L (ref 15–37)
BASOPHILS # BLD: 0 K/UL (ref 0–0.1)
BASOPHILS NFR BLD: 1 % (ref 0–1)
BILIRUB SERPL-MCNC: 0.7 MG/DL (ref 0.2–1)
BILIRUB SERPL-MCNC: 0.7 MG/DL (ref 0.2–1)
BUN SERPL-MCNC: 16 MG/DL (ref 6–20)
BUN/CREAT SERPL: 16 (ref 12–20)
CALCIUM SERPL-MCNC: 8.7 MG/DL (ref 8.5–10.1)
CHLORIDE SERPL-SCNC: 106 MMOL/L (ref 97–108)
CO2 SERPL-SCNC: 30 MMOL/L (ref 21–32)
CREAT SERPL-MCNC: 0.97 MG/DL (ref 0.7–1.3)
DIFFERENTIAL METHOD BLD: ABNORMAL
EOSINOPHIL # BLD: 0 K/UL (ref 0–0.4)
EOSINOPHIL NFR BLD: 1 % (ref 0–7)
ERYTHROCYTE [DISTWIDTH] IN BLOOD BY AUTOMATED COUNT: 17.9 % (ref 11.5–14.5)
GLOBULIN SER CALC-MCNC: 3.2 G/DL (ref 2–4)
GLUCOSE SERPL-MCNC: 225 MG/DL (ref 65–100)
HCT VFR BLD AUTO: 33.5 % (ref 36.6–50.3)
HGB BLD-MCNC: 10.7 G/DL (ref 12.1–17)
IMM GRANULOCYTES # BLD AUTO: 0 K/UL (ref 0–0.04)
IMM GRANULOCYTES NFR BLD AUTO: 1 % (ref 0–0.5)
LYMPHOCYTES # BLD: 0.8 K/UL (ref 0.8–3.5)
LYMPHOCYTES NFR BLD: 22 % (ref 12–49)
MCH RBC QN AUTO: 26.7 PG (ref 26–34)
MCHC RBC AUTO-ENTMCNC: 31.9 G/DL (ref 30–36.5)
MCV RBC AUTO: 83.5 FL (ref 80–99)
MONOCYTES # BLD: 0.2 K/UL (ref 0–1)
MONOCYTES NFR BLD: 6 % (ref 5–13)
NEUTS SEG # BLD: 2.6 K/UL (ref 1.8–8)
NEUTS SEG NFR BLD: 69 % (ref 32–75)
NRBC # BLD: 0 K/UL (ref 0–0.01)
NRBC BLD-RTO: 0 PER 100 WBC
PLATELET # BLD AUTO: 122 K/UL (ref 150–400)
PMV BLD AUTO: 9.6 FL (ref 8.9–12.9)
POTASSIUM SERPL-SCNC: 3.6 MMOL/L (ref 3.5–5.1)
PROT SERPL-MCNC: 6.7 G/DL (ref 6.4–8.2)
RBC # BLD AUTO: 4.01 M/UL (ref 4.1–5.7)
RBC MORPH BLD: ABNORMAL
SODIUM SERPL-SCNC: 138 MMOL/L (ref 136–145)
WBC # BLD AUTO: 3.6 K/UL (ref 4.1–11.1)

## 2023-04-26 PROCEDURE — 3074F SYST BP LT 130 MM HG: CPT | Performed by: INTERNAL MEDICINE

## 2023-04-26 PROCEDURE — 74011250636 HC RX REV CODE- 250/636: Performed by: NURSE PRACTITIONER

## 2023-04-26 PROCEDURE — 96415 CHEMO IV INFUSION ADDL HR: CPT

## 2023-04-26 PROCEDURE — 96416 CHEMO PROLONG INFUSE W/PUMP: CPT

## 2023-04-26 PROCEDURE — G8427 DOCREV CUR MEDS BY ELIG CLIN: HCPCS | Performed by: INTERNAL MEDICINE

## 2023-04-26 PROCEDURE — 74011000250 HC RX REV CODE- 250: Performed by: NURSE PRACTITIONER

## 2023-04-26 PROCEDURE — 74011000258 HC RX REV CODE- 258: Performed by: NURSE PRACTITIONER

## 2023-04-26 PROCEDURE — 77030012965 HC NDL HUBR BBMI -A

## 2023-04-26 PROCEDURE — G8536 NO DOC ELDER MAL SCRN: HCPCS | Performed by: INTERNAL MEDICINE

## 2023-04-26 PROCEDURE — 96368 THER/DIAG CONCURRENT INF: CPT

## 2023-04-26 PROCEDURE — 1101F PT FALLS ASSESS-DOCD LE1/YR: CPT | Performed by: INTERNAL MEDICINE

## 2023-04-26 PROCEDURE — 96413 CHEMO IV INFUSION 1 HR: CPT

## 2023-04-26 PROCEDURE — 1123F ACP DISCUSS/DSCN MKR DOCD: CPT | Performed by: INTERNAL MEDICINE

## 2023-04-26 PROCEDURE — G8417 CALC BMI ABV UP PARAM F/U: HCPCS | Performed by: INTERNAL MEDICINE

## 2023-04-26 PROCEDURE — 99215 OFFICE O/P EST HI 40 MIN: CPT | Performed by: INTERNAL MEDICINE

## 2023-04-26 PROCEDURE — 3017F COLORECTAL CA SCREEN DOC REV: CPT | Performed by: INTERNAL MEDICINE

## 2023-04-26 PROCEDURE — 80053 COMPREHEN METABOLIC PANEL: CPT

## 2023-04-26 PROCEDURE — 3078F DIAST BP <80 MM HG: CPT | Performed by: INTERNAL MEDICINE

## 2023-04-26 PROCEDURE — 96411 CHEMO IV PUSH ADDL DRUG: CPT

## 2023-04-26 PROCEDURE — 85025 COMPLETE CBC W/AUTO DIFF WBC: CPT

## 2023-04-26 PROCEDURE — 36415 COLL VENOUS BLD VENIPUNCTURE: CPT

## 2023-04-26 PROCEDURE — G9717 DOC PT DX DEP/BP F/U NT REQ: HCPCS | Performed by: INTERNAL MEDICINE

## 2023-04-26 PROCEDURE — 82247 BILIRUBIN TOTAL: CPT

## 2023-04-26 PROCEDURE — 96375 TX/PRO/DX INJ NEW DRUG ADDON: CPT

## 2023-04-26 RX ORDER — SODIUM CHLORIDE 9 MG/ML
5-250 INJECTION, SOLUTION INTRAVENOUS AS NEEDED
Status: DISPENSED | OUTPATIENT
Start: 2023-04-26 | End: 2023-04-26

## 2023-04-26 RX ORDER — SODIUM CHLORIDE 0.9 % (FLUSH) 0.9 %
5-40 SYRINGE (ML) INJECTION AS NEEDED
Status: DISPENSED | OUTPATIENT
Start: 2023-04-26 | End: 2023-04-26

## 2023-04-26 RX ORDER — FLUOROURACIL 50 MG/ML
400 INJECTION, SOLUTION INTRAVENOUS ONCE
Status: COMPLETED | OUTPATIENT
Start: 2023-04-26 | End: 2023-04-26

## 2023-04-26 RX ORDER — PALONOSETRON 0.05 MG/ML
0.25 INJECTION, SOLUTION INTRAVENOUS ONCE
Status: COMPLETED | OUTPATIENT
Start: 2023-04-26 | End: 2023-04-26

## 2023-04-26 RX ORDER — ATROPINE SULFATE 0.4 MG/ML
0.4 INJECTION, SOLUTION ENDOTRACHEAL; INTRAMEDULLARY; INTRAMUSCULAR; INTRAVENOUS; SUBCUTANEOUS
Status: DISPENSED | OUTPATIENT
Start: 2023-04-26 | End: 2023-04-26

## 2023-04-26 RX ORDER — SODIUM CHLORIDE 9 MG/ML
5-40 INJECTION INTRAVENOUS AS NEEDED
Status: ACTIVE | OUTPATIENT
Start: 2023-04-26 | End: 2023-04-26

## 2023-04-26 RX ORDER — HEPARIN 100 UNIT/ML
500 SYRINGE INTRAVENOUS AS NEEDED
Status: ACTIVE | OUTPATIENT
Start: 2023-04-26 | End: 2023-04-26

## 2023-04-26 RX ORDER — DEXTROSE MONOHYDRATE 50 MG/ML
5-250 INJECTION, SOLUTION INTRAVENOUS AS NEEDED
Status: DISPENSED | OUTPATIENT
Start: 2023-04-26 | End: 2023-04-26

## 2023-04-26 RX ADMIN — FLUOROURACIL 5592 MG: 50 INJECTION, SOLUTION INTRAVENOUS at 13:49

## 2023-04-26 RX ADMIN — LEUCOVORIN CALCIUM 932 MG: 500 INJECTION, POWDER, LYOPHILIZED, FOR SOLUTION INTRAMUSCULAR; INTRAVENOUS at 11:59

## 2023-04-26 RX ADMIN — SODIUM CHLORIDE, PRESERVATIVE FREE 10 ML: 5 INJECTION INTRAVENOUS at 10:41

## 2023-04-26 RX ADMIN — DEXTROSE MONOHYDRATE 25 ML/HR: 50 INJECTION, SOLUTION INTRAVENOUS at 10:44

## 2023-04-26 RX ADMIN — SODIUM CHLORIDE, PRESERVATIVE FREE 10 ML: 5 INJECTION INTRAVENOUS at 11:50

## 2023-04-26 RX ADMIN — SODIUM CHLORIDE, PRESERVATIVE FREE 10 ML: 5 INJECTION INTRAVENOUS at 10:43

## 2023-04-26 RX ADMIN — ATROPINE SULFATE 0.4 MG: 0.4 INJECTION, SOLUTION INTRAMUSCULAR; INTRAVENOUS; SUBCUTANEOUS at 11:50

## 2023-04-26 RX ADMIN — DEXAMETHASONE SODIUM PHOSPHATE 12 MG: 4 INJECTION, SOLUTION INTRA-ARTICULAR; INTRALESIONAL; INTRAMUSCULAR; INTRAVENOUS; SOFT TISSUE at 10:48

## 2023-04-26 RX ADMIN — FLUOROURACIL 932 MG: 50 INJECTION, SOLUTION INTRAVENOUS at 13:43

## 2023-04-26 RX ADMIN — PALONOSETRON HYDROCHLORIDE 0.25 MG: 0.25 INJECTION, SOLUTION INTRAVENOUS at 10:42

## 2023-04-26 RX ADMIN — IRINOTECAN HYDROCHLORIDE 419 MG: 20 INJECTION, SOLUTION INTRAVENOUS at 11:59

## 2023-04-26 NOTE — PROGRESS NOTES
Donald Coronado is a 72 y.o. male here for follow up of small cell carcinoma of the pancreas. Patient with no complaints of pain at this time.

## 2023-04-26 NOTE — PROGRESS NOTES
[facility] Chemo Progress Note    Date: April 26, 2023        0905: Mr. Bhaskar Montes Arrived to [facility] for  Folfiri C 8 D1 ambulatory in stable condition. Assessment was completed and port accessed by Andrew Patterson RN. Labs drawn and sent for processing. Went to provider appointment with Medical Oncology. Returned from provider appointment. Labs reviewed. Criteria for treatment was met. Chemotherapy Flowsheet 4/26/2023   Cycle C8D1   Date 4/26/2023   Drug / Regimen Folfiri   Time Down -   Pre Meds given   Notes given       Mr. Zari Eckert vitals were reviewed. Patient Vitals for the past 12 hrs:   Temp Pulse Resp BP SpO2   04/26/23 1354 -- (!) 57 18 (!) 142/52 95 %   04/26/23 0903 97.8 °F (36.6 °C) (!) 58 18 (!) 150/68 98 %         Lab results were obtained and reviewed. Recent Results (from the past 12 hour(s))   BILIRUBIN, TOTAL    Collection Time: 04/26/23  9:08 AM   Result Value Ref Range    Bilirubin, total 0.7 0.2 - 1.0 MG/DL   CBC WITH AUTOMATED DIFF    Collection Time: 04/26/23  9:08 AM   Result Value Ref Range    WBC 3.6 (L) 4.1 - 11.1 K/uL    RBC 4.01 (L) 4.10 - 5.70 M/uL    HGB 10.7 (L) 12.1 - 17.0 g/dL    HCT 33.5 (L) 36.6 - 50.3 %    MCV 83.5 80.0 - 99.0 FL    MCH 26.7 26.0 - 34.0 PG    MCHC 31.9 30.0 - 36.5 g/dL    RDW 17.9 (H) 11.5 - 14.5 %    PLATELET 927 (L) 450 - 400 K/uL    MPV 9.6 8.9 - 12.9 FL    NRBC 0.0 0  WBC    ABSOLUTE NRBC 0.00 0.00 - 0.01 K/uL    NEUTROPHILS 69 32 - 75 %    LYMPHOCYTES 22 12 - 49 %    MONOCYTES 6 5 - 13 %    EOSINOPHILS 1 0 - 7 %    BASOPHILS 1 0 - 1 %    IMMATURE GRANULOCYTES 1 (H) 0.0 - 0.5 %    ABS. NEUTROPHILS 2.6 1.8 - 8.0 K/UL    ABS. LYMPHOCYTES 0.8 0.8 - 3.5 K/UL    ABS. MONOCYTES 0.2 0.0 - 1.0 K/UL    ABS. EOSINOPHILS 0.0 0.0 - 0.4 K/UL    ABS. BASOPHILS 0.0 0.0 - 0.1 K/UL    ABS. IMM.  GRANS. 0.0 0.00 - 0.04 K/UL    DF SMEAR SCANNED      RBC COMMENTS ANISOCYTOSIS  1+       METABOLIC PANEL, COMPREHENSIVE    Collection Time: 04/26/23  9:08 AM   Result Value Ref Range Sodium 138 136 - 145 mmol/L    Potassium 3.6 3.5 - 5.1 mmol/L    Chloride 106 97 - 108 mmol/L    CO2 30 21 - 32 mmol/L    Anion gap 2 (L) 5 - 15 mmol/L    Glucose 225 (H) 65 - 100 mg/dL    BUN 16 6 - 20 MG/DL    Creatinine 0.97 0.70 - 1.30 MG/DL    BUN/Creatinine ratio 16 12 - 20      eGFR >60 >60 ml/min/1.73m2    Calcium 8.7 8.5 - 10.1 MG/DL    Bilirubin, total 0.7 0.2 - 1.0 MG/DL    ALT (SGPT) 25 12 - 78 U/L    AST (SGOT) 21 15 - 37 U/L    Alk. phosphatase 85 45 - 117 U/L    Protein, total 6.7 6.4 - 8.2 g/dL    Albumin 3.5 3.5 - 5.0 g/dL    Globulin 3.2 2.0 - 4.0 g/dL    A-G Ratio 1.1 1.1 - 2.2           Pre-medications  were administered as ordered and chemotherapy was initiated.   Medications Administered       0.9% sodium chloride injection 5-40 mL       Admin Date  04/26/2023 Action  Given Dose  10 mL Route  IntraVENous Administered By  Estevan Ruiz, TARUN               Admin Date  04/26/2023 Action  Given Dose  10 mL Route  IntraVENous Administered By  Estevan Ruiz, TARUN               Admin Date  04/26/2023 Action  Given Dose  10 mL Route  IntraVENous Administered By  Estevan Ruiz, TARUN              atropine 0.4 mg/mL injection 0.4 mg       Admin Date  04/26/2023 Action  Given Dose  0.4 mg Route  IntraVENous Administered By  Estevan Ruiz, TARUN              dexamethasone (DECADRON) 12 mg in 0.9% sodium chloride 50 mL IVPB       Admin Date  04/26/2023 Action  New Bag Dose  12 mg Route  IntraVENous Administered By  Estevan Ruiz, TARUN              dextrose 5% infusion       Admin Date  04/26/2023 Action  New Bag Dose  25 mL/hr Rate  25 mL/hr Route  IntraVENous Administered By  Estevan Ruiz, TARUN               Admin Date  04/26/2023 Action  Restarted Dose  25 mL/hr Rate  25 mL/hr Route  IntraVENous Administered By  Estevan Ruiz, TARUN              fluorouraciL (ADRUCIL) 5,592 mg in 0.9% sodium chloride 250 mL CADD Cassette       Admin Date  04/26/2023 Action  New Bag Dose  5,592 mg Rate  5.4 mL/hr Route  IntraVENous Administered By  Jovany Altamirano RN              fluorouraciL (ADRUCIL) chemo syringe 932 mg       Admin Date  04/26/2023 Action  Given Dose  932 mg Rate  279.6 mL/hr Route  IntraVENous Administered By  Jovany Altamirano RN              irinotecan (CAMPTOSAR) 419 mg in dextrose 5% 250 mL, overfill volume 25 mL chemo infusion       Admin Date  04/26/2023 Action  New Bag Dose  419 mg Rate  197.3 mL/hr Route  IntraVENous Administered By  Jovany Altamirano RN              leucovorin (WELLCOVORIN) 932 mg in dextrose 5% 250 mL, overfill volume 25 mL IVPB       Admin Date  04/26/2023 Action  New Bag Dose  932 mg Rate  214.4 mL/hr Route  IntraVENous Administered By  Jovany Altamirano RN              palonosetron HCl (ALOXI) injection 0.25 mg       Admin Date  04/26/2023 Action  Given Dose  0.25 mg Route  IntraVENous Administered By  Jovany Altamirano RN                      Two nurses verified prior to administering: Drug name, Drug dose, Infusion volume or drug volume when prepared in a syringe, Rate of administration, Route of administration, Expiration dates and/or times, Appearance and physical integrity of the drugs, Rate set on infusion pump, when used, and Sequencing of drug administration. 1400 Patient tolerated treatment well. Port maintained positive blood return throughout treatment. Port flushed and connected to continuous infusion pump. Verified to be running. Patient is aware of next scheduled [facility] appointment on 4/28/23 at 11:30 AM for disconnection.      Future Appointments   Date Time Provider Cindy Gee   4/28/2023 11:30 AM SS [facility]F7 [facility]   5/8/2023  8:45 AM [address] CAVSF BS AMB         Destini Briggs, RN, RN  April 26, 2023

## 2023-04-26 NOTE — PROGRESS NOTES
Spiritual Care Partner Volunteer visited patient at [facility] in [facility] on 4/26/2023  Documented by:  April 78, Williams Doran 87, Shaquille 70, [facility]  Staff [address]  Paging service: [phone] (RAHUL)

## 2023-04-28 ENCOUNTER — HOSPITAL ENCOUNTER (OUTPATIENT)
Dept: INFUSION THERAPY | Age: 73
Discharge: HOME | End: 2023-04-28
Payer: MEDICARE

## 2023-04-28 VITALS
RESPIRATION RATE: 17 BRPM | HEIGHT: 70 IN | HEART RATE: 64 BPM | OXYGEN SATURATION: 95 % | WEIGHT: 243 LBS | DIASTOLIC BLOOD PRESSURE: 78 MMHG | SYSTOLIC BLOOD PRESSURE: 190 MMHG | TEMPERATURE: 98 F | BODY MASS INDEX: 34.79 KG/M2

## 2023-04-28 DIAGNOSIS — C25.0 MALIGNANT NEOPLASM OF HEAD OF PANCREAS (HCC): ICD-10-CM

## 2023-04-28 DIAGNOSIS — C78.7 METASTASIS TO LIVER (HCC): ICD-10-CM

## 2023-04-28 DIAGNOSIS — C7A.8 NEUROENDOCRINE CARCINOMA OF PANCREAS (HCC): Primary | ICD-10-CM

## 2023-04-28 DIAGNOSIS — Z51.11 ENCOUNTER FOR ANTINEOPLASTIC CHEMOTHERAPY: ICD-10-CM

## 2023-04-28 DIAGNOSIS — C80.1 SMALL CELL CARCINOMA (HCC): ICD-10-CM

## 2023-04-28 PROCEDURE — 74011000250 HC RX REV CODE- 250: Performed by: NURSE PRACTITIONER

## 2023-04-28 PROCEDURE — 96523 IRRIG DRUG DELIVERY DEVICE: CPT

## 2023-04-28 PROCEDURE — 74011250636 HC RX REV CODE- 250/636: Performed by: NURSE PRACTITIONER

## 2023-04-28 RX ORDER — HEPARIN 100 UNIT/ML
500 SYRINGE INTRAVENOUS AS NEEDED
Status: ACTIVE | OUTPATIENT
Start: 2023-04-28 | End: 2023-04-28

## 2023-04-28 RX ORDER — SODIUM CHLORIDE 0.9 % (FLUSH) 0.9 %
5-40 SYRINGE (ML) INJECTION AS NEEDED
Status: DISPENSED | OUTPATIENT
Start: 2023-04-28 | End: 2023-04-28

## 2023-04-28 RX ADMIN — Medication 500 UNITS: at 14:49

## 2023-04-28 RX ADMIN — SODIUM CHLORIDE, PRESERVATIVE FREE 10 ML: 5 INJECTION INTRAVENOUS at 14:48

## 2023-04-28 NOTE — PROGRESS NOTES
[facility] Progress Note    Date: April 28, 2023      1200: Pt arrived ambulatory to [facility] for Pump Disconnection + Port Flush in stable condition. Pts pump started beeping at 10:30 AM and the pump stated it was clamped and medication was not infusing. Pt stated he called and the person who answered stated to come at his normal time. Pt came to facility at 12:00 PM and pump was turned back on and RN re-started the medication. Pt left facility and came back at 2:30 PM. Pump finished at 2:46 PM.    Assessment completed. No new complaints voiced at this time. Pump completed on arrival. Per patient report, pump completed at 1446. Pump disconnected from port. Port flushed with positive blood return. Visit Vitals  BP (!) 190/78 (BP 1 Location: Right upper arm, BP Patient Position: Sitting)   Pulse 64   Temp 98 °F (36.7 °C)   Resp 17   Ht 5' 10\" (1.778 m)   Wt 110.2 kg (243 lb)   SpO2 95%   BMI 34.87 kg/m²            1450: Tolerated treatment well, no adverse reactions noted. Port flushed, heparinized and de- accessed per protocol. D/Cd from [facility] ambulatory and in no distress.    Future Appointments   Date Time Provider Cindy Gee   5/8/2023  8:45 AM [email or web address], [facility] REED Crowley RN  April 28, 2023

## 2023-05-03 DIAGNOSIS — K86.89 PANCREATIC MASS: ICD-10-CM

## 2023-05-03 DIAGNOSIS — C80.1 SMALL CELL CARCINOMA (HCC): ICD-10-CM

## 2023-05-03 DIAGNOSIS — C78.7 METASTASIS TO LIVER (HCC): ICD-10-CM

## 2023-05-03 DIAGNOSIS — C25.0 MALIGNANT NEOPLASM OF HEAD OF PANCREAS (HCC): ICD-10-CM

## 2023-05-03 DIAGNOSIS — C7A.8 NEUROENDOCRINE CARCINOMA OF PANCREAS (HCC): Primary | ICD-10-CM

## 2023-05-03 DIAGNOSIS — R16.0 LIVER MASS: ICD-10-CM

## 2023-05-03 RX ORDER — SODIUM CHLORIDE 9 MG/ML
5-250 INJECTION, SOLUTION INTRAVENOUS PRN
OUTPATIENT
Start: 2023-05-19

## 2023-05-03 RX ORDER — ALBUTEROL SULFATE 90 UG/1
4 AEROSOL, METERED RESPIRATORY (INHALATION) PRN
OUTPATIENT
Start: 2023-05-17

## 2023-05-03 RX ORDER — SODIUM CHLORIDE 9 MG/ML
5-250 INJECTION, SOLUTION INTRAVENOUS PRN
OUTPATIENT
Start: 2023-05-17

## 2023-05-03 RX ORDER — SODIUM CHLORIDE 0.9 % (FLUSH) 0.9 %
5-40 SYRINGE (ML) INJECTION PRN
OUTPATIENT
Start: 2023-05-19

## 2023-05-03 RX ORDER — MEPERIDINE HYDROCHLORIDE 25 MG/ML
12.5 INJECTION INTRAMUSCULAR; INTRAVENOUS; SUBCUTANEOUS PRN
OUTPATIENT
Start: 2023-05-17

## 2023-05-03 RX ORDER — EPINEPHRINE 1 MG/ML
0.3 INJECTION, SOLUTION, CONCENTRATE INTRAVENOUS PRN
OUTPATIENT
Start: 2023-05-17

## 2023-05-03 RX ORDER — PALONOSETRON 0.05 MG/ML
0.25 INJECTION, SOLUTION INTRAVENOUS ONCE
OUTPATIENT
Start: 2023-05-17 | End: 2023-05-10

## 2023-05-03 RX ORDER — ACETAMINOPHEN 325 MG/1
650 TABLET ORAL
OUTPATIENT
Start: 2023-05-17

## 2023-05-03 RX ORDER — HEPARIN SODIUM (PORCINE) LOCK FLUSH IV SOLN 100 UNIT/ML 100 UNIT/ML
500 SOLUTION INTRAVENOUS PRN
OUTPATIENT
Start: 2023-05-17

## 2023-05-03 RX ORDER — DIPHENHYDRAMINE HYDROCHLORIDE 50 MG/ML
50 INJECTION INTRAMUSCULAR; INTRAVENOUS
OUTPATIENT
Start: 2023-05-17

## 2023-05-03 RX ORDER — FLUOROURACIL 50 MG/ML
400 INJECTION, SOLUTION INTRAVENOUS ONCE
OUTPATIENT
Start: 2023-05-17 | End: 2023-05-10

## 2023-05-03 RX ORDER — ONDANSETRON 2 MG/ML
8 INJECTION INTRAMUSCULAR; INTRAVENOUS
OUTPATIENT
Start: 2023-05-17

## 2023-05-03 RX ORDER — SODIUM CHLORIDE 0.9 % (FLUSH) 0.9 %
5-40 SYRINGE (ML) INJECTION PRN
OUTPATIENT
Start: 2023-05-17

## 2023-05-03 RX ORDER — ATROPINE SULFATE 0.4 MG/ML
0.4 AMPUL (ML) INJECTION
OUTPATIENT
Start: 2023-05-17

## 2023-05-03 RX ORDER — SODIUM CHLORIDE 9 MG/ML
INJECTION, SOLUTION INTRAVENOUS CONTINUOUS
OUTPATIENT
Start: 2023-05-17

## 2023-05-03 RX ORDER — HEPARIN SODIUM (PORCINE) LOCK FLUSH IV SOLN 100 UNIT/ML 100 UNIT/ML
500 SOLUTION INTRAVENOUS PRN
OUTPATIENT
Start: 2023-05-19

## 2023-05-08 ENCOUNTER — NURSE ONLY (OUTPATIENT)
Age: 73
End: 2023-05-08

## 2023-05-08 DIAGNOSIS — Z95.818 STATUS POST PLACEMENT OF IMPLANTABLE LOOP RECORDER: Primary | ICD-10-CM

## 2023-05-10 ENCOUNTER — HOSPITAL ENCOUNTER (OUTPATIENT)
Facility: HOSPITAL | Age: 73
Setting detail: INFUSION SERIES
End: 2023-05-10

## 2023-05-16 NOTE — PROGRESS NOTES
[facility] [place] at [address] [facility] [address] [facility]  W: [phone]  F: [phone]      Reason for Visit:   Donta Galicia. is a 72 y.o. male who is seen today for evaluation of small cell carcinoma of the pancreas. Hematology/Oncology Treatment History:   CT A/P 4/19/2022: Possible mild acute pancreatitis. Prominent sigmoid diverticulosis. Enlarged prostate. Fat-containing hernias  CT Abdomen 5/17/2022: New intra-hepatic biliary dilatation and common bile duct dilatation. Ampullary prominence and 1.5 cm x 1.5 cm soft tissue density in the pancreaticoduodenal groove. New subcentimeter  hepatic hypodense lesions, worrisome for metastatic disease. MRI Abdomen 5/17/2022:  Periampullary pancreatic mass with possible extension into the wall of the duodenum. Associated intrahepatic and extra hepatic biliary dilatation and mild pancreatic ductal dilatation. Small hyperenhancing lesions throughout the  liver suspicious for metastatic disease. CT Chest 5/18/2022: no metastatic disease within thorax  ERCP by Dr. Hamilton So 5/18/2022: Large ulcerated mass seen in the second portion of the duodenum taking half the circumference in the area of the major papilla and extending distally about 3 to 4 cm, appeared to be pancreatic and eroding into the duodenum. Cholangiogram with complete obstruction of CBD. Metal stent deployed. 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 Reports on Monday night he had a new pain develop in L side of back, rates pain 4/10, took Advil and a nap which helped. Pain comes and goes with movement.  Reports

## 2023-05-17 ENCOUNTER — HOSPITAL ENCOUNTER (OUTPATIENT)
Facility: HOSPITAL | Age: 73
Setting detail: INFUSION SERIES
End: 2023-05-17
Payer: MEDICARE

## 2023-05-17 ENCOUNTER — OFFICE VISIT (OUTPATIENT)
Age: 73
End: 2023-05-17
Payer: MEDICARE

## 2023-05-17 VITALS
WEIGHT: 238.4 LBS | OXYGEN SATURATION: 96 % | DIASTOLIC BLOOD PRESSURE: 65 MMHG | RESPIRATION RATE: 18 BRPM | TEMPERATURE: 98 F | BODY MASS INDEX: 34.13 KG/M2 | HEART RATE: 68 BPM | SYSTOLIC BLOOD PRESSURE: 133 MMHG | HEIGHT: 70 IN

## 2023-05-17 VITALS
HEART RATE: 63 BPM | OXYGEN SATURATION: 96 % | RESPIRATION RATE: 17 BRPM | SYSTOLIC BLOOD PRESSURE: 156 MMHG | DIASTOLIC BLOOD PRESSURE: 67 MMHG | WEIGHT: 238.4 LBS | TEMPERATURE: 98 F | BODY MASS INDEX: 34.13 KG/M2 | HEIGHT: 70 IN

## 2023-05-17 DIAGNOSIS — C25.0 MALIGNANT NEOPLASM OF HEAD OF PANCREAS (HCC): ICD-10-CM

## 2023-05-17 DIAGNOSIS — R16.0 LIVER MASS: ICD-10-CM

## 2023-05-17 DIAGNOSIS — C25.0 MALIGNANT NEOPLASM OF HEAD OF PANCREAS (HCC): Primary | ICD-10-CM

## 2023-05-17 DIAGNOSIS — C80.1 SMALL CELL CARCINOMA (HCC): ICD-10-CM

## 2023-05-17 DIAGNOSIS — K86.89 PANCREATIC MASS: ICD-10-CM

## 2023-05-17 DIAGNOSIS — C78.7 METASTASIS TO LIVER (HCC): ICD-10-CM

## 2023-05-17 DIAGNOSIS — C7A.8 NEUROENDOCRINE CARCINOMA OF PANCREAS (HCC): Primary | ICD-10-CM

## 2023-05-17 LAB
ALBUMIN SERPL-MCNC: 3.3 G/DL (ref 3.5–5)
ALBUMIN/GLOB SERPL: 0.9 (ref 1.1–2.2)
ALP SERPL-CCNC: 106 U/L (ref 45–117)
ALT SERPL-CCNC: 26 U/L (ref 12–78)
ANION GAP SERPL CALC-SCNC: 4 MMOL/L (ref 5–15)
AST SERPL-CCNC: 21 U/L (ref 15–37)
BASOPHILS # BLD: 0 K/UL (ref 0–0.1)
BASOPHILS NFR BLD: 1 % (ref 0–1)
BILIRUB SERPL-MCNC: 0.6 MG/DL (ref 0.2–1)
BILIRUB SERPL-MCNC: 0.7 MG/DL (ref 0.2–1)
BUN SERPL-MCNC: 16 MG/DL (ref 6–20)
BUN/CREAT SERPL: 18 (ref 12–20)
CALCIUM SERPL-MCNC: 8.5 MG/DL (ref 8.5–10.1)
CHLORIDE SERPL-SCNC: 106 MMOL/L (ref 97–108)
CO2 SERPL-SCNC: 27 MMOL/L (ref 21–32)
CREAT SERPL-MCNC: 0.88 MG/DL (ref 0.7–1.3)
DIFFERENTIAL METHOD BLD: ABNORMAL
EOSINOPHIL # BLD: 0.1 K/UL (ref 0–0.4)
EOSINOPHIL NFR BLD: 2 % (ref 0–7)
ERYTHROCYTE [DISTWIDTH] IN BLOOD BY AUTOMATED COUNT: 16.8 % (ref 11.5–14.5)
GLOBULIN SER CALC-MCNC: 3.5 G/DL (ref 2–4)
GLUCOSE SERPL-MCNC: 235 MG/DL (ref 65–100)
HCT VFR BLD AUTO: 32.6 % (ref 36.6–50.3)
HGB BLD-MCNC: 10.6 G/DL (ref 12.1–17)
IMM GRANULOCYTES # BLD AUTO: 0 K/UL (ref 0–0.04)
IMM GRANULOCYTES NFR BLD AUTO: 1 % (ref 0–0.5)
LYMPHOCYTES # BLD: 0.8 K/UL (ref 0.8–3.5)
LYMPHOCYTES NFR BLD: 20 % (ref 12–49)
MCH RBC QN AUTO: 27.4 PG (ref 26–34)
MCHC RBC AUTO-ENTMCNC: 32.5 G/DL (ref 30–36.5)
MCV RBC AUTO: 84.2 FL (ref 80–99)
MONOCYTES # BLD: 0.4 K/UL (ref 0–1)
MONOCYTES NFR BLD: 11 % (ref 5–13)
NEUTS SEG # BLD: 2.6 K/UL (ref 1.8–8)
NEUTS SEG NFR BLD: 65 % (ref 32–75)
NRBC # BLD: 0 K/UL (ref 0–0.01)
NRBC BLD-RTO: 0 PER 100 WBC
PLATELET # BLD AUTO: 138 K/UL (ref 150–400)
PMV BLD AUTO: 10.4 FL (ref 8.9–12.9)
POTASSIUM SERPL-SCNC: 3.6 MMOL/L (ref 3.5–5.1)
PROT SERPL-MCNC: 6.8 G/DL (ref 6.4–8.2)
RBC # BLD AUTO: 3.87 M/UL (ref 4.1–5.7)
RBC MORPH BLD: ABNORMAL
SODIUM SERPL-SCNC: 137 MMOL/L (ref 136–145)
WBC # BLD AUTO: 3.9 K/UL (ref 4.1–11.1)

## 2023-05-17 PROCEDURE — 82247 BILIRUBIN TOTAL: CPT

## 2023-05-17 PROCEDURE — 96413 CHEMO IV INFUSION 1 HR: CPT

## 2023-05-17 PROCEDURE — 1036F TOBACCO NON-USER: CPT | Performed by: INTERNAL MEDICINE

## 2023-05-17 PROCEDURE — 1123F ACP DISCUSS/DSCN MKR DOCD: CPT | Performed by: INTERNAL MEDICINE

## 2023-05-17 PROCEDURE — 85025 COMPLETE CBC W/AUTO DIFF WBC: CPT

## 2023-05-17 PROCEDURE — 99215 OFFICE O/P EST HI 40 MIN: CPT | Performed by: INTERNAL MEDICINE

## 2023-05-17 PROCEDURE — 36415 COLL VENOUS BLD VENIPUNCTURE: CPT

## 2023-05-17 PROCEDURE — 96375 TX/PRO/DX INJ NEW DRUG ADDON: CPT

## 2023-05-17 PROCEDURE — 96368 THER/DIAG CONCURRENT INF: CPT

## 2023-05-17 PROCEDURE — 96416 CHEMO PROLONG INFUSE W/PUMP: CPT

## 2023-05-17 PROCEDURE — G8427 DOCREV CUR MEDS BY ELIG CLIN: HCPCS | Performed by: INTERNAL MEDICINE

## 2023-05-17 PROCEDURE — 3078F DIAST BP <80 MM HG: CPT | Performed by: INTERNAL MEDICINE

## 2023-05-17 PROCEDURE — 2580000003 HC RX 258: Performed by: INTERNAL MEDICINE

## 2023-05-17 PROCEDURE — 96415 CHEMO IV INFUSION ADDL HR: CPT

## 2023-05-17 PROCEDURE — 3017F COLORECTAL CA SCREEN DOC REV: CPT | Performed by: INTERNAL MEDICINE

## 2023-05-17 PROCEDURE — 3077F SYST BP >= 140 MM HG: CPT | Performed by: INTERNAL MEDICINE

## 2023-05-17 PROCEDURE — 96411 CHEMO IV PUSH ADDL DRUG: CPT

## 2023-05-17 PROCEDURE — 80053 COMPREHEN METABOLIC PANEL: CPT

## 2023-05-17 PROCEDURE — G8419 CALC BMI OUT NRM PARAM NOF/U: HCPCS | Performed by: INTERNAL MEDICINE

## 2023-05-17 PROCEDURE — 6360000002 HC RX W HCPCS: Performed by: INTERNAL MEDICINE

## 2023-05-17 RX ORDER — PALONOSETRON 0.05 MG/ML
0.25 INJECTION, SOLUTION INTRAVENOUS ONCE
Status: COMPLETED | OUTPATIENT
Start: 2023-05-17 | End: 2023-05-17

## 2023-05-17 RX ORDER — SODIUM CHLORIDE 0.9 % (FLUSH) 0.9 %
5-40 SYRINGE (ML) INJECTION PRN
Status: DISCONTINUED | OUTPATIENT
Start: 2023-05-17 | End: 2023-05-18 | Stop reason: HOSPADM

## 2023-05-17 RX ORDER — SODIUM CHLORIDE 9 MG/ML
5-250 INJECTION, SOLUTION INTRAVENOUS PRN
Status: DISCONTINUED | OUTPATIENT
Start: 2023-05-17 | End: 2023-05-18 | Stop reason: HOSPADM

## 2023-05-17 RX ORDER — ATROPINE SULFATE 0.4 MG/ML
0.4 INJECTION, SOLUTION INTRAMUSCULAR; INTRAVENOUS; SUBCUTANEOUS
Status: DISCONTINUED | OUTPATIENT
Start: 2023-05-17 | End: 2023-10-27 | Stop reason: HOSPADM

## 2023-05-17 RX ORDER — HEPARIN SODIUM (PORCINE) LOCK FLUSH IV SOLN 100 UNIT/ML 100 UNIT/ML
500 SOLUTION INTRAVENOUS PRN
Status: DISCONTINUED | OUTPATIENT
Start: 2023-05-17 | End: 2023-05-18 | Stop reason: HOSPADM

## 2023-05-17 RX ORDER — FLUOROURACIL 50 MG/ML
400 INJECTION, SOLUTION INTRAVENOUS ONCE
Status: COMPLETED | OUTPATIENT
Start: 2023-05-17 | End: 2023-05-17

## 2023-05-17 RX ADMIN — LEUCOVORIN CALCIUM 900 MG: 500 INJECTION, POWDER, LYOPHILIZED, FOR SOLUTION INTRAMUSCULAR; INTRAVENOUS at 13:27

## 2023-05-17 RX ADMIN — FLUOROURACIL 925 MG: 50 INJECTION, SOLUTION INTRAVENOUS at 15:09

## 2023-05-17 RX ADMIN — FLUOROURACIL 5525 MG: 50 INJECTION, SOLUTION INTRAVENOUS at 15:13

## 2023-05-17 RX ADMIN — IRINOTECAN HYDROCHLORIDE 420 MG: 20 INJECTION, SOLUTION INTRAVENOUS at 13:27

## 2023-05-17 RX ADMIN — ATROPINE SULFATE 0.4 MG: 0.4 INJECTION, SOLUTION INTRAMUSCULAR; INTRAVENOUS; SUBCUTANEOUS at 13:16

## 2023-05-17 RX ADMIN — DEXAMETHASONE SODIUM PHOSPHATE 12 MG: 4 INJECTION, SOLUTION INTRA-ARTICULAR; INTRALESIONAL; INTRAMUSCULAR; INTRAVENOUS; SOFT TISSUE at 12:12

## 2023-05-17 RX ADMIN — SODIUM CHLORIDE 25 ML/HR: 9 INJECTION, SOLUTION INTRAVENOUS at 12:05

## 2023-05-17 RX ADMIN — PALONOSETRON 0.25 MG: 0.05 INJECTION, SOLUTION INTRAVENOUS at 12:10

## 2023-05-17 ASSESSMENT — PAIN DESCRIPTION - DESCRIPTORS: DESCRIPTORS: ACHING

## 2023-05-17 ASSESSMENT — PAIN SCALES - GENERAL: PAINLEVEL_OUTOF10: 3

## 2023-05-17 ASSESSMENT — PAIN DESCRIPTION - LOCATION: LOCATION: BACK

## 2023-05-17 ASSESSMENT — PAIN DESCRIPTION - ORIENTATION: ORIENTATION: POSTERIOR;LEFT

## 2023-05-17 NOTE — PROGRESS NOTES
[facility] Chemo Progress Note    Date: May 17, 2023        0900: Mr. Mary Ann Gonzalez Arrived to [facility] for  C9D FOLFIRI ambulatory in stable condition. Assessment was completed and port accessed by Wynema Harada RN. Labs drawn and sent for processing. Went to provider appointment with Medical Oncology. 1130: Returned from provider appointment. Labs reviewed. Criteria for treatment was met. Mr. Irene Mora vitals were reviewed. Patient Vitals for the past 12 hrs:   Temp Pulse Resp BP SpO2   05/17/23 1520 -- 68 18 133/65 --   05/17/23 0900 98 °F (36.7 °C) 63 17 (!) 156/67 96 %     Lab results were obtained and reviewed.   Recent Results (from the past 12 hour(s))   CBC With Auto Differential    Collection Time: 05/17/23  9:29 AM   Result Value Ref Range    WBC 3.9 (L) 4.1 - 11.1 K/uL    RBC 3.87 (L) 4.10 - 5.70 M/uL    Hemoglobin 10.6 (L) 12.1 - 17.0 g/dL    Hematocrit 32.6 (L) 36.6 - 50.3 %    MCV 84.2 80.0 - 99.0 FL    MCH 27.4 26.0 - 34.0 PG    MCHC 32.5 30.0 - 36.5 g/dL    RDW 16.8 (H) 11.5 - 14.5 %    Platelets 216 (L) 377 - 400 K/uL    MPV 10.4 8.9 - 12.9 FL    Nucleated RBCs 0.0 0  WBC    nRBC 0.00 0.00 - 0.01 K/uL    Neutrophils % 65 32 - 75 %    Lymphocytes % 20 12 - 49 %    Monocytes % 11 5 - 13 %    Eosinophils % 2 0 - 7 %    Basophils % 1 0 - 1 %    Immature Granulocytes 1 (H) 0.0 - 0.5 %    Neutrophils Absolute 2.6 1.8 - 8.0 K/UL    Lymphocytes Absolute 0.8 0.8 - 3.5 K/UL    Monocytes Absolute 0.4 0.0 - 1.0 K/UL    Eosinophils Absolute 0.1 0.0 - 0.4 K/UL    Basophils Absolute 0.0 0.0 - 0.1 K/UL    Absolute Immature Granulocyte 0.0 0.00 - 0.04 K/UL    Differential Type SMEAR SCANNED      RBC Comment NORMOCYTIC, NORMOCHROMIC     Comprehensive metabolic panel    Collection Time: 05/17/23  9:29 AM   Result Value Ref Range    Sodium 137 136 - 145 mmol/L    Potassium 3.6 3.5 - 5.1 mmol/L    Chloride 106 97 - 108 mmol/L    CO2 27 21 - 32 mmol/L    Anion Gap 4 (L) 5 - 15 mmol/L    Glucose 235 (H) 65 - 100 mg/dL    BUN

## 2023-05-17 NOTE — PROGRESS NOTES
Vesna Musa. is a 72 y.o. male here for follow up of small cell carcinoma of the pancreas. 1. Have you been to the ER, urgent care clinic since your last visit? Hospitalized since your last visit? No    2. Have you seen or consulted any other health care providers outside of the [address] [place] [address] since your last visit? Include any pap smears or colon screening.  No

## 2023-05-19 ENCOUNTER — HOSPITAL ENCOUNTER (OUTPATIENT)
Facility: HOSPITAL | Age: 73
Setting detail: INFUSION SERIES
End: 2023-05-19
Payer: MEDICARE

## 2023-05-19 VITALS
SYSTOLIC BLOOD PRESSURE: 122 MMHG | TEMPERATURE: 98.1 F | HEART RATE: 51 BPM | DIASTOLIC BLOOD PRESSURE: 58 MMHG | RESPIRATION RATE: 18 BRPM

## 2023-05-19 DIAGNOSIS — C78.7 METASTASIS TO LIVER (HCC): ICD-10-CM

## 2023-05-19 DIAGNOSIS — R16.0 LIVER MASS: ICD-10-CM

## 2023-05-19 DIAGNOSIS — C80.1 SMALL CELL CARCINOMA (HCC): ICD-10-CM

## 2023-05-19 DIAGNOSIS — C25.0 MALIGNANT NEOPLASM OF HEAD OF PANCREAS (HCC): ICD-10-CM

## 2023-05-19 DIAGNOSIS — K86.89 PANCREATIC MASS: ICD-10-CM

## 2023-05-19 DIAGNOSIS — C7A.8 NEUROENDOCRINE CARCINOMA OF PANCREAS (HCC): Primary | ICD-10-CM

## 2023-05-19 PROCEDURE — 96523 IRRIG DRUG DELIVERY DEVICE: CPT

## 2023-05-19 RX ORDER — HEPARIN SODIUM (PORCINE) LOCK FLUSH IV SOLN 100 UNIT/ML 100 UNIT/ML
500 SOLUTION INTRAVENOUS PRN
Status: DISCONTINUED | OUTPATIENT
Start: 2023-05-19 | End: 2023-05-20 | Stop reason: HOSPADM

## 2023-05-19 RX ORDER — SODIUM CHLORIDE 0.9 % (FLUSH) 0.9 %
5-40 SYRINGE (ML) INJECTION PRN
Status: DISCONTINUED | OUTPATIENT
Start: 2023-05-19 | End: 2023-05-20 | Stop reason: HOSPADM

## 2023-05-19 NOTE — PROGRESS NOTES
Outpatient Infusion Center Progress Note    1330 Pt admit to [facility] for pump disconnect ambulatory in stable condition. Assessment completed. No new concerns voiced. Blood return verified in right chest port. Flushed and removed nuñze per protocol. Pump infused 250ml volume without concern per patient. BP (!) 122/58   Pulse 51   Temp 98.1 °F (36.7 °C)   Resp 18      Medications:  NS flush  Heparin flush    1340 Pt tolerated treatment well. D/c home ambulatory in no distress. Pt aware of next appointment scheduled for 5/31/2023.

## 2023-05-23 RX ORDER — ONDANSETRON 2 MG/ML
8 INJECTION INTRAMUSCULAR; INTRAVENOUS
Status: CANCELLED | OUTPATIENT
Start: 2023-05-31

## 2023-05-23 RX ORDER — SODIUM CHLORIDE 9 MG/ML
5-250 INJECTION, SOLUTION INTRAVENOUS PRN
Status: CANCELLED | OUTPATIENT
Start: 2023-05-31

## 2023-05-23 RX ORDER — SODIUM CHLORIDE 9 MG/ML
INJECTION, SOLUTION INTRAVENOUS CONTINUOUS
Status: CANCELLED | OUTPATIENT
Start: 2023-05-31

## 2023-05-23 RX ORDER — SODIUM CHLORIDE 9 MG/ML
5-250 INJECTION, SOLUTION INTRAVENOUS PRN
Status: CANCELLED | OUTPATIENT
Start: 2023-06-02

## 2023-05-23 RX ORDER — HEPARIN SODIUM (PORCINE) LOCK FLUSH IV SOLN 100 UNIT/ML 100 UNIT/ML
500 SOLUTION INTRAVENOUS PRN
Status: CANCELLED | OUTPATIENT
Start: 2023-06-02

## 2023-05-23 RX ORDER — DIPHENHYDRAMINE HYDROCHLORIDE 50 MG/ML
50 INJECTION INTRAMUSCULAR; INTRAVENOUS
Status: CANCELLED | OUTPATIENT
Start: 2023-05-31

## 2023-05-23 RX ORDER — ALBUTEROL SULFATE 90 UG/1
4 AEROSOL, METERED RESPIRATORY (INHALATION) PRN
Status: CANCELLED | OUTPATIENT
Start: 2023-05-31

## 2023-05-23 RX ORDER — EPINEPHRINE 1 MG/ML
0.3 INJECTION, SOLUTION, CONCENTRATE INTRAVENOUS PRN
Status: CANCELLED | OUTPATIENT
Start: 2023-05-31

## 2023-05-23 RX ORDER — ACETAMINOPHEN 325 MG/1
650 TABLET ORAL
Status: CANCELLED | OUTPATIENT
Start: 2023-05-31

## 2023-05-23 RX ORDER — MEPERIDINE HYDROCHLORIDE 25 MG/ML
12.5 INJECTION INTRAMUSCULAR; INTRAVENOUS; SUBCUTANEOUS PRN
Status: CANCELLED | OUTPATIENT
Start: 2023-05-31

## 2023-05-23 RX ORDER — SODIUM CHLORIDE 0.9 % (FLUSH) 0.9 %
5-40 SYRINGE (ML) INJECTION PRN
Status: CANCELLED | OUTPATIENT
Start: 2023-06-02

## 2023-05-23 RX ORDER — HEPARIN SODIUM (PORCINE) LOCK FLUSH IV SOLN 100 UNIT/ML 100 UNIT/ML
500 SOLUTION INTRAVENOUS PRN
Status: CANCELLED | OUTPATIENT
Start: 2023-05-31

## 2023-05-30 NOTE — PROGRESS NOTES
[facility] [place] at [address] [facility] [address] [facility]  W: [phone]  F: [phone]      Reason for Visit:   Darrel Cordoba is a 72 y.o. male who is seen today for evaluation of small cell carcinoma of the pancreas. Hematology/Oncology Treatment History:   CT A/P 4/19/2022: Possible mild acute pancreatitis. Prominent sigmoid diverticulosis. Enlarged prostate. Fat-containing hernias  CT Abdomen 5/17/2022: New intra-hepatic biliary dilatation and common bile duct dilatation. Ampullary prominence and 1.5 cm x 1.5 cm soft tissue density in the pancreaticoduodenal groove. New subcentimeter  hepatic hypodense lesions, worrisome for metastatic disease. MRI Abdomen 5/17/2022:  Periampullary pancreatic mass with possible extension into the wall of the duodenum. Associated intrahepatic and extra hepatic biliary dilatation and mild pancreatic ductal dilatation. Small hyperenhancing lesions throughout the  liver suspicious for metastatic disease. CT Chest 5/18/2022: no metastatic disease within thorax  ERCP by Dr. Jacob Min 5/18/2022: Large ulcerated mass seen in the second portion of the duodenum taking half the circumference in the area of the major papilla and extending distally about 3 to 4 cm, appeared to be pancreatic and eroding into the duodenum. Cholangiogram with complete obstruction of CBD. Metal stent deployed. 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 Feeling well overall. Mild fatigue. No nausea or vomiting. Change in constipation and diarrhea. Mild cough. Energy has been fair.      Review of systems was

## 2023-05-31 ENCOUNTER — HOSPITAL ENCOUNTER (OUTPATIENT)
Facility: HOSPITAL | Age: 73
Setting detail: INFUSION SERIES
End: 2023-05-31
Payer: MEDICARE

## 2023-05-31 ENCOUNTER — OFFICE VISIT (OUTPATIENT)
Age: 73
End: 2023-05-31
Payer: MEDICARE

## 2023-05-31 VITALS
RESPIRATION RATE: 18 BRPM | HEART RATE: 81 BPM | OXYGEN SATURATION: 94 % | BODY MASS INDEX: 34.09 KG/M2 | WEIGHT: 238.1 LBS | HEIGHT: 70 IN | SYSTOLIC BLOOD PRESSURE: 169 MMHG | TEMPERATURE: 97.8 F | DIASTOLIC BLOOD PRESSURE: 69 MMHG

## 2023-05-31 VITALS
SYSTOLIC BLOOD PRESSURE: 121 MMHG | HEART RATE: 60 BPM | OXYGEN SATURATION: 96 % | DIASTOLIC BLOOD PRESSURE: 70 MMHG | WEIGHT: 238.1 LBS | HEIGHT: 70 IN | TEMPERATURE: 97.8 F | BODY MASS INDEX: 34.09 KG/M2

## 2023-05-31 DIAGNOSIS — C25.0 MALIGNANT NEOPLASM OF HEAD OF PANCREAS (HCC): Primary | ICD-10-CM

## 2023-05-31 DIAGNOSIS — C7A.8 NEUROENDOCRINE CARCINOMA OF PANCREAS (HCC): Primary | ICD-10-CM

## 2023-05-31 DIAGNOSIS — K86.89 PANCREATIC MASS: ICD-10-CM

## 2023-05-31 DIAGNOSIS — C80.1 SMALL CELL CARCINOMA (HCC): ICD-10-CM

## 2023-05-31 DIAGNOSIS — C25.0 MALIGNANT NEOPLASM OF HEAD OF PANCREAS (HCC): ICD-10-CM

## 2023-05-31 DIAGNOSIS — C78.7 METASTASIS TO LIVER (HCC): ICD-10-CM

## 2023-05-31 DIAGNOSIS — R16.0 LIVER MASS: ICD-10-CM

## 2023-05-31 LAB
ALBUMIN SERPL-MCNC: 3.3 G/DL (ref 3.5–5)
ALBUMIN/GLOB SERPL: 0.9 (ref 1.1–2.2)
ALP SERPL-CCNC: 82 U/L (ref 45–117)
ALT SERPL-CCNC: 25 U/L (ref 12–78)
ANION GAP SERPL CALC-SCNC: 6 MMOL/L (ref 5–15)
AST SERPL-CCNC: 19 U/L (ref 15–37)
BASOPHILS # BLD: 0 K/UL (ref 0–0.1)
BASOPHILS NFR BLD: 0 % (ref 0–1)
BILIRUB SERPL-MCNC: 0.5 MG/DL (ref 0.2–1)
BUN SERPL-MCNC: 16 MG/DL (ref 6–20)
BUN/CREAT SERPL: 15 (ref 12–20)
CALCIUM SERPL-MCNC: 8.4 MG/DL (ref 8.5–10.1)
CHLORIDE SERPL-SCNC: 106 MMOL/L (ref 97–108)
CO2 SERPL-SCNC: 24 MMOL/L (ref 21–32)
CREAT SERPL-MCNC: 1.07 MG/DL (ref 0.7–1.3)
DIFFERENTIAL METHOD BLD: ABNORMAL
EOSINOPHIL # BLD: 0.1 K/UL (ref 0–0.4)
EOSINOPHIL NFR BLD: 3 % (ref 0–7)
ERYTHROCYTE [DISTWIDTH] IN BLOOD BY AUTOMATED COUNT: 16.3 % (ref 11.5–14.5)
GLOBULIN SER CALC-MCNC: 3.6 G/DL (ref 2–4)
GLUCOSE SERPL-MCNC: 283 MG/DL (ref 65–100)
HCT VFR BLD AUTO: 31.7 % (ref 36.6–50.3)
HGB BLD-MCNC: 10.7 G/DL (ref 12.1–17)
IMM GRANULOCYTES # BLD AUTO: 0 K/UL (ref 0–0.04)
IMM GRANULOCYTES NFR BLD AUTO: 0 % (ref 0–0.5)
LYMPHOCYTES # BLD: 0.9 K/UL (ref 0.8–3.5)
LYMPHOCYTES NFR BLD: 24 % (ref 12–49)
MCH RBC QN AUTO: 27.1 PG (ref 26–34)
MCHC RBC AUTO-ENTMCNC: 33.8 G/DL (ref 30–36.5)
MCV RBC AUTO: 80.3 FL (ref 80–99)
MONOCYTES # BLD: 0.3 K/UL (ref 0–1)
MONOCYTES NFR BLD: 7 % (ref 5–13)
NEUTS SEG # BLD: 2.3 K/UL (ref 1.8–8)
NEUTS SEG NFR BLD: 66 % (ref 32–75)
NRBC # BLD: 0.02 K/UL (ref 0–0.01)
NRBC BLD-RTO: 0.6 PER 100 WBC
PLATELET # BLD AUTO: 119 K/UL (ref 150–400)
PMV BLD AUTO: 9.5 FL (ref 8.9–12.9)
POTASSIUM SERPL-SCNC: 4 MMOL/L (ref 3.5–5.1)
PROT SERPL-MCNC: 6.9 G/DL (ref 6.4–8.2)
RBC # BLD AUTO: 3.95 M/UL (ref 4.1–5.7)
SODIUM SERPL-SCNC: 136 MMOL/L (ref 136–145)
WBC # BLD AUTO: 3.6 K/UL (ref 4.1–11.1)

## 2023-05-31 PROCEDURE — 3074F SYST BP LT 130 MM HG: CPT | Performed by: INTERNAL MEDICINE

## 2023-05-31 PROCEDURE — G8427 DOCREV CUR MEDS BY ELIG CLIN: HCPCS | Performed by: INTERNAL MEDICINE

## 2023-05-31 PROCEDURE — 85025 COMPLETE CBC W/AUTO DIFF WBC: CPT

## 2023-05-31 PROCEDURE — 96411 CHEMO IV PUSH ADDL DRUG: CPT

## 2023-05-31 PROCEDURE — 2580000003 HC RX 258: Performed by: INTERNAL MEDICINE

## 2023-05-31 PROCEDURE — 96415 CHEMO IV INFUSION ADDL HR: CPT

## 2023-05-31 PROCEDURE — 96368 THER/DIAG CONCURRENT INF: CPT

## 2023-05-31 PROCEDURE — 96375 TX/PRO/DX INJ NEW DRUG ADDON: CPT

## 2023-05-31 PROCEDURE — 96413 CHEMO IV INFUSION 1 HR: CPT

## 2023-05-31 PROCEDURE — 1036F TOBACCO NON-USER: CPT | Performed by: INTERNAL MEDICINE

## 2023-05-31 PROCEDURE — 99215 OFFICE O/P EST HI 40 MIN: CPT | Performed by: INTERNAL MEDICINE

## 2023-05-31 PROCEDURE — 80053 COMPREHEN METABOLIC PANEL: CPT

## 2023-05-31 PROCEDURE — 6360000002 HC RX W HCPCS: Performed by: INTERNAL MEDICINE

## 2023-05-31 PROCEDURE — 3078F DIAST BP <80 MM HG: CPT | Performed by: INTERNAL MEDICINE

## 2023-05-31 PROCEDURE — 3017F COLORECTAL CA SCREEN DOC REV: CPT | Performed by: INTERNAL MEDICINE

## 2023-05-31 PROCEDURE — 1123F ACP DISCUSS/DSCN MKR DOCD: CPT | Performed by: INTERNAL MEDICINE

## 2023-05-31 PROCEDURE — 36415 COLL VENOUS BLD VENIPUNCTURE: CPT

## 2023-05-31 PROCEDURE — G8417 CALC BMI ABV UP PARAM F/U: HCPCS | Performed by: INTERNAL MEDICINE

## 2023-05-31 PROCEDURE — 96416 CHEMO PROLONG INFUSE W/PUMP: CPT

## 2023-05-31 RX ORDER — POTASSIUM CHLORIDE 20 MEQ/1
20 TABLET, EXTENDED RELEASE ORAL DAILY
Qty: 30 TABLET | Refills: 1 | Status: SHIPPED | OUTPATIENT
Start: 2023-05-31

## 2023-05-31 RX ORDER — SODIUM CHLORIDE 9 MG/ML
5-250 INJECTION, SOLUTION INTRAVENOUS PRN
Status: DISCONTINUED | OUTPATIENT
Start: 2023-05-31 | End: 2023-06-01 | Stop reason: HOSPADM

## 2023-05-31 RX ORDER — ATROPINE SULFATE 0.4 MG/ML
0.4 INJECTION, SOLUTION INTRAVENOUS
Status: DISCONTINUED | OUTPATIENT
Start: 2023-05-31 | End: 2023-10-27 | Stop reason: HOSPADM

## 2023-05-31 RX ORDER — POTASSIUM CHLORIDE 20 MEQ/1
20 TABLET, EXTENDED RELEASE ORAL DAILY
COMMUNITY
Start: 2023-04-12 | End: 2023-05-31 | Stop reason: SDUPTHER

## 2023-05-31 RX ORDER — SODIUM CHLORIDE 0.9 % (FLUSH) 0.9 %
5-40 SYRINGE (ML) INJECTION PRN
Status: DISCONTINUED | OUTPATIENT
Start: 2023-05-31 | End: 2023-06-01 | Stop reason: HOSPADM

## 2023-05-31 RX ORDER — FLUOROURACIL 50 MG/ML
400 INJECTION, SOLUTION INTRAVENOUS ONCE
Status: COMPLETED | OUTPATIENT
Start: 2023-05-31 | End: 2023-05-31

## 2023-05-31 RX ORDER — PALONOSETRON 0.05 MG/ML
0.25 INJECTION, SOLUTION INTRAVENOUS ONCE
Status: COMPLETED | OUTPATIENT
Start: 2023-05-31 | End: 2023-05-31

## 2023-05-31 RX ADMIN — FLUOROURACIL 5525 MG: 50 INJECTION, SOLUTION INTRAVENOUS at 15:07

## 2023-05-31 RX ADMIN — ATROPINE SULFATE 0.4 MG: 0.4 INJECTION, SOLUTION INTRAVENOUS at 13:04

## 2023-05-31 RX ADMIN — IRINOTECAN HYDROCHLORIDE 420 MG: 20 INJECTION, SOLUTION INTRAVENOUS at 13:15

## 2023-05-31 RX ADMIN — SODIUM CHLORIDE 25 ML/HR: 9 INJECTION, SOLUTION INTRAVENOUS at 12:01

## 2023-05-31 RX ADMIN — LEUCOVORIN CALCIUM 900 MG: 500 INJECTION, POWDER, LYOPHILIZED, FOR SOLUTION INTRAMUSCULAR; INTRAVENOUS at 13:15

## 2023-05-31 RX ADMIN — PALONOSETRON HYDROCHLORIDE 0.25 MG: 0.25 INJECTION INTRAVENOUS at 12:02

## 2023-05-31 RX ADMIN — FLUOROURACIL 925 MG: 50 INJECTION, SOLUTION INTRAVENOUS at 15:02

## 2023-05-31 RX ADMIN — DEXAMETHASONE SODIUM PHOSPHATE 12 MG: 4 INJECTION, SOLUTION INTRAMUSCULAR; INTRAVENOUS at 12:04

## 2023-05-31 ASSESSMENT — PAIN SCALES - GENERAL: PAINLEVEL_OUTOF10: 0

## 2023-05-31 NOTE — PROGRESS NOTES
Chaparrita Maikol. is a 72 y.o. male here for follow up of small cell carcinoma of the pancreas. Patient with no complaints of pain at this time.

## 2023-05-31 NOTE — PROGRESS NOTES
Michael Savage, RN              Two nurses verified prior to administering: Drug name, Drug dose, Infusion volume or drug volume when prepared in a syringe, Rate of administration, Route of administration, Expiration dates and/or times, Appearance and physical integrity of the drugs, Rate set on infusion pump, when used, and Sequencing of drug administration. 1520 Patient tolerated treatment well. Port maintained positive blood return throughout treatment. Port flushed and connected to continuous infusion pump. Verified to be running. Patient is aware of next scheduled [facility] appointment on 6/2/23 at 11:30 AM for disconnection.      Future Appointments   Date Time Provider Ton Irene   6/2/2023 11:30 AM [facility]   6/12/2023  9:30 AM JUANIS [facility] CAV BS AMB   6/14/2023  9:00 AM SS INF1 [facility]4 [facility]   6/14/2023  9:15 AM Ritika Mccollum MD ON[facility]  AMB   6/16/2023 11:30 AM SS INF1 CH4 [facility]H [facility]         Marisol Loza RN, RN  May 31, 2023

## 2023-06-02 ENCOUNTER — HOSPITAL ENCOUNTER (OUTPATIENT)
Facility: HOSPITAL | Age: 73
Setting detail: INFUSION SERIES
End: 2023-06-02
Payer: MEDICARE

## 2023-06-02 VITALS
SYSTOLIC BLOOD PRESSURE: 129 MMHG | HEART RATE: 67 BPM | DIASTOLIC BLOOD PRESSURE: 63 MMHG | RESPIRATION RATE: 16 BRPM | TEMPERATURE: 98.2 F

## 2023-06-02 DIAGNOSIS — C78.7 METASTASIS TO LIVER (HCC): ICD-10-CM

## 2023-06-02 DIAGNOSIS — C7A.8 NEUROENDOCRINE CARCINOMA OF PANCREAS (HCC): Primary | ICD-10-CM

## 2023-06-02 DIAGNOSIS — K86.89 PANCREATIC MASS: ICD-10-CM

## 2023-06-02 DIAGNOSIS — C25.0 MALIGNANT NEOPLASM OF HEAD OF PANCREAS (HCC): ICD-10-CM

## 2023-06-02 DIAGNOSIS — R16.0 LIVER MASS: ICD-10-CM

## 2023-06-02 DIAGNOSIS — C80.1 SMALL CELL CARCINOMA (HCC): ICD-10-CM

## 2023-06-02 PROCEDURE — 96523 IRRIG DRUG DELIVERY DEVICE: CPT

## 2023-06-02 PROCEDURE — 2580000003 HC RX 258: Performed by: INTERNAL MEDICINE

## 2023-06-02 RX ORDER — SODIUM CHLORIDE 0.9 % (FLUSH) 0.9 %
5-40 SYRINGE (ML) INJECTION PRN
Status: DISCONTINUED | OUTPATIENT
Start: 2023-06-02 | End: 2023-06-03 | Stop reason: HOSPADM

## 2023-06-02 RX ADMIN — SODIUM CHLORIDE, PRESERVATIVE FREE 10 ML: 5 INJECTION INTRAVENOUS at 13:53

## 2023-06-02 ASSESSMENT — PAIN SCALES - GENERAL: PAINLEVEL_OUTOF10: 0

## 2023-06-02 NOTE — PROGRESS NOTES
hospitals Progress Note    Date: 2023    Name: Kike Boswell. MRN: [id number]         : 1950:            Mr. Rubén Venegas arrived ambulatory and in no distress for Pump Removal.  Assessment was completed, no acute issues at this time, no new complaints voiced. CADD completed in infusion center- 100 ml infused per order. Mr. Tyrone Hong vitals were reviewed. Vitals:    23 1345   BP: 129/63   Pulse: 67   Resp: 16   Temp: 98.2 °F (36.8 °C)       Mr. Rubén Venegas tolerated treatment well and was discharged from [place] [address] in stable condition. Port de-accessed and flushed per protocol. He is to return on  at 0900 for his next appointment.     Juan Carlos Goodwin RN  2023

## 2023-06-07 RX ORDER — DIPHENHYDRAMINE HYDROCHLORIDE 50 MG/ML
50 INJECTION INTRAMUSCULAR; INTRAVENOUS
Status: CANCELLED | OUTPATIENT
Start: 2023-06-14

## 2023-06-07 RX ORDER — ACETAMINOPHEN 325 MG/1
650 TABLET ORAL
Status: CANCELLED | OUTPATIENT
Start: 2023-06-14

## 2023-06-07 RX ORDER — ONDANSETRON 2 MG/ML
8 INJECTION INTRAMUSCULAR; INTRAVENOUS
Status: CANCELLED | OUTPATIENT
Start: 2023-06-14

## 2023-06-07 RX ORDER — SODIUM CHLORIDE 9 MG/ML
5-250 INJECTION, SOLUTION INTRAVENOUS PRN
Status: CANCELLED | OUTPATIENT
Start: 2023-06-16

## 2023-06-07 RX ORDER — HEPARIN SODIUM (PORCINE) LOCK FLUSH IV SOLN 100 UNIT/ML 100 UNIT/ML
500 SOLUTION INTRAVENOUS PRN
Status: CANCELLED | OUTPATIENT
Start: 2023-06-16

## 2023-06-07 RX ORDER — EPINEPHRINE 1 MG/ML
0.3 INJECTION, SOLUTION, CONCENTRATE INTRAVENOUS PRN
Status: CANCELLED | OUTPATIENT
Start: 2023-06-14

## 2023-06-07 RX ORDER — SODIUM CHLORIDE 0.9 % (FLUSH) 0.9 %
5-40 SYRINGE (ML) INJECTION PRN
Status: CANCELLED | OUTPATIENT
Start: 2023-06-16

## 2023-06-07 RX ORDER — MEPERIDINE HYDROCHLORIDE 25 MG/ML
12.5 INJECTION INTRAMUSCULAR; INTRAVENOUS; SUBCUTANEOUS PRN
Status: CANCELLED | OUTPATIENT
Start: 2023-06-14

## 2023-06-07 RX ORDER — SODIUM CHLORIDE 9 MG/ML
5-250 INJECTION, SOLUTION INTRAVENOUS PRN
Status: CANCELLED | OUTPATIENT
Start: 2023-06-14

## 2023-06-07 RX ORDER — HEPARIN SODIUM (PORCINE) LOCK FLUSH IV SOLN 100 UNIT/ML 100 UNIT/ML
500 SOLUTION INTRAVENOUS PRN
Status: CANCELLED | OUTPATIENT
Start: 2023-06-14

## 2023-06-07 RX ORDER — ALBUTEROL SULFATE 90 UG/1
4 AEROSOL, METERED RESPIRATORY (INHALATION) PRN
Status: CANCELLED | OUTPATIENT
Start: 2023-06-14

## 2023-06-07 RX ORDER — SODIUM CHLORIDE 9 MG/ML
INJECTION, SOLUTION INTRAVENOUS CONTINUOUS
Status: CANCELLED | OUTPATIENT
Start: 2023-06-14

## 2023-06-14 ENCOUNTER — HOSPITAL ENCOUNTER (OUTPATIENT)
Facility: HOSPITAL | Age: 73
Setting detail: INFUSION SERIES
Discharge: HOME | End: 2023-06-14
Payer: MEDICARE

## 2023-06-14 VITALS
RESPIRATION RATE: 18 BRPM | OXYGEN SATURATION: 95 % | HEART RATE: 93 BPM | WEIGHT: 237.4 LBS | HEIGHT: 70 IN | SYSTOLIC BLOOD PRESSURE: 129 MMHG | DIASTOLIC BLOOD PRESSURE: 60 MMHG | BODY MASS INDEX: 33.99 KG/M2 | TEMPERATURE: 98 F

## 2023-06-14 DIAGNOSIS — C80.1 SMALL CELL CARCINOMA (HCC): ICD-10-CM

## 2023-06-14 DIAGNOSIS — R16.0 LIVER MASS: ICD-10-CM

## 2023-06-14 DIAGNOSIS — C25.0 MALIGNANT NEOPLASM OF HEAD OF PANCREAS (HCC): ICD-10-CM

## 2023-06-14 DIAGNOSIS — C78.7 METASTASIS TO LIVER (HCC): ICD-10-CM

## 2023-06-14 DIAGNOSIS — K86.89 PANCREATIC MASS: ICD-10-CM

## 2023-06-14 DIAGNOSIS — C7A.8 NEUROENDOCRINE CARCINOMA OF PANCREAS (HCC): Primary | ICD-10-CM

## 2023-06-14 LAB
ALBUMIN SERPL-MCNC: 3.2 G/DL (ref 3.5–5)
ALBUMIN/GLOB SERPL: 0.9 (ref 1.1–2.2)
ALP SERPL-CCNC: 87 U/L (ref 45–117)
ALT SERPL-CCNC: 26 U/L (ref 12–78)
ANION GAP SERPL CALC-SCNC: 6 MMOL/L (ref 5–15)
AST SERPL-CCNC: 22 U/L (ref 15–37)
BASOPHILS # BLD: 0 K/UL (ref 0–0.1)
BASOPHILS NFR BLD: 0 % (ref 0–1)
BILIRUB SERPL-MCNC: 0.7 MG/DL (ref 0.2–1)
BILIRUB SERPL-MCNC: 0.8 MG/DL (ref 0.2–1)
BUN SERPL-MCNC: 14 MG/DL (ref 6–20)
BUN/CREAT SERPL: 16 (ref 12–20)
CALCIUM SERPL-MCNC: 8.6 MG/DL (ref 8.5–10.1)
CHLORIDE SERPL-SCNC: 105 MMOL/L (ref 97–108)
CO2 SERPL-SCNC: 28 MMOL/L (ref 21–32)
CREAT SERPL-MCNC: 0.88 MG/DL (ref 0.7–1.3)
DIFFERENTIAL METHOD BLD: ABNORMAL
EOSINOPHIL # BLD: 0.1 K/UL (ref 0–0.4)
EOSINOPHIL NFR BLD: 2 % (ref 0–7)
ERYTHROCYTE [DISTWIDTH] IN BLOOD BY AUTOMATED COUNT: 16.8 % (ref 11.5–14.5)
GLOBULIN SER CALC-MCNC: 3.5 G/DL (ref 2–4)
GLUCOSE SERPL-MCNC: 172 MG/DL (ref 65–100)
HCT VFR BLD AUTO: 31.9 % (ref 36.6–50.3)
HGB BLD-MCNC: 10.7 G/DL (ref 12.1–17)
IMM GRANULOCYTES # BLD AUTO: 0 K/UL (ref 0–0.04)
IMM GRANULOCYTES NFR BLD AUTO: 1 % (ref 0–0.5)
LYMPHOCYTES # BLD: 0.7 K/UL (ref 0.8–3.5)
LYMPHOCYTES NFR BLD: 22 % (ref 12–49)
MCH RBC QN AUTO: 26.7 PG (ref 26–34)
MCHC RBC AUTO-ENTMCNC: 33.5 G/DL (ref 30–36.5)
MCV RBC AUTO: 79.6 FL (ref 80–99)
MONOCYTES # BLD: 0.2 K/UL (ref 0–1)
MONOCYTES NFR BLD: 7 % (ref 5–13)
NEUTS SEG # BLD: 2.1 K/UL (ref 1.8–8)
NEUTS SEG NFR BLD: 68 % (ref 32–75)
NRBC # BLD: 0 K/UL (ref 0–0.01)
NRBC BLD-RTO: 0 PER 100 WBC
PLATELET # BLD AUTO: 107 K/UL (ref 150–400)
PMV BLD AUTO: 9.3 FL (ref 8.9–12.9)
POTASSIUM SERPL-SCNC: 3.3 MMOL/L (ref 3.5–5.1)
PROT SERPL-MCNC: 6.7 G/DL (ref 6.4–8.2)
RBC # BLD AUTO: 4.01 M/UL (ref 4.1–5.7)
RBC MORPH BLD: ABNORMAL
RBC MORPH BLD: ABNORMAL
SODIUM SERPL-SCNC: 139 MMOL/L (ref 136–145)
WBC # BLD AUTO: 3.1 K/UL (ref 4.1–11.1)

## 2023-06-14 PROCEDURE — 85025 COMPLETE CBC W/AUTO DIFF WBC: CPT

## 2023-06-14 PROCEDURE — 2580000003 HC RX 258: Performed by: INTERNAL MEDICINE

## 2023-06-14 PROCEDURE — 96368 THER/DIAG CONCURRENT INF: CPT

## 2023-06-14 PROCEDURE — 36415 COLL VENOUS BLD VENIPUNCTURE: CPT

## 2023-06-14 PROCEDURE — G0498 CHEMO EXTEND IV INFUS W/PUMP: HCPCS

## 2023-06-14 PROCEDURE — 80053 COMPREHEN METABOLIC PANEL: CPT

## 2023-06-14 PROCEDURE — 82247 BILIRUBIN TOTAL: CPT

## 2023-06-14 PROCEDURE — 96375 TX/PRO/DX INJ NEW DRUG ADDON: CPT

## 2023-06-14 PROCEDURE — 96413 CHEMO IV INFUSION 1 HR: CPT

## 2023-06-14 PROCEDURE — 96411 CHEMO IV PUSH ADDL DRUG: CPT

## 2023-06-14 PROCEDURE — 6360000002 HC RX W HCPCS: Performed by: INTERNAL MEDICINE

## 2023-06-14 RX ORDER — ATROPINE SULFATE 0.4 MG/ML
0.4 INJECTION, SOLUTION INTRAVENOUS
Status: DISCONTINUED | OUTPATIENT
Start: 2023-06-14 | End: 2023-10-27 | Stop reason: HOSPADM

## 2023-06-14 RX ORDER — SODIUM CHLORIDE 0.9 % (FLUSH) 0.9 %
5-40 SYRINGE (ML) INJECTION PRN
Status: DISCONTINUED | OUTPATIENT
Start: 2023-06-14 | End: 2023-06-15 | Stop reason: HOSPADM

## 2023-06-14 RX ORDER — FLUOROURACIL 50 MG/ML
900 INJECTION, SOLUTION INTRAVENOUS ONCE
Status: COMPLETED | OUTPATIENT
Start: 2023-06-14 | End: 2023-06-14

## 2023-06-14 RX ORDER — PALONOSETRON 0.05 MG/ML
0.25 INJECTION, SOLUTION INTRAVENOUS ONCE
Status: COMPLETED | OUTPATIENT
Start: 2023-06-14 | End: 2023-06-14

## 2023-06-14 RX ORDER — SODIUM CHLORIDE 9 MG/ML
5-250 INJECTION, SOLUTION INTRAVENOUS PRN
Status: DISCONTINUED | OUTPATIENT
Start: 2023-06-14 | End: 2023-06-15 | Stop reason: HOSPADM

## 2023-06-14 RX ADMIN — FLUOROURACIL 5500 MG: 50 INJECTION, SOLUTION INTRAVENOUS at 14:20

## 2023-06-14 RX ADMIN — FLUOROURACIL 900 MG: 50 INJECTION, SOLUTION INTRAVENOUS at 14:15

## 2023-06-14 RX ADMIN — LEUCOVORIN CALCIUM 200 MG: 200 INJECTION, POWDER, LYOPHILIZED, FOR SOLUTION INTRAMUSCULAR; INTRAVENOUS at 12:11

## 2023-06-14 RX ADMIN — PALONOSETRON HYDROCHLORIDE 0.25 MG: 0.25 INJECTION INTRAVENOUS at 10:59

## 2023-06-14 RX ADMIN — IRINOTECAN HYDROCHLORIDE 410 MG: 20 INJECTION, SOLUTION INTRAVENOUS at 12:11

## 2023-06-14 RX ADMIN — DEXAMETHASONE SODIUM PHOSPHATE 12 MG: 4 INJECTION, SOLUTION INTRAMUSCULAR; INTRAVENOUS at 11:03

## 2023-06-14 RX ADMIN — ATROPINE SULFATE 0.4 MG: 0.4 INJECTION, SOLUTION INTRAVENOUS at 11:47

## 2023-06-14 RX ADMIN — SODIUM CHLORIDE 25 ML/HR: 9 INJECTION, SOLUTION INTRAVENOUS at 11:00

## 2023-06-14 ASSESSMENT — PAIN SCALES - GENERAL: PAINLEVEL_OUTOF10: 0

## 2023-06-14 NOTE — PROGRESS NOTES
[facility] Progress Note    Date: 2023    Name: Washington Marmolejo. MRN: [id number]         : 1950    Mr. Jon Perez Arrived ambulatory and in no distress for C11D1 of Folfiri Regimen. Assessment was completed, no acute issues at this time, no new complaints voiced. R chest wall port accessed without difficulty, labs drawn & sent for processing by Cathie Zuñiga, [address] [place] [address]. Patient proceed to appointment with Dr. Hayde Franklin. Mr. Sherrie Morton vitals were reviewed. Vitals:    23 0900   BP: 138/75   Pulse: 68   Resp: 18   Temp: 98 °F (36.7 °C)   SpO2: 96%       Lab results were obtained and reviewed.   Recent Results (from the past 12 hour(s))   Comprehensive metabolic panel    Collection Time: 23  9:08 AM   Result Value Ref Range    Sodium 139 136 - 145 mmol/L    Potassium 3.3 (L) 3.5 - 5.1 mmol/L    Chloride 105 97 - 108 mmol/L    CO2 28 21 - 32 mmol/L    Anion Gap 6 5 - 15 mmol/L    Glucose 172 (H) 65 - 100 mg/dL    BUN 14 6 - 20 MG/DL    Creatinine 0.88 0.70 - 1.30 MG/DL    Bun/Cre Ratio 16 12 - 20      Est, Glom Filt Rate >60 >60 ml/min/1.73m2    Calcium 8.6 8.5 - 10.1 MG/DL    Total Bilirubin 0.7 0.2 - 1.0 MG/DL    ALT 26 12 - 78 U/L    AST 22 15 - 37 U/L    Alk Phosphatase 87 45 - 117 U/L    Total Protein 6.7 6.4 - 8.2 g/dL    Albumin 3.2 (L) 3.5 - 5.0 g/dL    Globulin 3.5 2.0 - 4.0 g/dL    Albumin/Globulin Ratio 0.9 (L) 1.1 - 2.2     CBC With Auto Differential    Collection Time: 23  9:08 AM   Result Value Ref Range    WBC 3.1 (L) 4.1 - 11.1 K/uL    RBC 4.01 (L) 4.10 - 5.70 M/uL    Hemoglobin 10.7 (L) 12.1 - 17.0 g/dL    Hematocrit 31.9 (L) 36.6 - 50.3 %    MCV 79.6 (L) 80.0 - 99.0 FL    MCH 26.7 26.0 - 34.0 PG    MCHC 33.5 30.0 - 36.5 g/dL    RDW 16.8 (H) 11.5 - 14.5 %    Platelets 504 (L) 562 - 400 K/uL    MPV 9.3 8.9 - 12.9 FL    Nucleated RBCs 0.0 0  WBC    nRBC 0.00 0.00 - 0.01 K/uL    Neutrophils % 68 32 - 75 %    Lymphocytes % 22 12 - 49 %    Monocytes % 7 5 - 13 %    Eosinophils % 2 0 -

## 2023-06-16 ENCOUNTER — HOSPITAL ENCOUNTER (OUTPATIENT)
Facility: HOSPITAL | Age: 73
Setting detail: INFUSION SERIES
Discharge: HOME | End: 2023-06-16
Payer: MEDICARE

## 2023-06-16 VITALS
HEART RATE: 59 BPM | RESPIRATION RATE: 18 BRPM | DIASTOLIC BLOOD PRESSURE: 60 MMHG | SYSTOLIC BLOOD PRESSURE: 135 MMHG | OXYGEN SATURATION: 95 % | TEMPERATURE: 97.8 F

## 2023-06-16 DIAGNOSIS — C80.1 SMALL CELL CARCINOMA (HCC): ICD-10-CM

## 2023-06-16 DIAGNOSIS — R16.0 LIVER MASS: ICD-10-CM

## 2023-06-16 DIAGNOSIS — K86.89 PANCREATIC MASS: ICD-10-CM

## 2023-06-16 DIAGNOSIS — C78.7 METASTASIS TO LIVER (HCC): ICD-10-CM

## 2023-06-16 DIAGNOSIS — C7A.8 NEUROENDOCRINE CARCINOMA OF PANCREAS (HCC): Primary | ICD-10-CM

## 2023-06-16 DIAGNOSIS — C25.0 MALIGNANT NEOPLASM OF HEAD OF PANCREAS (HCC): ICD-10-CM

## 2023-06-16 PROCEDURE — 96523 IRRIG DRUG DELIVERY DEVICE: CPT

## 2023-06-16 RX ORDER — SODIUM CHLORIDE 0.9 % (FLUSH) 0.9 %
5-40 SYRINGE (ML) INJECTION PRN
Status: DISCONTINUED | OUTPATIENT
Start: 2023-06-16 | End: 2023-06-17 | Stop reason: HOSPADM

## 2023-06-16 ASSESSMENT — PAIN SCALES - GENERAL: PAINLEVEL_OUTOF10: 0

## 2023-06-16 NOTE — PROGRESS NOTES
[facility] Progress Note    Date: 2023    Name: Eugene Irene. MRN: [id number]         : 1950:            Mr. Virginie Avilez arrived ambulatory and in no distress for Pump Removal.  Assessment was completed, no acute issues at this time, no new complaints voiced. CADD completed in infusion center-250 ml infused per order. Mr. Kat Noriega vitals were reviewed. Vitals:    23 1315   BP: 135/60   Pulse: 59   Resp: 18   Temp: 97.8 °F (36.6 °C)   SpO2: 95%       Mr. Virginie Avilez tolerated treatment well and was discharged from [place] [address] in stable condition at 1320. Port  flushed and discontinued per protocol. He is to return on ,  at 0900 for his next appointment.     Ruben Vargas RN  2023

## 2023-06-28 ENCOUNTER — OFFICE VISIT (OUTPATIENT)
Age: 73
End: 2023-06-28
Payer: MEDICARE

## 2023-06-28 ENCOUNTER — APPOINTMENT (OUTPATIENT)
Dept: INFUSION THERAPY | Age: 73
End: 2023-06-28

## 2023-06-28 ENCOUNTER — HOSPITAL ENCOUNTER (OUTPATIENT)
Facility: HOSPITAL | Age: 73
Setting detail: INFUSION SERIES
End: 2023-06-28
Payer: MEDICARE

## 2023-06-28 VITALS
DIASTOLIC BLOOD PRESSURE: 68 MMHG | BODY MASS INDEX: 34.04 KG/M2 | HEART RATE: 63 BPM | WEIGHT: 237.8 LBS | SYSTOLIC BLOOD PRESSURE: 131 MMHG | OXYGEN SATURATION: 95 % | HEIGHT: 70 IN | TEMPERATURE: 97.8 F

## 2023-06-28 VITALS
HEART RATE: 60 BPM | WEIGHT: 237.8 LBS | HEIGHT: 70 IN | OXYGEN SATURATION: 95 % | DIASTOLIC BLOOD PRESSURE: 67 MMHG | SYSTOLIC BLOOD PRESSURE: 126 MMHG | TEMPERATURE: 97.8 F | BODY MASS INDEX: 34.04 KG/M2 | RESPIRATION RATE: 18 BRPM

## 2023-06-28 DIAGNOSIS — C7A.8 NEUROENDOCRINE CARCINOMA OF PANCREAS (HCC): ICD-10-CM

## 2023-06-28 DIAGNOSIS — K86.89 PANCREATIC MASS: ICD-10-CM

## 2023-06-28 DIAGNOSIS — C78.7 METASTASIS TO LIVER (HCC): ICD-10-CM

## 2023-06-28 DIAGNOSIS — C25.0 MALIGNANT NEOPLASM OF HEAD OF PANCREAS (HCC): Primary | ICD-10-CM

## 2023-06-28 DIAGNOSIS — C80.1 SMALL CELL CARCINOMA (HCC): ICD-10-CM

## 2023-06-28 DIAGNOSIS — R16.0 LIVER MASS: ICD-10-CM

## 2023-06-28 LAB
ALBUMIN SERPL-MCNC: 3.3 G/DL (ref 3.5–5)
ALBUMIN/GLOB SERPL: 0.9 (ref 1.1–2.2)
ALP SERPL-CCNC: 82 U/L (ref 45–117)
ALT SERPL-CCNC: 23 U/L (ref 12–78)
ANION GAP SERPL CALC-SCNC: 6 MMOL/L (ref 5–15)
AST SERPL-CCNC: 19 U/L (ref 15–37)
BASOPHILS # BLD: 0 K/UL (ref 0–0.1)
BASOPHILS NFR BLD: 1 % (ref 0–1)
BILIRUB SERPL-MCNC: 1.3 MG/DL (ref 0.2–1)
BUN SERPL-MCNC: 15 MG/DL (ref 6–20)
BUN/CREAT SERPL: 15 (ref 12–20)
CALCIUM SERPL-MCNC: 8.7 MG/DL (ref 8.5–10.1)
CHLORIDE SERPL-SCNC: 106 MMOL/L (ref 97–108)
CO2 SERPL-SCNC: 27 MMOL/L (ref 21–32)
CREAT SERPL-MCNC: 0.97 MG/DL (ref 0.7–1.3)
DIFFERENTIAL METHOD BLD: ABNORMAL
EOSINOPHIL # BLD: 0.1 K/UL (ref 0–0.4)
EOSINOPHIL NFR BLD: 3 % (ref 0–7)
ERYTHROCYTE [DISTWIDTH] IN BLOOD BY AUTOMATED COUNT: 17.1 % (ref 11.5–14.5)
GLOBULIN SER CALC-MCNC: 3.6 G/DL (ref 2–4)
GLUCOSE SERPL-MCNC: 238 MG/DL (ref 65–100)
HCT VFR BLD AUTO: 33.6 % (ref 36.6–50.3)
HGB BLD-MCNC: 10.8 G/DL (ref 12.1–17)
IMM GRANULOCYTES # BLD AUTO: 0 K/UL (ref 0–0.04)
IMM GRANULOCYTES NFR BLD AUTO: 1 % (ref 0–0.5)
LYMPHOCYTES # BLD: 0.9 K/UL (ref 0.8–3.5)
LYMPHOCYTES NFR BLD: 26 % (ref 12–49)
MCH RBC QN AUTO: 26 PG (ref 26–34)
MCHC RBC AUTO-ENTMCNC: 32.1 G/DL (ref 30–36.5)
MCV RBC AUTO: 80.8 FL (ref 80–99)
MONOCYTES # BLD: 0.3 K/UL (ref 0–1)
MONOCYTES NFR BLD: 7 % (ref 5–13)
NEUTS SEG # BLD: 2.3 K/UL (ref 1.8–8)
NEUTS SEG NFR BLD: 62 % (ref 32–75)
NRBC # BLD: 0 K/UL (ref 0–0.01)
NRBC BLD-RTO: 0 PER 100 WBC
PLATELET # BLD AUTO: 124 K/UL (ref 150–400)
PMV BLD AUTO: 10.3 FL (ref 8.9–12.9)
POTASSIUM SERPL-SCNC: 3.3 MMOL/L (ref 3.5–5.1)
PROT SERPL-MCNC: 6.9 G/DL (ref 6.4–8.2)
RBC # BLD AUTO: 4.16 M/UL (ref 4.1–5.7)
SODIUM SERPL-SCNC: 139 MMOL/L (ref 136–145)
WBC # BLD AUTO: 3.6 K/UL (ref 4.1–11.1)

## 2023-06-28 PROCEDURE — 96413 CHEMO IV INFUSION 1 HR: CPT

## 2023-06-28 PROCEDURE — 99215 OFFICE O/P EST HI 40 MIN: CPT | Performed by: INTERNAL MEDICINE

## 2023-06-28 PROCEDURE — 6360000002 HC RX W HCPCS: Performed by: NURSE PRACTITIONER

## 2023-06-28 PROCEDURE — 3074F SYST BP LT 130 MM HG: CPT | Performed by: INTERNAL MEDICINE

## 2023-06-28 PROCEDURE — 80053 COMPREHEN METABOLIC PANEL: CPT

## 2023-06-28 PROCEDURE — 96411 CHEMO IV PUSH ADDL DRUG: CPT

## 2023-06-28 PROCEDURE — 96366 THER/PROPH/DIAG IV INF ADDON: CPT

## 2023-06-28 PROCEDURE — G8427 DOCREV CUR MEDS BY ELIG CLIN: HCPCS | Performed by: INTERNAL MEDICINE

## 2023-06-28 PROCEDURE — 85025 COMPLETE CBC W/AUTO DIFF WBC: CPT

## 2023-06-28 PROCEDURE — 96368 THER/DIAG CONCURRENT INF: CPT

## 2023-06-28 PROCEDURE — 36415 COLL VENOUS BLD VENIPUNCTURE: CPT

## 2023-06-28 PROCEDURE — 3078F DIAST BP <80 MM HG: CPT | Performed by: INTERNAL MEDICINE

## 2023-06-28 PROCEDURE — 3017F COLORECTAL CA SCREEN DOC REV: CPT | Performed by: INTERNAL MEDICINE

## 2023-06-28 PROCEDURE — 96415 CHEMO IV INFUSION ADDL HR: CPT

## 2023-06-28 PROCEDURE — 96375 TX/PRO/DX INJ NEW DRUG ADDON: CPT

## 2023-06-28 PROCEDURE — 2580000003 HC RX 258: Performed by: NURSE PRACTITIONER

## 2023-06-28 PROCEDURE — 96416 CHEMO PROLONG INFUSE W/PUMP: CPT

## 2023-06-28 PROCEDURE — 1123F ACP DISCUSS/DSCN MKR DOCD: CPT | Performed by: INTERNAL MEDICINE

## 2023-06-28 PROCEDURE — G8417 CALC BMI ABV UP PARAM F/U: HCPCS | Performed by: INTERNAL MEDICINE

## 2023-06-28 PROCEDURE — 1036F TOBACCO NON-USER: CPT | Performed by: INTERNAL MEDICINE

## 2023-06-28 RX ORDER — FLUOROURACIL 50 MG/ML
900 INJECTION, SOLUTION INTRAVENOUS ONCE
Status: COMPLETED | OUTPATIENT
Start: 2023-06-28 | End: 2023-06-28

## 2023-06-28 RX ORDER — SODIUM CHLORIDE 9 MG/ML
5-250 INJECTION, SOLUTION INTRAVENOUS PRN
Status: DISCONTINUED | OUTPATIENT
Start: 2023-06-28 | End: 2023-06-29 | Stop reason: HOSPADM

## 2023-06-28 RX ORDER — SODIUM CHLORIDE 0.9 % (FLUSH) 0.9 %
5-40 SYRINGE (ML) INJECTION PRN
Status: DISCONTINUED | OUTPATIENT
Start: 2023-06-28 | End: 2023-06-29 | Stop reason: HOSPADM

## 2023-06-28 RX ORDER — ATROPINE SULFATE 0.4 MG/ML
0.4 INJECTION, SOLUTION INTRAVENOUS
Status: DISCONTINUED | OUTPATIENT
Start: 2023-06-28 | End: 2023-10-27 | Stop reason: HOSPADM

## 2023-06-28 RX ORDER — PALONOSETRON 0.05 MG/ML
0.25 INJECTION, SOLUTION INTRAVENOUS ONCE
Status: COMPLETED | OUTPATIENT
Start: 2023-06-28 | End: 2023-06-28

## 2023-06-28 RX ADMIN — SODIUM CHLORIDE 25 ML/HR: 9 INJECTION, SOLUTION INTRAVENOUS at 11:37

## 2023-06-28 RX ADMIN — LEUCOVORIN CALCIUM 200 MG: 200 INJECTION, POWDER, LYOPHILIZED, FOR SOLUTION INTRAMUSCULAR; INTRAVENOUS at 12:33

## 2023-06-28 RX ADMIN — FLUOROURACIL 5500 MG: 50 INJECTION, SOLUTION INTRAVENOUS at 14:15

## 2023-06-28 RX ADMIN — PALONOSETRON HYDROCHLORIDE 0.25 MG: 0.25 INJECTION INTRAVENOUS at 11:39

## 2023-06-28 RX ADMIN — FLUOROURACIL 900 MG: 50 INJECTION, SOLUTION INTRAVENOUS at 14:10

## 2023-06-28 RX ADMIN — DEXAMETHASONE SODIUM PHOSPHATE 12 MG: 4 INJECTION, SOLUTION INTRAMUSCULAR; INTRAVENOUS at 11:42

## 2023-06-28 RX ADMIN — ATROPINE SULFATE 0.4 MG: 0.4 INJECTION, SOLUTION INTRAVENOUS at 12:22

## 2023-06-28 RX ADMIN — IRINOTECAN HYDROCHLORIDE 410 MG: 20 INJECTION, SOLUTION INTRAVENOUS at 12:33

## 2023-06-28 ASSESSMENT — PAIN SCALES - GENERAL: PAINLEVEL_OUTOF10: 0

## 2023-06-30 ENCOUNTER — HOSPITAL ENCOUNTER (OUTPATIENT)
Facility: HOSPITAL | Age: 73
Setting detail: INFUSION SERIES
Discharge: HOME | End: 2023-06-30

## 2023-06-30 ASSESSMENT — PAIN SCALES - GENERAL: PAINLEVEL_OUTOF10: 0

## 2023-07-03 VITALS — DIASTOLIC BLOOD PRESSURE: 68 MMHG | HEART RATE: 62 BPM | TEMPERATURE: 98.2 F | SYSTOLIC BLOOD PRESSURE: 132 MMHG

## 2023-07-03 NOTE — PROGRESS NOTES
Outpatient Infusion Center Short Visit Progress Note    0098 Mr. Sara Tan admitted to [facility] for pump dc ambulatory in stable condition. Assessment completed. No new concerns voiced. Vital Signs:  98.2-64-18 132/68      Right chest port with positive blood return. Lab Results:  No results found for this or any previous visit (from the past 12 hour(s)). Medications:  NS flush         AVS declined    Patient tolerated treatment well. Patient discharged from [address] [place] [address] ambulatory in no distress. Patient aware of next appointment.     Ronna Bueno RN  July 3, 2023    Future Appointments   Date Time Provider 4600  [facility]   7/7/2023  3:00 PM [facility] CT 2 [facility]   7/12/2023  9:00 AM [facility]   7/12/2023  9:15 AM YI Melara - NP ONCSF BS AMB   7/14/2023 11:30 AM SS INF1 CH2 >7H [facility]   7/17/2023  1:00 PM [place] [address], [facility] CAVSF BS AMB   8/2/2023  9:00 AM SS INF1 CH4 <2H [facility]   8/4/2023 11:30 AM SS INF2 CH1 >4H [facility]   8/16/2023  9:00 AM SS INF1 CH4 <2H [facility]   8/18/2023 11:30 AM SS INF1 CH4 <2H [facility]

## 2023-07-07 ENCOUNTER — HOSPITAL ENCOUNTER (OUTPATIENT)
Facility: HOSPITAL | Age: 73
End: 2023-07-07
Payer: MEDICARE

## 2023-07-07 DIAGNOSIS — C78.7 METASTASIS TO LIVER (HCC): ICD-10-CM

## 2023-07-07 DIAGNOSIS — C25.0 MALIGNANT NEOPLASM OF HEAD OF PANCREAS (HCC): ICD-10-CM

## 2023-07-07 PROCEDURE — 74177 CT ABD & PELVIS W/CONTRAST: CPT

## 2023-07-07 PROCEDURE — 6360000004 HC RX CONTRAST MEDICATION: Performed by: FAMILY MEDICINE

## 2023-07-07 RX ADMIN — IOPAMIDOL 100 ML: 755 INJECTION, SOLUTION INTRAVENOUS at 15:16

## 2023-07-12 ENCOUNTER — HOSPITAL ENCOUNTER (OUTPATIENT)
Facility: HOSPITAL | Age: 73
Setting detail: INFUSION SERIES
End: 2023-07-12
Payer: MEDICARE

## 2023-07-12 ENCOUNTER — OFFICE VISIT (OUTPATIENT)
Age: 73
End: 2023-07-12
Payer: MEDICARE

## 2023-07-12 VITALS
WEIGHT: 234.8 LBS | OXYGEN SATURATION: 95 % | HEIGHT: 70 IN | SYSTOLIC BLOOD PRESSURE: 143 MMHG | TEMPERATURE: 98.2 F | RESPIRATION RATE: 18 BRPM | BODY MASS INDEX: 33.61 KG/M2 | DIASTOLIC BLOOD PRESSURE: 82 MMHG | HEART RATE: 79 BPM

## 2023-07-12 VITALS
HEIGHT: 70 IN | RESPIRATION RATE: 18 BRPM | TEMPERATURE: 98.2 F | BODY MASS INDEX: 33.61 KG/M2 | SYSTOLIC BLOOD PRESSURE: 138 MMHG | OXYGEN SATURATION: 95 % | DIASTOLIC BLOOD PRESSURE: 78 MMHG | WEIGHT: 234.8 LBS | HEART RATE: 73 BPM

## 2023-07-12 DIAGNOSIS — C78.7 METASTASIS TO LIVER (HCC): ICD-10-CM

## 2023-07-12 DIAGNOSIS — R16.0 LIVER MASS: ICD-10-CM

## 2023-07-12 DIAGNOSIS — C80.1 SMALL CELL CARCINOMA (HCC): ICD-10-CM

## 2023-07-12 DIAGNOSIS — C7A.8 NEUROENDOCRINE CARCINOMA OF PANCREAS (HCC): ICD-10-CM

## 2023-07-12 DIAGNOSIS — C25.0 MALIGNANT NEOPLASM OF HEAD OF PANCREAS (HCC): Primary | ICD-10-CM

## 2023-07-12 DIAGNOSIS — K86.89 PANCREATIC MASS: ICD-10-CM

## 2023-07-12 LAB
ALBUMIN SERPL-MCNC: 3.6 G/DL (ref 3.5–5)
ALBUMIN/GLOB SERPL: 1.2 (ref 1.1–2.2)
ALP SERPL-CCNC: 85 U/L (ref 45–117)
ALT SERPL-CCNC: 27 U/L (ref 12–78)
ANION GAP SERPL CALC-SCNC: 8 MMOL/L (ref 5–15)
AST SERPL-CCNC: 22 U/L (ref 15–37)
BASOPHILS # BLD: 0 K/UL (ref 0–0.1)
BASOPHILS NFR BLD: 1 % (ref 0–1)
BILIRUB SERPL-MCNC: 0.8 MG/DL (ref 0.2–1)
BILIRUB SERPL-MCNC: 1.1 MG/DL (ref 0.2–1)
BUN SERPL-MCNC: 17 MG/DL (ref 6–20)
BUN/CREAT SERPL: 19 (ref 12–20)
CALCIUM SERPL-MCNC: 8.8 MG/DL (ref 8.5–10.1)
CHLORIDE SERPL-SCNC: 108 MMOL/L (ref 97–108)
CO2 SERPL-SCNC: 24 MMOL/L (ref 21–32)
CREAT SERPL-MCNC: 0.91 MG/DL (ref 0.7–1.3)
DIFFERENTIAL METHOD BLD: ABNORMAL
EOSINOPHIL # BLD: 0.1 K/UL (ref 0–0.4)
EOSINOPHIL NFR BLD: 2 % (ref 0–7)
ERYTHROCYTE [DISTWIDTH] IN BLOOD BY AUTOMATED COUNT: 18.5 % (ref 11.5–14.5)
GLOBULIN SER CALC-MCNC: 3.1 G/DL (ref 2–4)
GLUCOSE SERPL-MCNC: 144 MG/DL (ref 65–100)
HCT VFR BLD AUTO: 36.9 % (ref 36.6–50.3)
HGB BLD-MCNC: 11.9 G/DL (ref 12.1–17)
IMM GRANULOCYTES # BLD AUTO: 0 K/UL (ref 0–0.04)
IMM GRANULOCYTES NFR BLD AUTO: 0 % (ref 0–0.5)
LYMPHOCYTES # BLD: 1 K/UL (ref 0.8–3.5)
LYMPHOCYTES NFR BLD: 20 % (ref 12–49)
MCH RBC QN AUTO: 26 PG (ref 26–34)
MCHC RBC AUTO-ENTMCNC: 32.2 G/DL (ref 30–36.5)
MCV RBC AUTO: 80.7 FL (ref 80–99)
MONOCYTES # BLD: 0.3 K/UL (ref 0–1)
MONOCYTES NFR BLD: 5 % (ref 5–13)
NEUTS SEG # BLD: 3.8 K/UL (ref 1.8–8)
NEUTS SEG NFR BLD: 72 % (ref 32–75)
NRBC # BLD: 0 K/UL (ref 0–0.01)
NRBC BLD-RTO: 0 PER 100 WBC
PLATELET # BLD AUTO: 122 K/UL (ref 150–400)
PMV BLD AUTO: 9.7 FL (ref 8.9–12.9)
POTASSIUM SERPL-SCNC: 3.2 MMOL/L (ref 3.5–5.1)
PROT SERPL-MCNC: 6.7 G/DL (ref 6.4–8.2)
RBC # BLD AUTO: 4.57 M/UL (ref 4.1–5.7)
SODIUM SERPL-SCNC: 140 MMOL/L (ref 136–145)
WBC # BLD AUTO: 5.2 K/UL (ref 4.1–11.1)

## 2023-07-12 PROCEDURE — 6360000002 HC RX W HCPCS: Performed by: NURSE PRACTITIONER

## 2023-07-12 PROCEDURE — 3017F COLORECTAL CA SCREEN DOC REV: CPT | Performed by: NURSE PRACTITIONER

## 2023-07-12 PROCEDURE — 3077F SYST BP >= 140 MM HG: CPT | Performed by: NURSE PRACTITIONER

## 2023-07-12 PROCEDURE — 96375 TX/PRO/DX INJ NEW DRUG ADDON: CPT

## 2023-07-12 PROCEDURE — 96417 CHEMO IV INFUS EACH ADDL SEQ: CPT

## 2023-07-12 PROCEDURE — G8417 CALC BMI ABV UP PARAM F/U: HCPCS | Performed by: NURSE PRACTITIONER

## 2023-07-12 PROCEDURE — 82247 BILIRUBIN TOTAL: CPT

## 2023-07-12 PROCEDURE — 36415 COLL VENOUS BLD VENIPUNCTURE: CPT

## 2023-07-12 PROCEDURE — 96411 CHEMO IV PUSH ADDL DRUG: CPT

## 2023-07-12 PROCEDURE — 1123F ACP DISCUSS/DSCN MKR DOCD: CPT | Performed by: NURSE PRACTITIONER

## 2023-07-12 PROCEDURE — G8427 DOCREV CUR MEDS BY ELIG CLIN: HCPCS | Performed by: NURSE PRACTITIONER

## 2023-07-12 PROCEDURE — 99215 OFFICE O/P EST HI 40 MIN: CPT | Performed by: NURSE PRACTITIONER

## 2023-07-12 PROCEDURE — 1036F TOBACCO NON-USER: CPT | Performed by: NURSE PRACTITIONER

## 2023-07-12 PROCEDURE — 3079F DIAST BP 80-89 MM HG: CPT | Performed by: NURSE PRACTITIONER

## 2023-07-12 PROCEDURE — 85025 COMPLETE CBC W/AUTO DIFF WBC: CPT

## 2023-07-12 PROCEDURE — 80053 COMPREHEN METABOLIC PANEL: CPT

## 2023-07-12 PROCEDURE — 96413 CHEMO IV INFUSION 1 HR: CPT

## 2023-07-12 PROCEDURE — 2580000003 HC RX 258: Performed by: NURSE PRACTITIONER

## 2023-07-12 PROCEDURE — 96368 THER/DIAG CONCURRENT INF: CPT

## 2023-07-12 PROCEDURE — 96416 CHEMO PROLONG INFUSE W/PUMP: CPT

## 2023-07-12 RX ORDER — FLUOROURACIL 50 MG/ML
900 INJECTION, SOLUTION INTRAVENOUS ONCE
Status: COMPLETED | OUTPATIENT
Start: 2023-07-12 | End: 2023-07-12

## 2023-07-12 RX ORDER — PALONOSETRON 0.05 MG/ML
0.25 INJECTION, SOLUTION INTRAVENOUS ONCE
Status: COMPLETED | OUTPATIENT
Start: 2023-07-12 | End: 2023-07-12

## 2023-07-12 RX ORDER — ATROPINE SULFATE 0.4 MG/ML
0.4 INJECTION, SOLUTION INTRAVENOUS
Status: DISCONTINUED | OUTPATIENT
Start: 2023-07-12 | End: 2023-10-27 | Stop reason: HOSPADM

## 2023-07-12 RX ORDER — SODIUM CHLORIDE 9 MG/ML
5-250 INJECTION, SOLUTION INTRAVENOUS PRN
Status: DISCONTINUED | OUTPATIENT
Start: 2023-07-12 | End: 2023-07-13 | Stop reason: HOSPADM

## 2023-07-12 RX ORDER — SODIUM CHLORIDE 0.9 % (FLUSH) 0.9 %
5-40 SYRINGE (ML) INJECTION PRN
Status: DISCONTINUED | OUTPATIENT
Start: 2023-07-12 | End: 2023-07-13 | Stop reason: HOSPADM

## 2023-07-12 RX ADMIN — PALONOSETRON HYDROCHLORIDE 0.25 MG: 0.25 INJECTION INTRAVENOUS at 11:24

## 2023-07-12 RX ADMIN — FLUOROURACIL 5500 MG: 50 INJECTION, SOLUTION INTRAVENOUS at 14:07

## 2023-07-12 RX ADMIN — ATROPINE SULFATE 0.4 MG: 0.4 INJECTION, SOLUTION INTRAVENOUS at 12:16

## 2023-07-12 RX ADMIN — FLUOROURACIL 900 MG: 50 INJECTION, SOLUTION INTRAVENOUS at 14:07

## 2023-07-12 RX ADMIN — DEXAMETHASONE SODIUM PHOSPHATE 12 MG: 4 INJECTION, SOLUTION INTRAMUSCULAR; INTRAVENOUS at 11:27

## 2023-07-12 RX ADMIN — IRINOTECAN HYDROCHLORIDE 410 MG: 20 INJECTION, SOLUTION INTRAVENOUS at 12:24

## 2023-07-12 RX ADMIN — LEUCOVORIN CALCIUM 200 MG: 200 INJECTION, POWDER, LYOPHILIZED, FOR SOLUTION INTRAMUSCULAR; INTRAVENOUS at 12:24

## 2023-07-12 ASSESSMENT — PAIN SCALES - GENERAL: PAINLEVEL_OUTOF10: 0

## 2023-07-12 NOTE — PROGRESS NOTES
Forrest Sandoval is a 72 y.o. male here for follow up of small cell carcinoma of the pancreas. 1. Have you been to the ER, urgent care clinic since your last visit? Hospitalized since your last visit? No    2. Have you seen or consulted any other health care providers outside of the [address] [place] [address] since your last visit? Include any pap smears or colon screening.  No
center PRN. Continue KCL 20MEQ daily in the home    9) Elevated bilirubin   Imaging stable. Will have stent addressed upcoming with GI. Improved today.        Plan:     Proceed today with C13 of FOLFIRI (irinotecan 180mg/m2, leucovorin 400 mg/m2, fluorouracil 400 mg/m2, and a 46 hour infusion of fluorouracil  2400 mg/m2) given every 2 weeks  Labs: CBC, CMP prior to each treatment  Continue KCL daily  He will see GI on 7/27, will delay C14 x 1 week  Return to clinic in 3 weeks      Signed By: YI Stafford NP

## 2023-07-14 ENCOUNTER — HOSPITAL ENCOUNTER (OUTPATIENT)
Facility: HOSPITAL | Age: 73
Setting detail: INFUSION SERIES
End: 2023-07-14
Payer: MEDICARE

## 2023-07-14 VITALS
TEMPERATURE: 97.8 F | HEART RATE: 58 BPM | BODY MASS INDEX: 33.69 KG/M2 | SYSTOLIC BLOOD PRESSURE: 129 MMHG | OXYGEN SATURATION: 94 % | HEIGHT: 70 IN | RESPIRATION RATE: 18 BRPM | DIASTOLIC BLOOD PRESSURE: 74 MMHG

## 2023-07-14 DIAGNOSIS — R16.0 LIVER MASS: ICD-10-CM

## 2023-07-14 DIAGNOSIS — C78.7 METASTASIS TO LIVER (HCC): ICD-10-CM

## 2023-07-14 DIAGNOSIS — C7A.8 NEUROENDOCRINE CARCINOMA OF PANCREAS (HCC): Primary | ICD-10-CM

## 2023-07-14 DIAGNOSIS — K86.89 PANCREATIC MASS: ICD-10-CM

## 2023-07-14 DIAGNOSIS — C25.0 MALIGNANT NEOPLASM OF HEAD OF PANCREAS (HCC): ICD-10-CM

## 2023-07-14 DIAGNOSIS — C80.1 SMALL CELL CARCINOMA (HCC): ICD-10-CM

## 2023-07-14 PROCEDURE — 36591 DRAW BLOOD OFF VENOUS DEVICE: CPT

## 2023-07-14 PROCEDURE — 2580000003 HC RX 258: Performed by: NURSE PRACTITIONER

## 2023-07-14 PROCEDURE — 96523 IRRIG DRUG DELIVERY DEVICE: CPT

## 2023-07-14 RX ORDER — SODIUM CHLORIDE 0.9 % (FLUSH) 0.9 %
5-40 SYRINGE (ML) INJECTION PRN
Status: DISCONTINUED | OUTPATIENT
Start: 2023-07-14 | End: 2023-07-15 | Stop reason: HOSPADM

## 2023-07-14 RX ADMIN — SODIUM CHLORIDE, PRESERVATIVE FREE 10 ML: 5 INJECTION INTRAVENOUS at 12:45

## 2023-07-14 RX ADMIN — SODIUM CHLORIDE, PRESERVATIVE FREE 10 ML: 5 INJECTION INTRAVENOUS at 12:44

## 2023-07-14 ASSESSMENT — PAIN SCALES - GENERAL: PAINLEVEL_OUTOF10: 0

## 2023-07-23 PROCEDURE — G2066 INTER DEVC REMOTE 30D: HCPCS | Performed by: INTERNAL MEDICINE

## 2023-07-23 PROCEDURE — 93298 REM INTERROG DEV EVAL SCRMS: CPT | Performed by: INTERNAL MEDICINE

## 2023-07-27 ENCOUNTER — ANESTHESIA (OUTPATIENT)
Facility: HOSPITAL | Age: 73
End: 2023-07-27
Payer: MEDICARE

## 2023-07-27 ENCOUNTER — HOSPITAL ENCOUNTER (OUTPATIENT)
Facility: HOSPITAL | Age: 73
Setting detail: OUTPATIENT SURGERY
Discharge: HOME | End: 2023-07-27
Attending: INTERNAL MEDICINE | Admitting: INTERNAL MEDICINE
Payer: MEDICARE

## 2023-07-27 ENCOUNTER — ANESTHESIA EVENT (OUTPATIENT)
Facility: HOSPITAL | Age: 73
End: 2023-07-27
Payer: MEDICARE

## 2023-07-27 VITALS
SYSTOLIC BLOOD PRESSURE: 119 MMHG | HEART RATE: 54 BPM | OXYGEN SATURATION: 97 % | DIASTOLIC BLOOD PRESSURE: 61 MMHG | HEIGHT: 71 IN | TEMPERATURE: 98.6 F | BODY MASS INDEX: 32.5 KG/M2 | RESPIRATION RATE: 21 BRPM | WEIGHT: 232.14 LBS

## 2023-07-27 DIAGNOSIS — C78.7 METASTASIS TO LIVER (HCC): ICD-10-CM

## 2023-07-27 DIAGNOSIS — K86.89 PANCREATIC MASS: ICD-10-CM

## 2023-07-27 DIAGNOSIS — C25.0 MALIGNANT NEOPLASM OF HEAD OF PANCREAS (HCC): ICD-10-CM

## 2023-07-27 DIAGNOSIS — R16.0 LIVER MASS: ICD-10-CM

## 2023-07-27 DIAGNOSIS — C7A.8 NEUROENDOCRINE CARCINOMA OF PANCREAS (HCC): Primary | ICD-10-CM

## 2023-07-27 DIAGNOSIS — C80.1 SMALL CELL CARCINOMA (HCC): ICD-10-CM

## 2023-07-27 LAB
GLUCOSE BLD STRIP.AUTO-MCNC: 186 MG/DL (ref 65–117)
SERVICE CMNT-IMP: ABNORMAL

## 2023-07-27 PROCEDURE — 82962 GLUCOSE BLOOD TEST: CPT

## 2023-07-27 PROCEDURE — 3600007502: Performed by: INTERNAL MEDICINE

## 2023-07-27 PROCEDURE — 88305 TISSUE EXAM BY PATHOLOGIST: CPT

## 2023-07-27 PROCEDURE — 7100000010 HC PHASE II RECOVERY - FIRST 15 MIN: Performed by: INTERNAL MEDICINE

## 2023-07-27 PROCEDURE — 3700000001 HC ADD 15 MINUTES (ANESTHESIA): Performed by: INTERNAL MEDICINE

## 2023-07-27 PROCEDURE — 2500000003 HC RX 250 WO HCPCS: Performed by: NURSE ANESTHETIST, CERTIFIED REGISTERED

## 2023-07-27 PROCEDURE — 3700000000 HC ANESTHESIA ATTENDED CARE: Performed by: INTERNAL MEDICINE

## 2023-07-27 PROCEDURE — 7100000011 HC PHASE II RECOVERY - ADDTL 15 MIN: Performed by: INTERNAL MEDICINE

## 2023-07-27 PROCEDURE — 6360000002 HC RX W HCPCS: Performed by: NURSE ANESTHETIST, CERTIFIED REGISTERED

## 2023-07-27 PROCEDURE — 3600007512: Performed by: INTERNAL MEDICINE

## 2023-07-27 RX ORDER — ACETAMINOPHEN 325 MG/1
650 TABLET ORAL
Status: CANCELLED | OUTPATIENT
Start: 2023-08-02

## 2023-07-27 RX ORDER — DIPHENHYDRAMINE HYDROCHLORIDE 50 MG/ML
50 INJECTION INTRAMUSCULAR; INTRAVENOUS
Status: CANCELLED | OUTPATIENT
Start: 2023-08-02

## 2023-07-27 RX ORDER — SODIUM CHLORIDE 9 MG/ML
INJECTION, SOLUTION INTRAVENOUS CONTINUOUS
Status: CANCELLED | OUTPATIENT
Start: 2023-07-27

## 2023-07-27 RX ORDER — LIDOCAINE HYDROCHLORIDE 20 MG/ML
INJECTION, SOLUTION INTRAVENOUS PRN
Status: DISCONTINUED | OUTPATIENT
Start: 2023-07-27 | End: 2023-07-27 | Stop reason: SDUPTHER

## 2023-07-27 RX ORDER — SODIUM CHLORIDE 9 MG/ML
5-250 INJECTION, SOLUTION INTRAVENOUS PRN
Status: CANCELLED | OUTPATIENT
Start: 2023-08-02

## 2023-07-27 RX ORDER — HEPARIN 100 UNIT/ML
500 SYRINGE INTRAVENOUS PRN
Status: CANCELLED | OUTPATIENT
Start: 2023-08-04

## 2023-07-27 RX ORDER — ALBUTEROL SULFATE 90 UG/1
4 AEROSOL, METERED RESPIRATORY (INHALATION) PRN
Status: CANCELLED | OUTPATIENT
Start: 2023-08-02

## 2023-07-27 RX ORDER — EPINEPHRINE 1 MG/ML
0.3 INJECTION, SOLUTION, CONCENTRATE INTRAVENOUS PRN
Status: CANCELLED | OUTPATIENT
Start: 2023-08-02

## 2023-07-27 RX ORDER — PROPOFOL 10 MG/ML
INJECTION, EMULSION INTRAVENOUS CONTINUOUS PRN
Status: DISCONTINUED | OUTPATIENT
Start: 2023-07-27 | End: 2023-07-27 | Stop reason: SDUPTHER

## 2023-07-27 RX ORDER — SODIUM CHLORIDE 9 MG/ML
INJECTION, SOLUTION INTRAVENOUS CONTINUOUS
Status: CANCELLED | OUTPATIENT
Start: 2023-08-02

## 2023-07-27 RX ORDER — ATROPINE SULFATE 1 MG/ML
0.4 INJECTION, SOLUTION INTRAMUSCULAR; INTRAVENOUS; SUBCUTANEOUS
Status: CANCELLED | OUTPATIENT
Start: 2023-08-02

## 2023-07-27 RX ORDER — DEXMEDETOMIDINE HYDROCHLORIDE 100 UG/ML
INJECTION, SOLUTION INTRAVENOUS PRN
Status: DISCONTINUED | OUTPATIENT
Start: 2023-07-27 | End: 2023-07-27 | Stop reason: SDUPTHER

## 2023-07-27 RX ORDER — SODIUM CHLORIDE 0.9 % (FLUSH) 0.9 %
5-40 SYRINGE (ML) INJECTION PRN
Status: CANCELLED | OUTPATIENT
Start: 2023-08-02

## 2023-07-27 RX ORDER — PALONOSETRON 0.05 MG/ML
0.25 INJECTION, SOLUTION INTRAVENOUS ONCE
Status: CANCELLED | OUTPATIENT
Start: 2023-08-02 | End: 2023-08-02

## 2023-07-27 RX ORDER — ONDANSETRON 2 MG/ML
8 INJECTION INTRAMUSCULAR; INTRAVENOUS
Status: CANCELLED | OUTPATIENT
Start: 2023-08-02

## 2023-07-27 RX ORDER — MEPERIDINE HYDROCHLORIDE 25 MG/ML
12.5 INJECTION INTRAMUSCULAR; INTRAVENOUS; SUBCUTANEOUS PRN
Status: CANCELLED | OUTPATIENT
Start: 2023-08-02

## 2023-07-27 RX ORDER — SODIUM CHLORIDE 0.9 % (FLUSH) 0.9 %
5-40 SYRINGE (ML) INJECTION PRN
Status: CANCELLED | OUTPATIENT
Start: 2023-08-04

## 2023-07-27 RX ORDER — SODIUM CHLORIDE 9 MG/ML
5-250 INJECTION, SOLUTION INTRAVENOUS PRN
Status: CANCELLED | OUTPATIENT
Start: 2023-08-04

## 2023-07-27 RX ORDER — FLUOROURACIL 50 MG/ML
900 INJECTION, SOLUTION INTRAVENOUS ONCE
Status: CANCELLED | OUTPATIENT
Start: 2023-08-02 | End: 2023-08-02

## 2023-07-27 RX ORDER — HEPARIN 100 UNIT/ML
500 SYRINGE INTRAVENOUS PRN
Status: CANCELLED | OUTPATIENT
Start: 2023-08-02

## 2023-07-27 RX ADMIN — DEXMEDETOMIDINE 2 MCG: 100 INJECTION, SOLUTION INTRAVENOUS at 13:37

## 2023-07-27 RX ADMIN — DEXMEDETOMIDINE 2 MCG: 100 INJECTION, SOLUTION INTRAVENOUS at 13:40

## 2023-07-27 RX ADMIN — LIDOCAINE HYDROCHLORIDE 100 MG: 20 INJECTION, SOLUTION INTRAVENOUS at 13:40

## 2023-07-27 RX ADMIN — PROPOFOL 500 MCG/KG/MIN: 10 INJECTION, EMULSION INTRAVENOUS at 13:40

## 2023-07-27 ASSESSMENT — PAIN SCALES - GENERAL
PAINLEVEL_OUTOF10: 0

## 2023-07-27 ASSESSMENT — PAIN - FUNCTIONAL ASSESSMENT: PAIN_FUNCTIONAL_ASSESSMENT: 0-10

## 2023-07-27 NOTE — ANESTHESIA POSTPROCEDURE EVALUATION
Department of Anesthesiology  Postprocedure Note    Patient: Blaine Bradley MRN: [id number]  YOB: 1950  Date of evaluation: 7/27/2023      Procedure Summary     Date: 07/27/23 Room / Location: [place] [address] / [facility] ENDOSCOPY    Anesthesia Start: 3059 Anesthesia Stop: 0337    Procedures:       COLONOSCOPY AND EGD ESOPHAGOGASTRODUODENOSCOPY      EGD ESOPHAGOGASTRODUODENOSCOPY      EGD BIOPSY (Upper GI Region)      COLONOSCOPY POLYPECTOMY SNARE/COLD BIOPSY (Lower GI Region) Diagnosis:       Screen for colon cancer      Anemia due to other cause, not classified      (Screen for colon cancer [Z12.11])      (Anemia due to other cause, not classified [D64.89])    Surgeons: Boni Tiwari MD Responsible Provider: Jeremy Davis MD    Anesthesia Type: MAC ASA Status: 3          Anesthesia Type: No value filed.     Anatoly Phase I: Anatoly Score: 10    Anatoly Phase II: Anatoly Score: 10      Anesthesia Post Evaluation    Patient location during evaluation: PACU  Level of consciousness: awake  Pain score: 0  Airway patency: patent  Nausea & Vomiting: no nausea and no vomiting  Complications: no  Cardiovascular status: blood pressure returned to baseline  Respiratory status: acceptable  Hydration status: euvolemic

## 2023-07-27 NOTE — OP NOTE
Lizzie Lan M.D.  [phone]            2023          Colonoscopy Operative Report  Sunny Zelaya. :  1950  [facility] Medical Record Number:  [id number]      Indications:    Screening colonoscopy     :  Gianna Harman MD    Referring Provider: Abeba Adams MD    Sedation:  MAC anesthesia    Pre-Procedural Exam:      Airway: clear,  No airway problems anticipated  Heart: RRR, without gallops or rubs  Lungs: clear bilaterally without wheezes, crackles, or rhonchi  Abdomen: soft, nontender, nondistended, bowel sounds present  Mental Status: awake, alert and oriented to person, place and time     Procedure Details:  After informed consent was obtained with all risks and benefits of procedure explained and preoperative exam completed, the patient was taken to the endoscopy suite and placed in the left lateral decubitus position. Upon sequential sedation as per above, a digital rectal exam was performed. The Olympus videocolonoscope  was inserted in the rectum and carefully advanced to the cecum, which was identified by the ileocecal valve and appendiceal orifice. The quality of preparation was good. The colonoscope was slowly withdrawn with careful inspection and evaluation between folds. Retroflexion in the rectum was performed. Findings:   Terminal Ileum: not intubated  Cecum: normal  Ascending Colon: 3 mm polyp seen and removed  moderate diverticulosis;  Transverse Colon: normal  Descending Colon: no mucosal lesion appreciated  mild diverticulosis; Sigmoid: no mucosal lesion appreciated  mild diverticulosis; Rectum: no mucosal lesion appreciated  Grade 1 internal hemorrhoid(s); Interventions:  1 complete polypectomy were performed using cold snare  and the polyps were  retrieved    Specimen Removed:  specimen #1, 3 mm in size, located in the ascending colon removed by cold snare and retrieved for pathology    Complications: None.      EBL:

## 2023-07-27 NOTE — OP NOTE
Jaimie Horta M.D.  [phone]           2023                EGD Operative Report  Sunny Mulligan. :  1950  [place] [facility] Medical Record Number:  [id number]      Indication: Iron deficiency anemia    : Lokesh eFrnandes MD    Referring Provider:  Jovanny Leo MD      Anesthesia/Sedation:  MAC anesthesia    Airway assessment: No airway problems anticipated    Pre-Procedural Exam:      Airway: clear, no airway problems anticipated  Heart: RRR, without gallops or rubs  Lungs: clear bilaterally without wheezes, crackles, or rhonchi  Abdomen: soft, nontender, nondistended, bowel sounds present  Mental Status: awake, alert and oriented to person, place and time       Procedure Details     After infomed consent was obtained for the procedure, with all risks and benefits of procedure explained the patient was taken to the endoscopy suite and placed in the left lateral decubitus position. Following sequential administration of sedation as per above, the endoscope was inserted into the mouth and advanced under direct vision to second portion of the duodenum. A careful inspection was made as the gastroscope was withdrawn, including a retroflexed view of the proximal stomach; findings and interventions are described below. Findings:   Esophagus:Mucosa within normal throughout the esophagus  Stomach: Normal mucosa throughout the stomach  Duodenum/jejunum:  Normal mucosa in the bulb, evidence of a protruding metal stent seen at the area of ampulla in the second portion. No ulcer seen, however thickened folds noted in the area with a slight lumen narrowing. No cancer tissue seen. Therapies:  none    Specimens:  Stomach           Complications:   None; patient tolerated the procedure well. EBL:  None.            Impression:   Biliary metal stent seen protruding in the second portion of the duodenum    Recommendations:    -Await pathology.  -Continue monitoring

## 2023-07-27 NOTE — PROGRESS NOTES

## 2023-07-27 NOTE — PROGRESS NOTES
Josefa Melina.  1950  [id number]    Situation:  Verbal report received from:   Jose Bates RN   Procedure: Procedure(s):  COLONOSCOPY AND EGD ESOPHAGOGASTRODUODENOSCOPY  EGD ESOPHAGOGASTRODUODENOSCOPY  EGD BIOPSY  COLONOSCOPY POLYPECTOMY SNARE/COLD BIOPSY    Background:    Preoperative diagnosis: Screen for colon cancer [Z12.11]  Anemia due to other cause, not classified [D64.89]  Postoperative diagnosis: * No post-op diagnosis entered *    :  Dr. Mariel Eastman   Assistant(s): Circulator: Clifford Carney RN  Endoscopy Technician: Nj Gonzalez    Specimens:   ID Type Source Tests Collected by Time Destination   A : Stomach biopsies Tissue Stomach SURGICAL PATHOLOGY Sara Kirk MD 7/27/2023 1343    B : Ascending colon polyp Tissue Colon SURGICAL PATHOLOGY Sara Kirk MD 7/27/2023 1353      H. Pylori    no     Assessment:  Intra-procedure medications     Anesthesia gave intra-procedure sedation and medications, see anesthesia flow sheet   yes    Intravenous fluids: NS@ KVO     Vital signs stable   yes    Abdominal assessment: round and soft   yes    Recommendation:  Discharge patient per MD order  yes.   Return to floor  outpatient  Family or Friend   family   Permission to share finding with family or friend   yes

## 2023-07-27 NOTE — DISCHARGE INSTRUCTIONS
[address] [facility] Sharmin White M.D.  [phone]                 COLON and EGD DISCHARGE INSTRUCTIONS    2023    Carmenza De La Paz. :  1950  [place] Medical Record Number:  [id number]      DISCOMFORT:  Sore throat- throat lozenges or warm salt water gargle  Redness at IV site- apply warm compress to area; if redness or soreness persist- contact your physician  There may be a slight amount of blood passed from the rectum  Gaseous discomfort- walking, belching will help relieve any discomfort  You may not operate a vehicle for 12 hours  You may not engage in an occupation involving machinery or appliances for rest of today  You may not drink alcoholic beverages for at least 12 hours  Avoid making any critical decisions for at least 24 hour  DIET:   High fiber diet   - however -  remember your colon is empty and a heavy meal will produce gas. Avoid these foods:  vegetables, fried / greasy foods, carbonated drinks for today     ACTIVITY:  You may  resume your normal daily activities it is recommended that you spend the remainder of the day resting -  avoid any strenuous activity and driving. CALL M.D. ANY SIGN OF:   Increasing pain, nausea, vomiting  Abdominal distension (swelling)  New increased bleeding (oral or rectal)  Fever (chills)  Pain in chest area  Bloody discharge from nose or mouth  Shortness of breath      Follow-up Instructions:   Call Dr. Russell Wise if any questions at [phone]. Results of procedure / biopsy in 7 to 10 days, we will call you with these results. Your endoscopy showed completely healed ulcer and stent in place. Biopsies obtained from the stomach to rule out H.pylori. Your colonoscopy showed one small polyp that was removed and sent to pathology, diverticulosis seen throughout the colon.

## 2023-08-02 ENCOUNTER — HOSPITAL ENCOUNTER (OUTPATIENT)
Facility: HOSPITAL | Age: 73
Setting detail: INFUSION SERIES
End: 2023-08-02
Payer: MEDICARE

## 2023-08-02 ENCOUNTER — OFFICE VISIT (OUTPATIENT)
Age: 73
End: 2023-08-02
Payer: MEDICARE

## 2023-08-02 VITALS
WEIGHT: 230.9 LBS | HEIGHT: 71 IN | TEMPERATURE: 98.2 F | HEART RATE: 60 BPM | RESPIRATION RATE: 18 BRPM | OXYGEN SATURATION: 96 % | DIASTOLIC BLOOD PRESSURE: 75 MMHG | BODY MASS INDEX: 32.32 KG/M2 | SYSTOLIC BLOOD PRESSURE: 128 MMHG

## 2023-08-02 VITALS
HEART RATE: 60 BPM | BODY MASS INDEX: 32.2 KG/M2 | DIASTOLIC BLOOD PRESSURE: 69 MMHG | WEIGHT: 230 LBS | TEMPERATURE: 98.2 F | SYSTOLIC BLOOD PRESSURE: 131 MMHG | RESPIRATION RATE: 18 BRPM | HEIGHT: 71 IN | OXYGEN SATURATION: 96 %

## 2023-08-02 DIAGNOSIS — C7A.8 NEUROENDOCRINE CARCINOMA OF PANCREAS (HCC): ICD-10-CM

## 2023-08-02 DIAGNOSIS — R16.0 LIVER MASS: ICD-10-CM

## 2023-08-02 DIAGNOSIS — K86.89 PANCREATIC MASS: ICD-10-CM

## 2023-08-02 DIAGNOSIS — C25.0 MALIGNANT NEOPLASM OF HEAD OF PANCREAS (HCC): Primary | ICD-10-CM

## 2023-08-02 DIAGNOSIS — C78.7 METASTASIS TO LIVER (HCC): ICD-10-CM

## 2023-08-02 DIAGNOSIS — C80.1 SMALL CELL CARCINOMA (HCC): ICD-10-CM

## 2023-08-02 LAB
ALBUMIN SERPL-MCNC: 3.4 G/DL (ref 3.5–5)
ALBUMIN/GLOB SERPL: 0.9 (ref 1.1–2.2)
ALP SERPL-CCNC: 101 U/L (ref 45–117)
ALT SERPL-CCNC: 27 U/L (ref 12–78)
ANION GAP SERPL CALC-SCNC: 4 MMOL/L (ref 5–15)
AST SERPL-CCNC: 27 U/L (ref 15–37)
BASOPHILS # BLD: 0 K/UL (ref 0–0.1)
BASOPHILS NFR BLD: 0 % (ref 0–1)
BILIRUB SERPL-MCNC: 0.6 MG/DL (ref 0.2–1)
BILIRUB SERPL-MCNC: 0.7 MG/DL (ref 0.2–1)
BUN SERPL-MCNC: 15 MG/DL (ref 6–20)
BUN/CREAT SERPL: 17 (ref 12–20)
CALCIUM SERPL-MCNC: 9.1 MG/DL (ref 8.5–10.1)
CHLORIDE SERPL-SCNC: 107 MMOL/L (ref 97–108)
CO2 SERPL-SCNC: 27 MMOL/L (ref 21–32)
CREAT SERPL-MCNC: 0.87 MG/DL (ref 0.7–1.3)
DIFFERENTIAL METHOD BLD: ABNORMAL
EOSINOPHIL # BLD: 0.1 K/UL (ref 0–0.4)
EOSINOPHIL NFR BLD: 1 % (ref 0–7)
ERYTHROCYTE [DISTWIDTH] IN BLOOD BY AUTOMATED COUNT: 17.7 % (ref 11.5–14.5)
GLOBULIN SER CALC-MCNC: 3.6 G/DL (ref 2–4)
GLUCOSE SERPL-MCNC: 215 MG/DL (ref 65–100)
HCT VFR BLD AUTO: 35.3 % (ref 36.6–50.3)
HGB BLD-MCNC: 11.6 G/DL (ref 12.1–17)
IMM GRANULOCYTES # BLD AUTO: 0.1 K/UL (ref 0–0.04)
IMM GRANULOCYTES NFR BLD AUTO: 1 % (ref 0–0.5)
LYMPHOCYTES # BLD: 1 K/UL (ref 0.8–3.5)
LYMPHOCYTES NFR BLD: 19 % (ref 12–49)
MCH RBC QN AUTO: 26.6 PG (ref 26–34)
MCHC RBC AUTO-ENTMCNC: 32.9 G/DL (ref 30–36.5)
MCV RBC AUTO: 81 FL (ref 80–99)
MONOCYTES # BLD: 0.5 K/UL (ref 0–1)
MONOCYTES NFR BLD: 9 % (ref 5–13)
NEUTS SEG # BLD: 3.5 K/UL (ref 1.8–8)
NEUTS SEG NFR BLD: 70 % (ref 32–75)
NRBC # BLD: 0 K/UL (ref 0–0.01)
NRBC BLD-RTO: 0 PER 100 WBC
PLATELET # BLD AUTO: 138 K/UL (ref 150–400)
PMV BLD AUTO: 9.8 FL (ref 8.9–12.9)
POTASSIUM SERPL-SCNC: 3.3 MMOL/L (ref 3.5–5.1)
PROT SERPL-MCNC: 7 G/DL (ref 6.4–8.2)
RBC # BLD AUTO: 4.36 M/UL (ref 4.1–5.7)
SODIUM SERPL-SCNC: 138 MMOL/L (ref 136–145)
WBC # BLD AUTO: 5.1 K/UL (ref 4.1–11.1)

## 2023-08-02 PROCEDURE — 36591 DRAW BLOOD OFF VENOUS DEVICE: CPT

## 2023-08-02 PROCEDURE — 1036F TOBACCO NON-USER: CPT | Performed by: INTERNAL MEDICINE

## 2023-08-02 PROCEDURE — 96368 THER/DIAG CONCURRENT INF: CPT

## 2023-08-02 PROCEDURE — 82247 BILIRUBIN TOTAL: CPT

## 2023-08-02 PROCEDURE — 96417 CHEMO IV INFUS EACH ADDL SEQ: CPT

## 2023-08-02 PROCEDURE — 96411 CHEMO IV PUSH ADDL DRUG: CPT

## 2023-08-02 PROCEDURE — 96416 CHEMO PROLONG INFUSE W/PUMP: CPT

## 2023-08-02 PROCEDURE — 96375 TX/PRO/DX INJ NEW DRUG ADDON: CPT

## 2023-08-02 PROCEDURE — 99215 OFFICE O/P EST HI 40 MIN: CPT | Performed by: INTERNAL MEDICINE

## 2023-08-02 PROCEDURE — 96413 CHEMO IV INFUSION 1 HR: CPT

## 2023-08-02 PROCEDURE — 80053 COMPREHEN METABOLIC PANEL: CPT

## 2023-08-02 PROCEDURE — 6360000002 HC RX W HCPCS: Performed by: INTERNAL MEDICINE

## 2023-08-02 PROCEDURE — 2580000003 HC RX 258: Performed by: INTERNAL MEDICINE

## 2023-08-02 PROCEDURE — G8427 DOCREV CUR MEDS BY ELIG CLIN: HCPCS | Performed by: INTERNAL MEDICINE

## 2023-08-02 PROCEDURE — 3017F COLORECTAL CA SCREEN DOC REV: CPT | Performed by: INTERNAL MEDICINE

## 2023-08-02 PROCEDURE — 3078F DIAST BP <80 MM HG: CPT | Performed by: INTERNAL MEDICINE

## 2023-08-02 PROCEDURE — 85025 COMPLETE CBC W/AUTO DIFF WBC: CPT

## 2023-08-02 PROCEDURE — 1123F ACP DISCUSS/DSCN MKR DOCD: CPT | Performed by: INTERNAL MEDICINE

## 2023-08-02 PROCEDURE — 6370000000 HC RX 637 (ALT 250 FOR IP): Performed by: NURSE PRACTITIONER

## 2023-08-02 PROCEDURE — G8417 CALC BMI ABV UP PARAM F/U: HCPCS | Performed by: INTERNAL MEDICINE

## 2023-08-02 PROCEDURE — 36415 COLL VENOUS BLD VENIPUNCTURE: CPT

## 2023-08-02 PROCEDURE — 6360000002 HC RX W HCPCS: Performed by: NURSE PRACTITIONER

## 2023-08-02 PROCEDURE — 3075F SYST BP GE 130 - 139MM HG: CPT | Performed by: INTERNAL MEDICINE

## 2023-08-02 RX ORDER — DIPHENHYDRAMINE HYDROCHLORIDE 50 MG/ML
50 INJECTION INTRAMUSCULAR; INTRAVENOUS
OUTPATIENT
Start: 2023-08-16

## 2023-08-02 RX ORDER — EPINEPHRINE 1 MG/ML
0.3 INJECTION, SOLUTION, CONCENTRATE INTRAVENOUS PRN
OUTPATIENT
Start: 2023-09-20

## 2023-08-02 RX ORDER — SODIUM CHLORIDE 9 MG/ML
5-250 INJECTION, SOLUTION INTRAVENOUS PRN
OUTPATIENT
Start: 2023-08-16

## 2023-08-02 RX ORDER — ATROPINE SULFATE 0.4 MG/ML
0.4 INJECTION, SOLUTION INTRAMUSCULAR; INTRAVENOUS; SUBCUTANEOUS
OUTPATIENT
Start: 2023-08-30

## 2023-08-02 RX ORDER — ACETAMINOPHEN 325 MG/1
650 TABLET ORAL
OUTPATIENT
Start: 2023-09-20

## 2023-08-02 RX ORDER — SODIUM CHLORIDE 0.9 % (FLUSH) 0.9 %
5-40 SYRINGE (ML) INJECTION PRN
OUTPATIENT
Start: 2023-08-18

## 2023-08-02 RX ORDER — SODIUM CHLORIDE 0.9 % (FLUSH) 0.9 %
5-40 SYRINGE (ML) INJECTION PRN
OUTPATIENT
Start: 2023-08-30

## 2023-08-02 RX ORDER — SODIUM CHLORIDE 9 MG/ML
INJECTION, SOLUTION INTRAVENOUS CONTINUOUS
OUTPATIENT
Start: 2023-08-16

## 2023-08-02 RX ORDER — EPINEPHRINE 1 MG/ML
0.3 INJECTION, SOLUTION, CONCENTRATE INTRAVENOUS PRN
OUTPATIENT
Start: 2023-08-30

## 2023-08-02 RX ORDER — ACETAMINOPHEN 325 MG/1
650 TABLET ORAL
OUTPATIENT
Start: 2023-08-16

## 2023-08-02 RX ORDER — PALONOSETRON 0.05 MG/ML
0.25 INJECTION, SOLUTION INTRAVENOUS ONCE
Status: COMPLETED | OUTPATIENT
Start: 2023-08-02 | End: 2023-08-02

## 2023-08-02 RX ORDER — ATROPINE SULFATE 0.4 MG/ML
0.4 INJECTION, SOLUTION INTRAMUSCULAR; INTRAVENOUS; SUBCUTANEOUS
OUTPATIENT
Start: 2023-09-20

## 2023-08-02 RX ORDER — SODIUM CHLORIDE 9 MG/ML
5-250 INJECTION, SOLUTION INTRAVENOUS PRN
OUTPATIENT
Start: 2023-09-22

## 2023-08-02 RX ORDER — FLUOROURACIL 50 MG/ML
900 INJECTION, SOLUTION INTRAVENOUS ONCE
OUTPATIENT
Start: 2023-08-16 | End: 2023-08-16

## 2023-08-02 RX ORDER — ATROPINE SULFATE 0.4 MG/ML
0.4 INJECTION, SOLUTION INTRAVENOUS
Status: DISCONTINUED | OUTPATIENT
Start: 2023-08-02 | End: 2023-10-27 | Stop reason: HOSPADM

## 2023-08-02 RX ORDER — ONDANSETRON 2 MG/ML
8 INJECTION INTRAMUSCULAR; INTRAVENOUS
OUTPATIENT
Start: 2023-08-16

## 2023-08-02 RX ORDER — MEPERIDINE HYDROCHLORIDE 50 MG/ML
12.5 INJECTION INTRAMUSCULAR; INTRAVENOUS; SUBCUTANEOUS PRN
OUTPATIENT
Start: 2023-09-20

## 2023-08-02 RX ORDER — SODIUM CHLORIDE 0.9 % (FLUSH) 0.9 %
5-40 SYRINGE (ML) INJECTION PRN
OUTPATIENT
Start: 2023-09-20

## 2023-08-02 RX ORDER — ONDANSETRON 2 MG/ML
8 INJECTION INTRAMUSCULAR; INTRAVENOUS
OUTPATIENT
Start: 2023-09-20

## 2023-08-02 RX ORDER — FLUOROURACIL 50 MG/ML
900 INJECTION, SOLUTION INTRAVENOUS ONCE
OUTPATIENT
Start: 2023-08-30 | End: 2023-08-30

## 2023-08-02 RX ORDER — SODIUM CHLORIDE 0.9 % (FLUSH) 0.9 %
5-40 SYRINGE (ML) INJECTION PRN
OUTPATIENT
Start: 2023-09-01

## 2023-08-02 RX ORDER — SODIUM CHLORIDE 0.9 % (FLUSH) 0.9 %
5-40 SYRINGE (ML) INJECTION PRN
OUTPATIENT
Start: 2023-09-22

## 2023-08-02 RX ORDER — HEPARIN SODIUM (PORCINE) LOCK FLUSH IV SOLN 100 UNIT/ML 100 UNIT/ML
500 SOLUTION INTRAVENOUS PRN
OUTPATIENT
Start: 2023-08-16

## 2023-08-02 RX ORDER — PALONOSETRON 0.05 MG/ML
0.25 INJECTION, SOLUTION INTRAVENOUS ONCE
OUTPATIENT
Start: 2023-08-16 | End: 2023-08-16

## 2023-08-02 RX ORDER — HEPARIN SODIUM (PORCINE) LOCK FLUSH IV SOLN 100 UNIT/ML 100 UNIT/ML
500 SOLUTION INTRAVENOUS PRN
OUTPATIENT
Start: 2023-09-22

## 2023-08-02 RX ORDER — EPINEPHRINE 1 MG/ML
0.3 INJECTION, SOLUTION, CONCENTRATE INTRAVENOUS PRN
OUTPATIENT
Start: 2023-08-16

## 2023-08-02 RX ORDER — ACETAMINOPHEN 325 MG/1
650 TABLET ORAL
OUTPATIENT
Start: 2023-08-30

## 2023-08-02 RX ORDER — ALBUTEROL SULFATE 90 UG/1
4 AEROSOL, METERED RESPIRATORY (INHALATION) PRN
OUTPATIENT
Start: 2023-08-16

## 2023-08-02 RX ORDER — SODIUM CHLORIDE 9 MG/ML
5-250 INJECTION, SOLUTION INTRAVENOUS PRN
Status: DISCONTINUED | OUTPATIENT
Start: 2023-08-02 | End: 2023-08-03 | Stop reason: HOSPADM

## 2023-08-02 RX ORDER — ALBUTEROL SULFATE 90 UG/1
4 AEROSOL, METERED RESPIRATORY (INHALATION) PRN
OUTPATIENT
Start: 2023-09-20

## 2023-08-02 RX ORDER — HEPARIN SODIUM (PORCINE) LOCK FLUSH IV SOLN 100 UNIT/ML 100 UNIT/ML
500 SOLUTION INTRAVENOUS PRN
OUTPATIENT
Start: 2023-08-30

## 2023-08-02 RX ORDER — FAMOTIDINE 10 MG/ML
20 INJECTION, SOLUTION INTRAVENOUS
OUTPATIENT
Start: 2023-08-30

## 2023-08-02 RX ORDER — PALONOSETRON 0.05 MG/ML
0.25 INJECTION, SOLUTION INTRAVENOUS ONCE
OUTPATIENT
Start: 2023-09-20 | End: 2023-09-13

## 2023-08-02 RX ORDER — MEPERIDINE HYDROCHLORIDE 50 MG/ML
12.5 INJECTION INTRAMUSCULAR; INTRAVENOUS; SUBCUTANEOUS PRN
OUTPATIENT
Start: 2023-08-16

## 2023-08-02 RX ORDER — PALONOSETRON 0.05 MG/ML
0.25 INJECTION, SOLUTION INTRAVENOUS ONCE
OUTPATIENT
Start: 2023-08-30 | End: 2023-08-30

## 2023-08-02 RX ORDER — SODIUM CHLORIDE 9 MG/ML
5-250 INJECTION, SOLUTION INTRAVENOUS PRN
OUTPATIENT
Start: 2023-09-20

## 2023-08-02 RX ORDER — DIPHENHYDRAMINE HYDROCHLORIDE 50 MG/ML
50 INJECTION INTRAMUSCULAR; INTRAVENOUS
OUTPATIENT
Start: 2023-09-20

## 2023-08-02 RX ORDER — SODIUM CHLORIDE 9 MG/ML
5-250 INJECTION, SOLUTION INTRAVENOUS PRN
OUTPATIENT
Start: 2023-08-18

## 2023-08-02 RX ORDER — DIPHENHYDRAMINE HYDROCHLORIDE 50 MG/ML
50 INJECTION INTRAMUSCULAR; INTRAVENOUS
OUTPATIENT
Start: 2023-08-30

## 2023-08-02 RX ORDER — SODIUM CHLORIDE 9 MG/ML
5-250 INJECTION, SOLUTION INTRAVENOUS PRN
OUTPATIENT
Start: 2023-08-30

## 2023-08-02 RX ORDER — FLUOROURACIL 50 MG/ML
900 INJECTION, SOLUTION INTRAVENOUS ONCE
Status: COMPLETED | OUTPATIENT
Start: 2023-08-02 | End: 2023-08-02

## 2023-08-02 RX ORDER — HEPARIN SODIUM (PORCINE) LOCK FLUSH IV SOLN 100 UNIT/ML 100 UNIT/ML
500 SOLUTION INTRAVENOUS PRN
OUTPATIENT
Start: 2023-09-20

## 2023-08-02 RX ORDER — SODIUM CHLORIDE 9 MG/ML
INJECTION, SOLUTION INTRAVENOUS CONTINUOUS
OUTPATIENT
Start: 2023-08-30

## 2023-08-02 RX ORDER — HEPARIN SODIUM (PORCINE) LOCK FLUSH IV SOLN 100 UNIT/ML 100 UNIT/ML
500 SOLUTION INTRAVENOUS PRN
OUTPATIENT
Start: 2023-09-01

## 2023-08-02 RX ORDER — FAMOTIDINE 10 MG/ML
20 INJECTION, SOLUTION INTRAVENOUS
OUTPATIENT
Start: 2023-09-20

## 2023-08-02 RX ORDER — MEPERIDINE HYDROCHLORIDE 50 MG/ML
12.5 INJECTION INTRAMUSCULAR; INTRAVENOUS; SUBCUTANEOUS PRN
OUTPATIENT
Start: 2023-08-30

## 2023-08-02 RX ORDER — POTASSIUM CHLORIDE 1.5 G/1.58G
40 POWDER, FOR SOLUTION ORAL ONCE
Status: COMPLETED | OUTPATIENT
Start: 2023-08-02 | End: 2023-08-02

## 2023-08-02 RX ORDER — SODIUM CHLORIDE 9 MG/ML
INJECTION, SOLUTION INTRAVENOUS CONTINUOUS
OUTPATIENT
Start: 2023-09-20

## 2023-08-02 RX ORDER — FAMOTIDINE 10 MG/ML
20 INJECTION, SOLUTION INTRAVENOUS
OUTPATIENT
Start: 2023-08-16

## 2023-08-02 RX ORDER — ALBUTEROL SULFATE 90 UG/1
4 AEROSOL, METERED RESPIRATORY (INHALATION) PRN
OUTPATIENT
Start: 2023-08-30

## 2023-08-02 RX ORDER — SODIUM CHLORIDE 9 MG/ML
5-250 INJECTION, SOLUTION INTRAVENOUS PRN
OUTPATIENT
Start: 2023-09-01

## 2023-08-02 RX ORDER — ONDANSETRON 2 MG/ML
8 INJECTION INTRAMUSCULAR; INTRAVENOUS
OUTPATIENT
Start: 2023-08-30

## 2023-08-02 RX ORDER — FLUOROURACIL 50 MG/ML
900 INJECTION, SOLUTION INTRAVENOUS ONCE
OUTPATIENT
Start: 2023-09-20 | End: 2023-09-13

## 2023-08-02 RX ORDER — HEPARIN SODIUM (PORCINE) LOCK FLUSH IV SOLN 100 UNIT/ML 100 UNIT/ML
500 SOLUTION INTRAVENOUS PRN
OUTPATIENT
Start: 2023-08-18

## 2023-08-02 RX ORDER — SODIUM CHLORIDE 0.9 % (FLUSH) 0.9 %
5-40 SYRINGE (ML) INJECTION PRN
Status: DISCONTINUED | OUTPATIENT
Start: 2023-08-02 | End: 2023-08-03 | Stop reason: HOSPADM

## 2023-08-02 RX ORDER — SODIUM CHLORIDE 0.9 % (FLUSH) 0.9 %
5-40 SYRINGE (ML) INJECTION PRN
OUTPATIENT
Start: 2023-08-16

## 2023-08-02 RX ORDER — ATROPINE SULFATE 0.4 MG/ML
0.4 INJECTION, SOLUTION INTRAMUSCULAR; INTRAVENOUS; SUBCUTANEOUS
OUTPATIENT
Start: 2023-08-16

## 2023-08-02 RX ORDER — DEXAMETHASONE SODIUM PHOSPHATE 4 MG/ML
10 INJECTION, SOLUTION INTRA-ARTICULAR; INTRALESIONAL; INTRAMUSCULAR; INTRAVENOUS; SOFT TISSUE ONCE
Status: COMPLETED | OUTPATIENT
Start: 2023-08-02 | End: 2023-08-02

## 2023-08-02 RX ADMIN — IRINOTECAN HYDROCHLORIDE 410 MG: 20 INJECTION, SOLUTION INTRAVENOUS at 12:07

## 2023-08-02 RX ADMIN — LEUCOVORIN CALCIUM 200 MG: 200 INJECTION, POWDER, LYOPHILIZED, FOR SOLUTION INTRAMUSCULAR; INTRAVENOUS at 12:07

## 2023-08-02 RX ADMIN — DEXAMETHASONE SODIUM PHOSPHATE 10 MG: 4 INJECTION INTRA-ARTICULAR; INTRALESIONAL; INTRAMUSCULAR; INTRAVENOUS; SOFT TISSUE at 11:26

## 2023-08-02 RX ADMIN — FLUOROURACIL 900 MG: 50 INJECTION, SOLUTION INTRAVENOUS at 14:02

## 2023-08-02 RX ADMIN — ATROPINE SULFATE 0.4 MG: 0.4 INJECTION, SOLUTION INTRAVENOUS at 11:58

## 2023-08-02 RX ADMIN — PALONOSETRON HYDROCHLORIDE 0.25 MG: 0.25 INJECTION INTRAVENOUS at 11:24

## 2023-08-02 RX ADMIN — POTASSIUM CHLORIDE 40 MEQ: 1.5 POWDER, FOR SOLUTION ORAL at 11:29

## 2023-08-02 RX ADMIN — FLUOROURACIL 5500 MG: 50 INJECTION, SOLUTION INTRAVENOUS at 14:03

## 2023-08-02 ASSESSMENT — PAIN SCALES - GENERAL: PAINLEVEL_OUTOF10: 0

## 2023-08-02 NOTE — PROGRESS NOTES
[facility] at [address], [address] [place] [address]  W: [phone]  F: [phone]      Reason for Visit:   Kanchan Gupta is a 72 y.o. male who is seen today for evaluation of small cell carcinoma of the pancreas. Hematology/Oncology Treatment History:   CT A/P 4/19/2022: Possible mild acute pancreatitis. Prominent sigmoid diverticulosis. Enlarged prostate. Fat-containing hernias  CT Abdomen 5/17/2022: New intra-hepatic biliary dilatation and common bile duct dilatation. Ampullary prominence and 1.5 cm x 1.5 cm soft tissue density in the pancreaticoduodenal groove. New subcentimeter  hepatic hypodense lesions, worrisome for metastatic disease. MRI Abdomen 5/17/2022:  Periampullary pancreatic mass with possible extension into the wall of the duodenum. Associated intrahepatic and extra hepatic biliary dilatation and mild pancreatic ductal dilatation. Small hyperenhancing lesions throughout the  liver suspicious for metastatic disease. CT Chest 5/18/2022: no metastatic disease within thorax  ERCP by Dr. Yana Kim 5/18/2022: Large ulcerated mass seen in the second portion of the duodenum taking half the circumference in the area of the major papilla and extending distally about 3 to 4 cm, appeared to be pancreatic and eroding into the duodenum. Cholangiogram with complete obstruction of CBD. Metal stent deployed. 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 Had EGD and colonoscopy since last visit. Did well with this procedure. No abdominal pain. Eating and drinking well. Mild fatigue. Moderate diarrhea, stable.

## 2023-08-04 ENCOUNTER — HOSPITAL ENCOUNTER (OUTPATIENT)
Facility: HOSPITAL | Age: 73
Setting detail: INFUSION SERIES
End: 2023-08-04
Payer: MEDICARE

## 2023-08-04 VITALS
OXYGEN SATURATION: 96 % | RESPIRATION RATE: 18 BRPM | TEMPERATURE: 97.7 F | HEART RATE: 57 BPM | SYSTOLIC BLOOD PRESSURE: 117 MMHG | DIASTOLIC BLOOD PRESSURE: 70 MMHG

## 2023-08-04 DIAGNOSIS — C7A.8 NEUROENDOCRINE CARCINOMA OF PANCREAS (HCC): Primary | ICD-10-CM

## 2023-08-04 DIAGNOSIS — C25.0 MALIGNANT NEOPLASM OF HEAD OF PANCREAS (HCC): ICD-10-CM

## 2023-08-04 DIAGNOSIS — K86.89 PANCREATIC MASS: ICD-10-CM

## 2023-08-04 DIAGNOSIS — R16.0 LIVER MASS: ICD-10-CM

## 2023-08-04 DIAGNOSIS — C80.1 SMALL CELL CARCINOMA (HCC): ICD-10-CM

## 2023-08-04 DIAGNOSIS — C78.7 METASTASIS TO LIVER (HCC): ICD-10-CM

## 2023-08-04 PROCEDURE — 96523 IRRIG DRUG DELIVERY DEVICE: CPT

## 2023-08-04 PROCEDURE — 2580000003 HC RX 258: Performed by: INTERNAL MEDICINE

## 2023-08-04 RX ORDER — SODIUM CHLORIDE 9 MG/ML
5-250 INJECTION, SOLUTION INTRAVENOUS PRN
Status: DISCONTINUED | OUTPATIENT
Start: 2023-08-04 | End: 2023-08-05 | Stop reason: HOSPADM

## 2023-08-04 RX ORDER — HEPARIN 100 UNIT/ML
500 SYRINGE INTRAVENOUS PRN
Status: DISCONTINUED | OUTPATIENT
Start: 2023-08-04 | End: 2023-08-05 | Stop reason: HOSPADM

## 2023-08-04 RX ORDER — SODIUM CHLORIDE 0.9 % (FLUSH) 0.9 %
5-40 SYRINGE (ML) INJECTION PRN
Status: DISCONTINUED | OUTPATIENT
Start: 2023-08-04 | End: 2023-08-05 | Stop reason: HOSPADM

## 2023-08-04 RX ADMIN — SODIUM CHLORIDE, PRESERVATIVE FREE 20 ML: 5 INJECTION INTRAVENOUS at 13:20

## 2023-08-04 ASSESSMENT — PAIN SCALES - GENERAL: PAINLEVEL_OUTOF10: 0

## 2023-08-09 RX ORDER — DEXAMETHASONE SODIUM PHOSPHATE 4 MG/ML
10 INJECTION, SOLUTION INTRA-ARTICULAR; INTRALESIONAL; INTRAMUSCULAR; INTRAVENOUS; SOFT TISSUE ONCE
Status: CANCELLED
Start: 2023-08-30 | End: 2023-08-30

## 2023-08-09 RX ORDER — DEXAMETHASONE SODIUM PHOSPHATE 4 MG/ML
10 INJECTION, SOLUTION INTRA-ARTICULAR; INTRALESIONAL; INTRAMUSCULAR; INTRAVENOUS; SOFT TISSUE ONCE
Status: CANCELLED
Start: 2023-10-04 | End: 2023-09-20

## 2023-08-16 ENCOUNTER — HOSPITAL ENCOUNTER (OUTPATIENT)
Facility: HOSPITAL | Age: 73
Setting detail: INFUSION SERIES
End: 2023-08-16
Payer: MEDICARE

## 2023-08-16 ENCOUNTER — OFFICE VISIT (OUTPATIENT)
Age: 73
End: 2023-08-16
Payer: MEDICARE

## 2023-08-16 VITALS
WEIGHT: 235.5 LBS | HEART RATE: 64 BPM | BODY MASS INDEX: 32.97 KG/M2 | TEMPERATURE: 98.6 F | SYSTOLIC BLOOD PRESSURE: 138 MMHG | HEIGHT: 71 IN | OXYGEN SATURATION: 94 % | DIASTOLIC BLOOD PRESSURE: 72 MMHG | RESPIRATION RATE: 17 BRPM

## 2023-08-16 VITALS
BODY MASS INDEX: 33.64 KG/M2 | RESPIRATION RATE: 18 BRPM | HEART RATE: 59 BPM | TEMPERATURE: 98.6 F | WEIGHT: 235 LBS | OXYGEN SATURATION: 94 % | DIASTOLIC BLOOD PRESSURE: 70 MMHG | SYSTOLIC BLOOD PRESSURE: 131 MMHG | HEIGHT: 70 IN

## 2023-08-16 DIAGNOSIS — R16.0 LIVER MASS: ICD-10-CM

## 2023-08-16 DIAGNOSIS — C80.1 SMALL CELL CARCINOMA (HCC): ICD-10-CM

## 2023-08-16 DIAGNOSIS — C25.0 MALIGNANT NEOPLASM OF HEAD OF PANCREAS (HCC): Primary | ICD-10-CM

## 2023-08-16 DIAGNOSIS — K86.89 PANCREATIC MASS: ICD-10-CM

## 2023-08-16 DIAGNOSIS — C7A.8 NEUROENDOCRINE CARCINOMA OF PANCREAS (HCC): ICD-10-CM

## 2023-08-16 DIAGNOSIS — C78.7 METASTASIS TO LIVER (HCC): ICD-10-CM

## 2023-08-16 LAB
ALBUMIN SERPL-MCNC: 3.2 G/DL (ref 3.5–5)
ALBUMIN/GLOB SERPL: 0.9 (ref 1.1–2.2)
ALP SERPL-CCNC: 78 U/L (ref 45–117)
ALT SERPL-CCNC: 25 U/L (ref 12–78)
ANION GAP SERPL CALC-SCNC: 5 MMOL/L (ref 5–15)
AST SERPL-CCNC: 22 U/L (ref 15–37)
BASOPHILS # BLD: 0 K/UL (ref 0–0.1)
BASOPHILS NFR BLD: 0 % (ref 0–1)
BILIRUB SERPL-MCNC: 0.8 MG/DL (ref 0.2–1)
BILIRUB SERPL-MCNC: 1 MG/DL (ref 0.2–1)
BUN SERPL-MCNC: 15 MG/DL (ref 6–20)
BUN/CREAT SERPL: 16 (ref 12–20)
CALCIUM SERPL-MCNC: 9 MG/DL (ref 8.5–10.1)
CHLORIDE SERPL-SCNC: 106 MMOL/L (ref 97–108)
CO2 SERPL-SCNC: 26 MMOL/L (ref 21–32)
CREAT SERPL-MCNC: 0.92 MG/DL (ref 0.7–1.3)
DIFFERENTIAL METHOD BLD: ABNORMAL
EOSINOPHIL # BLD: 0.1 K/UL (ref 0–0.4)
EOSINOPHIL NFR BLD: 2 % (ref 0–7)
ERYTHROCYTE [DISTWIDTH] IN BLOOD BY AUTOMATED COUNT: 17.6 % (ref 11.5–14.5)
GLOBULIN SER CALC-MCNC: 3.6 G/DL (ref 2–4)
GLUCOSE SERPL-MCNC: 223 MG/DL (ref 65–100)
HCT VFR BLD AUTO: 35.1 % (ref 36.6–50.3)
HGB BLD-MCNC: 11.3 G/DL (ref 12.1–17)
IMM GRANULOCYTES # BLD AUTO: 0 K/UL (ref 0–0.04)
IMM GRANULOCYTES NFR BLD AUTO: 0 % (ref 0–0.5)
LYMPHOCYTES # BLD: 1 K/UL (ref 0.8–3.5)
LYMPHOCYTES NFR BLD: 24 % (ref 12–49)
MCH RBC QN AUTO: 26.4 PG (ref 26–34)
MCHC RBC AUTO-ENTMCNC: 32.2 G/DL (ref 30–36.5)
MCV RBC AUTO: 82 FL (ref 80–99)
MONOCYTES # BLD: 0.3 K/UL (ref 0–1)
MONOCYTES NFR BLD: 8 % (ref 5–13)
NEUTS SEG # BLD: 2.6 K/UL (ref 1.8–8)
NEUTS SEG NFR BLD: 66 % (ref 32–75)
NRBC # BLD: 0 K/UL (ref 0–0.01)
NRBC BLD-RTO: 0 PER 100 WBC
PLATELET # BLD AUTO: 121 K/UL (ref 150–400)
PMV BLD AUTO: 9.7 FL (ref 8.9–12.9)
POTASSIUM SERPL-SCNC: 3.4 MMOL/L (ref 3.5–5.1)
PROT SERPL-MCNC: 6.8 G/DL (ref 6.4–8.2)
RBC # BLD AUTO: 4.28 M/UL (ref 4.1–5.7)
SODIUM SERPL-SCNC: 137 MMOL/L (ref 136–145)
WBC # BLD AUTO: 4 K/UL (ref 4.1–11.1)

## 2023-08-16 PROCEDURE — 80053 COMPREHEN METABOLIC PANEL: CPT

## 2023-08-16 PROCEDURE — 99215 OFFICE O/P EST HI 40 MIN: CPT | Performed by: INTERNAL MEDICINE

## 2023-08-16 PROCEDURE — 3075F SYST BP GE 130 - 139MM HG: CPT | Performed by: INTERNAL MEDICINE

## 2023-08-16 PROCEDURE — G8427 DOCREV CUR MEDS BY ELIG CLIN: HCPCS | Performed by: INTERNAL MEDICINE

## 2023-08-16 PROCEDURE — 96415 CHEMO IV INFUSION ADDL HR: CPT

## 2023-08-16 PROCEDURE — 96411 CHEMO IV PUSH ADDL DRUG: CPT

## 2023-08-16 PROCEDURE — 6360000002 HC RX W HCPCS: Performed by: INTERNAL MEDICINE

## 2023-08-16 PROCEDURE — 36415 COLL VENOUS BLD VENIPUNCTURE: CPT

## 2023-08-16 PROCEDURE — 2580000003 HC RX 258: Performed by: NURSE PRACTITIONER

## 2023-08-16 PROCEDURE — 96416 CHEMO PROLONG INFUSE W/PUMP: CPT

## 2023-08-16 PROCEDURE — 85025 COMPLETE CBC W/AUTO DIFF WBC: CPT

## 2023-08-16 PROCEDURE — 96368 THER/DIAG CONCURRENT INF: CPT

## 2023-08-16 PROCEDURE — G8417 CALC BMI ABV UP PARAM F/U: HCPCS | Performed by: INTERNAL MEDICINE

## 2023-08-16 PROCEDURE — 3078F DIAST BP <80 MM HG: CPT | Performed by: INTERNAL MEDICINE

## 2023-08-16 PROCEDURE — 96375 TX/PRO/DX INJ NEW DRUG ADDON: CPT

## 2023-08-16 PROCEDURE — 82247 BILIRUBIN TOTAL: CPT

## 2023-08-16 PROCEDURE — 96413 CHEMO IV INFUSION 1 HR: CPT

## 2023-08-16 PROCEDURE — 6360000002 HC RX W HCPCS: Performed by: NURSE PRACTITIONER

## 2023-08-16 PROCEDURE — 3017F COLORECTAL CA SCREEN DOC REV: CPT | Performed by: INTERNAL MEDICINE

## 2023-08-16 PROCEDURE — 1036F TOBACCO NON-USER: CPT | Performed by: INTERNAL MEDICINE

## 2023-08-16 PROCEDURE — 1123F ACP DISCUSS/DSCN MKR DOCD: CPT | Performed by: INTERNAL MEDICINE

## 2023-08-16 RX ORDER — SODIUM CHLORIDE 0.9 % (FLUSH) 0.9 %
5-40 SYRINGE (ML) INJECTION PRN
Status: DISCONTINUED | OUTPATIENT
Start: 2023-08-16 | End: 2023-08-17 | Stop reason: HOSPADM

## 2023-08-16 RX ORDER — PALONOSETRON 0.05 MG/ML
0.25 INJECTION, SOLUTION INTRAVENOUS ONCE
Status: COMPLETED | OUTPATIENT
Start: 2023-08-16 | End: 2023-08-16

## 2023-08-16 RX ORDER — FLUOROURACIL 50 MG/ML
900 INJECTION, SOLUTION INTRAVENOUS ONCE
Status: COMPLETED | OUTPATIENT
Start: 2023-08-16 | End: 2023-08-16

## 2023-08-16 RX ORDER — DEXAMETHASONE SODIUM PHOSPHATE 4 MG/ML
10 INJECTION, SOLUTION INTRA-ARTICULAR; INTRALESIONAL; INTRAMUSCULAR; INTRAVENOUS; SOFT TISSUE ONCE
Status: COMPLETED | OUTPATIENT
Start: 2023-08-16 | End: 2023-08-16

## 2023-08-16 RX ORDER — SODIUM CHLORIDE 9 MG/ML
5-250 INJECTION, SOLUTION INTRAVENOUS PRN
Status: DISCONTINUED | OUTPATIENT
Start: 2023-08-16 | End: 2023-08-17 | Stop reason: HOSPADM

## 2023-08-16 RX ORDER — ATROPINE SULFATE 0.4 MG/ML
0.4 INJECTION, SOLUTION INTRAVENOUS
Status: DISCONTINUED | OUTPATIENT
Start: 2023-08-16 | End: 2023-10-27 | Stop reason: HOSPADM

## 2023-08-16 RX ADMIN — FLUOROURACIL 5500 MG: 50 INJECTION, SOLUTION INTRAVENOUS at 14:30

## 2023-08-16 RX ADMIN — IRINOTECAN HYDROCHLORIDE 410 MG: 20 INJECTION, SOLUTION INTRAVENOUS at 12:35

## 2023-08-16 RX ADMIN — ATROPINE SULFATE 0.4 MG: 0.4 INJECTION, SOLUTION INTRAVENOUS at 12:27

## 2023-08-16 RX ADMIN — PALONOSETRON HYDROCHLORIDE 0.25 MG: 0.25 INJECTION, SOLUTION INTRAVENOUS at 11:35

## 2023-08-16 RX ADMIN — SODIUM CHLORIDE 25 ML/HR: 9 INJECTION, SOLUTION INTRAVENOUS at 11:33

## 2023-08-16 RX ADMIN — FLUOROURACIL 900 MG: 50 INJECTION, SOLUTION INTRAVENOUS at 14:19

## 2023-08-16 RX ADMIN — DEXAMETHASONE SODIUM PHOSPHATE 10 MG: 4 INJECTION INTRA-ARTICULAR; INTRALESIONAL; INTRAMUSCULAR; INTRAVENOUS; SOFT TISSUE at 11:39

## 2023-08-16 RX ADMIN — LEUCOVORIN CALCIUM 200 MG: 200 INJECTION, POWDER, LYOPHILIZED, FOR SOLUTION INTRAMUSCULAR; INTRAVENOUS at 12:35

## 2023-08-16 ASSESSMENT — PAIN SCALES - GENERAL: PAINLEVEL_OUTOF10: 0

## 2023-08-16 NOTE — PROGRESS NOTES
[facility] [place] at [address], [address] [place] [address]  W: [phone]  F: [phone]      Reason for Visit:   João Romero is a 72 y.o. male who is seen today for evaluation of small cell carcinoma of the pancreas. Hematology/Oncology Treatment History:   CT A/P 4/19/2022: Possible mild acute pancreatitis. Prominent sigmoid diverticulosis. Enlarged prostate. Fat-containing hernias  CT Abdomen 5/17/2022: New intra-hepatic biliary dilatation and common bile duct dilatation. Ampullary prominence and 1.5 cm x 1.5 cm soft tissue density in the pancreaticoduodenal groove. New subcentimeter  hepatic hypodense lesions, worrisome for metastatic disease. MRI Abdomen 5/17/2022:  Periampullary pancreatic mass with possible extension into the wall of the duodenum. Associated intrahepatic and extra hepatic biliary dilatation and mild pancreatic ductal dilatation. Small hyperenhancing lesions throughout the  liver suspicious for metastatic disease. CT Chest 5/18/2022: no metastatic disease within thorax  ERCP by Dr. Aron Leon 5/18/2022: Large ulcerated mass seen in the second portion of the duodenum taking half the circumference in the area of the major papilla and extending distally about 3 to 4 cm, appeared to be pancreatic and eroding into the duodenum. Cholangiogram with complete obstruction of CBD. Metal stent deployed. 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 States he is doing well overall, no new concerns. Mild fatigue. Denies pain. Bowels are moving well. No diarrhea currently. Mild MARION, stable. No fever or chills.

## 2023-08-16 NOTE — PROGRESS NOTES
Dariusz Wiley. is a 72 y.o. male follow up for small cell of pancreas. 1. Have you been to the ER, urgent care clinic since your last visit? Hospitalized since your last visit?no    2. Have you seen or consulted any other health care providers outside of the [address] [place] [address] Avenue since your last visit? Include any pap smears or colon screening.  No    Vitals 8/36/4733   SYSTOLIC 483   DIASTOLIC 70   Pulse 59   Temp 98.6   Resp 18   SpO2 94   Weight 235 lb 8 oz   Height 5' 10.512\"   Body Mass Index 33.3 kg/m2   Pain Level 0

## 2023-08-18 ENCOUNTER — HOSPITAL ENCOUNTER (OUTPATIENT)
Facility: HOSPITAL | Age: 73
Setting detail: INFUSION SERIES
End: 2023-08-18
Payer: MEDICARE

## 2023-08-18 VITALS
BODY MASS INDEX: 33.72 KG/M2 | HEART RATE: 56 BPM | TEMPERATURE: 97.8 F | DIASTOLIC BLOOD PRESSURE: 61 MMHG | SYSTOLIC BLOOD PRESSURE: 132 MMHG | RESPIRATION RATE: 17 BRPM | HEIGHT: 70 IN | OXYGEN SATURATION: 93 %

## 2023-08-18 DIAGNOSIS — C25.0 MALIGNANT NEOPLASM OF HEAD OF PANCREAS (HCC): ICD-10-CM

## 2023-08-18 DIAGNOSIS — R16.0 LIVER MASS: ICD-10-CM

## 2023-08-18 DIAGNOSIS — C7A.8 NEUROENDOCRINE CARCINOMA OF PANCREAS (HCC): Primary | ICD-10-CM

## 2023-08-18 DIAGNOSIS — C78.7 METASTASIS TO LIVER (HCC): ICD-10-CM

## 2023-08-18 DIAGNOSIS — C80.1 SMALL CELL CARCINOMA (HCC): ICD-10-CM

## 2023-08-18 DIAGNOSIS — K86.89 PANCREATIC MASS: ICD-10-CM

## 2023-08-18 PROCEDURE — 2580000003 HC RX 258: Performed by: NURSE PRACTITIONER

## 2023-08-18 PROCEDURE — 96523 IRRIG DRUG DELIVERY DEVICE: CPT

## 2023-08-18 RX ORDER — SODIUM CHLORIDE 0.9 % (FLUSH) 0.9 %
5-40 SYRINGE (ML) INJECTION PRN
Status: DISCONTINUED | OUTPATIENT
Start: 2023-08-18 | End: 2023-08-19 | Stop reason: HOSPADM

## 2023-08-18 RX ADMIN — SODIUM CHLORIDE, PRESERVATIVE FREE 20 ML: 5 INJECTION INTRAVENOUS at 13:10

## 2023-08-30 ENCOUNTER — OFFICE VISIT (OUTPATIENT)
Age: 73
End: 2023-08-30
Payer: MEDICARE

## 2023-08-30 ENCOUNTER — HOSPITAL ENCOUNTER (OUTPATIENT)
Facility: HOSPITAL | Age: 73
Setting detail: INFUSION SERIES
End: 2023-08-30
Payer: MEDICARE

## 2023-08-30 ENCOUNTER — TELEPHONE (OUTPATIENT)
Age: 73
End: 2023-08-30

## 2023-08-30 VITALS
TEMPERATURE: 97.9 F | DIASTOLIC BLOOD PRESSURE: 70 MMHG | BODY MASS INDEX: 32.78 KG/M2 | HEART RATE: 83 BPM | OXYGEN SATURATION: 94 % | RESPIRATION RATE: 18 BRPM | SYSTOLIC BLOOD PRESSURE: 116 MMHG | HEIGHT: 70 IN | WEIGHT: 229 LBS

## 2023-08-30 VITALS
HEIGHT: 70 IN | WEIGHT: 229.8 LBS | HEART RATE: 83 BPM | TEMPERATURE: 97.9 F | SYSTOLIC BLOOD PRESSURE: 116 MMHG | RESPIRATION RATE: 18 BRPM | DIASTOLIC BLOOD PRESSURE: 70 MMHG | BODY MASS INDEX: 32.9 KG/M2 | OXYGEN SATURATION: 94 %

## 2023-08-30 DIAGNOSIS — C78.7 METASTASIS TO LIVER (HCC): ICD-10-CM

## 2023-08-30 DIAGNOSIS — K86.89 PANCREATIC MASS: ICD-10-CM

## 2023-08-30 DIAGNOSIS — C25.0 MALIGNANT NEOPLASM OF HEAD OF PANCREAS (HCC): Primary | ICD-10-CM

## 2023-08-30 DIAGNOSIS — C78.7 METASTASIS TO LIVER (HCC): Primary | ICD-10-CM

## 2023-08-30 DIAGNOSIS — C80.1 SMALL CELL CARCINOMA (HCC): ICD-10-CM

## 2023-08-30 DIAGNOSIS — C25.0 MALIGNANT NEOPLASM OF HEAD OF PANCREAS (HCC): ICD-10-CM

## 2023-08-30 DIAGNOSIS — C7A.8 NEUROENDOCRINE CARCINOMA OF PANCREAS (HCC): ICD-10-CM

## 2023-08-30 DIAGNOSIS — R16.0 LIVER MASS: ICD-10-CM

## 2023-08-30 LAB
ALBUMIN SERPL-MCNC: 3.1 G/DL (ref 3.5–5)
ALBUMIN/GLOB SERPL: 0.8 (ref 1.1–2.2)
ALP SERPL-CCNC: 84 U/L (ref 45–117)
ALT SERPL-CCNC: 41 U/L (ref 12–78)
ANION GAP SERPL CALC-SCNC: 7 MMOL/L (ref 5–15)
AST SERPL-CCNC: 80 U/L (ref 15–37)
BASOPHILS # BLD: 0 K/UL (ref 0–0.1)
BASOPHILS NFR BLD: 0 % (ref 0–1)
BILIRUB SERPL-MCNC: 1.3 MG/DL (ref 0.2–1)
BILIRUB SERPL-MCNC: 1.3 MG/DL (ref 0.2–1)
BUN SERPL-MCNC: 14 MG/DL (ref 6–20)
BUN/CREAT SERPL: 15 (ref 12–20)
CALCIUM SERPL-MCNC: 8.7 MG/DL (ref 8.5–10.1)
CHLORIDE SERPL-SCNC: 101 MMOL/L (ref 97–108)
CO2 SERPL-SCNC: 25 MMOL/L (ref 21–32)
CREAT SERPL-MCNC: 0.94 MG/DL (ref 0.7–1.3)
DIFFERENTIAL METHOD BLD: ABNORMAL
EOSINOPHIL # BLD: 0.1 K/UL (ref 0–0.4)
EOSINOPHIL NFR BLD: 1 % (ref 0–7)
ERYTHROCYTE [DISTWIDTH] IN BLOOD BY AUTOMATED COUNT: 17.8 % (ref 11.5–14.5)
GLOBULIN SER CALC-MCNC: 4 G/DL (ref 2–4)
GLUCOSE SERPL-MCNC: 209 MG/DL (ref 65–100)
HCT VFR BLD AUTO: 37.4 % (ref 36.6–50.3)
HGB BLD-MCNC: 12.3 G/DL (ref 12.1–17)
IMM GRANULOCYTES # BLD AUTO: 0.1 K/UL (ref 0–0.04)
IMM GRANULOCYTES NFR BLD AUTO: 1 % (ref 0–0.5)
LYMPHOCYTES # BLD: 0.7 K/UL (ref 0.8–3.5)
LYMPHOCYTES NFR BLD: 11 % (ref 12–49)
MCH RBC QN AUTO: 26.7 PG (ref 26–34)
MCHC RBC AUTO-ENTMCNC: 32.9 G/DL (ref 30–36.5)
MCV RBC AUTO: 81.3 FL (ref 80–99)
MONOCYTES # BLD: 0.6 K/UL (ref 0–1)
MONOCYTES NFR BLD: 10 % (ref 5–13)
NEUTS SEG # BLD: 4.7 K/UL (ref 1.8–8)
NEUTS SEG NFR BLD: 77 % (ref 32–75)
NRBC # BLD: 0 K/UL (ref 0–0.01)
NRBC BLD-RTO: 0 PER 100 WBC
PLATELET # BLD AUTO: 131 K/UL (ref 150–400)
PMV BLD AUTO: 9.9 FL (ref 8.9–12.9)
POTASSIUM SERPL-SCNC: 3.7 MMOL/L (ref 3.5–5.1)
PROT SERPL-MCNC: 7.1 G/DL (ref 6.4–8.2)
RBC # BLD AUTO: 4.6 M/UL (ref 4.1–5.7)
RBC MORPH BLD: ABNORMAL
SODIUM SERPL-SCNC: 133 MMOL/L (ref 136–145)
WBC # BLD AUTO: 6.2 K/UL (ref 4.1–11.1)

## 2023-08-30 PROCEDURE — 82247 BILIRUBIN TOTAL: CPT

## 2023-08-30 PROCEDURE — 3017F COLORECTAL CA SCREEN DOC REV: CPT | Performed by: INTERNAL MEDICINE

## 2023-08-30 PROCEDURE — 36591 DRAW BLOOD OFF VENOUS DEVICE: CPT

## 2023-08-30 PROCEDURE — 3078F DIAST BP <80 MM HG: CPT | Performed by: INTERNAL MEDICINE

## 2023-08-30 PROCEDURE — 1123F ACP DISCUSS/DSCN MKR DOCD: CPT | Performed by: INTERNAL MEDICINE

## 2023-08-30 PROCEDURE — 80053 COMPREHEN METABOLIC PANEL: CPT

## 2023-08-30 PROCEDURE — G8417 CALC BMI ABV UP PARAM F/U: HCPCS | Performed by: INTERNAL MEDICINE

## 2023-08-30 PROCEDURE — G8427 DOCREV CUR MEDS BY ELIG CLIN: HCPCS | Performed by: INTERNAL MEDICINE

## 2023-08-30 PROCEDURE — 85025 COMPLETE CBC W/AUTO DIFF WBC: CPT

## 2023-08-30 PROCEDURE — 3074F SYST BP LT 130 MM HG: CPT | Performed by: INTERNAL MEDICINE

## 2023-08-30 PROCEDURE — 1036F TOBACCO NON-USER: CPT | Performed by: INTERNAL MEDICINE

## 2023-08-30 PROCEDURE — 99215 OFFICE O/P EST HI 40 MIN: CPT | Performed by: INTERNAL MEDICINE

## 2023-08-30 RX ORDER — AMOXICILLIN AND CLAVULANATE POTASSIUM 875; 125 MG/1; MG/1
1 TABLET, FILM COATED ORAL 2 TIMES DAILY
Qty: 14 TABLET | Refills: 0 | Status: SHIPPED | OUTPATIENT
Start: 2023-08-30 | End: 2023-09-06

## 2023-08-30 RX ORDER — SODIUM CHLORIDE 0.9 % (FLUSH) 0.9 %
5-40 SYRINGE (ML) INJECTION PRN
Status: DISCONTINUED | OUTPATIENT
Start: 2023-08-30 | End: 2023-08-31 | Stop reason: HOSPADM

## 2023-08-30 ASSESSMENT — PAIN SCALES - GENERAL: PAINLEVEL_OUTOF10: 0

## 2023-08-30 NOTE — TELEPHONE ENCOUNTER
PT's  called in to let [facility] know the results of Mr. Paloma Dominguez test. They stated that the test came back negative.      Taniya's CB #: [phone]

## 2023-08-30 NOTE — PROGRESS NOTES
Alondra Yeager. is a 72 y.o. male follow up for SCC. 1. Have you been to the ER, urgent care clinic since your last visit? Hospitalized since your last visit?no     2. Have you seen or consulted any other health care providers outside of the [address] [place] [address] since your last visit? Include any pap smears or colon screening.  No    Vitals 1/41/1570   SYSTOLIC 763   DIASTOLIC 70   Pulse 83   Temp 97.9   Resp 18   SpO2 94   Weight 229 lb 12.8 oz   Height 5' 10\"   Body Mass Index 32.97 kg/m2   Pain Level 0

## 2023-08-30 NOTE — PROGRESS NOTES
[facility] at [address], [address] [place] [address]  W: [phone]  F: [phone]      Reason for Visit:   Per Escobar is a 72 y.o. male who is seen today for evaluation of small cell carcinoma of the pancreas. Hematology/Oncology Treatment History:   CT A/P 4/19/2022: Possible mild acute pancreatitis. Prominent sigmoid diverticulosis. Enlarged prostate. Fat-containing hernias  CT Abdomen 5/17/2022: New intra-hepatic biliary dilatation and common bile duct dilatation. Ampullary prominence and 1.5 cm x 1.5 cm soft tissue density in the pancreaticoduodenal groove. New subcentimeter  hepatic hypodense lesions, worrisome for metastatic disease. MRI Abdomen 5/17/2022:  Periampullary pancreatic mass with possible extension into the wall of the duodenum. Associated intrahepatic and extra hepatic biliary dilatation and mild pancreatic ductal dilatation. Small hyperenhancing lesions throughout the  liver suspicious for metastatic disease. CT Chest 5/18/2022: no metastatic disease within thorax  ERCP by Dr. Orville Hercules 5/18/2022: Large ulcerated mass seen in the second portion of the duodenum taking half the circumference in the area of the major papilla and extending distally about 3 to 4 cm, appeared to be pancreatic and eroding into the duodenum. Cholangiogram with complete obstruction of CBD. Metal stent deployed. 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 Having more lower abdominal pain. States pain in the past 2 weeks, this seemed to resolve but then upset stomach. Unsettled stomach since then.  Bowels are moving

## 2023-08-30 NOTE — TELEPHONE ENCOUNTER
Denzel Solis at [facility]  [phone]    08/30/23- Informed patient's wife that [facility] sent an antibiotic to pharmacy on file to start if symptoms don't improve in the next couple days. She verbalized understanding and was appreciative.

## 2023-09-01 ENCOUNTER — TELEPHONE (OUTPATIENT)
Age: 73
End: 2023-09-01

## 2023-09-01 NOTE — TELEPHONE ENCOUNTER
Spoke to patient. He receives Creon through [facility] assistance program ([phone]). The 36,000 dose is on backorder. They have 24,000 dose in stock. Patient is going out of town for 3 weeks and needs refill before he leaves. Call to assistance program, they are able to ship 24,000 dose caps now if new script is called in. Reviewed with Ronni Cordero RD. She is recommending 3 caps with each meal and 2 caps with snacks at this dosage. Call to pharmacy, left voicemail with patient ID ([id number]) with new script information. Max 11 caps daily. 1 year of refills. When 36,000 dose is back in stock we can change back. Call to patient to advise him to call pharmacy tomorrow to inquire about shipment of new dose.

## 2023-09-01 NOTE — TELEPHONE ENCOUNTER
PT called in stating he is having difficulty getting his creon 57958 rx filled due to the dosage. PT is wondering if there is a way he could get a smaller dosage rx sent in that he could possibly double up on instead.      CB#: [phone]

## 2023-09-07 DIAGNOSIS — C80.1 SMALL CELL CARCINOMA (HCC): ICD-10-CM

## 2023-09-07 DIAGNOSIS — K86.89 PANCREATIC MASS: ICD-10-CM

## 2023-09-07 DIAGNOSIS — C25.0 MALIGNANT NEOPLASM OF HEAD OF PANCREAS (HCC): ICD-10-CM

## 2023-09-07 DIAGNOSIS — R16.0 LIVER MASS: ICD-10-CM

## 2023-09-07 DIAGNOSIS — C78.7 METASTASIS TO LIVER (HCC): ICD-10-CM

## 2023-09-07 DIAGNOSIS — C7A.8 NEUROENDOCRINE CARCINOMA OF PANCREAS (HCC): Primary | ICD-10-CM

## 2023-09-07 RX ORDER — SODIUM CHLORIDE 9 MG/ML
5-250 INJECTION, SOLUTION INTRAVENOUS PRN
OUTPATIENT
Start: 2023-09-13

## 2023-09-07 RX ORDER — SODIUM CHLORIDE 0.9 % (FLUSH) 0.9 %
5-40 SYRINGE (ML) INJECTION PRN
OUTPATIENT
Start: 2023-09-13

## 2023-09-07 RX ORDER — HEPARIN SODIUM (PORCINE) LOCK FLUSH IV SOLN 100 UNIT/ML 100 UNIT/ML
500 SOLUTION INTRAVENOUS PRN
OUTPATIENT
Start: 2023-09-13

## 2023-09-07 RX ORDER — ONDANSETRON 2 MG/ML
8 INJECTION INTRAMUSCULAR; INTRAVENOUS
OUTPATIENT
Start: 2023-09-13

## 2023-09-07 RX ORDER — FAMOTIDINE 10 MG/ML
20 INJECTION, SOLUTION INTRAVENOUS
OUTPATIENT
Start: 2023-09-13

## 2023-09-07 RX ORDER — FLUOROURACIL 50 MG/ML
900 INJECTION, SOLUTION INTRAVENOUS ONCE
OUTPATIENT
Start: 2023-09-13 | End: 2023-09-13

## 2023-09-07 RX ORDER — DEXAMETHASONE SODIUM PHOSPHATE 4 MG/ML
10 INJECTION, SOLUTION INTRA-ARTICULAR; INTRALESIONAL; INTRAMUSCULAR; INTRAVENOUS; SOFT TISSUE ONCE
Start: 2023-09-13 | End: 2023-09-13

## 2023-09-07 RX ORDER — EPINEPHRINE 1 MG/ML
0.3 INJECTION, SOLUTION, CONCENTRATE INTRAVENOUS PRN
OUTPATIENT
Start: 2023-09-13

## 2023-09-07 RX ORDER — ATROPINE SULFATE 0.4 MG/ML
0.4 INJECTION, SOLUTION INTRAMUSCULAR; INTRAVENOUS; SUBCUTANEOUS
OUTPATIENT
Start: 2023-09-13

## 2023-09-07 RX ORDER — SODIUM CHLORIDE 9 MG/ML
INJECTION, SOLUTION INTRAVENOUS CONTINUOUS
OUTPATIENT
Start: 2023-09-13

## 2023-09-07 RX ORDER — MEPERIDINE HYDROCHLORIDE 50 MG/ML
12.5 INJECTION INTRAMUSCULAR; INTRAVENOUS; SUBCUTANEOUS PRN
OUTPATIENT
Start: 2023-09-13

## 2023-09-07 RX ORDER — PALONOSETRON 0.05 MG/ML
0.25 INJECTION, SOLUTION INTRAVENOUS ONCE
OUTPATIENT
Start: 2023-09-13 | End: 2023-09-13

## 2023-09-07 RX ORDER — ACETAMINOPHEN 325 MG/1
650 TABLET ORAL
OUTPATIENT
Start: 2023-09-13

## 2023-09-07 RX ORDER — ALBUTEROL SULFATE 90 UG/1
4 AEROSOL, METERED RESPIRATORY (INHALATION) PRN
OUTPATIENT
Start: 2023-09-13

## 2023-09-07 RX ORDER — DIPHENHYDRAMINE HYDROCHLORIDE 50 MG/ML
50 INJECTION INTRAMUSCULAR; INTRAVENOUS
OUTPATIENT
Start: 2023-09-13

## 2023-09-20 ENCOUNTER — OFFICE VISIT (OUTPATIENT)
Age: 73
End: 2023-09-20
Payer: MEDICARE

## 2023-09-20 ENCOUNTER — HOSPITAL ENCOUNTER (OUTPATIENT)
Facility: HOSPITAL | Age: 73
Setting detail: INFUSION SERIES
End: 2023-09-20
Payer: MEDICARE

## 2023-09-20 VITALS
OXYGEN SATURATION: 94 % | HEIGHT: 70 IN | TEMPERATURE: 97.8 F | DIASTOLIC BLOOD PRESSURE: 71 MMHG | WEIGHT: 229 LBS | RESPIRATION RATE: 18 BRPM | HEART RATE: 65 BPM | SYSTOLIC BLOOD PRESSURE: 109 MMHG | BODY MASS INDEX: 32.78 KG/M2

## 2023-09-20 VITALS
RESPIRATION RATE: 18 BRPM | SYSTOLIC BLOOD PRESSURE: 109 MMHG | HEART RATE: 65 BPM | DIASTOLIC BLOOD PRESSURE: 71 MMHG | BODY MASS INDEX: 32.84 KG/M2 | HEIGHT: 70 IN | TEMPERATURE: 97.8 F | OXYGEN SATURATION: 94 % | WEIGHT: 229.4 LBS

## 2023-09-20 DIAGNOSIS — C78.7 METASTASIS TO LIVER (HCC): ICD-10-CM

## 2023-09-20 DIAGNOSIS — C7A.8 NEUROENDOCRINE CARCINOMA OF PANCREAS (HCC): Primary | ICD-10-CM

## 2023-09-20 DIAGNOSIS — K86.89 PANCREATIC MASS: ICD-10-CM

## 2023-09-20 DIAGNOSIS — C80.1 SMALL CELL CARCINOMA (HCC): ICD-10-CM

## 2023-09-20 DIAGNOSIS — R16.0 LIVER MASS: ICD-10-CM

## 2023-09-20 DIAGNOSIS — C25.0 MALIGNANT NEOPLASM OF HEAD OF PANCREAS (HCC): ICD-10-CM

## 2023-09-20 LAB
ALBUMIN SERPL-MCNC: 2.8 G/DL (ref 3.5–5)
ALBUMIN/GLOB SERPL: 0.7 (ref 1.1–2.2)
ALP SERPL-CCNC: 89 U/L (ref 45–117)
ALT SERPL-CCNC: 27 U/L (ref 12–78)
ANION GAP SERPL CALC-SCNC: 6 MMOL/L (ref 5–15)
AST SERPL-CCNC: 56 U/L (ref 15–37)
BASOPHILS # BLD: 0 K/UL (ref 0–0.1)
BASOPHILS NFR BLD: 0 % (ref 0–1)
BILIRUB SERPL-MCNC: 0.4 MG/DL (ref 0.2–1)
BILIRUB SERPL-MCNC: 0.4 MG/DL (ref 0.2–1)
BUN SERPL-MCNC: 14 MG/DL (ref 6–20)
BUN/CREAT SERPL: 16 (ref 12–20)
CALCIUM SERPL-MCNC: 8.7 MG/DL (ref 8.5–10.1)
CHLORIDE SERPL-SCNC: 101 MMOL/L (ref 97–108)
CO2 SERPL-SCNC: 28 MMOL/L (ref 21–32)
CREAT SERPL-MCNC: 0.86 MG/DL (ref 0.7–1.3)
DIFFERENTIAL METHOD BLD: ABNORMAL
EOSINOPHIL # BLD: 0 K/UL (ref 0–0.4)
EOSINOPHIL NFR BLD: 1 % (ref 0–7)
ERYTHROCYTE [DISTWIDTH] IN BLOOD BY AUTOMATED COUNT: 16.3 % (ref 11.5–14.5)
GLOBULIN SER CALC-MCNC: 4.2 G/DL (ref 2–4)
GLUCOSE SERPL-MCNC: 196 MG/DL (ref 65–100)
HCT VFR BLD AUTO: 32.9 % (ref 36.6–50.3)
HGB BLD-MCNC: 10.7 G/DL (ref 12.1–17)
IMM GRANULOCYTES # BLD AUTO: 0 K/UL (ref 0–0.04)
IMM GRANULOCYTES NFR BLD AUTO: 1 % (ref 0–0.5)
LYMPHOCYTES # BLD: 0.8 K/UL (ref 0.8–3.5)
LYMPHOCYTES NFR BLD: 12 % (ref 12–49)
MCH RBC QN AUTO: 26 PG (ref 26–34)
MCHC RBC AUTO-ENTMCNC: 32.5 G/DL (ref 30–36.5)
MCV RBC AUTO: 79.9 FL (ref 80–99)
MONOCYTES # BLD: 0.4 K/UL (ref 0–1)
MONOCYTES NFR BLD: 6 % (ref 5–13)
NEUTS SEG # BLD: 5.3 K/UL (ref 1.8–8)
NEUTS SEG NFR BLD: 80 % (ref 32–75)
NRBC # BLD: 0 K/UL (ref 0–0.01)
NRBC BLD-RTO: 0 PER 100 WBC
PLATELET # BLD AUTO: 127 K/UL (ref 150–400)
PMV BLD AUTO: 10.3 FL (ref 8.9–12.9)
POTASSIUM SERPL-SCNC: 3.6 MMOL/L (ref 3.5–5.1)
PROT SERPL-MCNC: 7 G/DL (ref 6.4–8.2)
RBC # BLD AUTO: 4.12 M/UL (ref 4.1–5.7)
SODIUM SERPL-SCNC: 135 MMOL/L (ref 136–145)
WBC # BLD AUTO: 6.5 K/UL (ref 4.1–11.1)

## 2023-09-20 PROCEDURE — 6360000002 HC RX W HCPCS: Performed by: NURSE PRACTITIONER

## 2023-09-20 PROCEDURE — 3074F SYST BP LT 130 MM HG: CPT | Performed by: INTERNAL MEDICINE

## 2023-09-20 PROCEDURE — 96411 CHEMO IV PUSH ADDL DRUG: CPT

## 2023-09-20 PROCEDURE — 3078F DIAST BP <80 MM HG: CPT | Performed by: INTERNAL MEDICINE

## 2023-09-20 PROCEDURE — 99215 OFFICE O/P EST HI 40 MIN: CPT | Performed by: INTERNAL MEDICINE

## 2023-09-20 PROCEDURE — 2580000003 HC RX 258: Performed by: NURSE PRACTITIONER

## 2023-09-20 PROCEDURE — 82247 BILIRUBIN TOTAL: CPT

## 2023-09-20 PROCEDURE — 3017F COLORECTAL CA SCREEN DOC REV: CPT | Performed by: INTERNAL MEDICINE

## 2023-09-20 PROCEDURE — G8417 CALC BMI ABV UP PARAM F/U: HCPCS | Performed by: INTERNAL MEDICINE

## 2023-09-20 PROCEDURE — 1123F ACP DISCUSS/DSCN MKR DOCD: CPT | Performed by: INTERNAL MEDICINE

## 2023-09-20 PROCEDURE — 80053 COMPREHEN METABOLIC PANEL: CPT

## 2023-09-20 PROCEDURE — 96416 CHEMO PROLONG INFUSE W/PUMP: CPT

## 2023-09-20 PROCEDURE — 1036F TOBACCO NON-USER: CPT | Performed by: INTERNAL MEDICINE

## 2023-09-20 PROCEDURE — G8427 DOCREV CUR MEDS BY ELIG CLIN: HCPCS | Performed by: INTERNAL MEDICINE

## 2023-09-20 PROCEDURE — 96413 CHEMO IV INFUSION 1 HR: CPT

## 2023-09-20 PROCEDURE — 96375 TX/PRO/DX INJ NEW DRUG ADDON: CPT

## 2023-09-20 PROCEDURE — 96368 THER/DIAG CONCURRENT INF: CPT

## 2023-09-20 PROCEDURE — 85025 COMPLETE CBC W/AUTO DIFF WBC: CPT

## 2023-09-20 RX ORDER — SODIUM CHLORIDE 9 MG/ML
5-250 INJECTION, SOLUTION INTRAVENOUS PRN
Status: DISCONTINUED | OUTPATIENT
Start: 2023-09-20 | End: 2023-09-21 | Stop reason: HOSPADM

## 2023-09-20 RX ORDER — ATROPINE SULFATE 0.4 MG/ML
0.4 INJECTION, SOLUTION INTRAVENOUS
Status: DISCONTINUED | OUTPATIENT
Start: 2023-09-20 | End: 2023-10-27 | Stop reason: HOSPADM

## 2023-09-20 RX ORDER — PALONOSETRON 0.05 MG/ML
0.25 INJECTION, SOLUTION INTRAVENOUS ONCE
Status: COMPLETED | OUTPATIENT
Start: 2023-09-20 | End: 2023-09-20

## 2023-09-20 RX ORDER — SODIUM CHLORIDE 0.9 % (FLUSH) 0.9 %
5-40 SYRINGE (ML) INJECTION PRN
Status: DISCONTINUED | OUTPATIENT
Start: 2023-09-20 | End: 2023-09-21 | Stop reason: HOSPADM

## 2023-09-20 RX ORDER — FLUOROURACIL 50 MG/ML
900 INJECTION, SOLUTION INTRAVENOUS ONCE
Status: COMPLETED | OUTPATIENT
Start: 2023-09-20 | End: 2023-09-20

## 2023-09-20 RX ORDER — DEXAMETHASONE SODIUM PHOSPHATE 4 MG/ML
10 INJECTION, SOLUTION INTRA-ARTICULAR; INTRALESIONAL; INTRAMUSCULAR; INTRAVENOUS; SOFT TISSUE ONCE
Status: COMPLETED | OUTPATIENT
Start: 2023-09-20 | End: 2023-09-20

## 2023-09-20 RX ADMIN — IRINOTECAN HYDROCHLORIDE 410 MG: 20 INJECTION, SOLUTION INTRAVENOUS at 12:04

## 2023-09-20 RX ADMIN — ATROPINE SULFATE 0.4 MG: 0.4 INJECTION, SOLUTION INTRAVENOUS at 11:56

## 2023-09-20 RX ADMIN — FLUOROURACIL 900 MG: 50 INJECTION, SOLUTION INTRAVENOUS at 14:04

## 2023-09-20 RX ADMIN — FLUOROURACIL 5500 MG: 50 INJECTION, SOLUTION INTRAVENOUS at 13:59

## 2023-09-20 RX ADMIN — LEUCOVORIN CALCIUM 200 MG: 200 INJECTION, POWDER, LYOPHILIZED, FOR SOLUTION INTRAMUSCULAR; INTRAVENOUS at 12:04

## 2023-09-20 RX ADMIN — PALONOSETRON HYDROCHLORIDE 0.25 MG: 0.25 INJECTION INTRAVENOUS at 11:00

## 2023-09-20 RX ADMIN — DEXAMETHASONE SODIUM PHOSPHATE 10 MG: 4 INJECTION INTRA-ARTICULAR; INTRALESIONAL; INTRAMUSCULAR; INTRAVENOUS; SOFT TISSUE at 11:02

## 2023-09-20 ASSESSMENT — PAIN SCALES - GENERAL: PAINLEVEL_OUTOF10: 0

## 2023-09-20 NOTE — PROGRESS NOTES
[facility] at [address], [address] [place] [address]  W: [phone]  F: [phone]      Reason for Visit:   Miguel Moore is a 72 y.o. male who is seen today for evaluation of small cell carcinoma of the pancreas. Hematology/Oncology Treatment History:   CT A/P 4/19/2022: Possible mild acute pancreatitis. Prominent sigmoid diverticulosis. Enlarged prostate. Fat-containing hernias  CT Abdomen 5/17/2022: New intra-hepatic biliary dilatation and common bile duct dilatation. Ampullary prominence and 1.5 cm x 1.5 cm soft tissue density in the pancreaticoduodenal groove. New subcentimeter  hepatic hypodense lesions, worrisome for metastatic disease. MRI Abdomen 5/17/2022:  Periampullary pancreatic mass with possible extension into the wall of the duodenum. Associated intrahepatic and extra hepatic biliary dilatation and mild pancreatic ductal dilatation. Small hyperenhancing lesions throughout the  liver suspicious for metastatic disease. CT Chest 5/18/2022: no metastatic disease within thorax  ERCP by Dr. Vidal Zazueta 5/18/2022: Large ulcerated mass seen in the second portion of the duodenum taking half the circumference in the area of the major papilla and extending distally about 3 to 4 cm, appeared to be pancreatic and eroding into the duodenum. Cholangiogram with complete obstruction of CBD. Metal stent deployed. 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 Had cold symptoms and low grade fever last visit. Given antibiotic therapy but he didn't start it as symptoms resolved.  They went on vacation after this, returned on

## 2023-09-22 ENCOUNTER — HOSPITAL ENCOUNTER (OUTPATIENT)
Facility: HOSPITAL | Age: 73
Setting detail: INFUSION SERIES
End: 2023-09-22
Payer: MEDICARE

## 2023-09-22 VITALS
RESPIRATION RATE: 17 BRPM | TEMPERATURE: 97.9 F | OXYGEN SATURATION: 93 % | DIASTOLIC BLOOD PRESSURE: 75 MMHG | HEART RATE: 65 BPM | SYSTOLIC BLOOD PRESSURE: 129 MMHG

## 2023-09-22 DIAGNOSIS — C7A.8 NEUROENDOCRINE CARCINOMA OF PANCREAS (HCC): Primary | ICD-10-CM

## 2023-09-22 DIAGNOSIS — K86.89 PANCREATIC MASS: ICD-10-CM

## 2023-09-22 DIAGNOSIS — C78.7 METASTASIS TO LIVER (HCC): ICD-10-CM

## 2023-09-22 DIAGNOSIS — C25.0 MALIGNANT NEOPLASM OF HEAD OF PANCREAS (HCC): ICD-10-CM

## 2023-09-22 DIAGNOSIS — R16.0 LIVER MASS: ICD-10-CM

## 2023-09-22 DIAGNOSIS — C80.1 SMALL CELL CARCINOMA (HCC): ICD-10-CM

## 2023-09-22 PROCEDURE — 96523 IRRIG DRUG DELIVERY DEVICE: CPT

## 2023-09-22 ASSESSMENT — PAIN SCALES - GENERAL: PAINLEVEL_OUTOF10: 0

## 2023-09-25 ENCOUNTER — TELEPHONE (OUTPATIENT)
Age: 73
End: 2023-09-25

## 2023-09-25 NOTE — TELEPHONE ENCOUNTER
PT called in stating he had treatment last week but just tested positive for covid and wanted the team to know    CB# [phone]

## 2023-09-25 NOTE — TELEPHONE ENCOUNTER
Denzel Solsi at [facility]  [phone]    09/25/23- Patient reports runny nose, cough, fatigue, and a couple episodes of vomiting last night. Unsure if he's had fevers. Symptoms started a couple days ago, but he tested positive for COVID this morning. Stated he's called his PCP office as well and is waiting on a return call to discuss symptom management. Let him know OTC medications are okay from our standpoint. Encouraged lots of rest and hydration. Patient verbalized understanding and was appreciative of the call.

## 2023-09-26 NOTE — TELEPHONE ENCOUNTER
Spoke to patient to follow up on condition. States he is doing fine, some hoarseness of voice. States he is more tired and having body aches. No fever/chills. Appetite is good, eating and drinking well. States had tele-health visit with PCP yesterday and was prescribed Paxlovid. He has picked this up and will start taking today. Encouraged him to call with any concerns.

## 2023-09-27 ENCOUNTER — APPOINTMENT (OUTPATIENT)
Facility: HOSPITAL | Age: 73
End: 2023-09-27
Payer: MEDICARE

## 2023-10-04 ENCOUNTER — HOSPITAL ENCOUNTER (OUTPATIENT)
Facility: HOSPITAL | Age: 73
Setting detail: INFUSION SERIES
End: 2023-10-04
Payer: MEDICARE

## 2023-10-04 ENCOUNTER — OFFICE VISIT (OUTPATIENT)
Age: 73
End: 2023-10-04
Payer: MEDICARE

## 2023-10-04 VITALS
RESPIRATION RATE: 18 BRPM | DIASTOLIC BLOOD PRESSURE: 67 MMHG | TEMPERATURE: 98 F | OXYGEN SATURATION: 95 % | SYSTOLIC BLOOD PRESSURE: 130 MMHG | BODY MASS INDEX: 32.84 KG/M2 | HEIGHT: 70 IN | HEART RATE: 69 BPM | WEIGHT: 229.4 LBS

## 2023-10-04 VITALS
TEMPERATURE: 98 F | HEIGHT: 70 IN | WEIGHT: 229 LBS | BODY MASS INDEX: 32.78 KG/M2 | OXYGEN SATURATION: 95 % | SYSTOLIC BLOOD PRESSURE: 130 MMHG | DIASTOLIC BLOOD PRESSURE: 67 MMHG | RESPIRATION RATE: 18 BRPM | HEART RATE: 69 BPM

## 2023-10-04 DIAGNOSIS — R16.0 LIVER MASS: ICD-10-CM

## 2023-10-04 DIAGNOSIS — C7A.8 NEUROENDOCRINE CARCINOMA OF PANCREAS (HCC): ICD-10-CM

## 2023-10-04 DIAGNOSIS — C78.7 METASTASIS TO LIVER (HCC): ICD-10-CM

## 2023-10-04 DIAGNOSIS — K86.89 PANCREATIC MASS: ICD-10-CM

## 2023-10-04 DIAGNOSIS — C80.1 SMALL CELL CARCINOMA (HCC): ICD-10-CM

## 2023-10-04 DIAGNOSIS — C25.0 MALIGNANT NEOPLASM OF HEAD OF PANCREAS (HCC): ICD-10-CM

## 2023-10-04 DIAGNOSIS — C25.0 MALIGNANT NEOPLASM OF HEAD OF PANCREAS (HCC): Primary | ICD-10-CM

## 2023-10-04 DIAGNOSIS — E11.9 TYPE 2 DIABETES MELLITUS WITHOUT COMPLICATION, WITHOUT LONG-TERM CURRENT USE OF INSULIN (HCC): Primary | ICD-10-CM

## 2023-10-04 LAB
ALBUMIN SERPL-MCNC: 3.1 G/DL (ref 3.5–5)
ALBUMIN/GLOB SERPL: 0.9 (ref 1.1–2.2)
ALP SERPL-CCNC: 80 U/L (ref 45–117)
ALT SERPL-CCNC: 24 U/L (ref 12–78)
ANION GAP SERPL CALC-SCNC: 6 MMOL/L (ref 5–15)
AST SERPL-CCNC: 27 U/L (ref 15–37)
BASOPHILS # BLD: 0 K/UL (ref 0–0.1)
BASOPHILS NFR BLD: 0 % (ref 0–1)
BILIRUB SERPL-MCNC: 0.6 MG/DL (ref 0.2–1)
BILIRUB SERPL-MCNC: 0.6 MG/DL (ref 0.2–1)
BUN SERPL-MCNC: 15 MG/DL (ref 6–20)
BUN/CREAT SERPL: 18 (ref 12–20)
CALCIUM SERPL-MCNC: 8.6 MG/DL (ref 8.5–10.1)
CHLORIDE SERPL-SCNC: 103 MMOL/L (ref 97–108)
CO2 SERPL-SCNC: 30 MMOL/L (ref 21–32)
CREAT SERPL-MCNC: 0.82 MG/DL (ref 0.7–1.3)
DIFFERENTIAL METHOD BLD: ABNORMAL
EOSINOPHIL # BLD: 0.1 K/UL (ref 0–0.4)
EOSINOPHIL NFR BLD: 3 % (ref 0–7)
ERYTHROCYTE [DISTWIDTH] IN BLOOD BY AUTOMATED COUNT: 16.8 % (ref 11.5–14.5)
EST. AVERAGE GLUCOSE BLD GHB EST-MCNC: 252 MG/DL
GLOBULIN SER CALC-MCNC: 3.6 G/DL (ref 2–4)
GLUCOSE SERPL-MCNC: 225 MG/DL (ref 65–100)
HBA1C MFR BLD: 10.4 % (ref 4–5.6)
HCT VFR BLD AUTO: 35.8 % (ref 36.6–50.3)
HGB BLD-MCNC: 11.5 G/DL (ref 12.1–17)
IMM GRANULOCYTES # BLD AUTO: 0 K/UL (ref 0–0.04)
IMM GRANULOCYTES NFR BLD AUTO: 0 % (ref 0–0.5)
LYMPHOCYTES # BLD: 1 K/UL (ref 0.8–3.5)
LYMPHOCYTES NFR BLD: 27 % (ref 12–49)
MCH RBC QN AUTO: 26.3 PG (ref 26–34)
MCHC RBC AUTO-ENTMCNC: 32.1 G/DL (ref 30–36.5)
MCV RBC AUTO: 81.7 FL (ref 80–99)
MONOCYTES # BLD: 0.2 K/UL (ref 0–1)
MONOCYTES NFR BLD: 6 % (ref 5–13)
NEUTS SEG # BLD: 2.5 K/UL (ref 1.8–8)
NEUTS SEG NFR BLD: 64 % (ref 32–75)
NRBC # BLD: 0 K/UL (ref 0–0.01)
NRBC BLD-RTO: 0 PER 100 WBC
PLATELET # BLD AUTO: 130 K/UL (ref 150–400)
PMV BLD AUTO: 9.8 FL (ref 8.9–12.9)
POTASSIUM SERPL-SCNC: 3.7 MMOL/L (ref 3.5–5.1)
PROT SERPL-MCNC: 6.7 G/DL (ref 6.4–8.2)
RBC # BLD AUTO: 4.38 M/UL (ref 4.1–5.7)
SODIUM SERPL-SCNC: 139 MMOL/L (ref 136–145)
WBC # BLD AUTO: 3.9 K/UL (ref 4.1–11.1)

## 2023-10-04 PROCEDURE — 2580000003 HC RX 258: Performed by: NURSE PRACTITIONER

## 2023-10-04 PROCEDURE — 3078F DIAST BP <80 MM HG: CPT | Performed by: INTERNAL MEDICINE

## 2023-10-04 PROCEDURE — 96375 TX/PRO/DX INJ NEW DRUG ADDON: CPT

## 2023-10-04 PROCEDURE — 1123F ACP DISCUSS/DSCN MKR DOCD: CPT | Performed by: INTERNAL MEDICINE

## 2023-10-04 PROCEDURE — 99215 OFFICE O/P EST HI 40 MIN: CPT | Performed by: INTERNAL MEDICINE

## 2023-10-04 PROCEDURE — 96368 THER/DIAG CONCURRENT INF: CPT

## 2023-10-04 PROCEDURE — 96413 CHEMO IV INFUSION 1 HR: CPT

## 2023-10-04 PROCEDURE — 80053 COMPREHEN METABOLIC PANEL: CPT

## 2023-10-04 PROCEDURE — 1036F TOBACCO NON-USER: CPT | Performed by: INTERNAL MEDICINE

## 2023-10-04 PROCEDURE — 6360000002 HC RX W HCPCS: Performed by: INTERNAL MEDICINE

## 2023-10-04 PROCEDURE — G8484 FLU IMMUNIZE NO ADMIN: HCPCS | Performed by: INTERNAL MEDICINE

## 2023-10-04 PROCEDURE — 3017F COLORECTAL CA SCREEN DOC REV: CPT | Performed by: INTERNAL MEDICINE

## 2023-10-04 PROCEDURE — 83036 HEMOGLOBIN GLYCOSYLATED A1C: CPT

## 2023-10-04 PROCEDURE — 3075F SYST BP GE 130 - 139MM HG: CPT | Performed by: INTERNAL MEDICINE

## 2023-10-04 PROCEDURE — 82247 BILIRUBIN TOTAL: CPT

## 2023-10-04 PROCEDURE — 6360000002 HC RX W HCPCS: Performed by: NURSE PRACTITIONER

## 2023-10-04 PROCEDURE — G8417 CALC BMI ABV UP PARAM F/U: HCPCS | Performed by: INTERNAL MEDICINE

## 2023-10-04 PROCEDURE — G8427 DOCREV CUR MEDS BY ELIG CLIN: HCPCS | Performed by: INTERNAL MEDICINE

## 2023-10-04 PROCEDURE — 96416 CHEMO PROLONG INFUSE W/PUMP: CPT

## 2023-10-04 PROCEDURE — 96411 CHEMO IV PUSH ADDL DRUG: CPT

## 2023-10-04 PROCEDURE — 85025 COMPLETE CBC W/AUTO DIFF WBC: CPT

## 2023-10-04 RX ORDER — FLUOROURACIL 50 MG/ML
900 INJECTION, SOLUTION INTRAVENOUS ONCE
Status: COMPLETED | OUTPATIENT
Start: 2023-10-04 | End: 2023-10-04

## 2023-10-04 RX ORDER — ATROPINE SULFATE 0.4 MG/ML
0.4 INJECTION, SOLUTION INTRAVENOUS
Status: DISCONTINUED | OUTPATIENT
Start: 2023-10-04 | End: 2023-10-27 | Stop reason: HOSPADM

## 2023-10-04 RX ORDER — SODIUM CHLORIDE 0.9 % (FLUSH) 0.9 %
5-40 SYRINGE (ML) INJECTION PRN
Status: DISCONTINUED | OUTPATIENT
Start: 2023-10-04 | End: 2023-10-05 | Stop reason: HOSPADM

## 2023-10-04 RX ORDER — DEXAMETHASONE SODIUM PHOSPHATE 4 MG/ML
10 INJECTION, SOLUTION INTRA-ARTICULAR; INTRALESIONAL; INTRAMUSCULAR; INTRAVENOUS; SOFT TISSUE ONCE
Status: COMPLETED | OUTPATIENT
Start: 2023-10-04 | End: 2023-10-04

## 2023-10-04 RX ORDER — SODIUM CHLORIDE 9 MG/ML
5-250 INJECTION, SOLUTION INTRAVENOUS PRN
Status: DISCONTINUED | OUTPATIENT
Start: 2023-10-04 | End: 2023-10-05 | Stop reason: HOSPADM

## 2023-10-04 RX ORDER — PALONOSETRON 0.05 MG/ML
0.25 INJECTION, SOLUTION INTRAVENOUS ONCE
Status: COMPLETED | OUTPATIENT
Start: 2023-10-04 | End: 2023-10-04

## 2023-10-04 RX ADMIN — IRINOTECAN HYDROCHLORIDE 410 MG: 20 INJECTION, SOLUTION INTRAVENOUS at 12:37

## 2023-10-04 RX ADMIN — DEXAMETHASONE SODIUM PHOSPHATE 10 MG: 4 INJECTION INTRA-ARTICULAR; INTRALESIONAL; INTRAMUSCULAR; INTRAVENOUS; SOFT TISSUE at 11:23

## 2023-10-04 RX ADMIN — PALONOSETRON HYDROCHLORIDE 0.25 MG: 0.25 INJECTION INTRAVENOUS at 11:21

## 2023-10-04 RX ADMIN — LEUCOVORIN CALCIUM 200 MG: 200 INJECTION, POWDER, LYOPHILIZED, FOR SOLUTION INTRAMUSCULAR; INTRAVENOUS at 12:37

## 2023-10-04 RX ADMIN — ATROPINE SULFATE 0.4 MG: 0.4 INJECTION, SOLUTION INTRAVENOUS at 12:28

## 2023-10-04 RX ADMIN — FLUOROURACIL 900 MG: 50 INJECTION, SOLUTION INTRAVENOUS at 14:25

## 2023-10-04 RX ADMIN — FLUOROURACIL 5500 MG: 50 INJECTION, SOLUTION INTRAVENOUS at 14:30

## 2023-10-04 ASSESSMENT — PAIN SCALES - GENERAL: PAINLEVEL_OUTOF10: 0

## 2023-10-04 NOTE — PROGRESS NOTES
Lymphocytes % 27 12 - 49 %    Monocytes % 6 5 - 13 %    Eosinophils % 3 0 - 7 %    Basophils % 0 0 - 1 %    Immature Granulocytes 0 0.0 - 0.5 %    Neutrophils Absolute 2.5 1.8 - 8.0 K/UL    Lymphocytes Absolute 1.0 0.8 - 3.5 K/UL    Monocytes Absolute 0.2 0.0 - 1.0 K/UL    Eosinophils Absolute 0.1 0.0 - 0.4 K/UL    Basophils Absolute 0.0 0.0 - 0.1 K/UL    Absolute Immature Granulocyte 0.0 0.00 - 0.04 K/UL    Differential Type AUTOMATED     Bilirubin, Total    Collection Time: 10/04/23  8:59 AM   Result Value Ref Range    Total Bilirubin 0.6 0.2 - 1.0 MG/DL   Hemoglobin A1C    Collection Time: 10/04/23 12:58 PM   Result Value Ref Range    Hemoglobin A1C 10.4 (H) 4.0 - 5.6 %    eAG 252 mg/dL       Medications:  Medications Administered         atropine injection 0.4 mg Admin Date  10/04/2023 Action  Given Dose  0.4 mg Rate   Route  IntraVENous Administered By  [employer] RN        dexamethasone (DECADRON) injection 10 mg Admin Date  10/04/2023 Action  Given Dose  10 mg Rate   Route  IntraVENous Administered By  [employer] RN        fluorouracil (ADRUCIL) 5,500 mg in sodium chloride 0.9 % 250 mL chemo infusion Admin Date  10/04/2023 Action  Given Dose  5,500 mg Rate  5.4 mL/hr Route  IntraVENous Administered By  [employer] RN        fluorouracil (ADRUCIL) chemo syringe 900 mg Admin Date  10/04/2023 Action  Given Dose  900 mg Rate   Route  IntraVENous Administered By  [employer] RN        irinotecan (CAMPTOSAR) 410 mg in sodium chloride 0.9 % 250 mL chemo IVPB Admin Date  10/04/2023 Action  New Bag Dose  410 mg Rate  197 mL/hr Route  IntraVENous Administered By  [employer] RN        leucovorin calcium (WELLCOVORIN) 200 mg in sodium chloride 0.9 % 250 mL IVPB Admin Date  10/04/2023 Action  New Bag Dose  200 mg Rate  190 mL/hr Route  IntraVENous Administered By  [employer] RN        palonosetron (ALOXI) injection 0.25 mg Admin Date  10/04/2023 Action  Given Dose  0.25 mg Rate   Route  IntraVENous Administered By  Neha Springer, RN              Two nurses verified prior to administering: Drug name, drug dose, infusion volume or drug volume when prepared in a syringe, rate of administration, route of administration,  expiration dates and/or times, appearance and physical integrity of the drugs, rate set on infusion pump, when used sequencing of drug administration. Mr. Jairo Santiago tolerated treatment well and was discharged from [address] [place] [address] in stable condition. Port remains accessed and attached to 5FU cassette. Patient is aware of future appointments. Future Appointments   Date Time Provider [address]   10/6/2023  1:00 PM SS INF6 CH4 <1H [facility]   10/18/2023  9:00 AM SS INF2 CH2 >7H [facility]   10/18/2023  9:15 AM Gabi Solis MD [facility] AMB   10/20/2023 11:30 AM SS INF2 CH4 <2H [facility]        [address] [place]  [facility], RN  October 4, 2023

## 2023-10-06 ENCOUNTER — HOSPITAL ENCOUNTER (OUTPATIENT)
Facility: HOSPITAL | Age: 73
Setting detail: INFUSION SERIES
End: 2023-10-06
Payer: MEDICARE

## 2023-10-06 VITALS
TEMPERATURE: 98 F | RESPIRATION RATE: 18 BRPM | DIASTOLIC BLOOD PRESSURE: 65 MMHG | SYSTOLIC BLOOD PRESSURE: 137 MMHG | HEART RATE: 78 BPM

## 2023-10-06 DIAGNOSIS — K86.89 PANCREATIC MASS: ICD-10-CM

## 2023-10-06 DIAGNOSIS — C7A.8 NEUROENDOCRINE CARCINOMA OF PANCREAS (HCC): Primary | ICD-10-CM

## 2023-10-06 DIAGNOSIS — C78.7 METASTASIS TO LIVER (HCC): ICD-10-CM

## 2023-10-06 DIAGNOSIS — R16.0 LIVER MASS: ICD-10-CM

## 2023-10-06 DIAGNOSIS — C25.0 MALIGNANT NEOPLASM OF HEAD OF PANCREAS (HCC): ICD-10-CM

## 2023-10-06 DIAGNOSIS — C80.1 SMALL CELL CARCINOMA (HCC): ICD-10-CM

## 2023-10-06 PROCEDURE — 2580000003 HC RX 258: Performed by: NURSE PRACTITIONER

## 2023-10-06 RX ORDER — SODIUM CHLORIDE 0.9 % (FLUSH) 0.9 %
5-40 SYRINGE (ML) INJECTION PRN
Status: DISCONTINUED | OUTPATIENT
Start: 2023-10-06 | End: 2023-10-07 | Stop reason: HOSPADM

## 2023-10-06 RX ADMIN — SODIUM CHLORIDE, PRESERVATIVE FREE 20 ML: 5 INJECTION INTRAVENOUS at 14:36

## 2023-10-11 ENCOUNTER — APPOINTMENT (OUTPATIENT)
Facility: HOSPITAL | Age: 73
End: 2023-10-11
Payer: MEDICARE

## 2023-10-11 RX ORDER — HEPARIN 100 UNIT/ML
500 SYRINGE INTRAVENOUS PRN
Status: CANCELLED | OUTPATIENT
Start: 2023-10-18

## 2023-10-11 RX ORDER — SODIUM CHLORIDE 9 MG/ML
INJECTION, SOLUTION INTRAVENOUS CONTINUOUS
Status: CANCELLED | OUTPATIENT
Start: 2023-10-18

## 2023-10-11 RX ORDER — SODIUM CHLORIDE 9 MG/ML
5-250 INJECTION, SOLUTION INTRAVENOUS PRN
Status: CANCELLED | OUTPATIENT
Start: 2023-10-18

## 2023-10-11 RX ORDER — ALBUTEROL SULFATE 90 UG/1
4 AEROSOL, METERED RESPIRATORY (INHALATION) PRN
Status: CANCELLED | OUTPATIENT
Start: 2023-10-18

## 2023-10-11 RX ORDER — DEXAMETHASONE SODIUM PHOSPHATE 4 MG/ML
10 INJECTION, SOLUTION INTRA-ARTICULAR; INTRALESIONAL; INTRAMUSCULAR; INTRAVENOUS; SOFT TISSUE ONCE
Status: CANCELLED
Start: 2023-10-18 | End: 2023-10-18

## 2023-10-11 RX ORDER — ATROPINE SULFATE 0.4 MG/ML
0.4 INJECTION, SOLUTION INTRAVENOUS
Status: CANCELLED | OUTPATIENT
Start: 2023-10-18

## 2023-10-11 RX ORDER — PALONOSETRON 0.05 MG/ML
0.25 INJECTION, SOLUTION INTRAVENOUS ONCE
Status: CANCELLED | OUTPATIENT
Start: 2023-10-18 | End: 2023-10-18

## 2023-10-11 RX ORDER — MEPERIDINE HYDROCHLORIDE 25 MG/ML
12.5 INJECTION INTRAMUSCULAR; INTRAVENOUS; SUBCUTANEOUS PRN
Status: CANCELLED | OUTPATIENT
Start: 2023-10-18

## 2023-10-11 RX ORDER — ONDANSETRON 2 MG/ML
8 INJECTION INTRAMUSCULAR; INTRAVENOUS
Status: CANCELLED | OUTPATIENT
Start: 2023-10-18

## 2023-10-11 RX ORDER — DIPHENHYDRAMINE HYDROCHLORIDE 50 MG/ML
50 INJECTION INTRAMUSCULAR; INTRAVENOUS
Status: CANCELLED | OUTPATIENT
Start: 2023-10-18

## 2023-10-11 RX ORDER — EPINEPHRINE 1 MG/ML
0.3 INJECTION, SOLUTION, CONCENTRATE INTRAVENOUS PRN
Status: CANCELLED | OUTPATIENT
Start: 2023-10-18

## 2023-10-11 RX ORDER — ACETAMINOPHEN 325 MG/1
650 TABLET ORAL
Status: CANCELLED | OUTPATIENT
Start: 2023-10-18

## 2023-10-11 RX ORDER — FLUOROURACIL 50 MG/ML
900 INJECTION, SOLUTION INTRAVENOUS ONCE
Status: CANCELLED | OUTPATIENT
Start: 2023-10-18 | End: 2023-10-18

## 2023-10-11 RX ORDER — SODIUM CHLORIDE 0.9 % (FLUSH) 0.9 %
5-40 SYRINGE (ML) INJECTION PRN
Status: CANCELLED | OUTPATIENT
Start: 2023-10-18

## 2023-10-12 ENCOUNTER — HOSPITAL ENCOUNTER (OUTPATIENT)
Facility: HOSPITAL | Age: 73
Discharge: HOME | End: 2023-10-12
Payer: MEDICARE

## 2023-10-12 DIAGNOSIS — C78.7 METASTASIS TO LIVER (HCC): ICD-10-CM

## 2023-10-12 DIAGNOSIS — C25.0 MALIGNANT NEOPLASM OF HEAD OF PANCREAS (HCC): ICD-10-CM

## 2023-10-12 PROCEDURE — 74177 CT ABD & PELVIS W/CONTRAST: CPT

## 2023-10-12 PROCEDURE — 6360000004 HC RX CONTRAST MEDICATION: Performed by: NURSE PRACTITIONER

## 2023-10-12 RX ADMIN — IOPAMIDOL 100 ML: 755 INJECTION, SOLUTION INTRAVENOUS at 09:39

## 2023-10-13 ENCOUNTER — APPOINTMENT (OUTPATIENT)
Facility: HOSPITAL | Age: 73
End: 2023-10-13
Payer: MEDICARE

## 2023-10-16 ENCOUNTER — TELEPHONE (OUTPATIENT)
Age: 73
End: 2023-10-16

## 2023-10-16 NOTE — TELEPHONE ENCOUNTER
Denzel Will at [facility]  [phone]    10/16/23- [facility] ordered on specimen #[id number]. Additional records faxed to [phone], confirmation received. 10/23/23- Per  portal, \"order confirmed, waiting on specimen\". Spoke to Philly Beckett in pathology, she stated specimen was sent out Friday afternoon. 10/25/23- Per  portal, ETA for results 11/6.     10/31/23- Results back and scanned into EMR.

## 2023-10-18 ENCOUNTER — HOSPITAL ENCOUNTER (OUTPATIENT)
Facility: HOSPITAL | Age: 73
Setting detail: INFUSION SERIES
End: 2023-10-18
Payer: MEDICARE

## 2023-10-18 ENCOUNTER — OFFICE VISIT (OUTPATIENT)
Age: 73
End: 2023-10-18
Payer: MEDICARE

## 2023-10-18 VITALS
SYSTOLIC BLOOD PRESSURE: 163 MMHG | OXYGEN SATURATION: 96 % | BODY MASS INDEX: 32.86 KG/M2 | RESPIRATION RATE: 18 BRPM | DIASTOLIC BLOOD PRESSURE: 62 MMHG | WEIGHT: 229.5 LBS | HEIGHT: 70 IN | TEMPERATURE: 97.4 F | HEART RATE: 107 BPM

## 2023-10-18 VITALS
BODY MASS INDEX: 32.78 KG/M2 | DIASTOLIC BLOOD PRESSURE: 62 MMHG | TEMPERATURE: 97.4 F | RESPIRATION RATE: 18 BRPM | HEIGHT: 70 IN | OXYGEN SATURATION: 96 % | SYSTOLIC BLOOD PRESSURE: 163 MMHG | HEART RATE: 107 BPM | WEIGHT: 229 LBS

## 2023-10-18 DIAGNOSIS — C7A.8 NEUROENDOCRINE CARCINOMA OF PANCREAS (HCC): Primary | ICD-10-CM

## 2023-10-18 DIAGNOSIS — C80.1 SMALL CELL CARCINOMA (HCC): ICD-10-CM

## 2023-10-18 DIAGNOSIS — C25.0 MALIGNANT NEOPLASM OF HEAD OF PANCREAS (HCC): Primary | ICD-10-CM

## 2023-10-18 DIAGNOSIS — C25.0 MALIGNANT NEOPLASM OF HEAD OF PANCREAS (HCC): ICD-10-CM

## 2023-10-18 DIAGNOSIS — C78.7 METASTASIS TO LIVER (HCC): ICD-10-CM

## 2023-10-18 DIAGNOSIS — K86.89 PANCREATIC MASS: ICD-10-CM

## 2023-10-18 DIAGNOSIS — C7A.8 NEUROENDOCRINE CARCINOMA OF PANCREAS (HCC): ICD-10-CM

## 2023-10-18 DIAGNOSIS — R16.0 LIVER MASS: ICD-10-CM

## 2023-10-18 LAB
ALBUMIN SERPL-MCNC: 3 G/DL (ref 3.5–5)
ALBUMIN/GLOB SERPL: 0.7 (ref 1.1–2.2)
ALP SERPL-CCNC: 90 U/L (ref 45–117)
ALT SERPL-CCNC: 30 U/L (ref 12–78)
ANION GAP SERPL CALC-SCNC: 6 MMOL/L (ref 5–15)
AST SERPL-CCNC: 43 U/L (ref 15–37)
BASOPHILS # BLD: 0 K/UL (ref 0–0.1)
BASOPHILS NFR BLD: 0 % (ref 0–1)
BILIRUB SERPL-MCNC: 0.6 MG/DL (ref 0.2–1)
BUN SERPL-MCNC: 12 MG/DL (ref 6–20)
BUN/CREAT SERPL: 14 (ref 12–20)
CALCIUM SERPL-MCNC: 8.8 MG/DL (ref 8.5–10.1)
CHLORIDE SERPL-SCNC: 104 MMOL/L (ref 97–108)
CO2 SERPL-SCNC: 28 MMOL/L (ref 21–32)
COMMENT:: NORMAL
CREAT SERPL-MCNC: 0.86 MG/DL (ref 0.7–1.3)
DIFFERENTIAL METHOD BLD: ABNORMAL
EOSINOPHIL # BLD: 0.1 K/UL (ref 0–0.4)
EOSINOPHIL NFR BLD: 2 % (ref 0–7)
ERYTHROCYTE [DISTWIDTH] IN BLOOD BY AUTOMATED COUNT: 16.7 % (ref 11.5–14.5)
GLOBULIN SER CALC-MCNC: 4.1 G/DL (ref 2–4)
GLUCOSE SERPL-MCNC: 149 MG/DL (ref 65–100)
HCT VFR BLD AUTO: 36.1 % (ref 36.6–50.3)
HGB BLD-MCNC: 11.6 G/DL (ref 12.1–17)
IMM GRANULOCYTES # BLD AUTO: 0 K/UL (ref 0–0.04)
IMM GRANULOCYTES NFR BLD AUTO: 0 % (ref 0–0.5)
LYMPHOCYTES # BLD: 0.9 K/UL (ref 0.8–3.5)
LYMPHOCYTES NFR BLD: 18 % (ref 12–49)
MCH RBC QN AUTO: 25.9 PG (ref 26–34)
MCHC RBC AUTO-ENTMCNC: 32.1 G/DL (ref 30–36.5)
MCV RBC AUTO: 80.6 FL (ref 80–99)
MONOCYTES # BLD: 0.3 K/UL (ref 0–1)
MONOCYTES NFR BLD: 6 % (ref 5–13)
NEUTS SEG # BLD: 3.8 K/UL (ref 1.8–8)
NEUTS SEG NFR BLD: 74 % (ref 32–75)
NRBC # BLD: 0 K/UL (ref 0–0.01)
NRBC BLD-RTO: 0 PER 100 WBC
PLATELET # BLD AUTO: 138 K/UL (ref 150–400)
PMV BLD AUTO: 9.7 FL (ref 8.9–12.9)
POTASSIUM SERPL-SCNC: 3.1 MMOL/L (ref 3.5–5.1)
PROT SERPL-MCNC: 7.1 G/DL (ref 6.4–8.2)
RBC # BLD AUTO: 4.48 M/UL (ref 4.1–5.7)
SODIUM SERPL-SCNC: 138 MMOL/L (ref 136–145)
SPECIMEN HOLD: NORMAL
WBC # BLD AUTO: 5.2 K/UL (ref 4.1–11.1)

## 2023-10-18 PROCEDURE — 3078F DIAST BP <80 MM HG: CPT | Performed by: INTERNAL MEDICINE

## 2023-10-18 PROCEDURE — G8417 CALC BMI ABV UP PARAM F/U: HCPCS | Performed by: INTERNAL MEDICINE

## 2023-10-18 PROCEDURE — 3017F COLORECTAL CA SCREEN DOC REV: CPT | Performed by: INTERNAL MEDICINE

## 2023-10-18 PROCEDURE — 3077F SYST BP >= 140 MM HG: CPT | Performed by: INTERNAL MEDICINE

## 2023-10-18 PROCEDURE — 85025 COMPLETE CBC W/AUTO DIFF WBC: CPT

## 2023-10-18 PROCEDURE — G8484 FLU IMMUNIZE NO ADMIN: HCPCS | Performed by: INTERNAL MEDICINE

## 2023-10-18 PROCEDURE — 99215 OFFICE O/P EST HI 40 MIN: CPT | Performed by: INTERNAL MEDICINE

## 2023-10-18 PROCEDURE — 36415 COLL VENOUS BLD VENIPUNCTURE: CPT

## 2023-10-18 PROCEDURE — 80053 COMPREHEN METABOLIC PANEL: CPT

## 2023-10-18 PROCEDURE — G8427 DOCREV CUR MEDS BY ELIG CLIN: HCPCS | Performed by: INTERNAL MEDICINE

## 2023-10-18 PROCEDURE — 36591 DRAW BLOOD OFF VENOUS DEVICE: CPT

## 2023-10-18 PROCEDURE — 1036F TOBACCO NON-USER: CPT | Performed by: INTERNAL MEDICINE

## 2023-10-18 PROCEDURE — 1123F ACP DISCUSS/DSCN MKR DOCD: CPT | Performed by: INTERNAL MEDICINE

## 2023-10-18 RX ORDER — FAMOTIDINE 10 MG/ML
20 INJECTION, SOLUTION INTRAVENOUS
OUTPATIENT
Start: 2023-10-25

## 2023-10-18 RX ORDER — PALONOSETRON 0.05 MG/ML
0.25 INJECTION, SOLUTION INTRAVENOUS ONCE
OUTPATIENT
Start: 2023-10-25 | End: 2023-10-25

## 2023-10-18 RX ORDER — SODIUM CHLORIDE 9 MG/ML
INJECTION, SOLUTION INTRAVENOUS CONTINUOUS
OUTPATIENT
Start: 2023-10-25

## 2023-10-18 RX ORDER — MEPERIDINE HYDROCHLORIDE 50 MG/ML
12.5 INJECTION INTRAMUSCULAR; INTRAVENOUS; SUBCUTANEOUS PRN
OUTPATIENT
Start: 2023-10-25

## 2023-10-18 RX ORDER — ACETAMINOPHEN 325 MG/1
650 TABLET ORAL
OUTPATIENT
Start: 2023-10-25

## 2023-10-18 RX ORDER — SODIUM CHLORIDE 9 MG/ML
5-250 INJECTION, SOLUTION INTRAVENOUS PRN
OUTPATIENT
Start: 2023-10-25

## 2023-10-18 RX ORDER — SODIUM CHLORIDE 0.9 % (FLUSH) 0.9 %
5-40 SYRINGE (ML) INJECTION PRN
OUTPATIENT
Start: 2023-10-25

## 2023-10-18 RX ORDER — HEPARIN SODIUM (PORCINE) LOCK FLUSH IV SOLN 100 UNIT/ML 100 UNIT/ML
500 SOLUTION INTRAVENOUS PRN
OUTPATIENT
Start: 2023-10-25

## 2023-10-18 RX ORDER — EPINEPHRINE 1 MG/ML
0.3 INJECTION, SOLUTION, CONCENTRATE INTRAVENOUS PRN
OUTPATIENT
Start: 2023-10-25

## 2023-10-18 RX ORDER — ONDANSETRON 2 MG/ML
8 INJECTION INTRAMUSCULAR; INTRAVENOUS
OUTPATIENT
Start: 2023-10-25

## 2023-10-18 RX ORDER — DIPHENHYDRAMINE HYDROCHLORIDE 50 MG/ML
50 INJECTION INTRAMUSCULAR; INTRAVENOUS
OUTPATIENT
Start: 2023-10-25

## 2023-10-18 RX ORDER — ALBUTEROL SULFATE 90 UG/1
4 AEROSOL, METERED RESPIRATORY (INHALATION) PRN
OUTPATIENT
Start: 2023-10-25

## 2023-10-18 ASSESSMENT — PAIN SCALES - GENERAL: PAINLEVEL_OUTOF10: 2

## 2023-10-22 ENCOUNTER — APPOINTMENT (OUTPATIENT)
Facility: HOSPITAL | Age: 73
End: 2023-10-22
Payer: MEDICARE

## 2023-10-22 ENCOUNTER — HOSPITAL ENCOUNTER (EMERGENCY)
Facility: HOSPITAL | Age: 73
Discharge: HOME | End: 2023-10-22
Attending: EMERGENCY MEDICINE
Payer: MEDICARE

## 2023-10-22 VITALS
RESPIRATION RATE: 16 BRPM | TEMPERATURE: 97.7 F | HEART RATE: 68 BPM | BODY MASS INDEX: 32.78 KG/M2 | DIASTOLIC BLOOD PRESSURE: 74 MMHG | HEIGHT: 70 IN | WEIGHT: 229 LBS | OXYGEN SATURATION: 93 % | SYSTOLIC BLOOD PRESSURE: 153 MMHG

## 2023-10-22 DIAGNOSIS — C79.9 METASTATIC MALIGNANT NEOPLASM, UNSPECIFIED SITE (HCC): ICD-10-CM

## 2023-10-22 DIAGNOSIS — R10.84 GENERALIZED ABDOMINAL PAIN: Primary | ICD-10-CM

## 2023-10-22 LAB
ALBUMIN SERPL-MCNC: 3.2 G/DL (ref 3.5–5)
ALBUMIN/GLOB SERPL: 0.7 (ref 1.1–2.2)
ALP SERPL-CCNC: 130 U/L (ref 45–117)
ALT SERPL-CCNC: 68 U/L (ref 12–78)
ANION GAP SERPL CALC-SCNC: 7 MMOL/L (ref 5–15)
APPEARANCE UR: CLEAR
AST SERPL-CCNC: 157 U/L (ref 15–37)
BACTERIA URNS QL MICRO: NEGATIVE /HPF
BASOPHILS # BLD: 0 K/UL (ref 0–0.1)
BASOPHILS NFR BLD: 0 % (ref 0–1)
BILIRUB SERPL-MCNC: 0.7 MG/DL (ref 0.2–1)
BILIRUB UR QL: NEGATIVE
BUN SERPL-MCNC: 15 MG/DL (ref 6–20)
BUN/CREAT SERPL: 17 (ref 12–20)
CALCIUM SERPL-MCNC: 8.9 MG/DL (ref 8.5–10.1)
CHLORIDE SERPL-SCNC: 102 MMOL/L (ref 97–108)
CO2 SERPL-SCNC: 26 MMOL/L (ref 21–32)
COLOR UR: ABNORMAL
COMMENT:: NORMAL
CREAT SERPL-MCNC: 0.89 MG/DL (ref 0.7–1.3)
DIFFERENTIAL METHOD BLD: ABNORMAL
EOSINOPHIL # BLD: 0.1 K/UL (ref 0–0.4)
EOSINOPHIL NFR BLD: 2 % (ref 0–7)
EPITH CASTS URNS QL MICRO: ABNORMAL /LPF
ERYTHROCYTE [DISTWIDTH] IN BLOOD BY AUTOMATED COUNT: 16.7 % (ref 11.5–14.5)
GLOBULIN SER CALC-MCNC: 4.7 G/DL (ref 2–4)
GLUCOSE SERPL-MCNC: 175 MG/DL (ref 65–100)
GLUCOSE UR STRIP.AUTO-MCNC: NEGATIVE MG/DL
HCT VFR BLD AUTO: 36.3 % (ref 36.6–50.3)
HGB BLD-MCNC: 11.7 G/DL (ref 12.1–17)
HGB UR QL STRIP: NEGATIVE
HYALINE CASTS URNS QL MICRO: ABNORMAL /LPF (ref 0–2)
IMM GRANULOCYTES # BLD AUTO: 0 K/UL
IMM GRANULOCYTES NFR BLD AUTO: 0 %
KETONES UR QL STRIP.AUTO: NEGATIVE MG/DL
LEUKOCYTE ESTERASE UR QL STRIP.AUTO: NEGATIVE
LIPASE SERPL-CCNC: 15 U/L (ref 13–75)
LYMPHOCYTES # BLD: 0.9 K/UL (ref 0.8–3.5)
LYMPHOCYTES NFR BLD: 21 % (ref 12–49)
MCH RBC QN AUTO: 25.7 PG (ref 26–34)
MCHC RBC AUTO-ENTMCNC: 32.2 G/DL (ref 30–36.5)
MCV RBC AUTO: 79.8 FL (ref 80–99)
METAMYELOCYTES NFR BLD MANUAL: 1 %
MONOCYTES # BLD: 0.4 K/UL (ref 0–1)
MONOCYTES NFR BLD: 8 % (ref 5–13)
NEUTS BAND NFR BLD MANUAL: 8 % (ref 0–6)
NEUTS SEG # BLD: 3.1 K/UL (ref 1.8–8)
NEUTS SEG NFR BLD: 60 % (ref 32–75)
NITRITE UR QL STRIP.AUTO: NEGATIVE
NRBC # BLD: 0 K/UL (ref 0–0.01)
NRBC BLD-RTO: 0 PER 100 WBC
PH UR STRIP: 5.5 (ref 5–8)
PLATELET # BLD AUTO: 157 K/UL (ref 150–400)
PMV BLD AUTO: 9.4 FL (ref 8.9–12.9)
POTASSIUM SERPL-SCNC: 3.4 MMOL/L (ref 3.5–5.1)
PROT SERPL-MCNC: 7.9 G/DL (ref 6.4–8.2)
PROT UR STRIP-MCNC: 100 MG/DL
RBC # BLD AUTO: 4.55 M/UL (ref 4.1–5.7)
RBC #/AREA URNS HPF: ABNORMAL /HPF (ref 0–5)
RBC MORPH BLD: ABNORMAL
SODIUM SERPL-SCNC: 135 MMOL/L (ref 136–145)
SP GR UR REFRACTOMETRY: 1.03 (ref 1–1.03)
SPECIMEN HOLD: NORMAL
SPECIMEN HOLD: NORMAL
UROBILINOGEN UR QL STRIP.AUTO: 1 EU/DL (ref 0.2–1)
WBC # BLD AUTO: 4.5 K/UL (ref 4.1–11.1)
WBC URNS QL MICRO: ABNORMAL /HPF (ref 0–4)

## 2023-10-22 PROCEDURE — 6360000004 HC RX CONTRAST MEDICATION: Performed by: EMERGENCY MEDICINE

## 2023-10-22 PROCEDURE — 80053 COMPREHEN METABOLIC PANEL: CPT

## 2023-10-22 PROCEDURE — 81001 URINALYSIS AUTO W/SCOPE: CPT

## 2023-10-22 PROCEDURE — 83690 ASSAY OF LIPASE: CPT

## 2023-10-22 PROCEDURE — 6360000002 HC RX W HCPCS: Performed by: EMERGENCY MEDICINE

## 2023-10-22 PROCEDURE — 96374 THER/PROPH/DIAG INJ IV PUSH: CPT

## 2023-10-22 PROCEDURE — 96375 TX/PRO/DX INJ NEW DRUG ADDON: CPT

## 2023-10-22 PROCEDURE — 36415 COLL VENOUS BLD VENIPUNCTURE: CPT

## 2023-10-22 PROCEDURE — 99285 EMERGENCY DEPT VISIT HI MDM: CPT

## 2023-10-22 PROCEDURE — 2580000003 HC RX 258: Performed by: EMERGENCY MEDICINE

## 2023-10-22 PROCEDURE — 85025 COMPLETE CBC W/AUTO DIFF WBC: CPT

## 2023-10-22 PROCEDURE — 74177 CT ABD & PELVIS W/CONTRAST: CPT

## 2023-10-22 RX ORDER — OXYCODONE HYDROCHLORIDE 5 MG/1
5 TABLET ORAL EVERY 6 HOURS PRN
Qty: 15 TABLET | Refills: 0 | Status: SHIPPED | OUTPATIENT
Start: 2023-10-22 | End: 2023-10-25 | Stop reason: SDUPTHER

## 2023-10-22 RX ORDER — ONDANSETRON 4 MG/1
4 TABLET, ORALLY DISINTEGRATING ORAL 3 TIMES DAILY PRN
Qty: 21 TABLET | Refills: 0 | Status: SHIPPED | OUTPATIENT
Start: 2023-10-22

## 2023-10-22 RX ORDER — ONDANSETRON 2 MG/ML
4 INJECTION INTRAMUSCULAR; INTRAVENOUS
Status: COMPLETED | OUTPATIENT
Start: 2023-10-22 | End: 2023-10-22

## 2023-10-22 RX ORDER — 0.9 % SODIUM CHLORIDE 0.9 %
1000 INTRAVENOUS SOLUTION INTRAVENOUS ONCE
Status: COMPLETED | OUTPATIENT
Start: 2023-10-22 | End: 2023-10-22

## 2023-10-22 RX ADMIN — IOPAMIDOL 100 ML: 755 INJECTION, SOLUTION INTRAVENOUS at 10:10

## 2023-10-22 RX ADMIN — SODIUM CHLORIDE 1000 ML: 9 INJECTION, SOLUTION INTRAVENOUS at 09:47

## 2023-10-22 RX ADMIN — ONDANSETRON 4 MG: 2 INJECTION INTRAMUSCULAR; INTRAVENOUS at 09:48

## 2023-10-22 RX ADMIN — HYDROMORPHONE HYDROCHLORIDE 0.5 MG: 1 INJECTION, SOLUTION INTRAMUSCULAR; INTRAVENOUS; SUBCUTANEOUS at 11:25

## 2023-10-22 ASSESSMENT — PAIN SCALES - GENERAL: PAINLEVEL_OUTOF10: 6

## 2023-10-22 ASSESSMENT — PAIN DESCRIPTION - LOCATION: LOCATION: ABDOMEN

## 2023-10-22 ASSESSMENT — PAIN - FUNCTIONAL ASSESSMENT: PAIN_FUNCTIONAL_ASSESSMENT: 0-10

## 2023-10-22 ASSESSMENT — PAIN DESCRIPTION - ORIENTATION: ORIENTATION: RIGHT

## 2023-10-22 NOTE — ED NOTES
Patient given discharge instructions per provider and verbalized understanding     Hunag Trejo RN  10/22/23 [id number]

## 2023-10-22 NOTE — ED PROVIDER NOTES
[facility] EMERGENCY DEPT  EMERGENCY DEPARTMENT ENCOUNTER      Pt Name: Josefa Summers. MRN: [id number]  Birthdate 1950  Date of evaluation: 10/22/2023  Provider: Adrianne Lemon DO    CHIEF COMPLAINT       Chief Complaint   Patient presents with    Abdominal Pain         HISTORY OF PRESENT ILLNESS   (Location/Symptom, Timing/Onset, Context/Setting, Quality, Duration, Modifying Factors, Severity)  Note limiting factors. 79-year-old male comes in for abdominal pain. History of cancer. He states that 2 days ago he started with some right-sided pain. Describes in his right upper abdomen flank and down into his right lower abdomen and back. It is nonpositional discomfort that has been getting progressively worse. There is nausea without vomiting. No change in bowel or bladder habit. He is continuing to pass gas. He denies chest pain or shortness of breath fevers or chills. He denies other complaints            Review of External Medical Records:     Nursing Notes were reviewed. REVIEW OF SYSTEMS    (2-9 systems for level 4, 10 or more for level 5)     Review of Systems    Except as noted above the remainder of the review of systems was reviewed and negative.        PAST MEDICAL HISTORY     Past Medical History:   Diagnosis Date    Anxiety and depression     Arthritis     Cirrhosis ([address])     DM (diabetes mellitus) ([address])     History of colon polyps     Hyperlipidemia     Hypertension     Hypothyroid     Obese     EV on CPAP     Pancreatic cancer ([address])          SURGICAL HISTORY       Past Surgical History:   Procedure Laterality Date    ANKLE FRACTURE SURGERY Right     Replacement    APPENDECTOMY  APR 2013    CHOLECYSTECTOMY      COLONOSCOPY N/A 7/27/2023    COLONOSCOPY AND EGD ESOPHAGOGASTRODUODENOSCOPY performed by Sara iKrk MD at [facility] ENDOSCOPY    COLONOSCOPY N/A 7/27/2023    COLONOSCOPY POLYPECTOMY SNARE/COLD BIOPSY performed by Sara Kirk MD at [facility]

## 2023-10-22 NOTE — ED TRIAGE NOTES
Pt arrives to the ER for complaints of right upper abdominal pain that radiates into back that started about three days ago. Reports that he also feels nauseous. Denies any fevers, vomiting or diarrhea. PMH of pancreatic and liver cancer. Pt reports that he has had his gallbladder and appendix removed.

## 2023-10-23 ENCOUNTER — TELEPHONE (OUTPATIENT)
Age: 73
End: 2023-10-23

## 2023-10-23 NOTE — PROGRESS NOTES
[facility] [place] at [address], [address] [place] [address]  W: [phone]  F: [phone]      Reason for Visit:   Bill Escalante. is a 72 y.o. male who is seen today for evaluation of small cell carcinoma of the pancreas. Hematology/Oncology Treatment History:   CT A/P 4/19/2022: Possible mild acute pancreatitis. Prominent sigmoid diverticulosis. Enlarged prostate. Fat-containing hernias  CT Abdomen 5/17/2022: New intra-hepatic biliary dilatation and common bile duct dilatation. Ampullary prominence and 1.5 cm x 1.5 cm soft tissue density in the pancreaticoduodenal groove. New subcentimeter  hepatic hypodense lesions, worrisome for metastatic disease. MRI Abdomen 5/17/2022:  Periampullary pancreatic mass with possible extension into the wall of the duodenum. Associated intrahepatic and extra hepatic biliary dilatation and mild pancreatic ductal dilatation. Small hyperenhancing lesions throughout the  liver suspicious for metastatic disease. CT Chest 5/18/2022: no metastatic disease within thorax  ERCP by Dr. Jerica Simons 5/18/2022: Large ulcerated mass seen in the second portion of the duodenum taking half the circumference in the area of the major papilla and extending distally about 3 to 4 cm, appeared to be pancreatic and eroding into the duodenum. Cholangiogram with complete obstruction of CBD. Metal stent deployed. 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 Given Oxycodone 5mg for PRN use.  States

## 2023-10-23 NOTE — TELEPHONE ENCOUNTER
Call to patient to check in after ED visit over the weekend. Encouraged him to call back with any needs, otherwise, we will see him in follow up on Wednesday this week.

## 2023-10-25 ENCOUNTER — HOSPITAL ENCOUNTER (OUTPATIENT)
Facility: HOSPITAL | Age: 73
Setting detail: INFUSION SERIES
Discharge: HOME | End: 2023-10-25
Payer: MEDICARE

## 2023-10-25 ENCOUNTER — OFFICE VISIT (OUTPATIENT)
Age: 73
End: 2023-10-25
Payer: MEDICARE

## 2023-10-25 ENCOUNTER — TELEPHONE (OUTPATIENT)
Age: 73
End: 2023-10-25

## 2023-10-25 VITALS
RESPIRATION RATE: 18 BRPM | SYSTOLIC BLOOD PRESSURE: 140 MMHG | HEIGHT: 70 IN | TEMPERATURE: 97.6 F | OXYGEN SATURATION: 95 % | WEIGHT: 229.2 LBS | HEART RATE: 64 BPM | DIASTOLIC BLOOD PRESSURE: 63 MMHG | BODY MASS INDEX: 32.81 KG/M2

## 2023-10-25 VITALS
DIASTOLIC BLOOD PRESSURE: 68 MMHG | SYSTOLIC BLOOD PRESSURE: 126 MMHG | HEART RATE: 64 BPM | BODY MASS INDEX: 32.81 KG/M2 | OXYGEN SATURATION: 95 % | TEMPERATURE: 97.6 F | HEIGHT: 70 IN | WEIGHT: 229.2 LBS | RESPIRATION RATE: 18 BRPM

## 2023-10-25 DIAGNOSIS — R16.0 LIVER MASS: ICD-10-CM

## 2023-10-25 DIAGNOSIS — C80.1 SMALL CELL CARCINOMA (HCC): ICD-10-CM

## 2023-10-25 DIAGNOSIS — C78.7 METASTASIS TO LIVER (HCC): ICD-10-CM

## 2023-10-25 DIAGNOSIS — C7A.8 NEUROENDOCRINE CARCINOMA OF PANCREAS (HCC): ICD-10-CM

## 2023-10-25 DIAGNOSIS — I49.9 IRREGULAR HEARTBEAT: ICD-10-CM

## 2023-10-25 DIAGNOSIS — R10.84 GENERALIZED ABDOMINAL PAIN: ICD-10-CM

## 2023-10-25 DIAGNOSIS — K86.89 PANCREATIC MASS: ICD-10-CM

## 2023-10-25 DIAGNOSIS — C25.0 MALIGNANT NEOPLASM OF HEAD OF PANCREAS (HCC): Primary | ICD-10-CM

## 2023-10-25 DIAGNOSIS — C79.9 METASTATIC MALIGNANT NEOPLASM, UNSPECIFIED SITE (HCC): ICD-10-CM

## 2023-10-25 LAB
ALBUMIN SERPL-MCNC: 2.5 G/DL (ref 3.5–5)
ALBUMIN/GLOB SERPL: 0.5 (ref 1.1–2.2)
ALP SERPL-CCNC: 134 U/L (ref 45–117)
ALT SERPL-CCNC: 58 U/L (ref 12–78)
ANION GAP SERPL CALC-SCNC: 6 MMOL/L (ref 5–15)
AST SERPL-CCNC: 89 U/L (ref 15–37)
BASOPHILS # BLD: 0 K/UL (ref 0–0.1)
BASOPHILS NFR BLD: 0 % (ref 0–1)
BILIRUB SERPL-MCNC: 1.2 MG/DL (ref 0.2–1)
BUN SERPL-MCNC: 17 MG/DL (ref 6–20)
BUN/CREAT SERPL: 18 (ref 12–20)
CALCIUM SERPL-MCNC: 8.8 MG/DL (ref 8.5–10.1)
CHLORIDE SERPL-SCNC: 100 MMOL/L (ref 97–108)
CO2 SERPL-SCNC: 29 MMOL/L (ref 21–32)
CREAT SERPL-MCNC: 0.92 MG/DL (ref 0.7–1.3)
DIFFERENTIAL METHOD BLD: ABNORMAL
EOSINOPHIL # BLD: 0 K/UL (ref 0–0.4)
EOSINOPHIL NFR BLD: 0 % (ref 0–7)
ERYTHROCYTE [DISTWIDTH] IN BLOOD BY AUTOMATED COUNT: 16.5 % (ref 11.5–14.5)
GLOBULIN SER CALC-MCNC: 4.6 G/DL (ref 2–4)
GLUCOSE SERPL-MCNC: 222 MG/DL (ref 65–100)
HCT VFR BLD AUTO: 34 % (ref 36.6–50.3)
HGB BLD-MCNC: 11 G/DL (ref 12.1–17)
IMM GRANULOCYTES # BLD AUTO: 0.1 K/UL (ref 0–0.04)
IMM GRANULOCYTES NFR BLD AUTO: 1 % (ref 0–0.5)
LYMPHOCYTES # BLD: 0.9 K/UL (ref 0.8–3.5)
LYMPHOCYTES NFR BLD: 12 % (ref 12–49)
MCH RBC QN AUTO: 26.2 PG (ref 26–34)
MCHC RBC AUTO-ENTMCNC: 32.4 G/DL (ref 30–36.5)
MCV RBC AUTO: 81 FL (ref 80–99)
MONOCYTES # BLD: 0.8 K/UL (ref 0–1)
MONOCYTES NFR BLD: 11 % (ref 5–13)
NEUTS SEG # BLD: 5.3 K/UL (ref 1.8–8)
NEUTS SEG NFR BLD: 76 % (ref 32–75)
NRBC # BLD: 0 K/UL (ref 0–0.01)
NRBC BLD-RTO: 0 PER 100 WBC
PLATELET # BLD AUTO: 157 K/UL (ref 150–400)
PMV BLD AUTO: 9.8 FL (ref 8.9–12.9)
POTASSIUM SERPL-SCNC: 2.9 MMOL/L (ref 3.5–5.1)
PROT SERPL-MCNC: 7.1 G/DL (ref 6.4–8.2)
RBC # BLD AUTO: 4.2 M/UL (ref 4.1–5.7)
SODIUM SERPL-SCNC: 135 MMOL/L (ref 136–145)
WBC # BLD AUTO: 7 K/UL (ref 4.1–11.1)

## 2023-10-25 PROCEDURE — 1123F ACP DISCUSS/DSCN MKR DOCD: CPT | Performed by: INTERNAL MEDICINE

## 2023-10-25 PROCEDURE — 3078F DIAST BP <80 MM HG: CPT | Performed by: INTERNAL MEDICINE

## 2023-10-25 PROCEDURE — 2580000003 HC RX 258: Performed by: INTERNAL MEDICINE

## 2023-10-25 PROCEDURE — 96375 TX/PRO/DX INJ NEW DRUG ADDON: CPT

## 2023-10-25 PROCEDURE — 85025 COMPLETE CBC W/AUTO DIFF WBC: CPT

## 2023-10-25 PROCEDURE — 96413 CHEMO IV INFUSION 1 HR: CPT

## 2023-10-25 PROCEDURE — G8417 CALC BMI ABV UP PARAM F/U: HCPCS | Performed by: INTERNAL MEDICINE

## 2023-10-25 PROCEDURE — 99215 OFFICE O/P EST HI 40 MIN: CPT | Performed by: INTERNAL MEDICINE

## 2023-10-25 PROCEDURE — 6360000002 HC RX W HCPCS: Performed by: INTERNAL MEDICINE

## 2023-10-25 PROCEDURE — G8484 FLU IMMUNIZE NO ADMIN: HCPCS | Performed by: INTERNAL MEDICINE

## 2023-10-25 PROCEDURE — 3074F SYST BP LT 130 MM HG: CPT | Performed by: INTERNAL MEDICINE

## 2023-10-25 PROCEDURE — 1036F TOBACCO NON-USER: CPT | Performed by: INTERNAL MEDICINE

## 2023-10-25 PROCEDURE — 3017F COLORECTAL CA SCREEN DOC REV: CPT | Performed by: INTERNAL MEDICINE

## 2023-10-25 PROCEDURE — G8427 DOCREV CUR MEDS BY ELIG CLIN: HCPCS | Performed by: INTERNAL MEDICINE

## 2023-10-25 PROCEDURE — 36415 COLL VENOUS BLD VENIPUNCTURE: CPT

## 2023-10-25 PROCEDURE — 80053 COMPREHEN METABOLIC PANEL: CPT

## 2023-10-25 RX ORDER — SODIUM CHLORIDE 0.9 % (FLUSH) 0.9 %
5-40 SYRINGE (ML) INJECTION PRN
Status: DISCONTINUED | OUTPATIENT
Start: 2023-10-25 | End: 2023-10-26 | Stop reason: HOSPADM

## 2023-10-25 RX ORDER — SODIUM CHLORIDE 9 MG/ML
INJECTION, SOLUTION INTRAVENOUS CONTINUOUS
Status: DISCONTINUED | OUTPATIENT
Start: 2023-10-25 | End: 2023-10-26 | Stop reason: HOSPADM

## 2023-10-25 RX ORDER — SODIUM CHLORIDE 9 MG/ML
5-250 INJECTION, SOLUTION INTRAVENOUS PRN
Status: DISCONTINUED | OUTPATIENT
Start: 2023-10-25 | End: 2023-10-26 | Stop reason: HOSPADM

## 2023-10-25 RX ORDER — EPINEPHRINE 1 MG/ML
0.3 INJECTION, SOLUTION, CONCENTRATE INTRAVENOUS PRN
Status: DISCONTINUED | OUTPATIENT
Start: 2023-10-25 | End: 2023-10-27 | Stop reason: HOSPADM

## 2023-10-25 RX ORDER — OXYCODONE HYDROCHLORIDE 5 MG/1
5 TABLET ORAL EVERY 6 HOURS PRN
Qty: 60 TABLET | Refills: 0 | Status: SHIPPED | OUTPATIENT
Start: 2023-10-25 | End: 2023-11-08

## 2023-10-25 RX ORDER — ACETAMINOPHEN 325 MG/1
650 TABLET ORAL
Status: DISCONTINUED | OUTPATIENT
Start: 2023-10-25 | End: 2023-10-26 | Stop reason: HOSPADM

## 2023-10-25 RX ORDER — POTASSIUM CHLORIDE 20 MEQ/1
20 TABLET, EXTENDED RELEASE ORAL 2 TIMES DAILY
Qty: 60 TABLET | Refills: 1 | Status: SHIPPED | OUTPATIENT
Start: 2023-10-25

## 2023-10-25 RX ORDER — ONDANSETRON 2 MG/ML
8 INJECTION INTRAMUSCULAR; INTRAVENOUS
Status: DISCONTINUED | OUTPATIENT
Start: 2023-10-25 | End: 2023-10-26 | Stop reason: HOSPADM

## 2023-10-25 RX ORDER — MEPERIDINE HYDROCHLORIDE 25 MG/ML
12.5 INJECTION INTRAMUSCULAR; INTRAVENOUS; SUBCUTANEOUS PRN
Status: DISCONTINUED | OUTPATIENT
Start: 2023-10-25 | End: 2023-10-27 | Stop reason: HOSPADM

## 2023-10-25 RX ORDER — DIPHENHYDRAMINE HYDROCHLORIDE 50 MG/ML
50 INJECTION INTRAMUSCULAR; INTRAVENOUS
Status: DISCONTINUED | OUTPATIENT
Start: 2023-10-25 | End: 2023-10-26 | Stop reason: HOSPADM

## 2023-10-25 RX ORDER — PALONOSETRON 0.05 MG/ML
0.25 INJECTION, SOLUTION INTRAVENOUS ONCE
Status: COMPLETED | OUTPATIENT
Start: 2023-10-25 | End: 2023-10-25

## 2023-10-25 RX ORDER — POTASSIUM CHLORIDE 20 MEQ/1
20 TABLET, EXTENDED RELEASE ORAL 2 TIMES DAILY
Qty: 180 TABLET | OUTPATIENT
Start: 2023-10-25

## 2023-10-25 RX ORDER — ALBUTEROL SULFATE 90 UG/1
4 AEROSOL, METERED RESPIRATORY (INHALATION) PRN
Status: DISCONTINUED | OUTPATIENT
Start: 2023-10-25 | End: 2023-10-26 | Stop reason: HOSPADM

## 2023-10-25 RX ADMIN — SODIUM CHLORIDE, PRESERVATIVE FREE 20 ML: 5 INJECTION INTRAVENOUS at 13:50

## 2023-10-25 RX ADMIN — DEXAMETHASONE SODIUM PHOSPHATE 12 MG: 4 INJECTION, SOLUTION INTRA-ARTICULAR; INTRALESIONAL; INTRAMUSCULAR; INTRAVENOUS; SOFT TISSUE at 11:38

## 2023-10-25 RX ADMIN — LURBINECTEDIN 7.25 MG: 0.5 INJECTION, POWDER, LYOPHILIZED, FOR SOLUTION INTRAVENOUS at 12:39

## 2023-10-25 RX ADMIN — SODIUM CHLORIDE 25 ML/HR: 9 INJECTION, SOLUTION INTRAVENOUS at 11:33

## 2023-10-25 RX ADMIN — PALONOSETRON HYDROCHLORIDE 0.25 MG: 0.25 INJECTION INTRAVENOUS at 11:36

## 2023-10-25 ASSESSMENT — PAIN SCALES - GENERAL: PAINLEVEL_OUTOF10: 0

## 2023-10-25 NOTE — TELEPHONE ENCOUNTER
Call to patient regarding low potassium from today. States he has not been taking potassium at home, ran out of prescription. Will refill today, advised to take BID for the next 5-7 days, then back down to once daily. Patient states understanding. No further questions at this time.

## 2023-10-27 ENCOUNTER — TELEPHONE (OUTPATIENT)
Age: 73
End: 2023-10-27

## 2023-10-27 NOTE — TELEPHONE ENCOUNTER
Denzel Solis at [facility]  [phone]    10/27/23- Patient stated he hasn't had a bowel movement in 2 days and is uncomfortable, not sure what to take and hasn't tried anything as of yet. Discussed options of miralax every couple hours, or magnesium citrate if he's needing something stronger. Reviewed directions with him. Stated he feels a bit dehydrated, but doesn't feel he needs IV fluids right now. Encouraged patient to push fluids with water and electrolyte drinks. Discussed starting a bowel regimen when taking pain medication to prevent constipation. Patient stated he hasn't been taking oxycodone because he felt it was too strong. He questioned if there was something else he could take. Will discuss medication options with Dottie and call him back. 10:25 AM- NP recommends taking 1/2 tab oxycodone PRN for pain. Detailed voicemail left for patient with this information. Will try to contact patient later today. 1:27 PM- Called patient to check in, no answer, voicemail left.

## 2023-10-27 NOTE — TELEPHONE ENCOUNTER
Pt called in stating he has terrible constipation and hasn't had a BM in 2 days which is now causing other issues    CB# [phone]

## 2023-11-02 ENCOUNTER — TELEPHONE (OUTPATIENT)
Age: 73
End: 2023-11-02

## 2023-11-02 NOTE — TELEPHONE ENCOUNTER
[facility] [place] at [facility]  [address] [facility], [address] [place] [address] Figures: [phone]  F: [phone]    Medical Nutrition Therapy  Nutrition Encounter:    Called patient. He reports his insides are just going against each other. RD inquired for more details, attempting to determine symptoms. He says when it gets to stomach, he starts to feel bloated and plugged up. He is taking 1-2 creon w/ meals. Last night he had 2 bowls of cheerios and reports it just sat there. Felt constipated, has miralax. Encouraged him to start taking daily. This morning his blood sugar, was 60, he had a boost shake and some pineapple. Then he felt nauseated. Plan:  - Reviewed drinking 4oz of juice when BG levels drop. Recheck 15 mins later. - Smaller more frequent meals. - Antiemetics as needed.      Signed By: Sy Jacob RD

## 2023-11-02 NOTE — TELEPHONE ENCOUNTER
Patient called and stated that his blood sugar drops, and he doesn't know what to do do about that. Patient also stated that he doesn't know what to and what not to eat. Requested a call back to discuss.        # [phone]

## 2023-11-07 ENCOUNTER — TELEPHONE (OUTPATIENT)
Age: 73
End: 2023-11-07

## 2023-11-07 ENCOUNTER — HOSPITAL ENCOUNTER (OUTPATIENT)
Facility: HOSPITAL | Age: 73
Setting detail: INFUSION SERIES
Discharge: HOME | End: 2023-11-07
Payer: MEDICARE

## 2023-11-07 VITALS
TEMPERATURE: 98.1 F | OXYGEN SATURATION: 94 % | BODY MASS INDEX: 31.74 KG/M2 | DIASTOLIC BLOOD PRESSURE: 54 MMHG | RESPIRATION RATE: 18 BRPM | WEIGHT: 221.2 LBS | SYSTOLIC BLOOD PRESSURE: 137 MMHG | HEART RATE: 69 BPM

## 2023-11-07 DIAGNOSIS — C25.0 MALIGNANT NEOPLASM OF HEAD OF PANCREAS (HCC): ICD-10-CM

## 2023-11-07 DIAGNOSIS — R11.2 NAUSEA AND VOMITING, UNSPECIFIED VOMITING TYPE: Primary | ICD-10-CM

## 2023-11-07 LAB
ANION GAP SERPL CALC-SCNC: 7 MMOL/L (ref 5–15)
BASOPHILS # BLD: 0 K/UL (ref 0–0.1)
BASOPHILS NFR BLD: 0 % (ref 0–1)
BUN SERPL-MCNC: 16 MG/DL (ref 6–20)
BUN/CREAT SERPL: 16 (ref 12–20)
CALCIUM SERPL-MCNC: 8.4 MG/DL (ref 8.5–10.1)
CHLORIDE SERPL-SCNC: 96 MMOL/L (ref 97–108)
CO2 SERPL-SCNC: 26 MMOL/L (ref 21–32)
CREAT SERPL-MCNC: 1.01 MG/DL (ref 0.7–1.3)
DIFFERENTIAL METHOD BLD: ABNORMAL
EOSINOPHIL # BLD: 0 K/UL (ref 0–0.4)
EOSINOPHIL NFR BLD: 0 % (ref 0–7)
ERYTHROCYTE [DISTWIDTH] IN BLOOD BY AUTOMATED COUNT: 16.2 % (ref 11.5–14.5)
GLUCOSE SERPL-MCNC: 242 MG/DL (ref 65–100)
HCT VFR BLD AUTO: 31.7 % (ref 36.6–50.3)
HGB BLD-MCNC: 10.2 G/DL (ref 12.1–17)
IMM GRANULOCYTES # BLD AUTO: 0 K/UL (ref 0–0.04)
IMM GRANULOCYTES NFR BLD AUTO: 0 % (ref 0–0.5)
LYMPHOCYTES # BLD: 1.1 K/UL (ref 0.8–3.5)
LYMPHOCYTES NFR BLD: 51 % (ref 12–49)
MAGNESIUM SERPL-MCNC: 1.6 MG/DL (ref 1.6–2.4)
MCH RBC QN AUTO: 25.2 PG (ref 26–34)
MCHC RBC AUTO-ENTMCNC: 32.2 G/DL (ref 30–36.5)
MCV RBC AUTO: 78.3 FL (ref 80–99)
MONOCYTES # BLD: 0.4 K/UL (ref 0–1)
MONOCYTES NFR BLD: 18 % (ref 5–13)
MYELOCYTES NFR BLD MANUAL: 1 %
NEUTS BAND NFR BLD MANUAL: 9 %
NEUTS SEG # BLD: 0.6 K/UL (ref 1.8–8)
NEUTS SEG NFR BLD: 21 % (ref 32–75)
NRBC # BLD: 0 K/UL (ref 0–0.01)
NRBC BLD-RTO: 0 PER 100 WBC
PLATELET # BLD AUTO: 127 K/UL (ref 150–400)
PLATELET COMMENT: ABNORMAL
PMV BLD AUTO: 10 FL (ref 8.9–12.9)
POTASSIUM SERPL-SCNC: 3.8 MMOL/L (ref 3.5–5.1)
RBC # BLD AUTO: 4.05 M/UL (ref 4.1–5.7)
RBC MORPH BLD: ABNORMAL
SODIUM SERPL-SCNC: 129 MMOL/L (ref 136–145)
WBC # BLD AUTO: 2.1 K/UL (ref 4.1–11.1)
WBC MORPH BLD: ABNORMAL

## 2023-11-07 PROCEDURE — 96360 HYDRATION IV INFUSION INIT: CPT

## 2023-11-07 PROCEDURE — 36415 COLL VENOUS BLD VENIPUNCTURE: CPT

## 2023-11-07 PROCEDURE — 80048 BASIC METABOLIC PNL TOTAL CA: CPT

## 2023-11-07 PROCEDURE — 2580000003 HC RX 258: Performed by: NURSE PRACTITIONER

## 2023-11-07 PROCEDURE — 83735 ASSAY OF MAGNESIUM: CPT

## 2023-11-07 PROCEDURE — 85025 COMPLETE CBC W/AUTO DIFF WBC: CPT

## 2023-11-07 RX ORDER — MEPERIDINE HYDROCHLORIDE 25 MG/ML
12.5 INJECTION INTRAMUSCULAR; INTRAVENOUS; SUBCUTANEOUS PRN
Status: CANCELLED | OUTPATIENT
Start: 2023-11-15

## 2023-11-07 RX ORDER — SODIUM CHLORIDE 9 MG/ML
5-250 INJECTION, SOLUTION INTRAVENOUS PRN
OUTPATIENT
Start: 2023-11-07

## 2023-11-07 RX ORDER — ONDANSETRON 2 MG/ML
8 INJECTION INTRAMUSCULAR; INTRAVENOUS
Status: CANCELLED | OUTPATIENT
Start: 2023-11-15

## 2023-11-07 RX ORDER — SODIUM CHLORIDE 9 MG/ML
5-250 INJECTION, SOLUTION INTRAVENOUS PRN
Status: CANCELLED | OUTPATIENT
Start: 2023-11-07

## 2023-11-07 RX ORDER — SODIUM CHLORIDE 0.9 % (FLUSH) 0.9 %
5-40 SYRINGE (ML) INJECTION PRN
Status: CANCELLED | OUTPATIENT
Start: 2023-11-07

## 2023-11-07 RX ORDER — 0.9 % SODIUM CHLORIDE 0.9 %
1000 INTRAVENOUS SOLUTION INTRAVENOUS ONCE
Status: CANCELLED | OUTPATIENT
Start: 2023-11-07 | End: 2023-11-07

## 2023-11-07 RX ORDER — SODIUM CHLORIDE 9 MG/ML
INJECTION, SOLUTION INTRAVENOUS CONTINUOUS
Status: CANCELLED | OUTPATIENT
Start: 2023-11-15

## 2023-11-07 RX ORDER — HEPARIN 100 UNIT/ML
500 SYRINGE INTRAVENOUS PRN
Status: CANCELLED | OUTPATIENT
Start: 2023-11-15

## 2023-11-07 RX ORDER — HEPARIN 100 UNIT/ML
500 SYRINGE INTRAVENOUS PRN
OUTPATIENT
Start: 2023-11-07

## 2023-11-07 RX ORDER — SODIUM CHLORIDE 9 MG/ML
5-250 INJECTION, SOLUTION INTRAVENOUS PRN
Status: CANCELLED | OUTPATIENT
Start: 2023-11-15

## 2023-11-07 RX ORDER — EPINEPHRINE 1 MG/ML
0.3 INJECTION, SOLUTION, CONCENTRATE INTRAVENOUS PRN
Status: CANCELLED | OUTPATIENT
Start: 2023-11-15

## 2023-11-07 RX ORDER — ACETAMINOPHEN 325 MG/1
650 TABLET ORAL
Status: CANCELLED | OUTPATIENT
Start: 2023-11-15

## 2023-11-07 RX ORDER — HEPARIN SODIUM (PORCINE) LOCK FLUSH IV SOLN 100 UNIT/ML 100 UNIT/ML
500 SOLUTION INTRAVENOUS PRN
Status: CANCELLED | OUTPATIENT
Start: 2023-11-07

## 2023-11-07 RX ORDER — DIPHENHYDRAMINE HYDROCHLORIDE 50 MG/ML
50 INJECTION INTRAMUSCULAR; INTRAVENOUS
Status: CANCELLED | OUTPATIENT
Start: 2023-11-15

## 2023-11-07 RX ORDER — SODIUM CHLORIDE 0.9 % (FLUSH) 0.9 %
5-40 SYRINGE (ML) INJECTION PRN
OUTPATIENT
Start: 2023-11-07

## 2023-11-07 RX ORDER — ALBUTEROL SULFATE 90 UG/1
4 AEROSOL, METERED RESPIRATORY (INHALATION) PRN
Status: CANCELLED | OUTPATIENT
Start: 2023-11-15

## 2023-11-07 RX ORDER — 0.9 % SODIUM CHLORIDE 0.9 %
1000 INTRAVENOUS SOLUTION INTRAVENOUS ONCE
Status: COMPLETED | OUTPATIENT
Start: 2023-11-07 | End: 2023-11-07

## 2023-11-07 RX ORDER — SODIUM CHLORIDE 0.9 % (FLUSH) 0.9 %
5-40 SYRINGE (ML) INJECTION PRN
Status: DISCONTINUED | OUTPATIENT
Start: 2023-11-07 | End: 2023-11-08 | Stop reason: HOSPADM

## 2023-11-07 RX ADMIN — SODIUM CHLORIDE, PRESERVATIVE FREE 20 ML: 5 INJECTION INTRAVENOUS at 16:13

## 2023-11-07 RX ADMIN — SODIUM CHLORIDE 1000 ML: 9 INJECTION, SOLUTION INTRAVENOUS at 15:06

## 2023-11-07 ASSESSMENT — PAIN SCALES - GENERAL: PAINLEVEL_OUTOF10: 0

## 2023-11-07 NOTE — PROGRESS NOTES
Neutrophils Absolute 0.6 (L) 1.8 - 8.0 K/UL    Lymphocytes Absolute 1.1 0.8 - 3.5 K/UL    Monocytes Absolute 0.4 0.0 - 1.0 K/UL    Eosinophils Absolute 0.0 0.0 - 0.4 K/UL    Basophils Absolute 0.0 0.0 - 0.1 K/UL    Absolute Immature Granulocyte 0.0 0.00 - 0.04 K/UL    Differential Type MANUAL      RBC Comment NORMOCYTIC, NORMOCHROMIC         Medications received:  Medications Administered         sodium chloride 0.9 % bolus 1,000 mL Admin Date  11/07/2023 Action  New Bag Dose  1,000 mL Rate  983.6 mL/hr Route  IntraVENous Administered By  Mateusz Jenkins RN        sodium chloride flush 0.9 % injection 5-40 mL Admin Date  11/07/2023 Action  Given Dose  20 mL Rate   Route  IntraVENous Administered By  Mateusz Jenkins RN           After Bolus infusion, pt stated that he felt better and denied dizziness. Vital taken. Mr. Zuri Robertson tolerated treatment well and was discharged from [address] [place] [address] in stable condition at 1615. Chest port flushed and de-accessed per protocol. He is to return on  November 15, 2023 at 0900 for his next appointment.     Mateusz Jenkins RN  November 7, 2023    Future Appointments:  Future Appointments   Date Time Provider [address]   11/15/2023  9:00 AM SS INF3 CH3 <4H [facility]   11/15/2023  9:15 AM Andrew Solis MD [facility] AMB   12/6/2023  9:00 AM SS INF3 CH3 <4H [facility]   12/27/2023  9:00 AM SS INF3 CH3 <4H [facility]

## 2023-11-07 NOTE — TELEPHONE ENCOUNTER
Denzel Solis at [facility]  [phone]    11/07/23- Spoke to wife she reported after last chemo treatment \" he is having nausea daily and 3-4 episodes of vomiting in the last 2 weeks\". Denies any fevers, chills, diarrhea, but is having extreme fatigue and not wanting to eat or drink much. Taking Zofran very sparely- recommended him take Zofran and compazine as prescribed and offered IVF today per NP. They agreed with this plan and added on for today at 3 pm. Encouraged him to call with any further concerns.

## 2023-11-07 NOTE — TELEPHONE ENCOUNTER
Patient spouse called and stated that since the patients last chemo that he has been extremely fatigued and nauseous. She stated that he is taking nausea medication which is helping a bit but not completely. Requested a call back to discuss what can be done to help.        # [phone]

## 2023-11-15 ENCOUNTER — HOSPITAL ENCOUNTER (OUTPATIENT)
Facility: HOSPITAL | Age: 73
Setting detail: INFUSION SERIES
Discharge: HOME | End: 2023-11-15
Payer: MEDICARE

## 2023-11-15 ENCOUNTER — OFFICE VISIT (OUTPATIENT)
Age: 73
End: 2023-11-15
Payer: MEDICARE

## 2023-11-15 VITALS
RESPIRATION RATE: 18 BRPM | HEART RATE: 64 BPM | WEIGHT: 225 LBS | OXYGEN SATURATION: 97 % | TEMPERATURE: 98.1 F | BODY MASS INDEX: 32.21 KG/M2 | HEIGHT: 70 IN | SYSTOLIC BLOOD PRESSURE: 137 MMHG | DIASTOLIC BLOOD PRESSURE: 60 MMHG

## 2023-11-15 VITALS
OXYGEN SATURATION: 96 % | WEIGHT: 225.2 LBS | TEMPERATURE: 97.7 F | HEIGHT: 70 IN | DIASTOLIC BLOOD PRESSURE: 70 MMHG | BODY MASS INDEX: 32.24 KG/M2 | RESPIRATION RATE: 18 BRPM | HEART RATE: 60 BPM | SYSTOLIC BLOOD PRESSURE: 154 MMHG

## 2023-11-15 DIAGNOSIS — C78.7 METASTASIS TO LIVER (HCC): ICD-10-CM

## 2023-11-15 DIAGNOSIS — K86.89 PANCREATIC MASS: ICD-10-CM

## 2023-11-15 DIAGNOSIS — C25.0 MALIGNANT NEOPLASM OF HEAD OF PANCREAS (HCC): Primary | ICD-10-CM

## 2023-11-15 DIAGNOSIS — C25.0 MALIGNANT NEOPLASM OF HEAD OF PANCREAS (HCC): ICD-10-CM

## 2023-11-15 DIAGNOSIS — C80.1 SMALL CELL CARCINOMA (HCC): ICD-10-CM

## 2023-11-15 DIAGNOSIS — R16.0 LIVER MASS: ICD-10-CM

## 2023-11-15 DIAGNOSIS — C7A.8 NEUROENDOCRINE CARCINOMA OF PANCREAS (HCC): Primary | ICD-10-CM

## 2023-11-15 DIAGNOSIS — C7A.8 NEUROENDOCRINE CARCINOMA OF PANCREAS (HCC): ICD-10-CM

## 2023-11-15 LAB
ALBUMIN SERPL-MCNC: 2.7 G/DL (ref 3.5–5)
ALBUMIN/GLOB SERPL: 0.6 (ref 1.1–2.2)
ALP SERPL-CCNC: 144 U/L (ref 45–117)
ALT SERPL-CCNC: 27 U/L (ref 12–78)
ANION GAP SERPL CALC-SCNC: 5 MMOL/L (ref 5–15)
AST SERPL-CCNC: 28 U/L (ref 15–37)
BASOPHILS # BLD: 0.1 K/UL (ref 0–0.1)
BASOPHILS NFR BLD: 3 % (ref 0–1)
BILIRUB SERPL-MCNC: 0.4 MG/DL (ref 0.2–1)
BUN SERPL-MCNC: 15 MG/DL (ref 6–20)
BUN/CREAT SERPL: 17 (ref 12–20)
CALCIUM SERPL-MCNC: 8.9 MG/DL (ref 8.5–10.1)
CHLORIDE SERPL-SCNC: 101 MMOL/L (ref 97–108)
CO2 SERPL-SCNC: 29 MMOL/L (ref 21–32)
CREAT SERPL-MCNC: 0.87 MG/DL (ref 0.7–1.3)
DIFFERENTIAL METHOD BLD: ABNORMAL
EOSINOPHIL # BLD: 0 K/UL (ref 0–0.4)
EOSINOPHIL NFR BLD: 0 % (ref 0–7)
ERYTHROCYTE [DISTWIDTH] IN BLOOD BY AUTOMATED COUNT: 16.9 % (ref 11.5–14.5)
GLOBULIN SER CALC-MCNC: 4.6 G/DL (ref 2–4)
GLUCOSE SERPL-MCNC: 278 MG/DL (ref 65–100)
HCT VFR BLD AUTO: 30.7 % (ref 36.6–50.3)
HGB BLD-MCNC: 9.7 G/DL (ref 12.1–17)
IMM GRANULOCYTES # BLD AUTO: 0 K/UL
IMM GRANULOCYTES NFR BLD AUTO: 0 %
LYMPHOCYTES # BLD: 0.5 K/UL (ref 0.8–3.5)
LYMPHOCYTES NFR BLD: 13 % (ref 12–49)
MCH RBC QN AUTO: 25.4 PG (ref 26–34)
MCHC RBC AUTO-ENTMCNC: 31.6 G/DL (ref 30–36.5)
MCV RBC AUTO: 80.4 FL (ref 80–99)
METAMYELOCYTES NFR BLD MANUAL: 2 %
MONOCYTES # BLD: 0.2 K/UL (ref 0–1)
MONOCYTES NFR BLD: 6 % (ref 5–13)
NEUTS BAND NFR BLD MANUAL: 7 % (ref 0–6)
NEUTS SEG # BLD: 3.1 K/UL (ref 1.8–8)
NEUTS SEG NFR BLD: 69 % (ref 32–75)
NRBC # BLD: 0 K/UL (ref 0–0.01)
NRBC BLD-RTO: 0 PER 100 WBC
PLATELET # BLD AUTO: 237 K/UL (ref 150–400)
PMV BLD AUTO: 9.3 FL (ref 8.9–12.9)
POTASSIUM SERPL-SCNC: 4 MMOL/L (ref 3.5–5.1)
PROT SERPL-MCNC: 7.3 G/DL (ref 6.4–8.2)
RBC # BLD AUTO: 3.82 M/UL (ref 4.1–5.7)
RBC MORPH BLD: ABNORMAL
RBC MORPH BLD: ABNORMAL
SODIUM SERPL-SCNC: 135 MMOL/L (ref 136–145)
WBC # BLD AUTO: 4.1 K/UL (ref 4.1–11.1)

## 2023-11-15 PROCEDURE — G8417 CALC BMI ABV UP PARAM F/U: HCPCS | Performed by: INTERNAL MEDICINE

## 2023-11-15 PROCEDURE — 99215 OFFICE O/P EST HI 40 MIN: CPT | Performed by: INTERNAL MEDICINE

## 2023-11-15 PROCEDURE — G8484 FLU IMMUNIZE NO ADMIN: HCPCS | Performed by: INTERNAL MEDICINE

## 2023-11-15 PROCEDURE — 3078F DIAST BP <80 MM HG: CPT | Performed by: INTERNAL MEDICINE

## 2023-11-15 PROCEDURE — 85025 COMPLETE CBC W/AUTO DIFF WBC: CPT

## 2023-11-15 PROCEDURE — G8427 DOCREV CUR MEDS BY ELIG CLIN: HCPCS | Performed by: INTERNAL MEDICINE

## 2023-11-15 PROCEDURE — 96417 CHEMO IV INFUS EACH ADDL SEQ: CPT

## 2023-11-15 PROCEDURE — 3075F SYST BP GE 130 - 139MM HG: CPT | Performed by: INTERNAL MEDICINE

## 2023-11-15 PROCEDURE — 6360000002 HC RX W HCPCS: Performed by: INTERNAL MEDICINE

## 2023-11-15 PROCEDURE — 36415 COLL VENOUS BLD VENIPUNCTURE: CPT

## 2023-11-15 PROCEDURE — 80053 COMPREHEN METABOLIC PANEL: CPT

## 2023-11-15 PROCEDURE — 2580000003 HC RX 258: Performed by: NURSE PRACTITIONER

## 2023-11-15 PROCEDURE — 1036F TOBACCO NON-USER: CPT | Performed by: INTERNAL MEDICINE

## 2023-11-15 PROCEDURE — 1123F ACP DISCUSS/DSCN MKR DOCD: CPT | Performed by: INTERNAL MEDICINE

## 2023-11-15 PROCEDURE — 96374 THER/PROPH/DIAG INJ IV PUSH: CPT

## 2023-11-15 PROCEDURE — 2580000003 HC RX 258: Performed by: INTERNAL MEDICINE

## 2023-11-15 PROCEDURE — 96367 TX/PROPH/DG ADDL SEQ IV INF: CPT

## 2023-11-15 PROCEDURE — 96361 HYDRATE IV INFUSION ADD-ON: CPT

## 2023-11-15 PROCEDURE — 3017F COLORECTAL CA SCREEN DOC REV: CPT | Performed by: INTERNAL MEDICINE

## 2023-11-15 PROCEDURE — 96413 CHEMO IV INFUSION 1 HR: CPT

## 2023-11-15 PROCEDURE — 96375 TX/PRO/DX INJ NEW DRUG ADDON: CPT

## 2023-11-15 RX ORDER — EPINEPHRINE 1 MG/ML
0.3 INJECTION, SOLUTION, CONCENTRATE INTRAVENOUS PRN
OUTPATIENT
Start: 2024-01-17

## 2023-11-15 RX ORDER — ACETAMINOPHEN 325 MG/1
650 TABLET ORAL
OUTPATIENT
Start: 2023-12-06

## 2023-11-15 RX ORDER — SODIUM CHLORIDE 9 MG/ML
5-250 INJECTION, SOLUTION INTRAVENOUS PRN
OUTPATIENT
Start: 2023-12-06

## 2023-11-15 RX ORDER — SODIUM CHLORIDE 0.9 % (FLUSH) 0.9 %
5-40 SYRINGE (ML) INJECTION PRN
OUTPATIENT
Start: 2023-12-27

## 2023-11-15 RX ORDER — HEPARIN SODIUM (PORCINE) LOCK FLUSH IV SOLN 100 UNIT/ML 100 UNIT/ML
500 SOLUTION INTRAVENOUS PRN
OUTPATIENT
Start: 2024-01-17

## 2023-11-15 RX ORDER — HEPARIN SODIUM (PORCINE) LOCK FLUSH IV SOLN 100 UNIT/ML 100 UNIT/ML
500 SOLUTION INTRAVENOUS PRN
OUTPATIENT
Start: 2023-12-06

## 2023-11-15 RX ORDER — ONDANSETRON 2 MG/ML
8 INJECTION INTRAMUSCULAR; INTRAVENOUS
OUTPATIENT
Start: 2023-12-06

## 2023-11-15 RX ORDER — ONDANSETRON 2 MG/ML
8 INJECTION INTRAMUSCULAR; INTRAVENOUS
OUTPATIENT
Start: 2023-12-27

## 2023-11-15 RX ORDER — SODIUM CHLORIDE 0.9 % (FLUSH) 0.9 %
5-40 SYRINGE (ML) INJECTION PRN
OUTPATIENT
Start: 2023-12-06

## 2023-11-15 RX ORDER — SODIUM CHLORIDE 9 MG/ML
5-250 INJECTION, SOLUTION INTRAVENOUS PRN
OUTPATIENT
Start: 2023-12-27

## 2023-11-15 RX ORDER — MEPERIDINE HYDROCHLORIDE 50 MG/ML
12.5 INJECTION INTRAMUSCULAR; INTRAVENOUS; SUBCUTANEOUS PRN
OUTPATIENT
Start: 2024-01-17

## 2023-11-15 RX ORDER — PALONOSETRON 0.05 MG/ML
0.25 INJECTION, SOLUTION INTRAVENOUS ONCE
Status: COMPLETED | OUTPATIENT
Start: 2023-11-15 | End: 2023-11-15

## 2023-11-15 RX ORDER — DIPHENHYDRAMINE HYDROCHLORIDE 50 MG/ML
50 INJECTION INTRAMUSCULAR; INTRAVENOUS
OUTPATIENT
Start: 2024-01-17

## 2023-11-15 RX ORDER — SODIUM CHLORIDE 9 MG/ML
5-250 INJECTION, SOLUTION INTRAVENOUS PRN
OUTPATIENT
Start: 2024-01-17

## 2023-11-15 RX ORDER — HEPARIN SODIUM (PORCINE) LOCK FLUSH IV SOLN 100 UNIT/ML 100 UNIT/ML
500 SOLUTION INTRAVENOUS PRN
OUTPATIENT
Start: 2023-12-27

## 2023-11-15 RX ORDER — DIPHENHYDRAMINE HYDROCHLORIDE 50 MG/ML
50 INJECTION INTRAMUSCULAR; INTRAVENOUS
OUTPATIENT
Start: 2023-12-27

## 2023-11-15 RX ORDER — DIPHENHYDRAMINE HYDROCHLORIDE 50 MG/ML
50 INJECTION INTRAMUSCULAR; INTRAVENOUS
OUTPATIENT
Start: 2023-12-06

## 2023-11-15 RX ORDER — ACETAMINOPHEN 325 MG/1
650 TABLET ORAL
OUTPATIENT
Start: 2023-12-27

## 2023-11-15 RX ORDER — SODIUM CHLORIDE 0.9 % (FLUSH) 0.9 %
5-40 SYRINGE (ML) INJECTION PRN
Status: DISCONTINUED | OUTPATIENT
Start: 2023-11-15 | End: 2023-11-16 | Stop reason: HOSPADM

## 2023-11-15 RX ORDER — SODIUM CHLORIDE 0.9 % (FLUSH) 0.9 %
5-40 SYRINGE (ML) INJECTION PRN
OUTPATIENT
Start: 2024-01-17

## 2023-11-15 RX ORDER — 0.9 % SODIUM CHLORIDE 0.9 %
1000 INTRAVENOUS SOLUTION INTRAVENOUS ONCE
Start: 2023-12-06

## 2023-11-15 RX ORDER — 0.9 % SODIUM CHLORIDE 0.9 %
1000 INTRAVENOUS SOLUTION INTRAVENOUS ONCE
Start: 2024-01-17

## 2023-11-15 RX ORDER — ALBUTEROL SULFATE 90 UG/1
4 AEROSOL, METERED RESPIRATORY (INHALATION) PRN
OUTPATIENT
Start: 2023-12-27

## 2023-11-15 RX ORDER — 0.9 % SODIUM CHLORIDE 0.9 %
1000 INTRAVENOUS SOLUTION INTRAVENOUS ONCE
Start: 2023-12-27

## 2023-11-15 RX ORDER — SODIUM CHLORIDE 9 MG/ML
INJECTION, SOLUTION INTRAVENOUS CONTINUOUS
OUTPATIENT
Start: 2023-12-27

## 2023-11-15 RX ORDER — ALBUTEROL SULFATE 90 UG/1
4 AEROSOL, METERED RESPIRATORY (INHALATION) PRN
OUTPATIENT
Start: 2024-01-17

## 2023-11-15 RX ORDER — 0.9 % SODIUM CHLORIDE 0.9 %
1000 INTRAVENOUS SOLUTION INTRAVENOUS ONCE
Status: CANCELLED
Start: 2023-11-15

## 2023-11-15 RX ORDER — MEPERIDINE HYDROCHLORIDE 50 MG/ML
12.5 INJECTION INTRAMUSCULAR; INTRAVENOUS; SUBCUTANEOUS PRN
OUTPATIENT
Start: 2023-12-06

## 2023-11-15 RX ORDER — EPINEPHRINE 1 MG/ML
0.3 INJECTION, SOLUTION, CONCENTRATE INTRAVENOUS PRN
OUTPATIENT
Start: 2023-12-06

## 2023-11-15 RX ORDER — SODIUM CHLORIDE 9 MG/ML
INJECTION, SOLUTION INTRAVENOUS CONTINUOUS
OUTPATIENT
Start: 2023-12-06

## 2023-11-15 RX ORDER — PALONOSETRON 0.05 MG/ML
0.25 INJECTION, SOLUTION INTRAVENOUS ONCE
OUTPATIENT
Start: 2024-01-17 | End: 2024-01-17

## 2023-11-15 RX ORDER — 0.9 % SODIUM CHLORIDE 0.9 %
1000 INTRAVENOUS SOLUTION INTRAVENOUS ONCE
Status: COMPLETED | OUTPATIENT
Start: 2023-11-15 | End: 2023-11-15

## 2023-11-15 RX ORDER — ALBUTEROL SULFATE 90 UG/1
4 AEROSOL, METERED RESPIRATORY (INHALATION) PRN
OUTPATIENT
Start: 2023-12-06

## 2023-11-15 RX ORDER — MEPERIDINE HYDROCHLORIDE 50 MG/ML
12.5 INJECTION INTRAMUSCULAR; INTRAVENOUS; SUBCUTANEOUS PRN
OUTPATIENT
Start: 2023-12-27

## 2023-11-15 RX ORDER — ACETAMINOPHEN 325 MG/1
650 TABLET ORAL
OUTPATIENT
Start: 2024-01-17

## 2023-11-15 RX ORDER — PALONOSETRON 0.05 MG/ML
0.25 INJECTION, SOLUTION INTRAVENOUS ONCE
OUTPATIENT
Start: 2023-12-27 | End: 2023-12-27

## 2023-11-15 RX ORDER — FAMOTIDINE 10 MG/ML
20 INJECTION, SOLUTION INTRAVENOUS
OUTPATIENT
Start: 2023-12-27

## 2023-11-15 RX ORDER — EPINEPHRINE 1 MG/ML
0.3 INJECTION, SOLUTION, CONCENTRATE INTRAVENOUS PRN
OUTPATIENT
Start: 2023-12-27

## 2023-11-15 RX ORDER — PALONOSETRON 0.05 MG/ML
0.25 INJECTION, SOLUTION INTRAVENOUS ONCE
OUTPATIENT
Start: 2023-12-06 | End: 2023-12-06

## 2023-11-15 RX ORDER — SODIUM CHLORIDE 9 MG/ML
INJECTION, SOLUTION INTRAVENOUS CONTINUOUS
OUTPATIENT
Start: 2024-01-17

## 2023-11-15 RX ORDER — FAMOTIDINE 10 MG/ML
20 INJECTION, SOLUTION INTRAVENOUS
OUTPATIENT
Start: 2023-12-06

## 2023-11-15 RX ORDER — SODIUM CHLORIDE 9 MG/ML
5-250 INJECTION, SOLUTION INTRAVENOUS PRN
Status: DISCONTINUED | OUTPATIENT
Start: 2023-11-15 | End: 2023-11-16 | Stop reason: HOSPADM

## 2023-11-15 RX ORDER — FAMOTIDINE 10 MG/ML
20 INJECTION, SOLUTION INTRAVENOUS
OUTPATIENT
Start: 2024-01-17

## 2023-11-15 RX ORDER — ONDANSETRON 2 MG/ML
8 INJECTION INTRAMUSCULAR; INTRAVENOUS
OUTPATIENT
Start: 2024-01-17

## 2023-11-15 RX ADMIN — SODIUM CHLORIDE 1000 ML: 9 INJECTION, SOLUTION INTRAVENOUS at 12:15

## 2023-11-15 RX ADMIN — DEXAMETHASONE SODIUM PHOSPHATE 12 MG: 4 INJECTION, SOLUTION INTRA-ARTICULAR; INTRALESIONAL; INTRAMUSCULAR; INTRAVENOUS; SOFT TISSUE at 11:54

## 2023-11-15 RX ADMIN — SODIUM CHLORIDE 25 ML/HR: 9 INJECTION, SOLUTION INTRAVENOUS at 13:32

## 2023-11-15 RX ADMIN — LURBINECTEDIN 7.25 MG: 0.5 INJECTION, POWDER, LYOPHILIZED, FOR SOLUTION INTRAVENOUS at 13:32

## 2023-11-15 RX ADMIN — PALONOSETRON HYDROCHLORIDE 0.25 MG: 0.25 INJECTION INTRAVENOUS at 11:50

## 2023-11-15 ASSESSMENT — PAIN SCALES - GENERAL: PAINLEVEL_OUTOF10: 0

## 2023-11-15 NOTE — PROGRESS NOTES
Nelli Shay. is a 72 y.o. male follow up for SCC of pancreas. 1. Have you been to the ER, urgent care clinic since your last visit? Hospitalized since your last visit?no    2. Have you seen or consulted any other health care providers outside of the [address] [place] [address] Avenue since your last visit? Include any pap smears or colon screening.  no
Increased size of pancreatic head mass status post biliary stent placement. No intrahepatic biliary dilatation. 3.  Retroperitoneal adenopathy increased. CT C/A/P 4/6/2023:  1. Overall picture of treatment response. Decreased pancreatic head mass and retroperitoneal lymphadenopathy. 2.  Decreased density of multifocal liver lesions which appear increased in size but may be pseudoprogression from lesional edema and necrosis rather than true progression. Continued attention on follow-up recommended. 3.  No evidence of intrathoracic metastatic disease. CT C/A/P 7/9/2023:  1. Interval continued decrease in size of the multifocal hypodense liver  metastases. 2.  Pancreatic head mass is indistinct, small, and difficult to measure from the surrounding parenchyma. The dominant pancreatic mass is certainly not larger. 3.  No evidence of intrathoracic metastatic disease. CT C/A/P 10/12/2023:  Progressive disease with increased size of pancreatic head mass with  interval development of upstream pancreatic duct dilation, increased size and number of diffuse hepatic metastases, and new enlarged vaishnavi hepatis and retroperitoneal probable metastatic lymphadenopathy      Assessment:   Metastatic Small Cell Carcinoma of the Pancreas  He has disease within his pancreas, invading into the duodenum. He additionally has metastatic disease within his liver, confirmed with biopsy. His cancer is not curable and management is with  palliative intent. CT imaging with progression on second line chemotherapy with FOLFIRI. Will initiate therapy with Lurbinectedin today. Tolerated first cycle with exception of myalgias and concern for dehydration. Will proceed with next cycle today as ordered. Add IV Hydration today and next week for additional support. The patient will be seen with each cycle of therapy, and labs will be monitored to assess for toxicity from therapy. Repeat imaging after C3.  At progression we can

## 2023-11-17 ENCOUNTER — TELEPHONE (OUTPATIENT)
Age: 73
End: 2023-11-17

## 2023-11-17 NOTE — TELEPHONE ENCOUNTER
Denzel Solis at [facility]  [phone]    11/17/23 4:45 pm- Incoming call from pt wife she reported pt continues to feel fatigue, weak, nausea with one episode of vomiting and mild chills since treatment. Denies any fevers,coughing, diarrhea or any other symptoms. Has not eaten or drank much today due to sleeping. Inquiring if he can come in for IVF. Informed her that IVF are not an option today due to office closing soon. [facility] if symptoms worsen. [facility]. He is set up next week already for IVF. No further questions or concerns.

## 2023-11-20 DIAGNOSIS — C25.0 MALIGNANT NEOPLASM OF HEAD OF PANCREAS (HCC): ICD-10-CM

## 2023-11-20 DIAGNOSIS — R11.2 NAUSEA AND VOMITING, UNSPECIFIED VOMITING TYPE: Primary | ICD-10-CM

## 2023-11-20 RX ORDER — 0.9 % SODIUM CHLORIDE 0.9 %
1000 INTRAVENOUS SOLUTION INTRAVENOUS ONCE
Status: CANCELLED
Start: 2023-11-20 | End: 2023-11-20

## 2023-11-22 ENCOUNTER — HOSPITAL ENCOUNTER (OUTPATIENT)
Facility: HOSPITAL | Age: 73
Setting detail: INFUSION SERIES
Discharge: HOME | End: 2023-11-22
Payer: MEDICARE

## 2023-11-22 VITALS
DIASTOLIC BLOOD PRESSURE: 67 MMHG | RESPIRATION RATE: 18 BRPM | SYSTOLIC BLOOD PRESSURE: 153 MMHG | OXYGEN SATURATION: 96 % | HEART RATE: 55 BPM

## 2023-11-22 DIAGNOSIS — R11.2 NAUSEA AND VOMITING, UNSPECIFIED VOMITING TYPE: Primary | ICD-10-CM

## 2023-11-22 DIAGNOSIS — C25.0 MALIGNANT NEOPLASM OF HEAD OF PANCREAS (HCC): ICD-10-CM

## 2023-11-22 LAB
ANION GAP SERPL CALC-SCNC: 7 MMOL/L (ref 5–15)
BUN SERPL-MCNC: 20 MG/DL (ref 6–20)
BUN/CREAT SERPL: 18 (ref 12–20)
CALCIUM SERPL-MCNC: 8.8 MG/DL (ref 8.5–10.1)
CHLORIDE SERPL-SCNC: 95 MMOL/L (ref 97–108)
CO2 SERPL-SCNC: 26 MMOL/L (ref 21–32)
CREAT SERPL-MCNC: 1.1 MG/DL (ref 0.7–1.3)
GLUCOSE SERPL-MCNC: 492 MG/DL (ref 65–100)
POTASSIUM SERPL-SCNC: 3.8 MMOL/L (ref 3.5–5.1)
SODIUM SERPL-SCNC: 128 MMOL/L (ref 136–145)

## 2023-11-22 PROCEDURE — 96361 HYDRATE IV INFUSION ADD-ON: CPT

## 2023-11-22 PROCEDURE — 96375 TX/PRO/DX INJ NEW DRUG ADDON: CPT

## 2023-11-22 PROCEDURE — 2580000003 HC RX 258: Performed by: NURSE PRACTITIONER

## 2023-11-22 PROCEDURE — 80048 BASIC METABOLIC PNL TOTAL CA: CPT

## 2023-11-22 PROCEDURE — 96360 HYDRATION IV INFUSION INIT: CPT

## 2023-11-22 PROCEDURE — 6360000002 HC RX W HCPCS: Performed by: INTERNAL MEDICINE

## 2023-11-22 PROCEDURE — 96374 THER/PROPH/DIAG INJ IV PUSH: CPT

## 2023-11-22 RX ORDER — ONDANSETRON 2 MG/ML
8 INJECTION INTRAMUSCULAR; INTRAVENOUS ONCE
Status: COMPLETED | OUTPATIENT
Start: 2023-11-22 | End: 2023-11-22

## 2023-11-22 RX ORDER — HEPARIN 100 UNIT/ML
500 SYRINGE INTRAVENOUS PRN
OUTPATIENT
Start: 2023-11-22

## 2023-11-22 RX ORDER — SODIUM CHLORIDE 9 MG/ML
5-250 INJECTION, SOLUTION INTRAVENOUS PRN
OUTPATIENT
Start: 2023-11-22

## 2023-11-22 RX ORDER — SODIUM CHLORIDE 0.9 % (FLUSH) 0.9 %
5-40 SYRINGE (ML) INJECTION PRN
OUTPATIENT
Start: 2023-11-22

## 2023-11-22 RX ORDER — 0.9 % SODIUM CHLORIDE 0.9 %
1000 INTRAVENOUS SOLUTION INTRAVENOUS ONCE
Status: COMPLETED | OUTPATIENT
Start: 2023-11-22 | End: 2023-11-22

## 2023-11-22 RX ORDER — 0.9 % SODIUM CHLORIDE 0.9 %
1000 INTRAVENOUS SOLUTION INTRAVENOUS ONCE
Status: CANCELLED
Start: 2023-11-22 | End: 2023-11-22

## 2023-11-22 RX ADMIN — SODIUM CHLORIDE 1000 ML: 9 INJECTION, SOLUTION INTRAVENOUS at 14:16

## 2023-11-22 RX ADMIN — ONDANSETRON 8 MG: 2 INJECTION INTRAMUSCULAR; INTRAVENOUS at 14:29

## 2023-11-22 ASSESSMENT — PAIN SCALES - GENERAL: PAINLEVEL_OUTOF10: 0

## 2023-11-22 NOTE — PROGRESS NOTES
[facility] Progress Note        Date: 23    Name: Naheed Pena. MRN: [id number]         : 1950      Mr. Woodrow Cazares admitted to [facility] for Supplemental Hydration ambulatory in stable condition. Assessment completed. Patient reported nausea, vomiting, and weakness. Right port a cath accessed using 0.75\" nuñez, blood return obtained and port flushed without difficulty. Labs drawn and sent for processing. Vitals:    23 1400 23 1515   BP: 130/72 (!) 153/67   Pulse: 82 55   Resp: 18    TempSrc: Temporal    SpO2: 96%            Results for orders placed or performed during the hospital encounter of    Basic Metabolic Panel   Result Value Ref Range    Sodium 128 (L) 136 - 145 mmol/L    Potassium 3.8 3.5 - 5.1 mmol/L    Chloride 95 (L) 97 - 108 mmol/L    CO2 26 21 - 32 mmol/L    Anion Gap 7 5 - 15 mmol/L    Glucose 492 (H) 65 - 100 mg/dL    BUN 20 6 - 20 MG/DL    Creatinine 1.10 0.70 - 1.30 MG/DL    Bun/Cre Ratio 18 12 - 20      Est, Glom Filt Rate >60 >60 ml/min/1.73m2    Calcium 8.8 8.5 - 10.1 MG/DL             Medications:  MEDICATIONS GIVEN:  Medications Administered         ondansetron (ZOFRAN) injection 8 mg Admin Date  2023 Action  Given Dose  8 mg Rate   Route  IntraVENous Administered By  Rudolph Giang RN        sodium chloride 0.9 % bolus 1,000 mL Admin Date  2023 Action  New Bag Dose  1,000 mL Rate  983.6 mL/hr Route  IntraVENous Administered By  Rudolph Giang RN            Post-Infusion Vitals:  Vitals:    23 1515   BP: (!) 153/67   Pulse: 55   Resp:    SpO2:            Pt tolerated treatment well. Port maintained positive blood return throughout treatment, flushed with positive blood return at conclusion and de-accessed per protocol. D/c home ambulatory in no distress. Patient is aware of next appointment on 23 @ 0900 at the [facility]. [place] location.         Future Appointments:  Future Appointments   Date

## 2023-11-30 ENCOUNTER — TELEPHONE (OUTPATIENT)
Age: 73
End: 2023-11-30

## 2023-11-30 NOTE — TELEPHONE ENCOUNTER
Call to patient to check in on his condition. Required IV hydration support last week (11/22). States he is doing well, had vomiting with this cycle but this is resolved now. He is eating and drinking better. Drinking Gatorade and water. Denies fever or chills. No current nausea. Will fu next week as scheduled. Encouraged him to call with any further needs.

## 2023-12-08 ENCOUNTER — HOSPITAL ENCOUNTER (OUTPATIENT)
Facility: HOSPITAL | Age: 73
Setting detail: INFUSION SERIES
Discharge: HOME | End: 2023-12-08
Payer: MEDICARE

## 2023-12-08 ENCOUNTER — OFFICE VISIT (OUTPATIENT)
Age: 73
End: 2023-12-08
Payer: MEDICARE

## 2023-12-08 VITALS
DIASTOLIC BLOOD PRESSURE: 71 MMHG | WEIGHT: 223.9 LBS | RESPIRATION RATE: 18 BRPM | SYSTOLIC BLOOD PRESSURE: 140 MMHG | HEART RATE: 62 BPM | HEIGHT: 70 IN | OXYGEN SATURATION: 93 % | BODY MASS INDEX: 32.05 KG/M2

## 2023-12-08 VITALS
SYSTOLIC BLOOD PRESSURE: 145 MMHG | OXYGEN SATURATION: 93 % | WEIGHT: 223 LBS | RESPIRATION RATE: 18 BRPM | BODY MASS INDEX: 31.92 KG/M2 | DIASTOLIC BLOOD PRESSURE: 81 MMHG | HEART RATE: 62 BPM | HEIGHT: 70 IN

## 2023-12-08 DIAGNOSIS — R16.0 LIVER MASS: Primary | ICD-10-CM

## 2023-12-08 DIAGNOSIS — K86.89 PANCREATIC MASS: ICD-10-CM

## 2023-12-08 DIAGNOSIS — C7A.8 NEUROENDOCRINE CARCINOMA OF PANCREAS (HCC): ICD-10-CM

## 2023-12-08 DIAGNOSIS — C25.0 MALIGNANT NEOPLASM OF HEAD OF PANCREAS (HCC): ICD-10-CM

## 2023-12-08 DIAGNOSIS — C25.0 MALIGNANT NEOPLASM OF HEAD OF PANCREAS (HCC): Primary | ICD-10-CM

## 2023-12-08 DIAGNOSIS — R11.2 NAUSEA AND VOMITING, UNSPECIFIED VOMITING TYPE: ICD-10-CM

## 2023-12-08 DIAGNOSIS — C80.1 SMALL CELL CARCINOMA (HCC): ICD-10-CM

## 2023-12-08 DIAGNOSIS — C78.7 METASTASIS TO LIVER (HCC): ICD-10-CM

## 2023-12-08 LAB
ALBUMIN SERPL-MCNC: 3 G/DL (ref 3.5–5)
ALBUMIN/GLOB SERPL: 0.8 (ref 1.1–2.2)
ALP SERPL-CCNC: 146 U/L (ref 45–117)
ALT SERPL-CCNC: 24 U/L (ref 12–78)
ANION GAP SERPL CALC-SCNC: 5 MMOL/L (ref 5–15)
AST SERPL-CCNC: 30 U/L (ref 15–37)
BASOPHILS # BLD: 0 K/UL (ref 0–0.1)
BASOPHILS NFR BLD: 1 % (ref 0–1)
BILIRUB SERPL-MCNC: 0.6 MG/DL (ref 0.2–1)
BUN SERPL-MCNC: 13 MG/DL (ref 6–20)
BUN/CREAT SERPL: 17 (ref 12–20)
CALCIUM SERPL-MCNC: 8.7 MG/DL (ref 8.5–10.1)
CHLORIDE SERPL-SCNC: 104 MMOL/L (ref 97–108)
CO2 SERPL-SCNC: 29 MMOL/L (ref 21–32)
CREAT SERPL-MCNC: 0.78 MG/DL (ref 0.7–1.3)
DIFFERENTIAL METHOD BLD: ABNORMAL
EOSINOPHIL # BLD: 0 K/UL (ref 0–0.4)
EOSINOPHIL NFR BLD: 1 % (ref 0–7)
ERYTHROCYTE [DISTWIDTH] IN BLOOD BY AUTOMATED COUNT: 18.7 % (ref 11.5–14.5)
GLOBULIN SER CALC-MCNC: 4 G/DL (ref 2–4)
GLUCOSE SERPL-MCNC: 167 MG/DL (ref 65–100)
HCT VFR BLD AUTO: 32.5 % (ref 36.6–50.3)
HGB BLD-MCNC: 10.3 G/DL (ref 12.1–17)
IMM GRANULOCYTES # BLD AUTO: 0 K/UL (ref 0–0.04)
IMM GRANULOCYTES NFR BLD AUTO: 1 % (ref 0–0.5)
LYMPHOCYTES # BLD: 0.8 K/UL (ref 0.8–3.5)
LYMPHOCYTES NFR BLD: 16 % (ref 12–49)
MCH RBC QN AUTO: 25.9 PG (ref 26–34)
MCHC RBC AUTO-ENTMCNC: 31.7 G/DL (ref 30–36.5)
MCV RBC AUTO: 81.9 FL (ref 80–99)
MONOCYTES # BLD: 0.4 K/UL (ref 0–1)
MONOCYTES NFR BLD: 9 % (ref 5–13)
NEUTS SEG # BLD: 3.6 K/UL (ref 1.8–8)
NEUTS SEG NFR BLD: 72 % (ref 32–75)
NRBC # BLD: 0 K/UL (ref 0–0.01)
NRBC BLD-RTO: 0 PER 100 WBC
PLATELET # BLD AUTO: 141 K/UL (ref 150–400)
PMV BLD AUTO: 9 FL (ref 8.9–12.9)
POTASSIUM SERPL-SCNC: 3.6 MMOL/L (ref 3.5–5.1)
PROT SERPL-MCNC: 7 G/DL (ref 6.4–8.2)
RBC # BLD AUTO: 3.97 M/UL (ref 4.1–5.7)
RBC MORPH BLD: ABNORMAL
SODIUM SERPL-SCNC: 138 MMOL/L (ref 136–145)
WBC # BLD AUTO: 4.8 K/UL (ref 4.1–11.1)

## 2023-12-08 PROCEDURE — 80053 COMPREHEN METABOLIC PANEL: CPT

## 2023-12-08 PROCEDURE — 96360 HYDRATION IV INFUSION INIT: CPT

## 2023-12-08 PROCEDURE — G8417 CALC BMI ABV UP PARAM F/U: HCPCS | Performed by: INTERNAL MEDICINE

## 2023-12-08 PROCEDURE — 85025 COMPLETE CBC W/AUTO DIFF WBC: CPT

## 2023-12-08 PROCEDURE — 96361 HYDRATE IV INFUSION ADD-ON: CPT

## 2023-12-08 PROCEDURE — 1123F ACP DISCUSS/DSCN MKR DOCD: CPT | Performed by: INTERNAL MEDICINE

## 2023-12-08 PROCEDURE — 3017F COLORECTAL CA SCREEN DOC REV: CPT | Performed by: INTERNAL MEDICINE

## 2023-12-08 PROCEDURE — 3079F DIAST BP 80-89 MM HG: CPT | Performed by: INTERNAL MEDICINE

## 2023-12-08 PROCEDURE — 96375 TX/PRO/DX INJ NEW DRUG ADDON: CPT

## 2023-12-08 PROCEDURE — 2580000003 HC RX 258: Performed by: NURSE PRACTITIONER

## 2023-12-08 PROCEDURE — 96413 CHEMO IV INFUSION 1 HR: CPT

## 2023-12-08 PROCEDURE — 96367 TX/PROPH/DG ADDL SEQ IV INF: CPT

## 2023-12-08 PROCEDURE — 99215 OFFICE O/P EST HI 40 MIN: CPT | Performed by: INTERNAL MEDICINE

## 2023-12-08 PROCEDURE — 3077F SYST BP >= 140 MM HG: CPT | Performed by: INTERNAL MEDICINE

## 2023-12-08 PROCEDURE — G8484 FLU IMMUNIZE NO ADMIN: HCPCS | Performed by: INTERNAL MEDICINE

## 2023-12-08 PROCEDURE — 6360000002 HC RX W HCPCS: Performed by: NURSE PRACTITIONER

## 2023-12-08 PROCEDURE — G8427 DOCREV CUR MEDS BY ELIG CLIN: HCPCS | Performed by: INTERNAL MEDICINE

## 2023-12-08 PROCEDURE — 1036F TOBACCO NON-USER: CPT | Performed by: INTERNAL MEDICINE

## 2023-12-08 RX ORDER — SODIUM CHLORIDE 0.9 % (FLUSH) 0.9 %
5-40 SYRINGE (ML) INJECTION PRN
Status: DISCONTINUED | OUTPATIENT
Start: 2023-12-08 | End: 2023-12-09 | Stop reason: HOSPADM

## 2023-12-08 RX ORDER — HEPARIN 100 UNIT/ML
500 SYRINGE INTRAVENOUS PRN
Status: DISCONTINUED | OUTPATIENT
Start: 2023-12-08 | End: 2023-12-09 | Stop reason: HOSPADM

## 2023-12-08 RX ORDER — SODIUM CHLORIDE 9 MG/ML
5-250 INJECTION, SOLUTION INTRAVENOUS PRN
Status: DISCONTINUED | OUTPATIENT
Start: 2023-12-08 | End: 2023-12-09 | Stop reason: HOSPADM

## 2023-12-08 RX ORDER — 0.9 % SODIUM CHLORIDE 0.9 %
1000 INTRAVENOUS SOLUTION INTRAVENOUS ONCE
Status: COMPLETED | OUTPATIENT
Start: 2023-12-08 | End: 2023-12-08

## 2023-12-08 RX ORDER — PALONOSETRON 0.05 MG/ML
0.25 INJECTION, SOLUTION INTRAVENOUS ONCE
Status: COMPLETED | OUTPATIENT
Start: 2023-12-08 | End: 2023-12-08

## 2023-12-08 RX ORDER — 0.9 % SODIUM CHLORIDE 0.9 %
1000 INTRAVENOUS SOLUTION INTRAVENOUS
Start: 2023-12-08

## 2023-12-08 RX ADMIN — PALONOSETRON HYDROCHLORIDE 0.25 MG: 0.25 INJECTION INTRAVENOUS at 13:54

## 2023-12-08 RX ADMIN — LURBINECTEDIN 7.25 MG: 0.5 INJECTION, POWDER, LYOPHILIZED, FOR SOLUTION INTRAVENOUS at 14:59

## 2023-12-08 RX ADMIN — DEXAMETHASONE SODIUM PHOSPHATE 12 MG: 4 INJECTION, SOLUTION INTRA-ARTICULAR; INTRALESIONAL; INTRAMUSCULAR; INTRAVENOUS; SOFT TISSUE at 13:57

## 2023-12-08 RX ADMIN — SODIUM CHLORIDE, PRESERVATIVE FREE 20 ML: 5 INJECTION INTRAVENOUS at 16:22

## 2023-12-08 RX ADMIN — SODIUM CHLORIDE 1000 ML: 9 INJECTION, SOLUTION INTRAVENOUS at 13:02

## 2023-12-08 ASSESSMENT — PAIN SCALES - GENERAL: PAINLEVEL_OUTOF10: 0

## 2023-12-08 NOTE — PROGRESS NOTES
João Romero is a 72 y.o. male follow up for SCC of pancreas. 1. Have you been to the ER, urgent care clinic since your last visit? Hospitalized since your last visit?no     2. Have you seen or consulted any other health care providers outside of the [address] [place] [address] since your last visit? Include any pap smears or colon screening. No     12/8/2023  10:00 AM   Vitals    SYSTOLIC 701 ! DIASTOLIC 81 !     Site    Position    Cuff Size    Pulse 62    Temp    Respirations 18    SpO2 93 %    Weight - Scale 223 lb 14.4 oz    Height 5' 10\"    Body Mass Index 32.13 kg/m2 (H)       Legend:  ! Abnormal  (H) High

## 2023-12-08 NOTE — PROGRESS NOTES
[facility] Progress Note    Date: 2023    Name: Tanisha Nayak MRN: [id number]         : 1950    Mr. hRea Glynn Arrived ambulatory and in no distress for C3D1 of Lurbinectedine/Hydration Regimen. Assessment was completed by [facility] RN, no acute issues at this time, no new complaints voiced. Right chest wall port accessed without difficulty, labs drawn & sent for processing. Patient proceed to appointment with Dr. Phyllis Gardiner. Mr. Belgica Bonilla vitals were reviewed. Vitals:    23 1619   BP: (!) 140/71   Pulse:    Resp:    SpO2:        Lab results were obtained and reviewed.   Recent Results (from the past 12 hour(s))   Comprehensive metabolic panel    Collection Time: 23 10:20 AM   Result Value Ref Range    Sodium 138 136 - 145 mmol/L    Potassium 3.6 3.5 - 5.1 mmol/L    Chloride 104 97 - 108 mmol/L    CO2 29 21 - 32 mmol/L    Anion Gap 5 5 - 15 mmol/L    Glucose 167 (H) 65 - 100 mg/dL    BUN 13 6 - 20 MG/DL    Creatinine 0.78 0.70 - 1.30 MG/DL    Bun/Cre Ratio 17 12 - 20      Est, Glom Filt Rate >60 >60 ml/min/1.73m2    Calcium 8.7 8.5 - 10.1 MG/DL    Total Bilirubin 0.6 0.2 - 1.0 MG/DL    ALT 24 12 - 78 U/L    AST 30 15 - 37 U/L    Alk Phosphatase 146 (H) 45 - 117 U/L    Total Protein 7.0 6.4 - 8.2 g/dL    Albumin 3.0 (L) 3.5 - 5.0 g/dL    Globulin 4.0 2.0 - 4.0 g/dL    Albumin/Globulin Ratio 0.8 (L) 1.1 - 2.2     CBC With Auto Differential    Collection Time: 23 10:20 AM   Result Value Ref Range    WBC 4.8 4.1 - 11.1 K/uL    RBC 3.97 (L) 4.10 - 5.70 M/uL    Hemoglobin 10.3 (L) 12.1 - 17.0 g/dL    Hematocrit 32.5 (L) 36.6 - 50.3 %    MCV 81.9 80.0 - 99.0 FL    MCH 25.9 (L) 26.0 - 34.0 PG    MCHC 31.7 30.0 - 36.5 g/dL    RDW 18.7 (H) 11.5 - 14.5 %    Platelets 316 (L) 336 - 400 K/uL    MPV 9.0 8.9 - 12.9 FL    Nucleated RBCs 0.0 0  WBC    nRBC 0.00 0.00 - 0.01 K/uL    Neutrophils % 72 32 - 75 %    Lymphocytes % 16 12 - 49 %    Monocytes % 9 5 - 13 %    Eosinophils % 1 0 - 7 %

## 2023-12-08 NOTE — PROGRESS NOTES
[facility] [place] at [address], [address] [place] [address]  W: [phone]  F: [phone]      Reason for Visit:   Nelli Harding is a 72 y.o. male who is seen today for evaluation of small cell carcinoma of the pancreas. Hematology/Oncology Treatment History:   CT A/P 4/19/2022: Possible mild acute pancreatitis. Prominent sigmoid diverticulosis. Enlarged prostate. Fat-containing hernias  CT Abdomen 5/17/2022: New intra-hepatic biliary dilatation and common bile duct dilatation. Ampullary prominence and 1.5 cm x 1.5 cm soft tissue density in the pancreaticoduodenal groove. New subcentimeter  hepatic hypodense lesions, worrisome for metastatic disease. MRI Abdomen 5/17/2022:  Periampullary pancreatic mass with possible extension into the wall of the duodenum. Associated intrahepatic and extra hepatic biliary dilatation and mild pancreatic ductal dilatation. Small hyperenhancing lesions throughout the  liver suspicious for metastatic disease. CT Chest 5/18/2022: no metastatic disease within thorax  ERCP by Dr. Whitney Chandra 5/18/2022: Large ulcerated mass seen in the second portion of the duodenum taking half the circumference in the area of the major papilla and extending distally about 3 to 4 cm, appeared to be pancreatic and eroding into the duodenum. Cholangiogram with complete obstruction of CBD. Metal stent deployed. 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 States he wonders if something he ate was bad.  Needed more

## 2023-12-12 ENCOUNTER — TELEPHONE (OUTPATIENT)
Age: 73
End: 2023-12-12

## 2023-12-15 ENCOUNTER — HOSPITAL ENCOUNTER (OUTPATIENT)
Facility: HOSPITAL | Age: 73
Setting detail: INFUSION SERIES
Discharge: HOME | End: 2023-12-15
Payer: MEDICARE

## 2023-12-15 VITALS
HEART RATE: 50 BPM | DIASTOLIC BLOOD PRESSURE: 66 MMHG | SYSTOLIC BLOOD PRESSURE: 157 MMHG | RESPIRATION RATE: 16 BRPM | OXYGEN SATURATION: 96 % | TEMPERATURE: 98 F

## 2023-12-15 DIAGNOSIS — C25.0 MALIGNANT NEOPLASM OF HEAD OF PANCREAS (HCC): ICD-10-CM

## 2023-12-15 DIAGNOSIS — R11.2 NAUSEA AND VOMITING, UNSPECIFIED VOMITING TYPE: Primary | ICD-10-CM

## 2023-12-15 LAB
ANION GAP SERPL CALC-SCNC: 6 MMOL/L (ref 5–15)
BUN SERPL-MCNC: 17 MG/DL (ref 6–20)
BUN/CREAT SERPL: 19 (ref 12–20)
CALCIUM SERPL-MCNC: 8.4 MG/DL (ref 8.5–10.1)
CHLORIDE SERPL-SCNC: 100 MMOL/L (ref 97–108)
CO2 SERPL-SCNC: 26 MMOL/L (ref 21–32)
CREAT SERPL-MCNC: 0.89 MG/DL (ref 0.7–1.3)
GLUCOSE SERPL-MCNC: 408 MG/DL (ref 65–100)
POTASSIUM SERPL-SCNC: 3.9 MMOL/L (ref 3.5–5.1)
SODIUM SERPL-SCNC: 132 MMOL/L (ref 136–145)

## 2023-12-15 PROCEDURE — 96360 HYDRATION IV INFUSION INIT: CPT

## 2023-12-15 PROCEDURE — 80048 BASIC METABOLIC PNL TOTAL CA: CPT

## 2023-12-15 PROCEDURE — 2580000003 HC RX 258: Performed by: NURSE PRACTITIONER

## 2023-12-15 RX ORDER — SODIUM CHLORIDE 0.9 % (FLUSH) 0.9 %
5-40 SYRINGE (ML) INJECTION PRN
OUTPATIENT
Start: 2023-12-20

## 2023-12-15 RX ORDER — ONDANSETRON 2 MG/ML
8 INJECTION INTRAMUSCULAR; INTRAVENOUS
Status: CANCELLED
Start: 2023-12-15

## 2023-12-15 RX ORDER — ONDANSETRON 2 MG/ML
8 INJECTION INTRAMUSCULAR; INTRAVENOUS
Start: 2023-12-20

## 2023-12-15 RX ORDER — SODIUM CHLORIDE 9 MG/ML
5-250 INJECTION, SOLUTION INTRAVENOUS PRN
OUTPATIENT
Start: 2023-12-20

## 2023-12-15 RX ORDER — HEPARIN 100 UNIT/ML
500 SYRINGE INTRAVENOUS PRN
OUTPATIENT
Start: 2023-12-20

## 2023-12-15 RX ORDER — 0.9 % SODIUM CHLORIDE 0.9 %
1000 INTRAVENOUS SOLUTION INTRAVENOUS
Status: COMPLETED | OUTPATIENT
Start: 2023-12-15 | End: 2023-12-15

## 2023-12-15 RX ORDER — 0.9 % SODIUM CHLORIDE 0.9 %
1000 INTRAVENOUS SOLUTION INTRAVENOUS
Start: 2023-12-20

## 2023-12-15 RX ADMIN — SODIUM CHLORIDE 1000 ML: 9 INJECTION, SOLUTION INTRAVENOUS at 10:43

## 2023-12-15 ASSESSMENT — PAIN SCALES - GENERAL: PAINLEVEL_OUTOF10: 0

## 2023-12-17 NOTE — PROGRESS NOTES
[facility] [place] at [address], [address] [place] [address]  W: [phone]  F: [phone]      Reason for Visit:   Prasanth Alberto. is a 72 y.o. male who is seen today for evaluation of small cell carcinoma of the pancreas. Hematology/Oncology Treatment History:   CT A/P 4/19/2022: Possible mild acute pancreatitis. Prominent sigmoid diverticulosis. Enlarged prostate. Fat-containing hernias  CT Abdomen 5/17/2022: New intra-hepatic biliary dilatation and common bile duct dilatation. Ampullary prominence and 1.5 cm x 1.5 cm soft tissue density in the pancreaticoduodenal groove. New subcentimeter  hepatic hypodense lesions, worrisome for metastatic disease. MRI Abdomen 5/17/2022:  Periampullary pancreatic mass with possible extension into the wall of the duodenum. Associated intrahepatic and extra hepatic biliary dilatation and mild pancreatic ductal dilatation. Small hyperenhancing lesions throughout the  liver suspicious for metastatic disease. CT Chest 5/18/2022: no metastatic disease within thorax  ERCP by Dr. Lee Bunch 5/18/2022: Large ulcerated mass seen in the second portion of the duodenum taking half the circumference in the area of the major papilla and extending distally about 3 to 4 cm, appeared to be pancreatic and eroding into the duodenum. Cholangiogram with complete obstruction of CBD. Metal stent deployed. 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 Trouble sleeping as a result.   Took half an oxycodone

## 2023-12-27 ENCOUNTER — HOSPITAL ENCOUNTER (OUTPATIENT)
Facility: HOSPITAL | Age: 73
Setting detail: INFUSION SERIES
Discharge: HOME | End: 2023-12-27
Payer: MEDICARE

## 2023-12-27 ENCOUNTER — OFFICE VISIT (OUTPATIENT)
Age: 73
End: 2023-12-27
Payer: MEDICARE

## 2023-12-27 VITALS
HEIGHT: 70 IN | DIASTOLIC BLOOD PRESSURE: 73 MMHG | SYSTOLIC BLOOD PRESSURE: 150 MMHG | WEIGHT: 216.4 LBS | HEART RATE: 57 BPM | RESPIRATION RATE: 16 BRPM | TEMPERATURE: 98.2 F | BODY MASS INDEX: 30.98 KG/M2 | OXYGEN SATURATION: 96 %

## 2023-12-27 VITALS
OXYGEN SATURATION: 96 % | WEIGHT: 216.4 LBS | RESPIRATION RATE: 16 BRPM | HEIGHT: 70 IN | HEART RATE: 80 BPM | SYSTOLIC BLOOD PRESSURE: 144 MMHG | TEMPERATURE: 98.2 F | DIASTOLIC BLOOD PRESSURE: 117 MMHG | BODY MASS INDEX: 30.98 KG/M2

## 2023-12-27 DIAGNOSIS — C25.0 MALIGNANT NEOPLASM OF HEAD OF PANCREAS (HCC): ICD-10-CM

## 2023-12-27 DIAGNOSIS — C80.1 SMALL CELL CARCINOMA (HCC): ICD-10-CM

## 2023-12-27 DIAGNOSIS — C7A.8 NEUROENDOCRINE CARCINOMA OF PANCREAS (HCC): Primary | ICD-10-CM

## 2023-12-27 DIAGNOSIS — R16.0 LIVER MASS: ICD-10-CM

## 2023-12-27 DIAGNOSIS — K86.89 PANCREATIC MASS: ICD-10-CM

## 2023-12-27 DIAGNOSIS — C78.7 METASTASIS TO LIVER (HCC): ICD-10-CM

## 2023-12-27 LAB
ALBUMIN SERPL-MCNC: 3.1 G/DL (ref 3.5–5)
ALBUMIN/GLOB SERPL: 0.7 (ref 1.1–2.2)
ALP SERPL-CCNC: 187 U/L (ref 45–117)
ALT SERPL-CCNC: 34 U/L (ref 12–78)
ANION GAP SERPL CALC-SCNC: 7 MMOL/L (ref 5–15)
AST SERPL-CCNC: 41 U/L (ref 15–37)
BASOPHILS # BLD: 0 K/UL (ref 0–0.1)
BASOPHILS NFR BLD: 1 % (ref 0–1)
BILIRUB SERPL-MCNC: 1.7 MG/DL (ref 0.2–1)
BUN SERPL-MCNC: 15 MG/DL (ref 6–20)
BUN/CREAT SERPL: 17 (ref 12–20)
CALCIUM SERPL-MCNC: 9.1 MG/DL (ref 8.5–10.1)
CHLORIDE SERPL-SCNC: 101 MMOL/L (ref 97–108)
CO2 SERPL-SCNC: 26 MMOL/L (ref 21–32)
CREAT SERPL-MCNC: 0.88 MG/DL (ref 0.7–1.3)
DIFFERENTIAL METHOD BLD: ABNORMAL
EOSINOPHIL # BLD: 0.1 K/UL (ref 0–0.4)
EOSINOPHIL NFR BLD: 2 % (ref 0–7)
ERYTHROCYTE [DISTWIDTH] IN BLOOD BY AUTOMATED COUNT: 18.4 % (ref 11.5–14.5)
GLOBULIN SER CALC-MCNC: 4.3 G/DL (ref 2–4)
GLUCOSE SERPL-MCNC: 236 MG/DL (ref 65–100)
HCT VFR BLD AUTO: 33 % (ref 36.6–50.3)
HGB BLD-MCNC: 11 G/DL (ref 12.1–17)
IMM GRANULOCYTES # BLD AUTO: 0 K/UL (ref 0–0.04)
IMM GRANULOCYTES NFR BLD AUTO: 1 % (ref 0–0.5)
LYMPHOCYTES # BLD: 0.7 K/UL (ref 0.8–3.5)
LYMPHOCYTES NFR BLD: 19 % (ref 12–49)
MCH RBC QN AUTO: 27.6 PG (ref 26–34)
MCHC RBC AUTO-ENTMCNC: 33.3 G/DL (ref 30–36.5)
MCV RBC AUTO: 82.7 FL (ref 80–99)
MONOCYTES # BLD: 0.6 K/UL (ref 0–1)
MONOCYTES NFR BLD: 15 % (ref 5–13)
NEUTS SEG # BLD: 2.3 K/UL (ref 1.8–8)
NEUTS SEG NFR BLD: 62 % (ref 32–75)
NRBC # BLD: 0 K/UL (ref 0–0.01)
NRBC BLD-RTO: 0 PER 100 WBC
PLATELET # BLD AUTO: 189 K/UL (ref 150–400)
PMV BLD AUTO: 9.5 FL (ref 8.9–12.9)
POTASSIUM SERPL-SCNC: 3.5 MMOL/L (ref 3.5–5.1)
PROT SERPL-MCNC: 7.4 G/DL (ref 6.4–8.2)
RBC # BLD AUTO: 3.99 M/UL (ref 4.1–5.7)
RBC MORPH BLD: ABNORMAL
SODIUM SERPL-SCNC: 134 MMOL/L (ref 136–145)
WBC # BLD AUTO: 3.7 K/UL (ref 4.1–11.1)

## 2023-12-27 PROCEDURE — 80053 COMPREHEN METABOLIC PANEL: CPT

## 2023-12-27 PROCEDURE — 99215 OFFICE O/P EST HI 40 MIN: CPT | Performed by: INTERNAL MEDICINE

## 2023-12-27 PROCEDURE — 2580000003 HC RX 258: Performed by: NURSE PRACTITIONER

## 2023-12-27 PROCEDURE — G8484 FLU IMMUNIZE NO ADMIN: HCPCS | Performed by: INTERNAL MEDICINE

## 2023-12-27 PROCEDURE — 1036F TOBACCO NON-USER: CPT | Performed by: INTERNAL MEDICINE

## 2023-12-27 PROCEDURE — 85025 COMPLETE CBC W/AUTO DIFF WBC: CPT

## 2023-12-27 PROCEDURE — 6370000000 HC RX 637 (ALT 250 FOR IP): Performed by: NURSE PRACTITIONER

## 2023-12-27 PROCEDURE — 6360000002 HC RX W HCPCS: Performed by: NURSE PRACTITIONER

## 2023-12-27 PROCEDURE — 96361 HYDRATE IV INFUSION ADD-ON: CPT

## 2023-12-27 PROCEDURE — 96413 CHEMO IV INFUSION 1 HR: CPT

## 2023-12-27 PROCEDURE — 3080F DIAST BP >= 90 MM HG: CPT | Performed by: INTERNAL MEDICINE

## 2023-12-27 PROCEDURE — 3077F SYST BP >= 140 MM HG: CPT | Performed by: INTERNAL MEDICINE

## 2023-12-27 PROCEDURE — G8417 CALC BMI ABV UP PARAM F/U: HCPCS | Performed by: INTERNAL MEDICINE

## 2023-12-27 PROCEDURE — 1123F ACP DISCUSS/DSCN MKR DOCD: CPT | Performed by: INTERNAL MEDICINE

## 2023-12-27 PROCEDURE — G8427 DOCREV CUR MEDS BY ELIG CLIN: HCPCS | Performed by: INTERNAL MEDICINE

## 2023-12-27 PROCEDURE — 96375 TX/PRO/DX INJ NEW DRUG ADDON: CPT

## 2023-12-27 PROCEDURE — 3017F COLORECTAL CA SCREEN DOC REV: CPT | Performed by: INTERNAL MEDICINE

## 2023-12-27 RX ORDER — ONDANSETRON HYDROCHLORIDE 8 MG/1
8 TABLET, FILM COATED ORAL EVERY 8 HOURS PRN
Qty: 60 TABLET | Refills: 3 | Status: SHIPPED | OUTPATIENT
Start: 2023-12-27

## 2023-12-27 RX ORDER — PROCHLORPERAZINE MALEATE 10 MG
10 TABLET ORAL EVERY 6 HOURS PRN
Qty: 60 TABLET | Refills: 3 | Status: SHIPPED | OUTPATIENT
Start: 2023-12-27

## 2023-12-27 RX ORDER — INSULIN GLARGINE 100 [IU]/ML
INJECTION, SOLUTION SUBCUTANEOUS
COMMUNITY

## 2023-12-27 RX ORDER — PALONOSETRON 0.05 MG/ML
0.25 INJECTION, SOLUTION INTRAVENOUS ONCE
Status: COMPLETED | OUTPATIENT
Start: 2023-12-27 | End: 2023-12-27

## 2023-12-27 RX ORDER — ACETAMINOPHEN 325 MG/1
650 TABLET ORAL
Status: COMPLETED | OUTPATIENT
Start: 2023-12-27 | End: 2023-12-27

## 2023-12-27 RX ORDER — SODIUM CHLORIDE 0.9 % (FLUSH) 0.9 %
5-40 SYRINGE (ML) INJECTION PRN
Status: DISCONTINUED | OUTPATIENT
Start: 2023-12-27 | End: 2023-12-28 | Stop reason: HOSPADM

## 2023-12-27 RX ORDER — SODIUM CHLORIDE 9 MG/ML
5-250 INJECTION, SOLUTION INTRAVENOUS PRN
Status: DISCONTINUED | OUTPATIENT
Start: 2023-12-27 | End: 2023-12-28 | Stop reason: HOSPADM

## 2023-12-27 RX ORDER — 0.9 % SODIUM CHLORIDE 0.9 %
1000 INTRAVENOUS SOLUTION INTRAVENOUS ONCE
Status: COMPLETED | OUTPATIENT
Start: 2023-12-27 | End: 2023-12-27

## 2023-12-27 RX ADMIN — PALONOSETRON HYDROCHLORIDE 0.25 MG: 0.25 INJECTION, SOLUTION INTRAVENOUS at 10:56

## 2023-12-27 RX ADMIN — DEXAMETHASONE SODIUM PHOSPHATE 12 MG: 4 INJECTION, SOLUTION INTRA-ARTICULAR; INTRALESIONAL; INTRAMUSCULAR; INTRAVENOUS; SOFT TISSUE at 11:00

## 2023-12-27 RX ADMIN — ACETAMINOPHEN 650 MG: 325 TABLET, FILM COATED ORAL at 11:29

## 2023-12-27 RX ADMIN — SODIUM CHLORIDE 1000 ML: 9 INJECTION, SOLUTION INTRAVENOUS at 11:25

## 2023-12-27 RX ADMIN — SODIUM CHLORIDE 25 ML/HR: 9 INJECTION, SOLUTION INTRAVENOUS at 10:56

## 2023-12-27 RX ADMIN — LURBINECTEDIN 7.25 MG: 0.5 INJECTION, POWDER, LYOPHILIZED, FOR SOLUTION INTRAVENOUS at 12:42

## 2023-12-27 RX ADMIN — SODIUM CHLORIDE, PRESERVATIVE FREE 20 ML: 5 INJECTION INTRAVENOUS at 13:50

## 2023-12-27 ASSESSMENT — PAIN DESCRIPTION - ORIENTATION: ORIENTATION: MID

## 2023-12-27 ASSESSMENT — PAIN SCALES - GENERAL: PAINLEVEL_OUTOF10: 2

## 2023-12-27 ASSESSMENT — PAIN DESCRIPTION - LOCATION: LOCATION: BACK

## 2023-12-27 ASSESSMENT — PAIN DESCRIPTION - DESCRIPTORS: DESCRIPTORS: DULL;ACHING

## 2023-12-29 ENCOUNTER — TELEPHONE (OUTPATIENT)
Age: 73
End: 2023-12-29

## 2023-12-29 NOTE — TELEPHONE ENCOUNTER
Denzel Solis at [facility]  [phone]    12/29/23- Patient reports constipation for a couple days, has been taking miralax every 6 hours with no relief. Stated he had 1 episode of vomiting yesterday, but had not taken antiemetics. He's taking them scheduled now. Patient reports eating okay and drinking lots of water. Discussed trying magnesium citrate for constipation, reviewed directions. Encouraged a return call if symptoms don't improve or worsen. He verbalized understanding. 2:36 PM- Patient called stating he drank 1/2 bottle of mag citrate 2 hours ago with no movement, so he drank the other 1/2 and vomited right after. Discussed with Dr. Lashae Agarwal, he recommends waiting a few more hours and then trying another laxative. He is also okay with patient doing an enema today or tomorrow since WBC was okay earlier this week. However, if patient continues to have vomiting, he recommends going to the ER for imaging due to concern for blockage. Patient verbalized understanding.

## 2024-01-01 ENCOUNTER — HOSPITAL ENCOUNTER (OUTPATIENT)
Facility: HOSPITAL | Age: 74
Setting detail: INFUSION SERIES
End: 2024-01-01

## 2024-01-01 ENCOUNTER — TELEPHONE (OUTPATIENT)
Age: 74
End: 2024-01-01

## 2024-01-03 ENCOUNTER — HOSPITAL ENCOUNTER (OUTPATIENT)
Facility: HOSPITAL | Age: 74
Setting detail: INFUSION SERIES
Discharge: HOME | End: 2024-01-03
Payer: MEDICARE

## 2024-01-03 VITALS
TEMPERATURE: 98 F | WEIGHT: 210.6 LBS | HEIGHT: 70 IN | OXYGEN SATURATION: 97 % | BODY MASS INDEX: 30.15 KG/M2 | RESPIRATION RATE: 16 BRPM | HEART RATE: 76 BPM | DIASTOLIC BLOOD PRESSURE: 70 MMHG | SYSTOLIC BLOOD PRESSURE: 130 MMHG

## 2024-01-03 DIAGNOSIS — C25.0 MALIGNANT NEOPLASM OF HEAD OF PANCREAS (HCC): Primary | ICD-10-CM

## 2024-01-03 DIAGNOSIS — R11.2 NAUSEA AND VOMITING, UNSPECIFIED VOMITING TYPE: ICD-10-CM

## 2024-01-03 LAB
ANION GAP SERPL CALC-SCNC: 7 MMOL/L (ref 5–15)
BUN SERPL-MCNC: 28 MG/DL (ref 6–20)
BUN/CREAT SERPL: 31 (ref 12–20)
CALCIUM SERPL-MCNC: 9.2 MG/DL (ref 8.5–10.1)
CHLORIDE SERPL-SCNC: 101 MMOL/L (ref 97–108)
CO2 SERPL-SCNC: 24 MMOL/L (ref 21–32)
CREAT SERPL-MCNC: 0.9 MG/DL (ref 0.7–1.3)
GLUCOSE SERPL-MCNC: 196 MG/DL (ref 65–100)
POTASSIUM SERPL-SCNC: 3.8 MMOL/L (ref 3.5–5.1)
SODIUM SERPL-SCNC: 132 MMOL/L (ref 136–145)

## 2024-01-03 PROCEDURE — 96360 HYDRATION IV INFUSION INIT: CPT

## 2024-01-03 PROCEDURE — 2580000003 HC RX 258: Performed by: NURSE PRACTITIONER

## 2024-01-03 PROCEDURE — 80048 BASIC METABOLIC PNL TOTAL CA: CPT

## 2024-01-03 RX ORDER — SODIUM CHLORIDE 0.9 % (FLUSH) 0.9 %
5-40 SYRINGE (ML) INJECTION PRN
Status: DISCONTINUED | OUTPATIENT
Start: 2024-01-03 | End: 2024-01-04 | Stop reason: HOSPADM

## 2024-01-03 RX ORDER — LIDOCAINE AND PRILOCAINE 25; 25 MG/G; MG/G
CREAM TOPICAL PRN
Qty: 30 G | Refills: 1 | Status: SHIPPED | OUTPATIENT
Start: 2024-01-03

## 2024-01-03 RX ORDER — 0.9 % SODIUM CHLORIDE 0.9 %
1000 INTRAVENOUS SOLUTION INTRAVENOUS
Start: 2024-01-05

## 2024-01-03 RX ORDER — SODIUM CHLORIDE 0.9 % (FLUSH) 0.9 %
5-40 SYRINGE (ML) INJECTION PRN
OUTPATIENT
Start: 2024-01-05

## 2024-01-03 RX ORDER — HEPARIN 100 UNIT/ML
500 SYRINGE INTRAVENOUS PRN
OUTPATIENT
Start: 2024-01-05

## 2024-01-03 RX ORDER — SODIUM CHLORIDE 9 MG/ML
5-250 INJECTION, SOLUTION INTRAVENOUS PRN
OUTPATIENT
Start: 2024-01-05

## 2024-01-03 RX ORDER — ONDANSETRON 2 MG/ML
8 INJECTION INTRAMUSCULAR; INTRAVENOUS
Start: 2024-01-05

## 2024-01-03 RX ORDER — 0.9 % SODIUM CHLORIDE 0.9 %
1000 INTRAVENOUS SOLUTION INTRAVENOUS
Status: COMPLETED | OUTPATIENT
Start: 2024-01-03 | End: 2024-01-03

## 2024-01-03 RX ADMIN — Medication 10 ML: at 13:10

## 2024-01-03 RX ADMIN — Medication 20 ML: at 14:59

## 2024-01-03 RX ADMIN — SODIUM CHLORIDE 1000 ML: 9 INJECTION, SOLUTION INTRAVENOUS at 13:28

## 2024-01-03 ASSESSMENT — PAIN SCALES - GENERAL: PAINLEVEL_OUTOF10: 0

## 2024-01-03 NOTE — PROGRESS NOTES
[facility] Progress Note    Date: January 3, 2024    Name: Sunny [place] Jr.    MRN: [id number]         : 1950    Mr. [place] arrived ambulatory and in no distress for Hydration.  Assessment was completed, patient stated he has been taking his nausea meds at home and declines the prn Zofran.  Right chest wall port accessed without difficulty, labs drawn & sent for processing.      Mr. [place]'s vitals were reviewed.  Vitals:    24 1312 24 1454   BP: 132/78 130/70   Pulse: 94 76   Resp: 16 16   Temp: 98 °F (36.7 °C)    TempSrc: Temporal    SpO2: 95% 97%   Weight: 95.5 kg (210 lb 9.6 oz)    Height: 1.778 m (5' 10\")         Lab results were obtained and reviewed.  Recent Results (from the past 12 hour(s))   Basic Metabolic Panel    Collection Time: 24  1:29 PM   Result Value Ref Range    Sodium 132 (L) 136 - 145 mmol/L    Potassium 3.8 3.5 - 5.1 mmol/L    Chloride 101 97 - 108 mmol/L    CO2 24 21 - 32 mmol/L    Anion Gap 7 5 - 15 mmol/L    Glucose 196 (H) 65 - 100 mg/dL    BUN 28 (H) 6 - 20 MG/DL    Creatinine 0.90 0.70 - 1.30 MG/DL    Bun/Cre Ratio 31 (H) 12 - 20      Est, Glom Filt Rate >60 >60 ml/min/1.73m2    Calcium 9.2 8.5 - 10.1 MG/DL       Medications:  Medications Administered         sodium chloride 0.9 % bolus 1,000 mL Admin Date  2024 Action  New Bag Dose  1,000 mL Rate  983.6 mL/hr Route  IntraVENous Administered By  Monica Eden RN        sodium chloride flush 0.9 % injection 5-40 mL Admin Date  2024 Action  Given Dose  10 mL Rate   Route  IntraVENous Administered By  Monica Eden RN        sodium chloride flush 0.9 % injection 5-40 mL Admin Date  2024 Action  Given Dose  20 mL Rate   Route  IntraVENous Administered By  Monica Eden, LEI             Mr. [place] tolerated treatment well and was [facility].   Port flushed and de-accessed per protocol. He is aware of the next scheduled appointment.    MONICA

## 2024-01-09 ENCOUNTER — TELEPHONE (OUTPATIENT)
Age: 74
End: 2024-01-09

## 2024-01-09 NOTE — TELEPHONE ENCOUNTER
Pt called in stating he has labs to do for our office and his pcp and would like to do them all at once on one order if Dr Solis would be able to do that. Pt does not want to get stuck multiple times    # [phone]

## 2024-01-09 NOTE — TELEPHONE ENCOUNTER
[facility] [place] at [facility]  [phone]    01/09/24- Patient stated he has lab orders from urology and endocrinology that he would like drawn at his [facility] appointment tomorrow.  Okay per Dottie Moraes.  Advised patient to bring the orders to his appointment.  He verbalized understanding and was appreciative.

## 2024-01-10 ENCOUNTER — HOSPITAL ENCOUNTER (OUTPATIENT)
Facility: HOSPITAL | Age: 74
Setting detail: INFUSION SERIES
Discharge: HOME | End: 2024-01-10
Payer: MEDICARE

## 2024-01-10 VITALS
TEMPERATURE: 97.7 F | SYSTOLIC BLOOD PRESSURE: 156 MMHG | DIASTOLIC BLOOD PRESSURE: 76 MMHG | HEIGHT: 70 IN | RESPIRATION RATE: 18 BRPM | HEART RATE: 67 BPM | BODY MASS INDEX: 30.22 KG/M2 | OXYGEN SATURATION: 93 %

## 2024-01-10 DIAGNOSIS — R11.2 NAUSEA AND VOMITING, UNSPECIFIED VOMITING TYPE: Primary | ICD-10-CM

## 2024-01-10 DIAGNOSIS — C25.0 MALIGNANT NEOPLASM OF HEAD OF PANCREAS (HCC): ICD-10-CM

## 2024-01-10 LAB
ALBUMIN SERPL-MCNC: 3 G/DL (ref 3.5–5)
ALBUMIN/GLOB SERPL: 0.7 (ref 1.1–2.2)
ALP SERPL-CCNC: 262 U/L (ref 45–117)
ALT SERPL-CCNC: 55 U/L (ref 12–78)
ANION GAP SERPL CALC-SCNC: 5 MMOL/L (ref 5–15)
AST SERPL-CCNC: 33 U/L (ref 15–37)
BILIRUB SERPL-MCNC: 1.2 MG/DL (ref 0.2–1)
BUN SERPL-MCNC: 16 MG/DL (ref 6–20)
BUN/CREAT SERPL: 15 (ref 12–20)
CALCIUM SERPL-MCNC: 8.9 MG/DL (ref 8.5–10.1)
CHLORIDE SERPL-SCNC: 98 MMOL/L (ref 97–108)
CHOLEST SERPL-MCNC: 147 MG/DL
CO2 SERPL-SCNC: 27 MMOL/L (ref 21–32)
CREAT SERPL-MCNC: 1.04 MG/DL (ref 0.7–1.3)
CREAT UR-MCNC: 128 MG/DL
ERYTHROCYTE [DISTWIDTH] IN BLOOD BY AUTOMATED COUNT: 17.7 % (ref 11.5–14.5)
EST. AVERAGE GLUCOSE BLD GHB EST-MCNC: 189 MG/DL
GLOBULIN SER CALC-MCNC: 4.2 G/DL (ref 2–4)
GLUCOSE SERPL-MCNC: 353 MG/DL (ref 65–100)
HBA1C MFR BLD: 8.2 % (ref 4–5.6)
HCT VFR BLD AUTO: 31.1 % (ref 36.6–50.3)
HDLC SERPL-MCNC: 29 MG/DL
HDLC SERPL: 5.1 (ref 0–5)
HGB BLD-MCNC: 10.5 G/DL (ref 12.1–17)
LDLC SERPL CALC-MCNC: 83.8 MG/DL (ref 0–100)
MCH RBC QN AUTO: 27.9 PG (ref 26–34)
MCHC RBC AUTO-ENTMCNC: 33.8 G/DL (ref 30–36.5)
MCV RBC AUTO: 82.5 FL (ref 80–99)
MICROALBUMIN UR-MCNC: 3.34 MG/DL
MICROALBUMIN/CREAT UR-RTO: 26 MG/G (ref 0–30)
NRBC # BLD: 0 K/UL (ref 0–0.01)
NRBC BLD-RTO: 0 PER 100 WBC
PLATELET # BLD AUTO: 141 K/UL (ref 150–400)
PMV BLD AUTO: 10.6 FL (ref 8.9–12.9)
POTASSIUM SERPL-SCNC: 3.5 MMOL/L (ref 3.5–5.1)
PROT SERPL-MCNC: 7.2 G/DL (ref 6.4–8.2)
PSA SERPL-MCNC: 4.1 NG/ML (ref 0.01–4)
RBC # BLD AUTO: 3.77 M/UL (ref 4.1–5.7)
SODIUM SERPL-SCNC: 130 MMOL/L (ref 136–145)
T4 FREE SERPL-MCNC: 1.3 NG/DL (ref 0.8–1.5)
TRIGL SERPL-MCNC: 171 MG/DL
TSH SERPL DL<=0.05 MIU/L-ACNC: 5.54 UIU/ML (ref 0.36–3.74)
VLDLC SERPL CALC-MCNC: 34.2 MG/DL
WBC # BLD AUTO: 2.3 K/UL (ref 4.1–11.1)

## 2024-01-10 PROCEDURE — 82570 ASSAY OF URINE CREATININE: CPT

## 2024-01-10 PROCEDURE — 85027 COMPLETE CBC AUTOMATED: CPT

## 2024-01-10 PROCEDURE — 96360 HYDRATION IV INFUSION INIT: CPT

## 2024-01-10 PROCEDURE — 80061 LIPID PANEL: CPT

## 2024-01-10 PROCEDURE — 83036 HEMOGLOBIN GLYCOSYLATED A1C: CPT

## 2024-01-10 PROCEDURE — 84153 ASSAY OF PSA TOTAL: CPT

## 2024-01-10 PROCEDURE — 82043 UR ALBUMIN QUANTITATIVE: CPT

## 2024-01-10 PROCEDURE — 2580000003 HC RX 258: Performed by: NURSE PRACTITIONER

## 2024-01-10 PROCEDURE — 80053 COMPREHEN METABOLIC PANEL: CPT

## 2024-01-10 PROCEDURE — 36415 COLL VENOUS BLD VENIPUNCTURE: CPT

## 2024-01-10 PROCEDURE — 6370000000 HC RX 637 (ALT 250 FOR IP): Performed by: NURSE PRACTITIONER

## 2024-01-10 PROCEDURE — 84443 ASSAY THYROID STIM HORMONE: CPT

## 2024-01-10 PROCEDURE — 84439 ASSAY OF FREE THYROXINE: CPT

## 2024-01-10 RX ORDER — SODIUM CHLORIDE 0.9 % (FLUSH) 0.9 %
5-40 SYRINGE (ML) INJECTION PRN
Status: CANCELLED | OUTPATIENT
Start: 2024-01-17

## 2024-01-10 RX ORDER — HEPARIN 100 UNIT/ML
500 SYRINGE INTRAVENOUS PRN
Status: CANCELLED | OUTPATIENT
Start: 2024-01-17

## 2024-01-10 RX ORDER — ACETAMINOPHEN 500 MG
1000 TABLET ORAL ONCE
Status: COMPLETED | OUTPATIENT
Start: 2024-01-10 | End: 2024-01-10

## 2024-01-10 RX ORDER — ONDANSETRON 2 MG/ML
8 INJECTION INTRAMUSCULAR; INTRAVENOUS
Status: CANCELLED
Start: 2024-01-17

## 2024-01-10 RX ORDER — 0.9 % SODIUM CHLORIDE 0.9 %
1000 INTRAVENOUS SOLUTION INTRAVENOUS
Status: COMPLETED | OUTPATIENT
Start: 2024-01-10 | End: 2024-01-10

## 2024-01-10 RX ORDER — SODIUM CHLORIDE 0.9 % (FLUSH) 0.9 %
5-40 SYRINGE (ML) INJECTION PRN
Status: DISCONTINUED | OUTPATIENT
Start: 2024-01-10 | End: 2024-01-11 | Stop reason: HOSPADM

## 2024-01-10 RX ORDER — 0.9 % SODIUM CHLORIDE 0.9 %
1000 INTRAVENOUS SOLUTION INTRAVENOUS
Status: CANCELLED
Start: 2024-01-17

## 2024-01-10 RX ORDER — SODIUM CHLORIDE 9 MG/ML
5-250 INJECTION, SOLUTION INTRAVENOUS PRN
Status: CANCELLED | OUTPATIENT
Start: 2024-01-17

## 2024-01-10 RX ADMIN — ACETAMINOPHEN 1000 MG: 500 TABLET ORAL at 13:46

## 2024-01-10 RX ADMIN — SODIUM CHLORIDE 1000 ML: 9 INJECTION, SOLUTION INTRAVENOUS at 13:32

## 2024-01-10 ASSESSMENT — PAIN DESCRIPTION - ORIENTATION: ORIENTATION: MID;LOWER

## 2024-01-10 ASSESSMENT — PAIN SCALES - GENERAL: PAINLEVEL_OUTOF10: 3

## 2024-01-10 ASSESSMENT — PAIN DESCRIPTION - DESCRIPTORS: DESCRIPTORS: ACHING

## 2024-01-10 ASSESSMENT — PAIN DESCRIPTION - LOCATION: LOCATION: BACK

## 2024-01-10 NOTE — PROGRESS NOTES
[facility] Progress Note    Date: January 10, 2024    Name: Sunny [place] Jr.    MRN: [id number]         : 1950    Mr. [place] Arrived ambulatory and in no distress for Hydration.  Assessment was completed, no acute issues at this time, pt complained of lower middle back pain, requested tylenol, NP placed orders tylenol. Right chest wall port accessed without difficulty, labs drawn & sent for processing. Additional lab orders entered per paper order provided by patient for outside MD. Lab order scanned into pt chart.       Mr. [place]'s vitals were reviewed.  Vitals:    01/10/24 1301   BP: (!) 156/76   Pulse: 67   Resp: 18   Temp: 97.7 °F (36.5 °C)   SpO2: 93%       Lab results were obtained and reviewed.  Recent Results (from the past 12 hour(s))   CBC    Collection Time: 01/10/24  1:14 PM   Result Value Ref Range    WBC 2.3 (L) 4.1 - 11.1 K/uL    RBC 3.77 (L) 4.10 - 5.70 M/uL    Hemoglobin 10.5 (L) 12.1 - 17.0 g/dL    Hematocrit 31.1 (L) 36.6 - 50.3 %    MCV 82.5 80.0 - 99.0 FL    MCH 27.9 26.0 - 34.0 PG    MCHC 33.8 30.0 - 36.5 g/dL    RDW 17.7 (H) 11.5 - 14.5 %    Platelets 141 (L) 150 - 400 K/uL    MPV 10.6 8.9 - 12.9 FL    Nucleated RBCs 0.0 0  WBC    nRBC 0.00 0.00 - 0.01 K/uL   Comprehensive Metabolic Panel    Collection Time: 01/10/24  1:14 PM   Result Value Ref Range    Sodium 130 (L) 136 - 145 mmol/L    Potassium 3.5 3.5 - 5.1 mmol/L    Chloride 98 97 - 108 mmol/L    CO2 27 21 - 32 mmol/L    Anion Gap 5 5 - 15 mmol/L    Glucose 353 (H) 65 - 100 mg/dL    BUN 16 6 - 20 MG/DL    Creatinine 1.04 0.70 - 1.30 MG/DL    Bun/Cre Ratio 15 12 - 20      Est, Glom Filt Rate >60 >60 ml/min/1.73m2    Calcium 8.9 8.5 - 10.1 MG/DL    Total Bilirubin 1.2 (H) 0.2 - 1.0 MG/DL    ALT 55 12 - 78 U/L    AST 33 15 - 37 U/L    Alk Phosphatase 262 (H) 45 - 117 U/L    Total Protein 7.2 6.4 - 8.2 g/dL    Albumin 3.0 (L) 3.5 - 5.0 g/dL    Globulin 4.2 (H) 2.0 - 4.0 g/dL    Albumin/Globulin Ratio 0.7 (L) 1.1 - 2.2

## 2024-01-11 ENCOUNTER — TELEPHONE (OUTPATIENT)
Age: 74
End: 2024-01-11

## 2024-01-11 NOTE — TELEPHONE ENCOUNTER
Brief Oncology Telephone Note    He calls reporting constipation. States last BM was 2 days ago, but it was scant. Yesterday, he had 4 doses of miralax without any relief. Today, he had 3 doses of dulcolax. He has had pressure but no movement. He inquires about whether he can safely take a dulcolax suppository. He has had mild nausea but no vomiting and no significant nausea in a few days. He has not required any anti-nausea medicine in ~3 days but was taking zofran.      Advised him that he can safely proceed with the dulcolax suppository. Reviewed that his zofran may have contributed to his nausea.

## 2024-01-12 ENCOUNTER — OFFICE VISIT (OUTPATIENT)
Age: 74
End: 2024-01-12
Payer: MEDICARE

## 2024-01-12 ENCOUNTER — HOSPITAL ENCOUNTER (OUTPATIENT)
Facility: HOSPITAL | Age: 74
Setting detail: INFUSION SERIES
Discharge: HOME | End: 2024-01-12
Payer: MEDICARE

## 2024-01-12 VITALS
RESPIRATION RATE: 18 BRPM | SYSTOLIC BLOOD PRESSURE: 138 MMHG | DIASTOLIC BLOOD PRESSURE: 82 MMHG | TEMPERATURE: 98 F | HEART RATE: 90 BPM

## 2024-01-12 VITALS
HEIGHT: 70 IN | HEART RATE: 90 BPM | BODY MASS INDEX: 29.92 KG/M2 | WEIGHT: 209 LBS | SYSTOLIC BLOOD PRESSURE: 138 MMHG | DIASTOLIC BLOOD PRESSURE: 82 MMHG | TEMPERATURE: 98 F | RESPIRATION RATE: 18 BRPM

## 2024-01-12 DIAGNOSIS — C7A.8 NEUROENDOCRINE CARCINOMA OF PANCREAS (HCC): ICD-10-CM

## 2024-01-12 DIAGNOSIS — C25.0 MALIGNANT NEOPLASM OF HEAD OF PANCREAS (HCC): Primary | ICD-10-CM

## 2024-01-12 DIAGNOSIS — R11.2 NAUSEA AND VOMITING, UNSPECIFIED VOMITING TYPE: ICD-10-CM

## 2024-01-12 DIAGNOSIS — C78.7 METASTASIS TO LIVER (HCC): ICD-10-CM

## 2024-01-12 DIAGNOSIS — C80.1 SMALL CELL CARCINOMA (HCC): ICD-10-CM

## 2024-01-12 DIAGNOSIS — K86.89 PANCREATIC MASS: ICD-10-CM

## 2024-01-12 DIAGNOSIS — R16.0 LIVER MASS: ICD-10-CM

## 2024-01-12 LAB
ALBUMIN SERPL-MCNC: 3.2 G/DL (ref 3.5–5)
ALBUMIN/GLOB SERPL: 0.7 (ref 1.1–2.2)
ALP SERPL-CCNC: 292 U/L (ref 45–117)
ALT SERPL-CCNC: 52 U/L (ref 12–78)
ANION GAP SERPL CALC-SCNC: 8 MMOL/L (ref 5–15)
AST SERPL-CCNC: 57 U/L (ref 15–37)
BASOPHILS # BLD: 0 K/UL (ref 0–0.1)
BASOPHILS NFR BLD: 0 % (ref 0–1)
BILIRUB SERPL-MCNC: 1 MG/DL (ref 0.2–1)
BUN SERPL-MCNC: 12 MG/DL (ref 6–20)
BUN/CREAT SERPL: 15 (ref 12–20)
CALCIUM SERPL-MCNC: 9 MG/DL (ref 8.5–10.1)
CHLORIDE SERPL-SCNC: 100 MMOL/L (ref 97–108)
CO2 SERPL-SCNC: 27 MMOL/L (ref 21–32)
CREAT SERPL-MCNC: 0.81 MG/DL (ref 0.7–1.3)
DIFFERENTIAL METHOD BLD: ABNORMAL
EOSINOPHIL # BLD: 0 K/UL (ref 0–0.4)
EOSINOPHIL NFR BLD: 1 % (ref 0–7)
ERYTHROCYTE [DISTWIDTH] IN BLOOD BY AUTOMATED COUNT: 18.3 % (ref 11.5–14.5)
GLOBULIN SER CALC-MCNC: 4.3 G/DL (ref 2–4)
GLUCOSE SERPL-MCNC: 173 MG/DL (ref 65–100)
HCT VFR BLD AUTO: 33.9 % (ref 36.6–50.3)
HGB BLD-MCNC: 11.5 G/DL (ref 12.1–17)
IMM GRANULOCYTES # BLD AUTO: 0 K/UL (ref 0–0.04)
IMM GRANULOCYTES NFR BLD AUTO: 1 % (ref 0–0.5)
LYMPHOCYTES # BLD: 0.3 K/UL (ref 0.8–3.5)
LYMPHOCYTES NFR BLD: 11 % (ref 12–49)
MCH RBC QN AUTO: 27.6 PG (ref 26–34)
MCHC RBC AUTO-ENTMCNC: 33.9 G/DL (ref 30–36.5)
MCV RBC AUTO: 81.5 FL (ref 80–99)
MONOCYTES # BLD: 0.6 K/UL (ref 0–1)
MONOCYTES NFR BLD: 19 % (ref 5–13)
NEUTS SEG # BLD: 2.2 K/UL (ref 1.8–8)
NEUTS SEG NFR BLD: 68 % (ref 32–75)
NRBC # BLD: 0 K/UL (ref 0–0.01)
NRBC BLD-RTO: 0 PER 100 WBC
PLATELET # BLD AUTO: 174 K/UL (ref 150–400)
PMV BLD AUTO: 10.1 FL (ref 8.9–12.9)
POTASSIUM SERPL-SCNC: 3 MMOL/L (ref 3.5–5.1)
PROT SERPL-MCNC: 7.5 G/DL (ref 6.4–8.2)
RBC # BLD AUTO: 4.16 M/UL (ref 4.1–5.7)
RBC MORPH BLD: ABNORMAL
SODIUM SERPL-SCNC: 135 MMOL/L (ref 136–145)
WBC # BLD AUTO: 3.1 K/UL (ref 4.1–11.1)

## 2024-01-12 PROCEDURE — 85025 COMPLETE CBC W/AUTO DIFF WBC: CPT

## 2024-01-12 PROCEDURE — 2580000003 HC RX 258: Performed by: NURSE PRACTITIONER

## 2024-01-12 PROCEDURE — 36415 COLL VENOUS BLD VENIPUNCTURE: CPT

## 2024-01-12 PROCEDURE — 99214 OFFICE O/P EST MOD 30 MIN: CPT | Performed by: INTERNAL MEDICINE

## 2024-01-12 PROCEDURE — 96360 HYDRATION IV INFUSION INIT: CPT

## 2024-01-12 PROCEDURE — 80053 COMPREHEN METABOLIC PANEL: CPT

## 2024-01-12 RX ORDER — SODIUM CHLORIDE 0.9 % (FLUSH) 0.9 %
5-40 SYRINGE (ML) INJECTION PRN
OUTPATIENT
Start: 2024-01-17

## 2024-01-12 RX ORDER — 0.9 % SODIUM CHLORIDE 0.9 %
1000 INTRAVENOUS SOLUTION INTRAVENOUS
Start: 2024-01-17

## 2024-01-12 RX ORDER — 0.9 % SODIUM CHLORIDE 0.9 %
1000 INTRAVENOUS SOLUTION INTRAVENOUS
Status: COMPLETED | OUTPATIENT
Start: 2024-01-12 | End: 2024-01-12

## 2024-01-12 RX ORDER — SODIUM CHLORIDE 0.9 % (FLUSH) 0.9 %
5-40 SYRINGE (ML) INJECTION PRN
Status: DISCONTINUED | OUTPATIENT
Start: 2024-01-12 | End: 2024-01-13 | Stop reason: HOSPADM

## 2024-01-12 RX ORDER — ONDANSETRON 2 MG/ML
8 INJECTION INTRAMUSCULAR; INTRAVENOUS
Start: 2024-01-17

## 2024-01-12 RX ORDER — SODIUM CHLORIDE 9 MG/ML
5-250 INJECTION, SOLUTION INTRAVENOUS PRN
OUTPATIENT
Start: 2024-01-17

## 2024-01-12 RX ORDER — HEPARIN 100 UNIT/ML
500 SYRINGE INTRAVENOUS PRN
OUTPATIENT
Start: 2024-01-17

## 2024-01-12 RX ADMIN — SODIUM CHLORIDE 1000 ML: 9 INJECTION, SOLUTION INTRAVENOUS at 10:21

## 2024-01-12 ASSESSMENT — PAIN SCALES - GENERAL: PAINLEVEL_OUTOF10: 0

## 2024-01-12 NOTE — TELEPHONE ENCOUNTER
[facility] [place] at [facility]  [phone]    01/12/24- Phone call placed to pt to check in- VM left for patient to call back.    8:20 am- pt returned phone call reported he took dulcolax suppository last night and was able to pass 2 very small BM. Still feels the need to go but denies any pain, fevers, N/V or other symptoms. He has been struggling with staying hydrated at home. Per MD he should come in to be seen and get IVF. Appointments scheduled.

## 2024-01-12 NOTE — PROGRESS NOTES
[facility] Progress Note    Date: 2024    Name: Sunny [place] Jr.    MRN: [id number]         : 1950    Mr. Lake Arrived ambulatory and in no distress for Hydration.  Assessment was completed, no acute issues at this time, no new complaints voiced.  Right chest wall port accessed without difficulty, labs drawn & sent for processing.      Mr. Lake's vitals were reviewed.  Vitals:    24 1000   BP: 138/82   Pulse: 90   Resp: 18   Temp: 98 °F (36.7 °C)       Lab results were obtained and reviewed.  No results found for this or any previous visit (from the past 12 hour(s)).    Medications:  1 L Bolus     Mr. [place] tolerated treatment well and was [facility] condition.   Port de-accessed and flushed per protocol. Patient is aware of their next appointment on 24.    Will Wang RN  2024

## 2024-01-12 NOTE — PROGRESS NOTES
Sunny [place] Jr. is a 73 y.o. male follow up for SCC.    1. Have you been to the ER, urgent care clinic since your last visit?  Hospitalized since your last visit?no     2. Have you seen or consulted any other health care providers outside of the [facility] since your last visit?  Include any pap smears or colon screening. No     1/12/2024  10:00 AM   Vitals    SYSTOLIC 138    DIASTOLIC 82    Site    Position    Cuff Size    Pulse 90    Temp 98 °F (36.7 °C)    Respirations 18    SpO2    Weight - Scale    Height    Body Mass Index    Pain Score    Pain Loc    Pain Level 0        
verified the above documentation and modified it as needed. He is having worsening constipation, which we discussed at length today.  We will expand his bowel regimen and give some IVFs today.    Signed By: Osvaldo Solis MD

## 2024-01-15 RX ORDER — MEPERIDINE HYDROCHLORIDE 25 MG/ML
12.5 INJECTION INTRAMUSCULAR; INTRAVENOUS; SUBCUTANEOUS PRN
OUTPATIENT
Start: 2024-02-28

## 2024-01-15 RX ORDER — SODIUM CHLORIDE 9 MG/ML
5-250 INJECTION, SOLUTION INTRAVENOUS PRN
OUTPATIENT
Start: 2024-02-07

## 2024-01-15 RX ORDER — PALONOSETRON 0.05 MG/ML
0.25 INJECTION, SOLUTION INTRAVENOUS ONCE
OUTPATIENT
Start: 2024-02-28 | End: 2024-02-28

## 2024-01-15 RX ORDER — SODIUM CHLORIDE 0.9 % (FLUSH) 0.9 %
5-40 SYRINGE (ML) INJECTION PRN
OUTPATIENT
Start: 2024-02-28

## 2024-01-15 RX ORDER — SODIUM CHLORIDE 9 MG/ML
INJECTION, SOLUTION INTRAVENOUS CONTINUOUS
OUTPATIENT
Start: 2024-02-07

## 2024-01-15 RX ORDER — HEPARIN 100 UNIT/ML
500 SYRINGE INTRAVENOUS PRN
OUTPATIENT
Start: 2024-02-28

## 2024-01-15 RX ORDER — EPINEPHRINE 1 MG/ML
0.3 INJECTION, SOLUTION INTRAMUSCULAR; SUBCUTANEOUS PRN
OUTPATIENT
Start: 2024-02-07

## 2024-01-15 RX ORDER — 0.9 % SODIUM CHLORIDE 0.9 %
1000 INTRAVENOUS SOLUTION INTRAVENOUS ONCE
Start: 2024-02-07 | End: 2024-02-07

## 2024-01-15 RX ORDER — ACETAMINOPHEN 325 MG/1
650 TABLET ORAL
OUTPATIENT
Start: 2024-02-28

## 2024-01-15 RX ORDER — DIPHENHYDRAMINE HYDROCHLORIDE 50 MG/ML
50 INJECTION INTRAMUSCULAR; INTRAVENOUS
OUTPATIENT
Start: 2024-02-07

## 2024-01-15 RX ORDER — SODIUM CHLORIDE 9 MG/ML
INJECTION, SOLUTION INTRAVENOUS CONTINUOUS
OUTPATIENT
Start: 2024-02-28

## 2024-01-15 RX ORDER — HEPARIN 100 UNIT/ML
500 SYRINGE INTRAVENOUS PRN
OUTPATIENT
Start: 2024-02-07

## 2024-01-15 RX ORDER — SODIUM CHLORIDE 9 MG/ML
5-250 INJECTION, SOLUTION INTRAVENOUS PRN
OUTPATIENT
Start: 2024-02-28

## 2024-01-15 RX ORDER — PALONOSETRON 0.05 MG/ML
0.25 INJECTION, SOLUTION INTRAVENOUS ONCE
OUTPATIENT
Start: 2024-02-07 | End: 2024-02-07

## 2024-01-15 RX ORDER — SODIUM CHLORIDE 0.9 % (FLUSH) 0.9 %
5-40 SYRINGE (ML) INJECTION PRN
OUTPATIENT
Start: 2024-02-07

## 2024-01-15 RX ORDER — FAMOTIDINE 10 MG/ML
20 INJECTION, SOLUTION INTRAVENOUS
OUTPATIENT
Start: 2024-02-28

## 2024-01-15 RX ORDER — ONDANSETRON 2 MG/ML
8 INJECTION INTRAMUSCULAR; INTRAVENOUS
OUTPATIENT
Start: 2024-02-28

## 2024-01-15 RX ORDER — ONDANSETRON 2 MG/ML
8 INJECTION INTRAMUSCULAR; INTRAVENOUS
OUTPATIENT
Start: 2024-02-07

## 2024-01-15 RX ORDER — MEPERIDINE HYDROCHLORIDE 25 MG/ML
12.5 INJECTION INTRAMUSCULAR; INTRAVENOUS; SUBCUTANEOUS PRN
OUTPATIENT
Start: 2024-02-07

## 2024-01-15 RX ORDER — ALBUTEROL SULFATE 90 UG/1
4 AEROSOL, METERED RESPIRATORY (INHALATION) PRN
OUTPATIENT
Start: 2024-02-28

## 2024-01-15 RX ORDER — ALBUTEROL SULFATE 90 UG/1
4 AEROSOL, METERED RESPIRATORY (INHALATION) PRN
OUTPATIENT
Start: 2024-02-07

## 2024-01-15 RX ORDER — FAMOTIDINE 10 MG/ML
20 INJECTION, SOLUTION INTRAVENOUS
OUTPATIENT
Start: 2024-02-07

## 2024-01-15 RX ORDER — EPINEPHRINE 1 MG/ML
0.3 INJECTION, SOLUTION INTRAMUSCULAR; SUBCUTANEOUS PRN
OUTPATIENT
Start: 2024-02-28

## 2024-01-15 RX ORDER — ACETAMINOPHEN 325 MG/1
650 TABLET ORAL
OUTPATIENT
Start: 2024-02-07

## 2024-01-15 RX ORDER — 0.9 % SODIUM CHLORIDE 0.9 %
1000 INTRAVENOUS SOLUTION INTRAVENOUS ONCE
Start: 2024-02-28 | End: 2024-02-28

## 2024-01-15 RX ORDER — DIPHENHYDRAMINE HYDROCHLORIDE 50 MG/ML
50 INJECTION INTRAMUSCULAR; INTRAVENOUS
OUTPATIENT
Start: 2024-02-28

## 2024-01-16 ENCOUNTER — TELEPHONE (OUTPATIENT)
Age: 74
End: 2024-01-16

## 2024-01-16 RX ORDER — EPINEPHRINE 1 MG/ML
0.3 INJECTION, SOLUTION INTRAMUSCULAR; SUBCUTANEOUS PRN
Status: CANCELLED | OUTPATIENT
Start: 2024-01-17

## 2024-01-16 RX ORDER — MEPERIDINE HYDROCHLORIDE 25 MG/ML
12.5 INJECTION INTRAMUSCULAR; INTRAVENOUS; SUBCUTANEOUS PRN
Status: CANCELLED | OUTPATIENT
Start: 2024-01-17

## 2024-01-16 RX ORDER — ACETAMINOPHEN 325 MG/1
650 TABLET ORAL
Status: CANCELLED | OUTPATIENT
Start: 2024-01-17

## 2024-01-16 RX ORDER — SODIUM CHLORIDE 9 MG/ML
5-250 INJECTION, SOLUTION INTRAVENOUS PRN
Status: CANCELLED | OUTPATIENT
Start: 2024-01-17

## 2024-01-16 RX ORDER — ONDANSETRON 2 MG/ML
8 INJECTION INTRAMUSCULAR; INTRAVENOUS
Status: CANCELLED | OUTPATIENT
Start: 2024-01-17

## 2024-01-16 RX ORDER — HEPARIN 100 UNIT/ML
500 SYRINGE INTRAVENOUS PRN
Status: CANCELLED | OUTPATIENT
Start: 2024-01-17

## 2024-01-16 RX ORDER — DIPHENHYDRAMINE HYDROCHLORIDE 50 MG/ML
50 INJECTION INTRAMUSCULAR; INTRAVENOUS
Status: CANCELLED | OUTPATIENT
Start: 2024-01-17

## 2024-01-16 RX ORDER — SODIUM CHLORIDE 9 MG/ML
INJECTION, SOLUTION INTRAVENOUS CONTINUOUS
Status: CANCELLED | OUTPATIENT
Start: 2024-01-17

## 2024-01-16 RX ORDER — ALBUTEROL SULFATE 90 UG/1
4 AEROSOL, METERED RESPIRATORY (INHALATION) PRN
Status: CANCELLED | OUTPATIENT
Start: 2024-01-17

## 2024-01-16 RX ORDER — FAMOTIDINE 10 MG/ML
20 INJECTION, SOLUTION INTRAVENOUS
Status: CANCELLED | OUTPATIENT
Start: 2024-01-17

## 2024-01-16 NOTE — TELEPHONE ENCOUNTER
[facility] [place] at [facility]  [phone]    01/16/24- Recent lab results faxed to Dr. Calvo (endocrinology) and Dr. Swenson (urology) per patient request.  Fax confirmations received.

## 2024-01-16 NOTE — TELEPHONE ENCOUNTER
Patient called to check on the status of the his lab results. Request a call back.    # [phone]

## 2024-01-17 ENCOUNTER — HOSPITAL ENCOUNTER (OUTPATIENT)
Facility: HOSPITAL | Age: 74
Setting detail: INFUSION SERIES
Discharge: HOME | End: 2024-01-17
Payer: MEDICARE

## 2024-01-17 ENCOUNTER — OFFICE VISIT (OUTPATIENT)
Age: 74
End: 2024-01-17
Payer: MEDICARE

## 2024-01-17 VITALS
WEIGHT: 208 LBS | TEMPERATURE: 97.9 F | BODY MASS INDEX: 29.78 KG/M2 | HEART RATE: 91 BPM | DIASTOLIC BLOOD PRESSURE: 81 MMHG | RESPIRATION RATE: 18 BRPM | HEIGHT: 70 IN | SYSTOLIC BLOOD PRESSURE: 152 MMHG

## 2024-01-17 VITALS
HEART RATE: 76 BPM | SYSTOLIC BLOOD PRESSURE: 151 MMHG | DIASTOLIC BLOOD PRESSURE: 80 MMHG | BODY MASS INDEX: 29.78 KG/M2 | TEMPERATURE: 97.9 F | RESPIRATION RATE: 18 BRPM | HEIGHT: 70 IN | WEIGHT: 208 LBS

## 2024-01-17 DIAGNOSIS — C78.7 METASTASIS TO LIVER (HCC): ICD-10-CM

## 2024-01-17 DIAGNOSIS — C25.0 MALIGNANT NEOPLASM OF HEAD OF PANCREAS (HCC): Primary | ICD-10-CM

## 2024-01-17 DIAGNOSIS — C80.1 SMALL CELL CARCINOMA (HCC): ICD-10-CM

## 2024-01-17 DIAGNOSIS — R16.0 LIVER MASS: ICD-10-CM

## 2024-01-17 DIAGNOSIS — C7A.8 NEUROENDOCRINE CARCINOMA OF PANCREAS (HCC): ICD-10-CM

## 2024-01-17 DIAGNOSIS — K86.89 PANCREATIC MASS: ICD-10-CM

## 2024-01-17 DIAGNOSIS — R11.2 NAUSEA AND VOMITING, UNSPECIFIED VOMITING TYPE: ICD-10-CM

## 2024-01-17 LAB
ANION GAP SERPL CALC-SCNC: 8 MMOL/L (ref 5–15)
BASOPHILS # BLD: 0 K/UL (ref 0–0.1)
BASOPHILS NFR BLD: 0 % (ref 0–1)
BUN SERPL-MCNC: 12 MG/DL (ref 6–20)
BUN/CREAT SERPL: 15 (ref 12–20)
CALCIUM SERPL-MCNC: 9.1 MG/DL (ref 8.5–10.1)
CHLORIDE SERPL-SCNC: 96 MMOL/L (ref 97–108)
CO2 SERPL-SCNC: 28 MMOL/L (ref 21–32)
CREAT SERPL-MCNC: 0.81 MG/DL (ref 0.7–1.3)
DIFFERENTIAL METHOD BLD: ABNORMAL
EOSINOPHIL # BLD: 0 K/UL (ref 0–0.4)
EOSINOPHIL NFR BLD: 0 % (ref 0–7)
ERYTHROCYTE [DISTWIDTH] IN BLOOD BY AUTOMATED COUNT: 17.2 % (ref 11.5–14.5)
GLUCOSE SERPL-MCNC: 409 MG/DL (ref 65–100)
HCT VFR BLD AUTO: 32.3 % (ref 36.6–50.3)
HGB BLD-MCNC: 10.9 G/DL (ref 12.1–17)
IMM GRANULOCYTES # BLD AUTO: 0.1 K/UL (ref 0–0.04)
IMM GRANULOCYTES NFR BLD AUTO: 1 % (ref 0–0.5)
LYMPHOCYTES # BLD: 0.6 K/UL (ref 0.8–3.5)
LYMPHOCYTES NFR BLD: 9 % (ref 12–49)
MCH RBC QN AUTO: 27.7 PG (ref 26–34)
MCHC RBC AUTO-ENTMCNC: 33.7 G/DL (ref 30–36.5)
MCV RBC AUTO: 82.2 FL (ref 80–99)
MONOCYTES # BLD: 0.6 K/UL (ref 0–1)
MONOCYTES NFR BLD: 10 % (ref 5–13)
NEUTS SEG # BLD: 4.9 K/UL (ref 1.8–8)
NEUTS SEG NFR BLD: 80 % (ref 32–75)
NRBC # BLD: 0 K/UL (ref 0–0.01)
NRBC BLD-RTO: 0 PER 100 WBC
PLATELET # BLD AUTO: 171 K/UL (ref 150–400)
PMV BLD AUTO: 9.7 FL (ref 8.9–12.9)
POTASSIUM SERPL-SCNC: 3.5 MMOL/L (ref 3.5–5.1)
RBC # BLD AUTO: 3.93 M/UL (ref 4.1–5.7)
RBC MORPH BLD: ABNORMAL
RBC MORPH BLD: ABNORMAL
SODIUM SERPL-SCNC: 132 MMOL/L (ref 136–145)
WBC # BLD AUTO: 6.2 K/UL (ref 4.1–11.1)

## 2024-01-17 PROCEDURE — 1036F TOBACCO NON-USER: CPT | Performed by: INTERNAL MEDICINE

## 2024-01-17 PROCEDURE — 85025 COMPLETE CBC W/AUTO DIFF WBC: CPT

## 2024-01-17 PROCEDURE — 3017F COLORECTAL CA SCREEN DOC REV: CPT | Performed by: INTERNAL MEDICINE

## 2024-01-17 PROCEDURE — 99215 OFFICE O/P EST HI 40 MIN: CPT | Performed by: INTERNAL MEDICINE

## 2024-01-17 PROCEDURE — 2580000003 HC RX 258: Performed by: NURSE PRACTITIONER

## 2024-01-17 PROCEDURE — 96375 TX/PRO/DX INJ NEW DRUG ADDON: CPT

## 2024-01-17 PROCEDURE — 96413 CHEMO IV INFUSION 1 HR: CPT

## 2024-01-17 PROCEDURE — 3077F SYST BP >= 140 MM HG: CPT | Performed by: INTERNAL MEDICINE

## 2024-01-17 PROCEDURE — G8417 CALC BMI ABV UP PARAM F/U: HCPCS | Performed by: INTERNAL MEDICINE

## 2024-01-17 PROCEDURE — 1123F ACP DISCUSS/DSCN MKR DOCD: CPT | Performed by: INTERNAL MEDICINE

## 2024-01-17 PROCEDURE — 6360000002 HC RX W HCPCS: Performed by: NURSE PRACTITIONER

## 2024-01-17 PROCEDURE — G8484 FLU IMMUNIZE NO ADMIN: HCPCS | Performed by: INTERNAL MEDICINE

## 2024-01-17 PROCEDURE — 96361 HYDRATE IV INFUSION ADD-ON: CPT

## 2024-01-17 PROCEDURE — G8427 DOCREV CUR MEDS BY ELIG CLIN: HCPCS | Performed by: INTERNAL MEDICINE

## 2024-01-17 PROCEDURE — G2211 COMPLEX E/M VISIT ADD ON: HCPCS | Performed by: INTERNAL MEDICINE

## 2024-01-17 PROCEDURE — 80048 BASIC METABOLIC PNL TOTAL CA: CPT

## 2024-01-17 PROCEDURE — 3079F DIAST BP 80-89 MM HG: CPT | Performed by: INTERNAL MEDICINE

## 2024-01-17 RX ORDER — 0.9 % SODIUM CHLORIDE 0.9 %
1000 INTRAVENOUS SOLUTION INTRAVENOUS ONCE
Status: COMPLETED | OUTPATIENT
Start: 2024-01-17 | End: 2024-01-17

## 2024-01-17 RX ORDER — PALONOSETRON 0.05 MG/ML
0.25 INJECTION, SOLUTION INTRAVENOUS ONCE
Status: COMPLETED | OUTPATIENT
Start: 2024-01-17 | End: 2024-01-17

## 2024-01-17 RX ORDER — SODIUM CHLORIDE 9 MG/ML
5-250 INJECTION, SOLUTION INTRAVENOUS PRN
Status: DISCONTINUED | OUTPATIENT
Start: 2024-01-17 | End: 2024-01-18 | Stop reason: HOSPADM

## 2024-01-17 RX ORDER — SODIUM CHLORIDE 0.9 % (FLUSH) 0.9 %
5-40 SYRINGE (ML) INJECTION PRN
Status: DISCONTINUED | OUTPATIENT
Start: 2024-01-17 | End: 2024-01-18 | Stop reason: HOSPADM

## 2024-01-17 RX ADMIN — PALONOSETRON HYDROCHLORIDE 0.25 MG: 0.25 INJECTION INTRAVENOUS at 11:24

## 2024-01-17 RX ADMIN — LURBINECTEDIN 7.25 MG: 0.5 INJECTION, POWDER, LYOPHILIZED, FOR SOLUTION INTRAVENOUS at 12:47

## 2024-01-17 RX ADMIN — DEXAMETHASONE SODIUM PHOSPHATE 12 MG: 4 INJECTION, SOLUTION INTRA-ARTICULAR; INTRALESIONAL; INTRAMUSCULAR; INTRAVENOUS; SOFT TISSUE at 11:29

## 2024-01-17 RX ADMIN — SODIUM CHLORIDE, PRESERVATIVE FREE 20 ML: 5 INJECTION INTRAVENOUS at 13:58

## 2024-01-17 RX ADMIN — SODIUM CHLORIDE 1000 ML: 9 INJECTION, SOLUTION INTRAVENOUS at 11:47

## 2024-01-17 ASSESSMENT — PAIN SCALES - GENERAL
PAINLEVEL_OUTOF10: 0
PAINLEVEL_OUTOF10: 0

## 2024-01-17 NOTE — PROGRESS NOTES
Cancer [place] at [facility]  [address], [address] [facility] [address]  W: [phone]  F: [phone]      Reason for Visit:   Sunny Lake Jr. is a 73 y.o. male who is seen today for evaluation of small cell carcinoma of the pancreas.    Hematology/Oncology Treatment History:   CT A/P 4/19/2022: Possible mild acute pancreatitis. Prominent sigmoid diverticulosis.  Enlarged prostate. Fat-containing hernias  CT Abdomen 5/17/2022: New intra-hepatic biliary dilatation and common bile duct dilatation. Ampullary prominence and 1.5 cm x 1.5 cm soft tissue density in the pancreaticoduodenal groove. New subcentimeter  hepatic hypodense lesions, worrisome for metastatic disease.   MRI Abdomen 5/17/2022:  Periampullary pancreatic mass with possible extension into the wall of the duodenum. Associated intrahepatic and extra hepatic biliary dilatation and mild pancreatic ductal dilatation. Small hyperenhancing lesions throughout the  liver suspicious for metastatic disease.  CT Chest 5/18/2022: no metastatic disease within thorax  ERCP by Dr. Cooley 5/18/2022: Large ulcerated mass seen in the second portion of the duodenum taking half the circumference in the area of the major papilla and extending distally about 3 to 4 cm, appeared to be pancreatic and eroding into the duodenum.  Cholangiogram with complete obstruction of CBD.  Metal stent deployed.  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 Constipation has resolved now. He is having normal stools. Using

## 2024-01-17 NOTE — PROGRESS NOTES
Called pt to explain check in instructions for the [facility] location. Pt said thanks.   Sunny JUAN Lake Jr. is a 73 y.o. male follow up fro SCC of pancreas.      1. Have you been to the ER, urgent care clinic since your last visit?  Hospitalized since your last visit?no    2. Have you seen or consulted any other health care providers outside of the [facility] since your last visit?  Include any pap smears or colon screening. no     1/17/2024  9:15 AM   Vitals    SYSTOLIC 152 !    DIASTOLIC 81 !    Site    Position    Cuff Size    Pulse 91    Temp 97.9 °F (36.6 °C)    Respirations 18    SpO2    Weight - Scale 208 lb    Height 5' 10\"    Body Mass Index 29.84 kg/m2 (H)    Pain Score    Pain Loc    Pain Level 0       Legend:  ! Abnormal  (H) High

## 2024-01-17 NOTE — PROGRESS NOTES
[facility] Chemo Progress Note    Date: January 17, 2024        0915: Mr. Lake Arrived to [facility] for  C5D1 Lurbinectedin ambulatory in stable condition.  Assessment was completed and port accessed by Dulce Denny RN. Labs drawn and sent for processing. Went to provider appointment with Medical Oncology.    1055: Returned from provider appointment. Labs reviewed. Criteria for treatment was met.    Mr. Lake's vitals were reviewed.  Patient Vitals for the past 12 hrs:   Temp Pulse Resp BP   01/17/24 1359 -- 76 18 (!) 151/80   01/17/24 0915 97.9 °F (36.6 °C) 91 18 (!) 152/81       Lab results were obtained and reviewed.  Recent Results (from the past 12 hour(s))   CBC with Auto Differential    Collection Time: 01/17/24  9:41 AM   Result Value Ref Range    WBC 6.2 4.1 - 11.1 K/uL    RBC 3.93 (L) 4.10 - 5.70 M/uL    Hemoglobin 10.9 (L) 12.1 - 17.0 g/dL    Hematocrit 32.3 (L) 36.6 - 50.3 %    MCV 82.2 80.0 - 99.0 FL    MCH 27.7 26.0 - 34.0 PG    MCHC 33.7 30.0 - 36.5 g/dL    RDW 17.2 (H) 11.5 - 14.5 %    Platelets 171 150 - 400 K/uL    MPV 9.7 8.9 - 12.9 FL    Nucleated RBCs 0.0 0  WBC    nRBC 0.00 0.00 - 0.01 K/uL    Neutrophils % 80 (H) 32 - 75 %    Lymphocytes % 9 (L) 12 - 49 %    Monocytes % 10 5 - 13 %    Eosinophils % 0 0 - 7 %    Basophils % 0 0 - 1 %    Immature Granulocytes 1 (H) 0.0 - 0.5 %    Neutrophils Absolute 4.9 1.8 - 8.0 K/UL    Lymphocytes Absolute 0.6 (L) 0.8 - 3.5 K/UL    Monocytes Absolute 0.6 0.0 - 1.0 K/UL    Eosinophils Absolute 0.0 0.0 - 0.4 K/UL    Basophils Absolute 0.0 0.0 - 0.1 K/UL    Absolute Immature Granulocyte 0.1 (H) 0.00 - 0.04 K/UL    Differential Type SMEAR SCANNED      RBC Comment ANISOCYTOSIS  1+        RBC Comment HYPOCHROMIA  PRESENT       Basic Metabolic Panel    Collection Time: 01/17/24  9:41 AM   Result Value Ref Range    Sodium 132 (L) 136 - 145 mmol/L    Potassium 3.5 3.5 - 5.1 mmol/L    Chloride 96 (L) 97 - 108 mmol/L    CO2 28 21 - 32 mmol/L    Anion Gap 8 5 - 15 mmol/L

## 2024-01-22 ENCOUNTER — HOSPITAL ENCOUNTER (OUTPATIENT)
Facility: HOSPITAL | Age: 74
Setting detail: INFUSION SERIES
Discharge: HOME | End: 2024-01-22
Payer: MEDICARE

## 2024-01-22 ENCOUNTER — TELEPHONE (OUTPATIENT)
Age: 74
End: 2024-01-22

## 2024-01-22 VITALS
BODY MASS INDEX: 29.66 KG/M2 | HEART RATE: 78 BPM | WEIGHT: 207.2 LBS | OXYGEN SATURATION: 98 % | HEIGHT: 70 IN | DIASTOLIC BLOOD PRESSURE: 73 MMHG | TEMPERATURE: 97.8 F | SYSTOLIC BLOOD PRESSURE: 114 MMHG | RESPIRATION RATE: 18 BRPM

## 2024-01-22 DIAGNOSIS — C25.0 MALIGNANT NEOPLASM OF HEAD OF PANCREAS (HCC): Primary | ICD-10-CM

## 2024-01-22 DIAGNOSIS — R11.2 NAUSEA AND VOMITING, UNSPECIFIED VOMITING TYPE: ICD-10-CM

## 2024-01-22 LAB
ANION GAP SERPL CALC-SCNC: 8 MMOL/L (ref 5–15)
BUN SERPL-MCNC: 14 MG/DL (ref 6–20)
BUN/CREAT SERPL: 16 (ref 12–20)
CALCIUM SERPL-MCNC: 8.5 MG/DL (ref 8.5–10.1)
CHLORIDE SERPL-SCNC: 93 MMOL/L (ref 97–108)
CO2 SERPL-SCNC: 29 MMOL/L (ref 21–32)
CREAT SERPL-MCNC: 0.85 MG/DL (ref 0.7–1.3)
GLUCOSE SERPL-MCNC: 505 MG/DL (ref 65–100)
POTASSIUM SERPL-SCNC: 4 MMOL/L (ref 3.5–5.1)
SODIUM SERPL-SCNC: 130 MMOL/L (ref 136–145)

## 2024-01-22 PROCEDURE — 96360 HYDRATION IV INFUSION INIT: CPT

## 2024-01-22 PROCEDURE — 80048 BASIC METABOLIC PNL TOTAL CA: CPT

## 2024-01-22 PROCEDURE — 36415 COLL VENOUS BLD VENIPUNCTURE: CPT

## 2024-01-22 PROCEDURE — 2580000003 HC RX 258: Performed by: NURSE PRACTITIONER

## 2024-01-22 RX ORDER — SODIUM CHLORIDE 0.9 % (FLUSH) 0.9 %
5-40 SYRINGE (ML) INJECTION PRN
Status: CANCELLED | OUTPATIENT
Start: 2024-01-24

## 2024-01-22 RX ORDER — SODIUM CHLORIDE 9 MG/ML
5-250 INJECTION, SOLUTION INTRAVENOUS PRN
Status: CANCELLED | OUTPATIENT
Start: 2024-01-24

## 2024-01-22 RX ORDER — 0.9 % SODIUM CHLORIDE 0.9 %
1000 INTRAVENOUS SOLUTION INTRAVENOUS
Status: CANCELLED
Start: 2024-01-24

## 2024-01-22 RX ORDER — HEPARIN 100 UNIT/ML
500 SYRINGE INTRAVENOUS PRN
Status: CANCELLED | OUTPATIENT
Start: 2024-01-24

## 2024-01-22 RX ORDER — 0.9 % SODIUM CHLORIDE 0.9 %
1000 INTRAVENOUS SOLUTION INTRAVENOUS
Status: COMPLETED | OUTPATIENT
Start: 2024-01-22 | End: 2024-01-22

## 2024-01-22 RX ORDER — ONDANSETRON 2 MG/ML
8 INJECTION INTRAMUSCULAR; INTRAVENOUS
Status: CANCELLED
Start: 2024-01-24

## 2024-01-22 RX ADMIN — SODIUM CHLORIDE 1000 ML: 9 INJECTION, SOLUTION INTRAVENOUS at 15:26

## 2024-01-22 ASSESSMENT — PAIN SCALES - GENERAL: PAINLEVEL_OUTOF10: 0

## 2024-01-22 NOTE — TELEPHONE ENCOUNTER
Pt wife called in stating since the treatment pt has been vomiting, loose stools and sleeping all of the time      # [phone]

## 2024-01-22 NOTE — TELEPHONE ENCOUNTER
[facility] [place] at [facility]  [phone]    01/22/24- Patient's wife reports multiple episodes of vomiting and loose stools since treatment last week.  She stated patient is sleeping constantly, not eating well, and very weak.  She's unsure if patient is taking antiemetics.  He stopped miralax over the weekend when he started having multiple loose stools.  Patient was able to eat applesauce, pretzels, and raisin bran yesterday.  Drinking water and gatorade, but complains of dry mouth.  No fevers.  Patient's wife stated he's having chills along with some head congestion/drainage.  Coughing at times leads to vomiting.  Patient is scheduled for fluids Wednesday, but feels he needs to come in sooner.  Will discuss with Dottie Moraes and call patient's wife back.      10:21 AM- Okay to add patient for IV fluids and labs, appointment scheduled for today at 3 pm in \[facility]\"".  Encouraged patient to take antiemetics at home as needed.  Patient's wife verbalized understanding and was appreciative.

## 2024-01-22 NOTE — PROGRESS NOTES
Outpatient Infusion Center Progress Note        Date: 24    Name: Sunny Lake Jr.    MRN: [id number]         : 1950      Mr. Lake admitted to \[facility]\"" for Supplemental Hydration ambulatory in stable condition. Assessment completed. No new concerns voiced.  Right port a cath accessed using 0.75\" nuñez, blood return obtained and port flushed without difficulty.  Labs drawn and sent for processing.            Vitals:    24 1500 24 1632   BP: (!) 148/77 114/73   Pulse: 97 78   Resp: 18 18   Temp: 97.8 °F (36.6 °C)    TempSrc: Oral Temporal   SpO2:  98%   Weight: 94 kg (207 lb 3.2 oz)    Height: 1.778 m (5' 10\")            Results for orders placed or performed during the hospital encounter of 24   Basic Metabolic Panel   Result Value Ref Range    Sodium 130 (L) 136 - 145 mmol/L    Potassium 4.0 3.5 - 5.1 mmol/L    Chloride 93 (L) 97 - 108 mmol/L    CO2 29 21 - 32 mmol/L    Anion Gap 8 5 - 15 mmol/L    Glucose 505 (H) 65 - 100 mg/dL    BUN 14 6 - 20 MG/DL    Creatinine 0.85 0.70 - 1.30 MG/DL    Bun/Cre Ratio 16 12 - 20      Est, Glom Filt Rate >60 >60 ml/min/1.73m2    Calcium 8.5 8.5 - 10.1 MG/DL             Medications:  MEDICATIONS GIVEN:  Medications Administered         sodium chloride 0.9 % bolus 1,000 mL Admin Date  2024 Action  New Bag Dose  1,000 mL Rate  983.6 mL/hr Route  IntraVENous Administered By  Latesha Garrido RN                Post-Infusion Vitals:  Vitals:    24 1632   BP: 114/73   Pulse: 78   Resp: 18   Temp:    SpO2: 98%             Pt tolerated treatment well. Port maintained positive blood return throughout treatment, flushed with positive blood return at conclusion and de-accessed per protocol. D/c home ambulatory in no distress. Patient is aware of next appointment on 24 @ 1300 at the [facility] location.        Future Appointments:  Future Appointments   Date Time Provider Department Center   2024  1:00 PM [facility]

## 2024-01-25 ENCOUNTER — HOSPITAL ENCOUNTER (OUTPATIENT)
Facility: HOSPITAL | Age: 74
Setting detail: INFUSION SERIES
Discharge: HOME | End: 2024-01-25
Payer: MEDICARE

## 2024-01-25 VITALS
TEMPERATURE: 98.1 F | SYSTOLIC BLOOD PRESSURE: 150 MMHG | RESPIRATION RATE: 18 BRPM | DIASTOLIC BLOOD PRESSURE: 81 MMHG | HEART RATE: 65 BPM

## 2024-01-25 DIAGNOSIS — R11.2 NAUSEA AND VOMITING, UNSPECIFIED VOMITING TYPE: ICD-10-CM

## 2024-01-25 DIAGNOSIS — C25.0 MALIGNANT NEOPLASM OF HEAD OF PANCREAS (HCC): Primary | ICD-10-CM

## 2024-01-25 LAB
ANION GAP SERPL CALC-SCNC: 9 MMOL/L (ref 5–15)
BUN SERPL-MCNC: 16 MG/DL (ref 6–20)
BUN/CREAT SERPL: 18 (ref 12–20)
CALCIUM SERPL-MCNC: 8.6 MG/DL (ref 8.5–10.1)
CHLORIDE SERPL-SCNC: 97 MMOL/L (ref 97–108)
CO2 SERPL-SCNC: 28 MMOL/L (ref 21–32)
CREAT SERPL-MCNC: 0.9 MG/DL (ref 0.7–1.3)
GLUCOSE SERPL-MCNC: 228 MG/DL (ref 65–100)
POTASSIUM SERPL-SCNC: 3.5 MMOL/L (ref 3.5–5.1)
SODIUM SERPL-SCNC: 134 MMOL/L (ref 136–145)

## 2024-01-25 PROCEDURE — 2580000003 HC RX 258: Performed by: NURSE PRACTITIONER

## 2024-01-25 PROCEDURE — 36415 COLL VENOUS BLD VENIPUNCTURE: CPT

## 2024-01-25 PROCEDURE — 96360 HYDRATION IV INFUSION INIT: CPT

## 2024-01-25 PROCEDURE — 80048 BASIC METABOLIC PNL TOTAL CA: CPT

## 2024-01-25 RX ORDER — HEPARIN 100 UNIT/ML
500 SYRINGE INTRAVENOUS PRN
Status: CANCELLED | OUTPATIENT
Start: 2024-01-31

## 2024-01-25 RX ORDER — SODIUM CHLORIDE 9 MG/ML
5-250 INJECTION, SOLUTION INTRAVENOUS PRN
Status: CANCELLED | OUTPATIENT
Start: 2024-01-31

## 2024-01-25 RX ORDER — SODIUM CHLORIDE 9 MG/ML
5-250 INJECTION, SOLUTION INTRAVENOUS PRN
Status: DISCONTINUED | OUTPATIENT
Start: 2024-01-25 | End: 2024-01-26 | Stop reason: HOSPADM

## 2024-01-25 RX ORDER — SODIUM CHLORIDE 0.9 % (FLUSH) 0.9 %
5-40 SYRINGE (ML) INJECTION PRN
Status: CANCELLED | OUTPATIENT
Start: 2024-01-31

## 2024-01-25 RX ORDER — 0.9 % SODIUM CHLORIDE 0.9 %
1000 INTRAVENOUS SOLUTION INTRAVENOUS
Status: CANCELLED
Start: 2024-01-31

## 2024-01-25 RX ORDER — ONDANSETRON 2 MG/ML
8 INJECTION INTRAMUSCULAR; INTRAVENOUS
Status: DISCONTINUED | OUTPATIENT
Start: 2024-01-25 | End: 2024-01-26 | Stop reason: HOSPADM

## 2024-01-25 RX ORDER — ONDANSETRON 2 MG/ML
8 INJECTION INTRAMUSCULAR; INTRAVENOUS
Status: CANCELLED
Start: 2024-01-31

## 2024-01-25 RX ORDER — 0.9 % SODIUM CHLORIDE 0.9 %
1000 INTRAVENOUS SOLUTION INTRAVENOUS
Status: COMPLETED | OUTPATIENT
Start: 2024-01-25 | End: 2024-01-25

## 2024-01-25 RX ADMIN — SODIUM CHLORIDE 1000 ML: 9 INJECTION, SOLUTION INTRAVENOUS at 14:16

## 2024-01-25 ASSESSMENT — PAIN SCALES - GENERAL: PAINLEVEL_OUTOF10: 0

## 2024-01-30 ENCOUNTER — TELEPHONE (OUTPATIENT)
Age: 74
End: 2024-01-30

## 2024-01-30 NOTE — TELEPHONE ENCOUNTER
[facility] Cancer [place] at [facility]  [phone]    01/30/24- Patient stated he woke up during the middle of the night with uncontrollable shivers and vomiting.  No fever.  Stated this episode came on suddenly, but subsided after about 30 minutes and was able to go back to sleep.  He's feeling poorly this morning, weak, and blood sugar low at 59.  Stated he drank some juice, waiting to see if that helps.  Patient stated he's otherwise been eating/drinking okay.  Had a regular bowel movement yesterday.  No pain.    Patient's wife questioned if they should come to the infusion center or take him to the ED.  Discussed monitoring symptoms over the next 30 minutes to see if BS improves and how patient's feeling at that point.  She will call back with an update.     Discussed the above with Dottie Moraes, can offer to bring patient in for IV fluids/labs this afternoon.     9:18 AM- Spoke to patient's wife, she stated patient started to feel a little better, more alert.  He's resting now.  Stated he doesn't want to go to the ED or come in for fluids today.  He's scheduled for IV hydration tomorrow.      Confirmed patient has been in contact with Dr. Calvo (endocrinology) about blood sugar management.  She wants his numbers to stay between .  Patient's wife stated blood sugar last night during the episode was 163.    Encouraged patient's wife to continue monitoring symptoms and call back if he changes his mind about coming in later.  She verbalized understanding and was appreciative.

## 2024-01-30 NOTE — TELEPHONE ENCOUNTER
Pt wife would like to know if pt needs to go to ER. Pt has been sick since last night     Shivers, throwing up, no fever, sugar is ok - pt feels like he needs to go somewhere    # [phone]

## 2024-01-31 ENCOUNTER — HOSPITAL ENCOUNTER (OUTPATIENT)
Facility: HOSPITAL | Age: 74
Setting detail: INFUSION SERIES
Discharge: HOME | End: 2024-01-31
Payer: MEDICARE

## 2024-01-31 VITALS
RESPIRATION RATE: 18 BRPM | OXYGEN SATURATION: 92 % | TEMPERATURE: 98.1 F | DIASTOLIC BLOOD PRESSURE: 79 MMHG | HEART RATE: 97 BPM | BODY MASS INDEX: 29.18 KG/M2 | WEIGHT: 203.4 LBS | SYSTOLIC BLOOD PRESSURE: 128 MMHG

## 2024-01-31 DIAGNOSIS — R11.2 NAUSEA AND VOMITING, UNSPECIFIED VOMITING TYPE: ICD-10-CM

## 2024-01-31 DIAGNOSIS — C25.0 MALIGNANT NEOPLASM OF HEAD OF PANCREAS (HCC): Primary | ICD-10-CM

## 2024-01-31 LAB
ALBUMIN SERPL-MCNC: 2.3 G/DL (ref 3.5–5)
ALBUMIN/GLOB SERPL: 0.5 (ref 1.1–2.2)
ALP SERPL-CCNC: 877 U/L (ref 45–117)
ALT SERPL-CCNC: 126 U/L (ref 12–78)
ANION GAP SERPL CALC-SCNC: 10 MMOL/L (ref 5–15)
AST SERPL-CCNC: 126 U/L (ref 15–37)
BILIRUB SERPL-MCNC: 1.5 MG/DL (ref 0.2–1)
BUN SERPL-MCNC: 17 MG/DL (ref 6–20)
BUN/CREAT SERPL: 20 (ref 12–20)
CALCIUM SERPL-MCNC: 8.9 MG/DL (ref 8.5–10.1)
CHLORIDE SERPL-SCNC: 98 MMOL/L (ref 97–108)
CO2 SERPL-SCNC: 26 MMOL/L (ref 21–32)
CREAT SERPL-MCNC: 0.85 MG/DL (ref 0.7–1.3)
GLOBULIN SER CALC-MCNC: 4.7 G/DL (ref 2–4)
GLUCOSE SERPL-MCNC: 257 MG/DL (ref 65–100)
POTASSIUM SERPL-SCNC: 3.6 MMOL/L (ref 3.5–5.1)
PROT SERPL-MCNC: 7 G/DL (ref 6.4–8.2)
SODIUM SERPL-SCNC: 134 MMOL/L (ref 136–145)

## 2024-01-31 PROCEDURE — 2580000003 HC RX 258: Performed by: NURSE PRACTITIONER

## 2024-01-31 PROCEDURE — 96360 HYDRATION IV INFUSION INIT: CPT

## 2024-01-31 PROCEDURE — 80053 COMPREHEN METABOLIC PANEL: CPT

## 2024-01-31 RX ORDER — SODIUM CHLORIDE 0.9 % (FLUSH) 0.9 %
5-40 SYRINGE (ML) INJECTION PRN
Status: DISCONTINUED | OUTPATIENT
Start: 2024-01-31 | End: 2024-02-01 | Stop reason: HOSPADM

## 2024-01-31 RX ORDER — ONDANSETRON 2 MG/ML
8 INJECTION INTRAMUSCULAR; INTRAVENOUS
Start: 2024-02-07

## 2024-01-31 RX ORDER — 0.9 % SODIUM CHLORIDE 0.9 %
1000 INTRAVENOUS SOLUTION INTRAVENOUS
Status: COMPLETED | OUTPATIENT
Start: 2024-01-31 | End: 2024-01-31

## 2024-01-31 RX ORDER — ONDANSETRON 2 MG/ML
8 INJECTION INTRAMUSCULAR; INTRAVENOUS
Status: DISCONTINUED | OUTPATIENT
Start: 2024-01-31 | End: 2024-02-01 | Stop reason: HOSPADM

## 2024-01-31 RX ORDER — SODIUM CHLORIDE 0.9 % (FLUSH) 0.9 %
5-40 SYRINGE (ML) INJECTION PRN
OUTPATIENT
Start: 2024-02-07

## 2024-01-31 RX ORDER — HEPARIN 100 UNIT/ML
500 SYRINGE INTRAVENOUS PRN
OUTPATIENT
Start: 2024-02-07

## 2024-01-31 RX ORDER — 0.9 % SODIUM CHLORIDE 0.9 %
1000 INTRAVENOUS SOLUTION INTRAVENOUS
Start: 2024-02-07

## 2024-01-31 RX ORDER — SODIUM CHLORIDE 9 MG/ML
5-250 INJECTION, SOLUTION INTRAVENOUS PRN
OUTPATIENT
Start: 2024-02-07

## 2024-01-31 RX ADMIN — SODIUM CHLORIDE 1000 ML: 9 INJECTION, SOLUTION INTRAVENOUS at 13:19

## 2024-01-31 RX ADMIN — SODIUM CHLORIDE, PRESERVATIVE FREE 20 ML: 5 INJECTION INTRAVENOUS at 14:50

## 2024-01-31 ASSESSMENT — PAIN SCALES - GENERAL: PAINLEVEL_OUTOF10: 0

## 2024-01-31 NOTE — PROGRESS NOTES
[facility] Progress Note    Date: 2024    Name: Sunny Lake Jr.    MRN: [id number]         : 1950    Mr. Lake Arrived ambulatory and in no distress for Hydration.  Assessment was completed, no acute issues at this time, no new complaints voiced.  Right chest wall port accessed without difficulty, labs drawn & sent for processing.      Mr. Lake's vitals were reviewed.  Vitals:    24 1430   BP: 128/79   Pulse:    Resp:    Temp:    SpO2:        Lab results were obtained and reviewed.  Recent Results (from the past 12 hour(s))   Comprehensive Metabolic Panel    Collection Time: 24  1:12 PM   Result Value Ref Range    Sodium 134 (L) 136 - 145 mmol/L    Potassium 3.6 3.5 - 5.1 mmol/L    Chloride 98 97 - 108 mmol/L    CO2 26 21 - 32 mmol/L    Anion Gap 10 5 - 15 mmol/L    Glucose 257 (H) 65 - 100 mg/dL    BUN 17 6 - 20 MG/DL    Creatinine 0.85 0.70 - 1.30 MG/DL    Bun/Cre Ratio 20 12 - 20      Est, Glom Filt Rate >60 >60 ml/min/1.73m2    Calcium 8.9 8.5 - 10.1 MG/DL    Total Bilirubin 1.5 (H) 0.2 - 1.0 MG/DL     (H) 12 - 78 U/L     (H) 15 - 37 U/L    Alk Phosphatase 877 (H) 45 - 117 U/L    Total Protein 7.0 6.4 - 8.2 g/dL    Albumin 2.3 (L) 3.5 - 5.0 g/dL    Globulin 4.7 (H) 2.0 - 4.0 g/dL    Albumin/Globulin Ratio 0.5 (L) 1.1 - 2.2         Medications:  Medications Administered         sodium chloride 0.9 % bolus 1,000 mL Admin Date  2024 Action  New Bag Dose  1,000 mL Rate  983.6 mL/hr Route  IntraVENous Administered By  Chely Mcnamara RN        sodium chloride flush 0.9 % injection 5-40 mL Admin Date  2024 Action  Given Dose  20 mL Rate   Route  IntraVENous Administered By  Jaqueline Gonsalez RN             NP notified of pts elevated LFTs. NP stated ok to discharge, and pt to return next week for lab re-check.     Mr. Lake tolerated treatment well and was discharged from Outpatient Infusion Center in stable condition.   Port de-accessed & flushed per protocol.

## 2024-02-07 ENCOUNTER — HOSPITAL ENCOUNTER (OUTPATIENT)
Facility: HOSPITAL | Age: 74
Setting detail: INFUSION SERIES
Discharge: HOME | End: 2024-02-07
Payer: MEDICARE

## 2024-02-07 ENCOUNTER — TELEPHONE (OUTPATIENT)
Age: 74
End: 2024-02-07

## 2024-02-07 ENCOUNTER — OFFICE VISIT (OUTPATIENT)
Age: 74
End: 2024-02-07
Payer: MEDICARE

## 2024-02-07 VITALS
OXYGEN SATURATION: 97 % | HEIGHT: 70 IN | DIASTOLIC BLOOD PRESSURE: 65 MMHG | TEMPERATURE: 97.7 F | BODY MASS INDEX: 28.2 KG/M2 | HEART RATE: 84 BPM | RESPIRATION RATE: 18 BRPM | WEIGHT: 197 LBS | SYSTOLIC BLOOD PRESSURE: 127 MMHG

## 2024-02-07 VITALS
WEIGHT: 197.1 LBS | OXYGEN SATURATION: 97 % | HEART RATE: 76 BPM | BODY MASS INDEX: 28.22 KG/M2 | DIASTOLIC BLOOD PRESSURE: 78 MMHG | TEMPERATURE: 97.7 F | HEIGHT: 70 IN | SYSTOLIC BLOOD PRESSURE: 145 MMHG | RESPIRATION RATE: 17 BRPM

## 2024-02-07 DIAGNOSIS — C7A.8 NEUROENDOCRINE CARCINOMA OF PANCREAS (HCC): ICD-10-CM

## 2024-02-07 DIAGNOSIS — R16.0 LIVER MASS: ICD-10-CM

## 2024-02-07 DIAGNOSIS — C78.7 METASTASIS TO LIVER (HCC): ICD-10-CM

## 2024-02-07 DIAGNOSIS — C25.0 MALIGNANT NEOPLASM OF HEAD OF PANCREAS (HCC): ICD-10-CM

## 2024-02-07 DIAGNOSIS — C80.1 SMALL CELL CARCINOMA (HCC): ICD-10-CM

## 2024-02-07 DIAGNOSIS — K86.89 PANCREATIC MASS: ICD-10-CM

## 2024-02-07 DIAGNOSIS — C25.0 MALIGNANT NEOPLASM OF HEAD OF PANCREAS (HCC): Primary | ICD-10-CM

## 2024-02-07 LAB
ALBUMIN SERPL-MCNC: 2.5 G/DL (ref 3.5–5)
ALBUMIN/GLOB SERPL: 0.5 (ref 1.1–2.2)
ALP SERPL-CCNC: 628 U/L (ref 45–117)
ALT SERPL-CCNC: 56 U/L (ref 12–78)
ANION GAP SERPL CALC-SCNC: 6 MMOL/L (ref 5–15)
AST SERPL-CCNC: 69 U/L (ref 15–37)
BASOPHILS # BLD: 0 K/UL (ref 0–0.1)
BASOPHILS NFR BLD: 0 % (ref 0–1)
BILIRUB SERPL-MCNC: 0.8 MG/DL (ref 0.2–1)
BUN SERPL-MCNC: 18 MG/DL (ref 6–20)
BUN/CREAT SERPL: 21 (ref 12–20)
CALCIUM SERPL-MCNC: 9.1 MG/DL (ref 8.5–10.1)
CHLORIDE SERPL-SCNC: 100 MMOL/L (ref 97–108)
CO2 SERPL-SCNC: 28 MMOL/L (ref 21–32)
CREAT SERPL-MCNC: 0.84 MG/DL (ref 0.7–1.3)
DIFFERENTIAL METHOD BLD: ABNORMAL
EOSINOPHIL # BLD: 0.1 K/UL (ref 0–0.4)
EOSINOPHIL NFR BLD: 1 % (ref 0–7)
ERYTHROCYTE [DISTWIDTH] IN BLOOD BY AUTOMATED COUNT: 17 % (ref 11.5–14.5)
GLOBULIN SER CALC-MCNC: 4.9 G/DL (ref 2–4)
GLUCOSE SERPL-MCNC: 224 MG/DL (ref 65–100)
HCT VFR BLD AUTO: 33.2 % (ref 36.6–50.3)
HGB BLD-MCNC: 10.9 G/DL (ref 12.1–17)
IMM GRANULOCYTES # BLD AUTO: 0.1 K/UL (ref 0–0.04)
IMM GRANULOCYTES NFR BLD AUTO: 1 % (ref 0–0.5)
LYMPHOCYTES # BLD: 0.6 K/UL (ref 0.8–3.5)
LYMPHOCYTES NFR BLD: 8 % (ref 12–49)
MCH RBC QN AUTO: 27.1 PG (ref 26–34)
MCHC RBC AUTO-ENTMCNC: 32.8 G/DL (ref 30–36.5)
MCV RBC AUTO: 82.6 FL (ref 80–99)
MONOCYTES # BLD: 0.6 K/UL (ref 0–1)
MONOCYTES NFR BLD: 8 % (ref 5–13)
NEUTS SEG # BLD: 5.7 K/UL (ref 1.8–8)
NEUTS SEG NFR BLD: 82 % (ref 32–75)
NRBC # BLD: 0 K/UL (ref 0–0.01)
NRBC BLD-RTO: 0 PER 100 WBC
PLATELET # BLD AUTO: 237 K/UL (ref 150–400)
PMV BLD AUTO: 9.7 FL (ref 8.9–12.9)
POTASSIUM SERPL-SCNC: 3.6 MMOL/L (ref 3.5–5.1)
PROT SERPL-MCNC: 7.4 G/DL (ref 6.4–8.2)
RBC # BLD AUTO: 4.02 M/UL (ref 4.1–5.7)
RBC MORPH BLD: ABNORMAL
SODIUM SERPL-SCNC: 134 MMOL/L (ref 136–145)
WBC # BLD AUTO: 7.1 K/UL (ref 4.1–11.1)

## 2024-02-07 PROCEDURE — 99215 OFFICE O/P EST HI 40 MIN: CPT | Performed by: NURSE PRACTITIONER

## 2024-02-07 PROCEDURE — 2580000003 HC RX 258: Performed by: NURSE PRACTITIONER

## 2024-02-07 PROCEDURE — 80053 COMPREHEN METABOLIC PANEL: CPT

## 2024-02-07 PROCEDURE — 85025 COMPLETE CBC W/AUTO DIFF WBC: CPT

## 2024-02-07 PROCEDURE — 96360 HYDRATION IV INFUSION INIT: CPT

## 2024-02-07 RX ORDER — 0.9 % SODIUM CHLORIDE 0.9 %
1000 INTRAVENOUS SOLUTION INTRAVENOUS ONCE
Status: COMPLETED | OUTPATIENT
Start: 2024-02-07 | End: 2024-02-07

## 2024-02-07 RX ADMIN — SODIUM CHLORIDE 1000 ML: 9 INJECTION, SOLUTION INTRAVENOUS at 10:31

## 2024-02-07 ASSESSMENT — PAIN SCALES - GENERAL: PAINLEVEL_OUTOF10: 0

## 2024-02-07 NOTE — PROGRESS NOTES
Cancer [place] at [facility]  [address], [address] [facility] [address]  W: [phone]  F: [phone]      Reason for Visit:   Sunny Lake Jr. is a 74 y.o. male who is seen today for evaluation of small cell carcinoma of the pancreas.    Hematology/Oncology Treatment History:   CT A/P 4/19/2022: Possible mild acute pancreatitis. Prominent sigmoid diverticulosis.  Enlarged prostate. Fat-containing hernias  CT Abdomen 5/17/2022: New intra-hepatic biliary dilatation and common bile duct dilatation. Ampullary prominence and 1.5 cm x 1.5 cm soft tissue density in the pancreaticoduodenal groove. New subcentimeter  hepatic hypodense lesions, worrisome for metastatic disease.   MRI Abdomen 5/17/2022:  Periampullary pancreatic mass with possible extension into the wall of the duodenum. Associated intrahepatic and extra hepatic biliary dilatation and mild pancreatic ductal dilatation. Small hyperenhancing lesions throughout the  liver suspicious for metastatic disease.  CT Chest 5/18/2022: no metastatic disease within thorax  ERCP by Dr. Cooley 5/18/2022: Large ulcerated mass seen in the second portion of the duodenum taking half the circumference in the area of the major papilla and extending distally about 3 to 4 cm, appeared to be pancreatic and eroding into the duodenum.  Cholangiogram with complete obstruction of CBD.  Metal stent deployed.  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 States he has been feeling poorly with nausea, weakness and fatigue.

## 2024-02-07 NOTE — PROGRESS NOTES
Spiritual Care Partner Volunteer visited patient at [facility] in [facility] on 2/7/2024    Documented by:  Chaplain Haley Manuel MS, MDiv, [facility]  Spiritual Health Services  Paging service: [phone] (ARABELLA)

## 2024-02-07 NOTE — PROGRESS NOTES
[facility] Chemo Progress Note    Date: February 7, 2024         Mr. Lake Arrived to [facility] for  Lurbinectedin(HELD) + Hydaration  ambulatory in stable condition.  Assessment was completed and port accessed by Sofia ODOM. Labs drawn and sent for processing. Went to provider appointment with Medical Oncology.        Mr. Lake's vitals were reviewed.  Patient Vitals for the past 12 hrs:   Temp Pulse Resp BP SpO2   02/07/24 0900 97.7 °F (36.5 °C) 84 17 127/65 97 %         Lab results were obtained and reviewed.  Recent Results (from the past 12 hour(s))   CBC With Auto Differential    Collection Time: 02/07/24  9:12 AM   Result Value Ref Range    WBC 7.1 4.1 - 11.1 K/uL    RBC 4.02 (L) 4.10 - 5.70 M/uL    Hemoglobin 10.9 (L) 12.1 - 17.0 g/dL    Hematocrit 33.2 (L) 36.6 - 50.3 %    MCV 82.6 80.0 - 99.0 FL    MCH 27.1 26.0 - 34.0 PG    MCHC 32.8 30.0 - 36.5 g/dL    RDW 17.0 (H) 11.5 - 14.5 %    Platelets 237 150 - 400 K/uL    MPV 9.7 8.9 - 12.9 FL    Nucleated RBCs 0.0 0  WBC    nRBC 0.00 0.00 - 0.01 K/uL    Neutrophils % 82 (H) 32 - 75 %    Lymphocytes % 8 (L) 12 - 49 %    Monocytes % 8 5 - 13 %    Eosinophils % 1 0 - 7 %    Basophils % 0 0 - 1 %    Immature Granulocytes 1 (H) 0.0 - 0.5 %    Neutrophils Absolute 5.7 1.8 - 8.0 K/UL    Lymphocytes Absolute 0.6 (L) 0.8 - 3.5 K/UL    Monocytes Absolute 0.6 0.0 - 1.0 K/UL    Eosinophils Absolute 0.1 0.0 - 0.4 K/UL    Basophils Absolute 0.0 0.0 - 0.1 K/UL    Absolute Immature Granulocyte 0.1 (H) 0.00 - 0.04 K/UL    Differential Type SMEAR SCANNED      RBC Comment ANISOCYTOSIS  1+       Comprehensive metabolic panel    Collection Time: 02/07/24  9:12 AM   Result Value Ref Range    Sodium 134 (L) 136 - 145 mmol/L    Potassium 3.6 3.5 - 5.1 mmol/L    Chloride 100 97 - 108 mmol/L    CO2 28 21 - 32 mmol/L    Anion Gap 6 5 - 15 mmol/L    Glucose 224 (H) 65 - 100 mg/dL    BUN 18 6 - 20 MG/DL    Creatinine 0.84 0.70 - 1.30 MG/DL    Bun/Cre Ratio 21 (H) 12 - 20      Est, Glom Filt Rate

## 2024-02-07 NOTE — TELEPHONE ENCOUNTER
[place] [place] at [facility]  [phone]    02/07/24- Lightweight wheelchair ordered through [employer].     02/12/24- Per [employer] portal, lightweight wheelchair was delivered on 2/8.

## 2024-02-07 NOTE — PROGRESS NOTES
Sunny Lake Jr. is a 74 y.o. male follow up for SCC pancreas.      1. Have you been to the ER, urgent care clinic since your last visit?  Hospitalized since your last visit?no    2. Have you seen or consulted any other health care providers outside of the [facility] since your last visit?  Include any pap smears or colon screening. No

## 2024-02-08 NOTE — TELEPHONE ENCOUNTER
Denzel Solis at [facility]  [phone]    12/12/23- Phone call placed to pt to remind pt to have CT prior to his follow up appointment with . Pt verbalized understanding.
Attending Attestation (For Attendings USE Only)...

## 2024-02-11 ENCOUNTER — HOSPITAL ENCOUNTER (OUTPATIENT)
Facility: HOSPITAL | Age: 74
Discharge: HOME | End: 2024-02-14
Payer: MEDICARE

## 2024-02-11 DIAGNOSIS — C78.7 METASTASIS TO LIVER (HCC): ICD-10-CM

## 2024-02-11 DIAGNOSIS — C25.0 MALIGNANT NEOPLASM OF HEAD OF PANCREAS (HCC): ICD-10-CM

## 2024-02-11 DIAGNOSIS — C7A.8 NEUROENDOCRINE CARCINOMA OF PANCREAS (HCC): ICD-10-CM

## 2024-02-11 PROCEDURE — 74177 CT ABD & PELVIS W/CONTRAST: CPT

## 2024-02-11 PROCEDURE — 6360000004 HC RX CONTRAST MEDICATION: Performed by: FAMILY MEDICINE

## 2024-02-11 RX ADMIN — IOPAMIDOL 100 ML: 755 INJECTION, SOLUTION INTRAVENOUS at 16:22

## 2024-02-14 ENCOUNTER — TELEPHONE (OUTPATIENT)
Age: 74
End: 2024-02-14

## 2024-02-14 NOTE — TELEPHONE ENCOUNTER
[place] [place] at [facility]  [phone]    02/14/24-Phone call returned to wife/patient to get more information on how patient is feeling.VM left to return phone call.

## 2024-02-14 NOTE — TELEPHONE ENCOUNTER
Patients wife called and stated that patient does not want to come in today and come tomorrow. The wife stated that he is not feeling well today and that he just does not want to come today.  #[phone]

## 2024-02-15 ENCOUNTER — OFFICE VISIT (OUTPATIENT)
Age: 74
End: 2024-02-15
Payer: MEDICARE

## 2024-02-15 VITALS
DIASTOLIC BLOOD PRESSURE: 85 MMHG | HEART RATE: 72 BPM | OXYGEN SATURATION: 97 % | RESPIRATION RATE: 18 BRPM | SYSTOLIC BLOOD PRESSURE: 113 MMHG | TEMPERATURE: 98.2 F

## 2024-02-15 DIAGNOSIS — C78.7 METASTASIS TO LIVER (HCC): ICD-10-CM

## 2024-02-15 DIAGNOSIS — C25.0 MALIGNANT NEOPLASM OF HEAD OF PANCREAS (HCC): Primary | ICD-10-CM

## 2024-02-15 PROCEDURE — 99215 OFFICE O/P EST HI 40 MIN: CPT | Performed by: NURSE PRACTITIONER

## 2024-02-15 PROCEDURE — 1123F ACP DISCUSS/DSCN MKR DOCD: CPT | Performed by: NURSE PRACTITIONER

## 2024-02-15 PROCEDURE — 3074F SYST BP LT 130 MM HG: CPT | Performed by: NURSE PRACTITIONER

## 2024-02-15 PROCEDURE — G8427 DOCREV CUR MEDS BY ELIG CLIN: HCPCS | Performed by: NURSE PRACTITIONER

## 2024-02-15 PROCEDURE — 3079F DIAST BP 80-89 MM HG: CPT | Performed by: NURSE PRACTITIONER

## 2024-02-15 PROCEDURE — 3017F COLORECTAL CA SCREEN DOC REV: CPT | Performed by: NURSE PRACTITIONER

## 2024-02-15 PROCEDURE — G8484 FLU IMMUNIZE NO ADMIN: HCPCS | Performed by: NURSE PRACTITIONER

## 2024-02-15 PROCEDURE — G8417 CALC BMI ABV UP PARAM F/U: HCPCS | Performed by: NURSE PRACTITIONER

## 2024-02-15 PROCEDURE — 1036F TOBACCO NON-USER: CPT | Performed by: NURSE PRACTITIONER

## 2024-02-15 NOTE — TELEPHONE ENCOUNTER
[facility] [place] at [facility]  [phone]    02/15/24- Spoke to patient's wife, she stated patient isn't doing well, but would like to come in today to discuss CT results and next steps.  He doesn't feel like he needs fluids.      Patient's wife stated she has a connection with [facility] and met with their  yesterday.  Patient was feeling anxious about hospice and she's hoping this eased some of his fears.  She wanted to ask Dr. Solis if he knows Dr. Mosher, who's [facility]'s medical director and would be following patient.  Let her know we can discuss hospice and answer questions at his visit today as well.  She was appreciative.  Appointment scheduled for 1:15 pm.

## 2024-02-15 NOTE — PROGRESS NOTES
DM  Follows endocrinology, Dr. Calvo. Now on continuous blood sugar monitoring. BS up today. Reduced insulin due to weight loss.        Pancreatic insufficiency  Continue Creon, taking 6-8 daily now.        Hypokalemia  Due to diarrhea, chemotherapy. Will replete in infusion center PRN. Continue KCL 20MEQ daily in the home, intermittent dosing.       Chemotherapy induced nausea  Continue Zofran and compazine PRN.      Constipation  Take Miralax twice daily, every day.  Take docusate BID.        CODE STATUS: DNR  Discussed 10/25/2023, signed DDNR on file.      Foul smelling urine  New in the last few days. Offered obtaining urine today. Patient declined, consider checking at home under hospice care.     Plan:     Cancel treatment  Transition to hospice, patient's wife is affiliated with hospice company and will call to enroll  Follow up PRN    Patient discussed with and seen by Dr. Osvaldo Solis on day of visit.       Signed By: YI Taylor NP

## 2024-02-16 NOTE — TELEPHONE ENCOUNTER
Jessica from [facility] called on behalf of patient to request records, demo sheet, order for hospice, scans, and last visits notes.  Fax#[phone]

## 2024-02-16 NOTE — TELEPHONE ENCOUNTER
[facility] [place] at [facility]  [phone]    02/16/24- Records faxed to [facility], confirmation received.

## 2024-02-21 ENCOUNTER — HOSPITAL ENCOUNTER (OUTPATIENT)
Facility: HOSPITAL | Age: 74
Setting detail: INFUSION SERIES
End: 2024-02-21

## 2024-02-22 ENCOUNTER — TELEPHONE (OUTPATIENT)
Age: 74
End: 2024-02-22

## 2024-02-22 NOTE — TELEPHONE ENCOUNTER
[facility] [place] at [facility]  [phone]    02/22/24- Phone call placed to pt to check in under hospice care. VM left.

## 2024-02-26 ENCOUNTER — TELEPHONE (OUTPATIENT)
Age: 74
End: 2024-02-26

## 2024-02-26 NOTE — TELEPHONE ENCOUNTER
Call to patient's home and wife's cell phone. Left message on wife's cell phone to let her know we were thinking of them and encouraged her to call if there were any needs we can assist with.

## 2024-03-12 ENCOUNTER — TELEPHONE (OUTPATIENT)
Age: 74
End: 2024-03-12

## 2024-03-12 NOTE — TELEPHONE ENCOUNTER
[place] [facility] [place] at [facility]  [phone]    03/12/24- Phone call placed to pt/ wife to check in while under hospice care. VM left.      03/20/24- Spoke to wife she reported pt passed away at home on 3/3/24 surrounded by family. She expressed sadness since his passing and verbalized gratitude for our care. Her family has been with her all month and just left yesterday. She feels that she has adequate emotional support. She was very appreciative of the call. Team informed.

## 2024-03-13 NOTE — PROGRESS NOTES
CCPD tx completed, pt tolerated tx well.  No issues overnight.  Effluent clear and yellow with few fibrin.  Tx started at 1900 and ended at 0500.  Pt with last fill of 2.5 Liters and dwell for 2 hours.  Last fill dwell completed at 0730; Manual drain initiated to drain last fill, no issues. Post VS WNL, report given to Primary RN.    Manual Drain Total:  3,442 mL       03/13/24 0745   PD Catheter Right lower abdomen   No placement date or time found.   Present at Time of Admission: Yes  Catheter Location: Right lower abdomen   Site Assessment WDL   Dressing Type Gauze;Adhesive bandage   Dressing Activity Other (Comment)  (assessed)   Dressing Changed On   03/12/24   Site Care Date 03/12/24   Lumen Cap(s) Changed On 03/13/24   Peritoneal Dialysis Cycler   Initial Drain (mL) 5 mL   Total Ultrafiltration (mL) 1046 mL   Average Dwell (Hours) 1 Hours   Average Dwell (Minutes) 18 Minutes   Lost Dwell 0.26   PD Balance this Exchange   Balance this Exchange (mL) 1051 mL        Michael Ag. is a 81 y.o. male follow up for SCC of pancreas. 1. Have you been to the ER, urgent care clinic since your last visit? Hospitalized since your last visit?no     2. Have you seen or consulted any other health care providers outside of the [address] [place] [address] since your last visit? Include any pap smears or colon screening.  No    Vitals 9/14/2022   Blood Pressure 131/62   Pulse 56   Temp 98   Resp 18   Height 5' 10\"   Weight 245 lb 1.6 oz   SpO2    BSA 2.34 m2   BMI 35.17 kg/m2

## 2025-06-05 NOTE — PROGRESS NOTES
Addended by: JULIOCESAR CONLEY on: 6/5/2025 04:15 PM     Modules accepted: Orders     Forrest Sandoval is a 72 y.o. male follow up for small cell of pancreas. 1. Have you been to the ER, urgent care clinic since your last visit? Hospitalized since your last visit?no     2. Have you seen or consulted any other health care providers outside of the [address] [place] [address] Avenue since your last visit? Include any pap smears or colon screening.  No  Vitals 0/0/0729   SYSTOLIC 014   DIASTOLIC 69   Pulse 60   Temp 98.2   Resp 18   SpO2 96   Weight 230 lb 14.4 oz   Height 5' 10.5\"   Body Mass Index 32.66 kg/m2   Pain Level 0

## (undated) DEVICE — BLUNTFILL WITH FILTER: Brand: MONOJECT

## (undated) DEVICE — FORCEPS BX L240CM JAW DIA2.8MM L CAP W/ NDL MIC MESH TOOTH

## (undated) DEVICE — RETRIEVAL BALLOON CATHETER: Brand: EXTRACTOR™ PRO RX

## (undated) DEVICE — ECHOTIP ULTRA, ENDOSCOPIC ULTRASOUND NEEDLE: Brand: ECHOTIP

## (undated) DEVICE — SOLIDIFIER MEDC 1200ML -- CONVERT TO 356117

## (undated) DEVICE — SET GRAV CK VLV NEEDLESS ST 3 GANGED 4WAY STPCOCK HI FLO 10

## (undated) DEVICE — BALLOON DILATATION CATHETER: Brand: HURRICANE™ RX

## (undated) DEVICE — SYR 3ML LL TIP 1/10ML GRAD --

## (undated) DEVICE — BLOCK BITE BLOX W DENTAL RIM --

## (undated) DEVICE — BAG BELONG PT PERS CLEAR HANDL

## (undated) DEVICE — KIT,1200CC CANISTER,3/16"X6' TUBING: Brand: MEDLINE INDUSTRIES, INC.

## (undated) DEVICE — ELECTRODE,RADIOTRANSLUCENT,FOAM,3PK: Brand: MEDLINE

## (undated) DEVICE — SYR 50ML SLIP TIP NSAF LF STRL --

## (undated) DEVICE — Device

## (undated) DEVICE — TUBING, SUCTION, 1/4" X 10', STRAIGHT: Brand: MEDLINE

## (undated) DEVICE — COVER LT HNDL PLAS RIG 1 PER PK

## (undated) DEVICE — INFLATION DEVICE: Brand: ENCORE 26

## (undated) DEVICE — CUFF RMFG BP INF SZ 11 DISP -- LAWSON OEM ITEM 238915

## (undated) DEVICE — POSITIONER HD REST SUPINE LAT

## (undated) DEVICE — BAG SPEC BIOHZRD 10 X 10 IN --

## (undated) DEVICE — HYPODERMIC SAFETY NEEDLE: Brand: MAGELLAN

## (undated) DEVICE — KIT COLON W/ 1.1OZ LUB AND 2 END

## (undated) DEVICE — SOLUTION IRRIG 1000ML STRL H2O USP PLAS POUR BTL

## (undated) DEVICE — ENDO CARRY-ON PROCEDURE KIT INCLUDES ENZYMATIC SPONGE, GAUZE, BIOHAZARD LABEL, TRAY, LUBRICANT, DIRTY SCOPE LABEL, WATER LABEL, TRAY, DRAWSTRING PAD, AND DEFENDO 4-PIECE KIT.: Brand: ENDO CARRY-ON PROCEDURE KIT

## (undated) DEVICE — ADULT SPO2 SENSOR,REMANUFACTURED,REPROCESSED DEVICE FOR SINGLE USE; REPROCESSED BY COVIDIEN LLC: Brand: NELLCOR

## (undated) DEVICE — CANNULATING SPHINCTEROTOME: Brand: JAGTOME™ REVOLUTION RX

## (undated) DEVICE — CANNULA CUSH AD W/ 14FT TBG

## (undated) DEVICE — 1200 GUARD II KIT W/5MM TUBE W/O VAC TUBE: Brand: GUARDIAN

## (undated) DEVICE — Device: Brand: SINGLE USE ASPIRATION NEEDLE

## (undated) DEVICE — SNARE ENDOSCP M L240CM W27MM SHTH DIA2.4MM CHN 2.8MM OVL

## (undated) DEVICE — BW-412T DISP COMBO CLEANING BRUSH: Brand: SINGLE USE COMBINATION CLEANING BRUSH

## (undated) DEVICE — BASIN EMSIS 16OZ GRAPHITE PLAS KID SHP MOLD GRAD FOR ORAL

## (undated) DEVICE — (D)FCPS GRSP 9MM 230CMLX2.4MM -- DISC BY MFR

## (undated) DEVICE — DEVICE LCK BILI RAP EXCHG OLPS --

## (undated) DEVICE — BLUNTFILL: Brand: MONOJECT

## (undated) DEVICE — BITEBLOCK ENDOSCP 60FR MAXI WHT POLYETH STURDY W/ VELC WVN

## (undated) DEVICE — SET ADMIN 16ML TBNG L100IN 2 Y INJ SITE IV PIGGY BK DISP

## (undated) DEVICE — REM POLYHESIVE ADULT PATIENT RETURN ELECTRODE: Brand: VALLEYLAB

## (undated) DEVICE — TRIPLE LUMEN NEEDLE KNIFE: Brand: RX NEEDLE KNIFE XL

## (undated) DEVICE — DRESSING ANTIMIC FOAM OPTIFOAM POSTOP ADH 4 X 6 IN

## (undated) DEVICE — GUIDEWIRE ENDOSCP WRK L450CM DIA0.035IN STD SHFT STR RND

## (undated) DEVICE — SYR 5ML 1/5 GRAD LL NSAF LF --

## (undated) DEVICE — Device: Brand: ENDO SMARTCAP

## (undated) DEVICE — CONTAINER SPEC 20 ML LID NEUT BUFF FORMALIN 10 % POLYPR STS